# Patient Record
Sex: FEMALE | Race: WHITE | NOT HISPANIC OR LATINO | Employment: FULL TIME | URBAN - METROPOLITAN AREA
[De-identification: names, ages, dates, MRNs, and addresses within clinical notes are randomized per-mention and may not be internally consistent; named-entity substitution may affect disease eponyms.]

---

## 2017-01-17 DIAGNOSIS — D68.61 ANTIPHOSPHOLIPID SYNDROME (HCC): ICD-10-CM

## 2017-01-17 DIAGNOSIS — Z12.31 ENCOUNTER FOR SCREENING MAMMOGRAM FOR MALIGNANT NEOPLASM OF BREAST: ICD-10-CM

## 2017-01-17 DIAGNOSIS — R92.2 INCONCLUSIVE MAMMOGRAM: ICD-10-CM

## 2017-01-17 DIAGNOSIS — M35.89 OTHER SPECIFIED SYSTEMIC INVOLVEMENT OF CONNECTIVE TISSUE (HCC): ICD-10-CM

## 2017-01-17 DIAGNOSIS — M94.0 CHONDROCOSTAL JUNCTION SYNDROME: ICD-10-CM

## 2017-01-17 DIAGNOSIS — Z13.6 ENCOUNTER FOR SCREENING FOR CARDIOVASCULAR DISORDERS: ICD-10-CM

## 2017-01-17 DIAGNOSIS — M54.50 LOW BACK PAIN: ICD-10-CM

## 2017-01-17 DIAGNOSIS — Z79.52 LONG TERM CURRENT USE OF SYSTEMIC STEROIDS: ICD-10-CM

## 2017-01-17 DIAGNOSIS — Z79.899 OTHER LONG TERM (CURRENT) DRUG THERAPY: ICD-10-CM

## 2017-02-06 ENCOUNTER — HOSPITAL ENCOUNTER (EMERGENCY)
Facility: HOSPITAL | Age: 47
Discharge: HOME | End: 2017-02-06
Attending: EMERGENCY MEDICINE
Payer: OTHER MISCELLANEOUS

## 2017-02-06 VITALS
BODY MASS INDEX: 23.54 KG/M2 | HEART RATE: 88 BPM | DIASTOLIC BLOOD PRESSURE: 72 MMHG | HEIGHT: 67 IN | OXYGEN SATURATION: 100 % | WEIGHT: 150 LBS | SYSTOLIC BLOOD PRESSURE: 136 MMHG | RESPIRATION RATE: 20 BRPM | TEMPERATURE: 97.6 F

## 2017-02-06 DIAGNOSIS — M54.16 LUMBAR RADICULOPATHY: Primary | ICD-10-CM
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
## 2017-02-06 PROCEDURE — 99283 EMERGENCY DEPT VISIT LOW MDM: CPT

## 2017-02-06 RX ORDER — PREDNISONE 1 MG/1
5 TABLET ORAL EVERY OTHER DAY
COMMUNITY
End: 2017-12-12 | Stop reason: HOSPADM

## 2017-02-06 RX ORDER — CYCLOBENZAPRINE HCL 10 MG
10 TABLET ORAL 3 TIMES DAILY PRN
Qty: 10 TABLET | Refills: 0 | Status: SHIPPED | OUTPATIENT
Start: 2017-02-06 | End: 2018-02-09

## 2017-02-06 RX ORDER — PREDNISONE 20 MG/1
60 TABLET ORAL ONCE
Status: COMPLETED | OUTPATIENT
Start: 2017-02-06 | End: 2017-02-06

## 2017-02-06 RX ORDER — PREDNISONE 1 MG/1
TABLET ORAL
Qty: 28 TABLET | Refills: 0 | Status: SHIPPED | OUTPATIENT
Start: 2017-02-06 | End: 2017-12-12 | Stop reason: HOSPADM

## 2017-02-06 RX ORDER — CYCLOBENZAPRINE HCL 10 MG
10 TABLET ORAL ONCE
Status: COMPLETED | OUTPATIENT
Start: 2017-02-06 | End: 2017-02-06

## 2017-02-06 RX ORDER — OXYCODONE HYDROCHLORIDE AND ACETAMINOPHEN 5; 325 MG/1; MG/1
1 TABLET ORAL EVERY 4 HOURS PRN
Qty: 15 TABLET | Refills: 0 | Status: SHIPPED | OUTPATIENT
Start: 2017-02-06 | End: 2017-12-12 | Stop reason: HOSPADM

## 2017-02-06 RX ORDER — OXYCODONE HYDROCHLORIDE AND ACETAMINOPHEN 5; 325 MG/1; MG/1
1 TABLET ORAL ONCE
Status: COMPLETED | OUTPATIENT
Start: 2017-02-06 | End: 2017-02-06

## 2017-02-06 RX ADMIN — OXYCODONE HYDROCHLORIDE AND ACETAMINOPHEN 1 TABLET: 5; 325 TABLET ORAL at 02:47

## 2017-02-06 RX ADMIN — PREDNISONE 60 MG: 20 TABLET ORAL at 02:47

## 2017-02-06 RX ADMIN — CYCLOBENZAPRINE HYDROCHLORIDE 10 MG: 10 TABLET, FILM COATED ORAL at 02:47

## 2017-02-10 ENCOUNTER — HOSPITAL ENCOUNTER (OUTPATIENT)
Dept: RADIOLOGY | Age: 47
Discharge: HOME | End: 2017-02-10
Payer: OTHER MISCELLANEOUS

## 2017-02-10 ENCOUNTER — GENERIC CONVERSION - ENCOUNTER (OUTPATIENT)
Dept: OTHER | Facility: OTHER | Age: 47
End: 2017-02-10

## 2017-02-10 ENCOUNTER — ALLSCRIPTS OFFICE VISIT (OUTPATIENT)
Dept: OTHER | Facility: OTHER | Age: 47
End: 2017-02-10

## 2017-02-10 ENCOUNTER — APPOINTMENT (OUTPATIENT)
Dept: URGENT CARE | Age: 47
End: 2017-02-10
Payer: OTHER MISCELLANEOUS

## 2017-02-10 ENCOUNTER — TRANSCRIBE ORDERS (OUTPATIENT)
Dept: ADMINISTRATIVE | Facility: HOSPITAL | Age: 47
End: 2017-02-10

## 2017-02-10 DIAGNOSIS — M54.50 LUMBAR PAIN: ICD-10-CM

## 2017-02-10 DIAGNOSIS — Z12.31 OTHER SCREENING MAMMOGRAM: Primary | ICD-10-CM

## 2017-02-10 DIAGNOSIS — Z12.31 OTHER SCREENING MAMMOGRAM: ICD-10-CM

## 2017-02-10 PROCEDURE — 72100 X-RAY EXAM L-S SPINE 2/3 VWS: CPT

## 2017-02-10 PROCEDURE — 99213 OFFICE O/P EST LOW 20 MIN: CPT

## 2017-02-13 ENCOUNTER — APPOINTMENT (OUTPATIENT)
Dept: LAB | Facility: HOSPITAL | Age: 47
End: 2017-02-13
Payer: COMMERCIAL

## 2017-02-13 ENCOUNTER — GENERIC CONVERSION - ENCOUNTER (OUTPATIENT)
Dept: OTHER | Facility: OTHER | Age: 47
End: 2017-02-13

## 2017-02-13 DIAGNOSIS — D68.61 ANTIPHOSPHOLIPID SYNDROME (HCC): ICD-10-CM

## 2017-02-13 DIAGNOSIS — Z79.899 OTHER LONG TERM (CURRENT) DRUG THERAPY: ICD-10-CM

## 2017-02-13 DIAGNOSIS — M54.50 LOW BACK PAIN: ICD-10-CM

## 2017-02-13 DIAGNOSIS — Z79.52 LONG TERM CURRENT USE OF SYSTEMIC STEROIDS: ICD-10-CM

## 2017-02-13 DIAGNOSIS — M35.89 OTHER SPECIFIED SYSTEMIC INVOLVEMENT OF CONNECTIVE TISSUE (HCC): ICD-10-CM

## 2017-02-13 DIAGNOSIS — Z13.6 ENCOUNTER FOR SCREENING FOR CARDIOVASCULAR DISORDERS: ICD-10-CM

## 2017-02-13 DIAGNOSIS — M94.0 CHONDROCOSTAL JUNCTION SYNDROME: ICD-10-CM

## 2017-02-13 LAB
ALBUMIN SERPL BCP-MCNC: 3.6 G/DL (ref 3.5–5)
ALP SERPL-CCNC: 57 U/L (ref 46–116)
ALT SERPL W P-5'-P-CCNC: 19 U/L (ref 12–78)
ANION GAP SERPL CALCULATED.3IONS-SCNC: 7 MMOL/L (ref 4–13)
AST SERPL W P-5'-P-CCNC: 14 U/L (ref 5–45)
BASOPHILS # BLD AUTO: 0 THOUSANDS/ΜL (ref 0–0.1)
BASOPHILS NFR BLD AUTO: 1 % (ref 0–1)
BILIRUB SERPL-MCNC: 0.2 MG/DL (ref 0.2–1)
BILIRUB UR QL STRIP: NEGATIVE
BUN SERPL-MCNC: 13 MG/DL (ref 5–25)
CALCIUM SERPL-MCNC: 8.5 MG/DL (ref 8.3–10.1)
CHLORIDE SERPL-SCNC: 102 MMOL/L (ref 100–108)
CHOLEST SERPL-MCNC: 141 MG/DL (ref 50–200)
CLARITY UR: CLEAR
CO2 SERPL-SCNC: 29 MMOL/L (ref 21–32)
COLOR UR: YELLOW
CREAT SERPL-MCNC: 0.75 MG/DL (ref 0.6–1.3)
CREAT UR-MCNC: 234 MG/DL
CRP SERPL QL: <3 MG/L
EOSINOPHIL # BLD AUTO: 0.2 THOUSAND/ΜL (ref 0–0.61)
EOSINOPHIL NFR BLD AUTO: 4 % (ref 0–6)
ERYTHROCYTE [DISTWIDTH] IN BLOOD BY AUTOMATED COUNT: 12.9 % (ref 11.6–15.1)
ERYTHROCYTE [SEDIMENTATION RATE] IN BLOOD: 11 MM/HOUR (ref 2–25)
GFR SERPL CREATININE-BSD FRML MDRD: >60 ML/MIN/1.73SQ M
GLUCOSE SERPL-MCNC: 86 MG/DL (ref 65–140)
GLUCOSE UR STRIP-MCNC: NEGATIVE MG/DL
HCT VFR BLD AUTO: 37.2 % (ref 37–47)
HDLC SERPL-MCNC: 58 MG/DL (ref 40–60)
HGB BLD-MCNC: 12.4 G/DL (ref 12–16)
HGB UR QL STRIP.AUTO: NEGATIVE
KETONES UR STRIP-MCNC: ABNORMAL MG/DL
LDLC SERPL CALC-MCNC: 74 MG/DL (ref 0–100)
LEUKOCYTE ESTERASE UR QL STRIP: NEGATIVE
LYMPHOCYTES # BLD AUTO: 1.4 THOUSANDS/ΜL (ref 0.6–4.47)
LYMPHOCYTES NFR BLD AUTO: 33 % (ref 14–44)
MCH RBC QN AUTO: 30.3 PG (ref 27–31)
MCHC RBC AUTO-ENTMCNC: 33.5 G/DL (ref 31.4–37.4)
MCV RBC AUTO: 91 FL (ref 82–98)
MONOCYTES # BLD AUTO: 0.6 THOUSAND/ΜL (ref 0.17–1.22)
MONOCYTES NFR BLD AUTO: 15 % (ref 4–12)
NEUTROPHILS # BLD AUTO: 2 THOUSANDS/ΜL (ref 1.85–7.62)
NEUTS SEG NFR BLD AUTO: 47 % (ref 43–75)
NITRITE UR QL STRIP: NEGATIVE
NRBC BLD AUTO-RTO: 0 /100 WBCS
PH UR STRIP.AUTO: 5.5 [PH] (ref 5–9)
PLATELET # BLD AUTO: 246 THOUSANDS/UL (ref 130–400)
PMV BLD AUTO: 6.7 FL (ref 8.9–12.7)
POTASSIUM SERPL-SCNC: 3.3 MMOL/L (ref 3.5–5.3)
PROT SERPL-MCNC: 7.4 G/DL (ref 6.4–8.2)
PROT UR STRIP-MCNC: NEGATIVE MG/DL
PROT UR-MCNC: 21 MG/DL
PROT/CREAT UR: 0.09 MG/G{CREAT} (ref 0–0.1)
RBC # BLD AUTO: 4.11 MILLION/UL (ref 4.2–5.4)
SODIUM SERPL-SCNC: 138 MMOL/L (ref 136–145)
SP GR UR STRIP.AUTO: >=1.03 (ref 1–1.03)
TRIGL SERPL-MCNC: 47 MG/DL
TSH SERPL DL<=0.05 MIU/L-ACNC: 3.68 UIU/ML (ref 0.36–3.74)
UROBILINOGEN UR QL STRIP.AUTO: 0.2 E.U./DL
WBC # BLD AUTO: 4.2 THOUSAND/UL (ref 4.8–10.8)

## 2017-02-13 PROCEDURE — 80053 COMPREHEN METABOLIC PANEL: CPT

## 2017-02-13 PROCEDURE — 80061 LIPID PANEL: CPT

## 2017-02-13 PROCEDURE — 86147 CARDIOLIPIN ANTIBODY EA IG: CPT

## 2017-02-13 PROCEDURE — 82570 ASSAY OF URINE CREATININE: CPT

## 2017-02-13 PROCEDURE — 81003 URINALYSIS AUTO W/O SCOPE: CPT

## 2017-02-13 PROCEDURE — 84156 ASSAY OF PROTEIN URINE: CPT

## 2017-02-13 PROCEDURE — 86140 C-REACTIVE PROTEIN: CPT

## 2017-02-13 PROCEDURE — 84443 ASSAY THYROID STIM HORMONE: CPT

## 2017-02-13 PROCEDURE — 85652 RBC SED RATE AUTOMATED: CPT

## 2017-02-13 PROCEDURE — 85025 COMPLETE CBC W/AUTO DIFF WBC: CPT

## 2017-02-13 PROCEDURE — 36415 COLL VENOUS BLD VENIPUNCTURE: CPT

## 2017-02-14 LAB
CARDIOLIPIN IGA SER IA-ACNC: <9 APL U/ML (ref 0–11)
CARDIOLIPIN IGG SER IA-ACNC: <9 GPL U/ML (ref 0–14)
CARDIOLIPIN IGM SER IA-ACNC: 42 MPL U/ML (ref 0–12)

## 2017-02-22 ENCOUNTER — ALLSCRIPTS OFFICE VISIT (OUTPATIENT)
Dept: OTHER | Facility: OTHER | Age: 47
End: 2017-02-22

## 2017-02-28 ENCOUNTER — GENERIC CONVERSION - ENCOUNTER (OUTPATIENT)
Dept: OTHER | Facility: OTHER | Age: 47
End: 2017-02-28

## 2017-02-28 ENCOUNTER — HOSPITAL ENCOUNTER (OUTPATIENT)
Dept: MAMMOGRAPHY | Facility: HOSPITAL | Age: 47
Discharge: HOME | End: 2017-02-28
Payer: COMMERCIAL

## 2017-02-28 DIAGNOSIS — Z12.31 ENCOUNTER FOR SCREENING MAMMOGRAM FOR MALIGNANT NEOPLASM OF BREAST: ICD-10-CM

## 2017-02-28 DIAGNOSIS — R92.2 INCONCLUSIVE MAMMOGRAM: ICD-10-CM

## 2017-02-28 PROCEDURE — G0202 SCR MAMMO BI INCL CAD: HCPCS

## 2017-05-11 ENCOUNTER — ALLSCRIPTS OFFICE VISIT (OUTPATIENT)
Dept: OTHER | Facility: OTHER | Age: 47
End: 2017-05-11

## 2017-05-11 DIAGNOSIS — Z79.52 LONG TERM CURRENT USE OF SYSTEMIC STEROIDS: ICD-10-CM

## 2017-05-11 DIAGNOSIS — Z79.899 OTHER LONG TERM (CURRENT) DRUG THERAPY: ICD-10-CM

## 2017-05-11 DIAGNOSIS — D68.61 ANTIPHOSPHOLIPID SYNDROME (HCC): ICD-10-CM

## 2017-05-11 DIAGNOSIS — M35.89 OTHER SPECIFIED SYSTEMIC INVOLVEMENT OF CONNECTIVE TISSUE (HCC): ICD-10-CM

## 2017-06-05 ENCOUNTER — TRANSCRIBE ORDERS (OUTPATIENT)
Dept: ADMINISTRATIVE | Facility: HOSPITAL | Age: 47
End: 2017-06-05

## 2017-06-05 ENCOUNTER — LAB (OUTPATIENT)
Dept: LAB | Facility: HOSPITAL | Age: 47
End: 2017-06-05
Payer: COMMERCIAL

## 2017-06-05 DIAGNOSIS — Z79.899 OTHER LONG TERM (CURRENT) DRUG THERAPY: ICD-10-CM

## 2017-06-05 DIAGNOSIS — M35.89 OTHER SPECIFIED SYSTEMIC INVOLVEMENT OF CONNECTIVE TISSUE (HCC): ICD-10-CM

## 2017-06-05 DIAGNOSIS — D68.61 ANTIPHOSPHOLIPID SYNDROME (HCC): ICD-10-CM

## 2017-06-05 DIAGNOSIS — Z79.52 LONG TERM CURRENT USE OF SYSTEMIC STEROIDS: ICD-10-CM

## 2017-06-05 LAB
ALBUMIN SERPL BCP-MCNC: 3.6 G/DL (ref 3.5–5)
ALP SERPL-CCNC: 45 U/L (ref 46–116)
ALT SERPL W P-5'-P-CCNC: 21 U/L (ref 12–78)
ANION GAP SERPL CALCULATED.3IONS-SCNC: 8 MMOL/L (ref 4–13)
AST SERPL W P-5'-P-CCNC: 17 U/L (ref 5–45)
BACTERIA UR QL AUTO: ABNORMAL /HPF
BASOPHILS # BLD AUTO: 0 THOUSANDS/ΜL (ref 0–0.1)
BASOPHILS NFR BLD AUTO: 0 % (ref 0–1)
BILIRUB SERPL-MCNC: 0.5 MG/DL (ref 0.2–1)
BILIRUB UR QL STRIP: NEGATIVE
BUN SERPL-MCNC: 9 MG/DL (ref 5–25)
CALCIUM SERPL-MCNC: 8.5 MG/DL (ref 8.3–10.1)
CHLORIDE SERPL-SCNC: 104 MMOL/L (ref 100–108)
CLARITY UR: CLEAR
CO2 SERPL-SCNC: 27 MMOL/L (ref 21–32)
COLOR UR: YELLOW
CREAT SERPL-MCNC: 0.94 MG/DL (ref 0.6–1.3)
CREAT UR-MCNC: 305 MG/DL
CRP SERPL QL: <3 MG/L
EOSINOPHIL # BLD AUTO: 0.2 THOUSAND/ΜL (ref 0–0.61)
EOSINOPHIL NFR BLD AUTO: 4 % (ref 0–6)
ERYTHROCYTE [DISTWIDTH] IN BLOOD BY AUTOMATED COUNT: 13.5 % (ref 11.6–15.1)
ERYTHROCYTE [SEDIMENTATION RATE] IN BLOOD: 14 MM/HOUR (ref 2–25)
GFR SERPL CREATININE-BSD FRML MDRD: >60 ML/MIN/1.73SQ M
GLUCOSE P FAST SERPL-MCNC: 74 MG/DL (ref 65–99)
GLUCOSE UR STRIP-MCNC: NEGATIVE MG/DL
HCT VFR BLD AUTO: 37.6 % (ref 37–47)
HGB BLD-MCNC: 12.8 G/DL (ref 12–16)
HGB UR QL STRIP.AUTO: NEGATIVE
KETONES UR STRIP-MCNC: NEGATIVE MG/DL
LEUKOCYTE ESTERASE UR QL STRIP: NEGATIVE
LYMPHOCYTES # BLD AUTO: 1.5 THOUSANDS/ΜL (ref 0.6–4.47)
LYMPHOCYTES NFR BLD AUTO: 41 % (ref 14–44)
MCH RBC QN AUTO: 31.4 PG (ref 27–31)
MCHC RBC AUTO-ENTMCNC: 34 G/DL (ref 31.4–37.4)
MCV RBC AUTO: 93 FL (ref 82–98)
MONOCYTES # BLD AUTO: 0.5 THOUSAND/ΜL (ref 0.17–1.22)
MONOCYTES NFR BLD AUTO: 13 % (ref 4–12)
MUCOUS THREADS UR QL AUTO: ABNORMAL
NEUTROPHILS # BLD AUTO: 1.5 THOUSANDS/ΜL (ref 1.85–7.62)
NEUTS SEG NFR BLD AUTO: 42 % (ref 43–75)
NITRITE UR QL STRIP: NEGATIVE
NON-SQ EPI CELLS URNS QL MICRO: ABNORMAL /HPF
NRBC BLD AUTO-RTO: 0 /100 WBCS
PH UR STRIP.AUTO: 5.5 [PH] (ref 5–9)
PLATELET # BLD AUTO: 258 THOUSANDS/UL (ref 130–400)
PMV BLD AUTO: 6.9 FL (ref 8.9–12.7)
POTASSIUM SERPL-SCNC: 3.4 MMOL/L (ref 3.5–5.3)
PROT SERPL-MCNC: 7.3 G/DL (ref 6.4–8.2)
PROT UR STRIP-MCNC: ABNORMAL MG/DL
PROT UR-MCNC: 48 MG/DL
PROT/CREAT UR: 0.16 MG/G{CREAT} (ref 0–0.1)
RBC # BLD AUTO: 4.06 MILLION/UL (ref 4.2–5.4)
RBC #/AREA URNS AUTO: ABNORMAL /HPF
SODIUM SERPL-SCNC: 139 MMOL/L (ref 136–145)
SP GR UR STRIP.AUTO: >=1.03 (ref 1–1.03)
UROBILINOGEN UR QL STRIP.AUTO: 0.2 E.U./DL
WBC # BLD AUTO: 3.7 THOUSAND/UL (ref 4.8–10.8)
WBC #/AREA URNS AUTO: ABNORMAL /HPF

## 2017-06-05 PROCEDURE — 85652 RBC SED RATE AUTOMATED: CPT

## 2017-06-05 PROCEDURE — 36415 COLL VENOUS BLD VENIPUNCTURE: CPT

## 2017-06-05 PROCEDURE — 80053 COMPREHEN METABOLIC PANEL: CPT

## 2017-06-05 PROCEDURE — 85025 COMPLETE CBC W/AUTO DIFF WBC: CPT

## 2017-06-05 PROCEDURE — 82570 ASSAY OF URINE CREATININE: CPT

## 2017-06-05 PROCEDURE — 86140 C-REACTIVE PROTEIN: CPT

## 2017-06-05 PROCEDURE — 81001 URINALYSIS AUTO W/SCOPE: CPT

## 2017-06-05 PROCEDURE — 84156 ASSAY OF PROTEIN URINE: CPT

## 2017-08-11 ENCOUNTER — APPOINTMENT (OUTPATIENT)
Dept: LAB | Facility: CLINIC | Age: 47
End: 2017-08-11
Payer: COMMERCIAL

## 2017-08-11 ENCOUNTER — ALLSCRIPTS OFFICE VISIT (OUTPATIENT)
Dept: OTHER | Facility: OTHER | Age: 47
End: 2017-08-11

## 2017-08-11 ENCOUNTER — TRANSCRIBE ORDERS (OUTPATIENT)
Dept: LAB | Facility: CLINIC | Age: 47
End: 2017-08-11

## 2017-08-11 DIAGNOSIS — M35.89 OTHER SPECIFIED SYSTEMIC INVOLVEMENT OF CONNECTIVE TISSUE (HCC): ICD-10-CM

## 2017-08-11 DIAGNOSIS — Z79.899 OTHER LONG TERM (CURRENT) DRUG THERAPY: ICD-10-CM

## 2017-08-11 DIAGNOSIS — D68.61 ANTIPHOSPHOLIPID SYNDROME (HCC): ICD-10-CM

## 2017-08-11 DIAGNOSIS — Z00.8 HEALTH EXAMINATION IN POPULATION SURVEY: Primary | ICD-10-CM

## 2017-08-11 DIAGNOSIS — Z00.8 HEALTH EXAMINATION IN POPULATION SURVEY: ICD-10-CM

## 2017-08-11 LAB
ALBUMIN SERPL BCP-MCNC: 3.7 G/DL (ref 3.5–5)
ALP SERPL-CCNC: 50 U/L (ref 46–116)
ALT SERPL W P-5'-P-CCNC: 23 U/L (ref 12–78)
ANION GAP SERPL CALCULATED.3IONS-SCNC: 7 MMOL/L (ref 4–13)
AST SERPL W P-5'-P-CCNC: 18 U/L (ref 5–45)
BASOPHILS # BLD AUTO: 0.03 THOUSANDS/ΜL (ref 0–0.1)
BASOPHILS NFR BLD AUTO: 1 % (ref 0–1)
BILIRUB SERPL-MCNC: 0.7 MG/DL (ref 0.2–1)
BUN SERPL-MCNC: 7 MG/DL (ref 5–25)
CALCIUM SERPL-MCNC: 9.1 MG/DL (ref 8.3–10.1)
CHLORIDE SERPL-SCNC: 103 MMOL/L (ref 100–108)
CHOLEST SERPL-MCNC: 169 MG/DL (ref 50–200)
CO2 SERPL-SCNC: 29 MMOL/L (ref 21–32)
CREAT SERPL-MCNC: 0.88 MG/DL (ref 0.6–1.3)
CRP SERPL QL: <3 MG/L
EOSINOPHIL # BLD AUTO: 0.21 THOUSAND/ΜL (ref 0–0.61)
EOSINOPHIL NFR BLD AUTO: 6 % (ref 0–6)
ERYTHROCYTE [DISTWIDTH] IN BLOOD BY AUTOMATED COUNT: 12.4 % (ref 11.6–15.1)
ERYTHROCYTE [SEDIMENTATION RATE] IN BLOOD: 10 MM/HOUR (ref 0–20)
EST. AVERAGE GLUCOSE BLD GHB EST-MCNC: 111 MG/DL
GFR SERPL CREATININE-BSD FRML MDRD: 80 ML/MIN/1.73SQ M
GLUCOSE P FAST SERPL-MCNC: 88 MG/DL (ref 65–99)
HBA1C MFR BLD: 5.5 % (ref 4.2–6.3)
HCT VFR BLD AUTO: 41.9 % (ref 34.8–46.1)
HDLC SERPL-MCNC: 62 MG/DL (ref 40–60)
HGB BLD-MCNC: 14.1 G/DL (ref 11.5–15.4)
LDLC SERPL CALC-MCNC: 96 MG/DL (ref 0–100)
LYMPHOCYTES # BLD AUTO: 1.5 THOUSANDS/ΜL (ref 0.6–4.47)
LYMPHOCYTES NFR BLD AUTO: 43 % (ref 14–44)
MCH RBC QN AUTO: 30.3 PG (ref 26.8–34.3)
MCHC RBC AUTO-ENTMCNC: 33.7 G/DL (ref 31.4–37.4)
MCV RBC AUTO: 90 FL (ref 82–98)
MONOCYTES # BLD AUTO: 0.41 THOUSAND/ΜL (ref 0.17–1.22)
MONOCYTES NFR BLD AUTO: 12 % (ref 4–12)
NEUTROPHILS # BLD AUTO: 1.34 THOUSANDS/ΜL (ref 1.85–7.62)
NEUTS SEG NFR BLD AUTO: 38 % (ref 43–75)
PLATELET # BLD AUTO: 212 THOUSANDS/UL (ref 149–390)
PMV BLD AUTO: 9 FL (ref 8.9–12.7)
POTASSIUM SERPL-SCNC: 4.3 MMOL/L (ref 3.5–5.3)
PROT SERPL-MCNC: 7.9 G/DL (ref 6.4–8.2)
RBC # BLD AUTO: 4.66 MILLION/UL (ref 3.81–5.12)
SODIUM SERPL-SCNC: 139 MMOL/L (ref 136–145)
TRIGL SERPL-MCNC: 57 MG/DL
WBC # BLD AUTO: 3.49 THOUSAND/UL (ref 4.31–10.16)

## 2017-08-11 PROCEDURE — 85025 COMPLETE CBC W/AUTO DIFF WBC: CPT

## 2017-08-11 PROCEDURE — 85652 RBC SED RATE AUTOMATED: CPT

## 2017-08-11 PROCEDURE — 80061 LIPID PANEL: CPT

## 2017-08-11 PROCEDURE — 86140 C-REACTIVE PROTEIN: CPT

## 2017-08-11 PROCEDURE — 80053 COMPREHEN METABOLIC PANEL: CPT

## 2017-08-11 PROCEDURE — 83036 HEMOGLOBIN GLYCOSYLATED A1C: CPT

## 2017-08-11 PROCEDURE — 36415 COLL VENOUS BLD VENIPUNCTURE: CPT

## 2017-10-09 ENCOUNTER — ALLSCRIPTS OFFICE VISIT (OUTPATIENT)
Dept: OTHER | Facility: OTHER | Age: 47
End: 2017-10-09

## 2017-10-09 DIAGNOSIS — D68.61 ANTIPHOSPHOLIPID SYNDROME (HCC): ICD-10-CM

## 2017-10-09 DIAGNOSIS — M35.89 OTHER SPECIFIED SYSTEMIC INVOLVEMENT OF CONNECTIVE TISSUE (HCC): ICD-10-CM

## 2017-10-09 DIAGNOSIS — N92.6 IRREGULAR MENSTRUATION: ICD-10-CM

## 2017-10-10 NOTE — PROGRESS NOTES
Assessment  1  Abnormal menstrual periods (626 2) (N92 6)    Plan  Abnormal menstrual periods    · (1) FSH; Status:Active; Requested XPI:[id number];    · (1) LH (LEUTINIZING HORMONE); Status:Active; Requested VDA:[id number];    · * US PELVIS COMPLETE (TRANSABDOMINAL AND TRANSVAGINAL); Status:Active; Requested XMA:[id number];    · 1 - Disha Matthews DO  (Obstetrics/Gynecology) Co-Management  *  Status: Hold For -  Scheduling  Requested for: [id number]  Care Summary provided  : Yes    Discussion/Summary    New menstrual problems - referred to gynecologist       Chief Complaint  personal issues rmklpn      History of Present Illness  HPI: she has been have menstrual cycle issues for the past couple of months  She would getting bleeding for a day and it would be light  Her normal cycles are 3-5 days  then sometimes it is just brown  then she went 90 days without getting it  over the past 2 months she started with an odor between cycles  She has not had a change in discharge  has noticed that her bladder is not emptying all the way right away  Active Problems  1  Antiphospholipid antibody syndrome (289 81) (D68 61)   2  Body mass index (BMI) of 24 0 to 24 9 in adult (V85 1) (Z68 24)   3  Chronic low back pain (724 2,338 29) (M54 5,G89 29)   4  Connective tissue disease overlap syndrome (710 8) (M35 8)   5  Costochondritis (733 6) (M94 0)   6  Dense breasts (793 82) (R92 2)   7  Long term (current) use of systemic steroids (V58 65) (Z79 52)   8  Long-term use of hydroxychloroquine (V58 69) (C30 000)    Past Medical History  1  History of Abnormal serum level of lipase (790 5) (R74 8)   2  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)   3  History of Costochondritis (733 6) (M94 0)   4  History of Elevated liver enzymes (790 5) (R74 8)   5  History of Elevated sed rate (790 1) (R70 0)   6  History of Encounter for routine pelvic examination (V72 31) (Z01 419)   7   History of Encounter for screening mammogram for malignant neoplasm of breast   (V76 12) (Z12 31)   8  History of Fever chills (780 60) (R50 9)   9  History of dermatitis (V13 3) (Z87 2)   10  History of erythema nodosum (V13 3) (Z87 2)   11  History of infectious mononucleosis (V12 09) (Z86 19)   12  History of Limb pain (729 5) (M79 609)   13  History of Myalgia (729 1) (M79 1)   14  History of Other bursitis of elbow, right elbow (726 39) (M70 31)   15  History of Other muscle spasm (728 85) (M62 838)   16  History of Polyarthralgia (719 49) (M25 50)   17  History of Positive cardiolipin antibodies (795 79) (R76 8)   18  History of Screening for cardiovascular condition (V81 2) (Z13 6)   19  History of Screening for diabetes mellitus (DM) (V77 1) (Z13 1)   20  History of Skin lesion (709 9) (L98 9)   21  History of Well adult on routine health check (V70 0) (Z00 00)    Family History  Father    1  Family history of gout (V18 19) (Z82 69)  Brother    2  Family history of Diabetes Mellitus (V18 0)  Maternal Grandmother    3  Family history of rheumatoid arthritis (V17 7) (Z82 61)  Cousin    4  Family history of systemic lupus erythematosus (V19 4) (Z82 69)  Maternal Relatives    5  Family history of Sjogren's disease (V17 89) (Z82 69)   6  Family history of systemic lupus erythematosus (V19 4) (Z82 69)   7  Family history of thyroid disease (V18 19) (Z83 49)  Family History    8  Denied: Family history of Crohn's disease   9  Denied: Family history of psoriasis   10  Denied: Family history of ulcerative colitis   11  Family history of Heart Disease (V17 49)    Social History   · 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   · Never a smoker   · History of Never a smoker   · No alcohol use   · No drug use    Surgical History  1  Denied: History Of Prior Surgery    Current Meds   1  Advil 200 MG Oral Tablet; TAKE 3 TABLET Daily PRN pain; Therapy: (Recorded:11Aug2017) to Recorded   2  Cyclobenzaprine HCl - 10 MG Oral Tablet;  Take 1/2 to 1 tablet PO TID prn spasms    Requested for: 72AGW9464; Last Rx:11May2017 Ordered   3  Hydroxychloroquine Sulfate 200 MG Oral Tablet; TAKE 2 TABLET Daily With Food; Therapy: 64NHF9820 to (Evaluate:07Nov2017)  Requested for: 10IHA1171; Last   Rx:11May2017 Ordered   4  Omega 3 CAPS; take 1 capsule daily; Therapy: (Recorded:11Aug2017) to Recorded   5  Percocet 5-325 MG Oral Tablet; Take 1 tablet daily; Therapy: (Recorded:10Nov2016) to Recorded   6  PredniSONE 5 MG Oral Tablet; Take 1 tablet daily; Therapy: 11Aug2017 to (Evaluate:07Feb2018); Last Rx:11Aug2017 Ordered   7  Turmeric 500 MG Oral Capsule; take 1 capsule daily; Therapy: (Recorded:10Feb2017) to Recorded    Allergies  1  AMOXICILLIN   2  Levaquin TABS    Vitals   Recorded: 43PCM2902 05:30PM Recorded: 92XDA5427 01:44PM   Temperature  98 2 F   Heart Rate  72   Respiration  18   Systolic  605   Diastolic  70   Height  5 ft 7 in   Weight  147 lb    BMI Calculated  23 02   BSA Calculated  1 77   LMP 08Oct2017      Physical Exam    Constitutional   General appearance: No acute distress, well appearing and well nourished  Ears, Nose, Mouth, and Throat   External inspection of ears and nose: Normal     Otoscopic examination: Tympanic membranes translucent with normal light reflex  Canals patent without erythema  Oropharynx: Normal with no erythema, edema, exudate or lesions  Pulmonary   Auscultation of lungs: Clear to auscultation  no rales or crackles were heard bilaterally  no rhonchi  no friction rub  no wheezing  Cardiovascular   Auscultation of heart: Normal rate and rhythm, normal S1 and S2, without murmurs  Abdomen   Abdomen: Non-tender, no masses      Musculoskeletal   Gait and station: Normal          Future Appointments    Date/Time Provider Specialty Site   10/13/2017 10:00 AM Kylah Salgado [facility] Rheumatology [address] [facility]     Signatures   Electronically signed by : Kamaljit New DO; Oct  9 2017  5:33PM EST                       (Author)

## 2017-10-11 ENCOUNTER — TRANSCRIBE ORDERS (OUTPATIENT)
Dept: ADMINISTRATIVE | Facility: HOSPITAL | Age: 47
End: 2017-10-11

## 2017-10-11 ENCOUNTER — HOSPITAL ENCOUNTER (OUTPATIENT)
Dept: ULTRASOUND IMAGING | Facility: HOSPITAL | Age: 47
Discharge: HOME | End: 2017-10-11
Payer: COMMERCIAL

## 2017-10-11 DIAGNOSIS — N92.6 IRREGULAR MENSTRUATION: ICD-10-CM

## 2017-10-11 PROCEDURE — 76830 TRANSVAGINAL US NON-OB: CPT

## 2017-10-11 PROCEDURE — 76856 US EXAM PELVIC COMPLETE: CPT

## 2017-10-13 ENCOUNTER — ALLSCRIPTS OFFICE VISIT (OUTPATIENT)
Dept: OTHER | Facility: OTHER | Age: 47
End: 2017-10-13

## 2017-10-16 ENCOUNTER — GENERIC CONVERSION - ENCOUNTER (OUTPATIENT)
Dept: OTHER | Facility: OTHER | Age: 47
End: 2017-10-16

## 2017-10-20 DIAGNOSIS — M35.89 OTHER SPECIFIED SYSTEMIC INVOLVEMENT OF CONNECTIVE TISSUE (HCC): ICD-10-CM

## 2017-10-20 DIAGNOSIS — D68.61 ANTIPHOSPHOLIPID SYNDROME (HCC): ICD-10-CM

## 2017-10-20 DIAGNOSIS — Z79.899 OTHER LONG TERM (CURRENT) DRUG THERAPY: ICD-10-CM

## 2017-10-26 ENCOUNTER — GENERIC CONVERSION - ENCOUNTER (OUTPATIENT)
Dept: OTHER | Facility: OTHER | Age: 47
End: 2017-10-26

## 2017-10-26 PROCEDURE — 88305 TISSUE EXAM BY PATHOLOGIST: CPT | Performed by: OBSTETRICS & GYNECOLOGY

## 2017-10-27 ENCOUNTER — LAB REQUISITION (OUTPATIENT)
Dept: LAB | Facility: HOSPITAL | Age: 47
End: 2017-10-27
Payer: COMMERCIAL

## 2017-10-27 DIAGNOSIS — N92.6 IRREGULAR MENSTRUATION: ICD-10-CM

## 2017-10-28 ENCOUNTER — APPOINTMENT (OUTPATIENT)
Dept: LAB | Facility: HOSPITAL | Age: 47
End: 2017-10-28
Payer: COMMERCIAL

## 2017-10-28 ENCOUNTER — TRANSCRIBE ORDERS (OUTPATIENT)
Dept: ADMINISTRATIVE | Facility: HOSPITAL | Age: 47
End: 2017-10-28

## 2017-10-28 DIAGNOSIS — D68.61 ANTIPHOSPHOLIPID SYNDROME (HCC): Primary | ICD-10-CM

## 2017-10-28 DIAGNOSIS — N92.6 IRREGULAR MENSTRUAL CYCLE: Primary | ICD-10-CM

## 2017-10-28 DIAGNOSIS — M35.89 EOSINOPHILIA MYALGIA SYNDROME (HCC): ICD-10-CM

## 2017-10-28 DIAGNOSIS — M35.89 OTHER SPECIFIED SYSTEMIC INVOLVEMENT OF CONNECTIVE TISSUE (HCC): ICD-10-CM

## 2017-10-28 DIAGNOSIS — D68.61 ANTIPHOSPHOLIPID SYNDROME (HCC): ICD-10-CM

## 2017-10-28 DIAGNOSIS — N92.6 IRREGULAR MENSTRUAL CYCLE: ICD-10-CM

## 2017-10-28 DIAGNOSIS — N92.6 IRREGULAR MENSTRUATION: ICD-10-CM

## 2017-10-28 LAB
ALBUMIN SERPL BCP-MCNC: 3.6 G/DL (ref 3.5–5)
ALP SERPL-CCNC: 50 U/L (ref 46–116)
ALT SERPL W P-5'-P-CCNC: 26 U/L (ref 12–78)
ANION GAP SERPL CALCULATED.3IONS-SCNC: 8 MMOL/L (ref 4–13)
AST SERPL W P-5'-P-CCNC: 16 U/L (ref 5–45)
BASOPHILS # BLD AUTO: 0 THOUSANDS/ΜL (ref 0–0.1)
BASOPHILS NFR BLD AUTO: 1 % (ref 0–1)
BILIRUB SERPL-MCNC: 0.3 MG/DL (ref 0.2–1)
BUN SERPL-MCNC: 15 MG/DL (ref 5–25)
CALCIUM SERPL-MCNC: 8.9 MG/DL (ref 8.3–10.1)
CHLORIDE SERPL-SCNC: 105 MMOL/L (ref 100–108)
CO2 SERPL-SCNC: 26 MMOL/L (ref 21–32)
CREAT SERPL-MCNC: 0.87 MG/DL (ref 0.6–1.3)
CRP SERPL QL: <3 MG/L
EOSINOPHIL # BLD AUTO: 0.2 THOUSAND/ΜL (ref 0–0.61)
EOSINOPHIL NFR BLD AUTO: 3 % (ref 0–6)
ERYTHROCYTE [DISTWIDTH] IN BLOOD BY AUTOMATED COUNT: 12.8 % (ref 11.6–15.1)
ERYTHROCYTE [SEDIMENTATION RATE] IN BLOOD: 14 MM/HOUR (ref 2–25)
FSH SERPL-ACNC: 4.4 MIU/ML
GFR SERPL CREATININE-BSD FRML MDRD: 81 ML/MIN/1.73SQ M
GLUCOSE P FAST SERPL-MCNC: 82 MG/DL (ref 65–99)
HCT VFR BLD AUTO: 39.1 % (ref 37–47)
HGB BLD-MCNC: 13 G/DL (ref 12–16)
LH SERPL-ACNC: 5.9 MIU/ML
LYMPHOCYTES # BLD AUTO: 1.7 THOUSANDS/ΜL (ref 0.6–4.47)
LYMPHOCYTES NFR BLD AUTO: 39 % (ref 14–44)
MCH RBC QN AUTO: 30.9 PG (ref 27–31)
MCHC RBC AUTO-ENTMCNC: 33.3 G/DL (ref 31.4–37.4)
MCV RBC AUTO: 93 FL (ref 82–98)
MONOCYTES # BLD AUTO: 0.4 THOUSAND/ΜL (ref 0.17–1.22)
MONOCYTES NFR BLD AUTO: 10 % (ref 4–12)
NEUTROPHILS # BLD AUTO: 2.1 THOUSANDS/ΜL (ref 1.85–7.62)
NEUTS SEG NFR BLD AUTO: 47 % (ref 43–75)
NRBC BLD AUTO-RTO: 0 /100 WBCS
PLATELET # BLD AUTO: 225 THOUSANDS/UL (ref 130–400)
PMV BLD AUTO: 6.7 FL (ref 8.9–12.7)
POTASSIUM SERPL-SCNC: 3.6 MMOL/L (ref 3.5–5.3)
PROT SERPL-MCNC: 7.4 G/DL (ref 6.4–8.2)
RBC # BLD AUTO: 4.22 MILLION/UL (ref 4.2–5.4)
SODIUM SERPL-SCNC: 139 MMOL/L (ref 136–145)
WBC # BLD AUTO: 4.4 THOUSAND/UL (ref 4.8–10.8)

## 2017-10-28 PROCEDURE — 86140 C-REACTIVE PROTEIN: CPT

## 2017-10-28 PROCEDURE — 86147 CARDIOLIPIN ANTIBODY EA IG: CPT

## 2017-10-28 PROCEDURE — 85025 COMPLETE CBC W/AUTO DIFF WBC: CPT

## 2017-10-28 PROCEDURE — 80053 COMPREHEN METABOLIC PANEL: CPT

## 2017-10-28 PROCEDURE — 83001 ASSAY OF GONADOTROPIN (FSH): CPT

## 2017-10-28 PROCEDURE — 83002 ASSAY OF GONADOTROPIN (LH): CPT

## 2017-10-28 PROCEDURE — 85652 RBC SED RATE AUTOMATED: CPT

## 2017-10-29 ENCOUNTER — GENERIC CONVERSION - ENCOUNTER (OUTPATIENT)
Dept: OTHER | Facility: OTHER | Age: 47
End: 2017-10-29

## 2017-10-30 LAB
CARDIOLIPIN IGA SER IA-ACNC: <9 APL U/ML (ref 0–11)
CARDIOLIPIN IGG SER IA-ACNC: 10 GPL U/ML (ref 0–14)
CARDIOLIPIN IGM SER IA-ACNC: 62 MPL U/ML (ref 0–12)

## 2017-11-08 ENCOUNTER — ALLSCRIPTS OFFICE VISIT (OUTPATIENT)
Dept: OTHER | Facility: OTHER | Age: 47
End: 2017-11-08

## 2017-11-29 ENCOUNTER — ALLSCRIPTS OFFICE VISIT (OUTPATIENT)
Dept: OTHER | Facility: OTHER | Age: 47
End: 2017-11-29

## 2017-11-29 LAB — HCG, QUALITATIVE (HISTORICAL): NEGATIVE

## 2017-11-29 PROCEDURE — 87624 HPV HI-RISK TYP POOLED RSLT: CPT | Performed by: OBSTETRICS & GYNECOLOGY

## 2017-11-29 PROCEDURE — 88305 TISSUE EXAM BY PATHOLOGIST: CPT | Performed by: OBSTETRICS & GYNECOLOGY

## 2017-11-29 PROCEDURE — G0145 SCR C/V CYTO,THINLAYER,RESCR: HCPCS | Performed by: OBSTETRICS & GYNECOLOGY

## 2017-11-30 ENCOUNTER — ALLSCRIPTS OFFICE VISIT (OUTPATIENT)
Dept: OTHER | Facility: OTHER | Age: 47
End: 2017-11-30

## 2017-12-01 ENCOUNTER — LAB REQUISITION (OUTPATIENT)
Dept: LAB | Facility: HOSPITAL | Age: 47
End: 2017-12-01
Payer: COMMERCIAL

## 2017-12-01 DIAGNOSIS — Z12.4 ENCOUNTER FOR SCREENING FOR MALIGNANT NEOPLASM OF CERVIX: ICD-10-CM

## 2017-12-01 DIAGNOSIS — N93.9 ABNORMAL UTERINE AND VAGINAL BLEEDING, UNSPECIFIED (CODE): ICD-10-CM

## 2017-12-05 ENCOUNTER — GENERIC CONVERSION - ENCOUNTER (OUTPATIENT)
Dept: OTHER | Facility: OTHER | Age: 47
End: 2017-12-05

## 2017-12-05 LAB — HPV RRNA GENITAL QL NAA+PROBE: NORMAL

## 2017-12-05 RX ORDER — OMEGA-3-ACID ETHYL ESTERS 1 G/1
2 CAPSULE, LIQUID FILLED ORAL 2 TIMES DAILY
COMMUNITY
End: 2018-02-09

## 2017-12-05 NOTE — PRE-PROCEDURE INSTRUCTIONS
Pre-Surgery Instructions:   Medication Instructions    cyclobenzaprine (FLEXERIL) 10 mg tablet Instructed patient per Anesthesia Guidelines   hydroxychloroquine (PLAQUENIL) 200 mg tablet Patient was instructed by Physician and understands   NON FORMULARY Instructed patient per Anesthesia Guidelines   omega-3-acid ethyl esters (LOVAZA) 1 g capsule Instructed patient per Anesthesia Guidelines

## 2017-12-06 ENCOUNTER — TRANSCRIBE ORDERS (OUTPATIENT)
Dept: LAB | Facility: CLINIC | Age: 47
End: 2017-12-06

## 2017-12-06 ENCOUNTER — APPOINTMENT (OUTPATIENT)
Dept: LAB | Facility: CLINIC | Age: 47
End: 2017-12-06
Payer: COMMERCIAL

## 2017-12-06 ENCOUNTER — GENERIC CONVERSION - ENCOUNTER (OUTPATIENT)
Dept: OTHER | Facility: OTHER | Age: 47
End: 2017-12-06

## 2017-12-06 DIAGNOSIS — N93.9 VAGINAL HEMORRHAGE: Primary | ICD-10-CM

## 2017-12-06 DIAGNOSIS — N93.9 VAGINAL HEMORRHAGE: ICD-10-CM

## 2017-12-06 LAB
ABO GROUP BLD: NORMAL
BASOPHILS # BLD AUTO: 0.01 THOUSANDS/ΜL (ref 0–0.1)
BASOPHILS NFR BLD AUTO: 0 % (ref 0–1)
BLD GP AB SCN SERPL QL: NEGATIVE
EOSINOPHIL # BLD AUTO: 0.16 THOUSAND/ΜL (ref 0–0.61)
EOSINOPHIL NFR BLD AUTO: 4 % (ref 0–6)
ERYTHROCYTE [DISTWIDTH] IN BLOOD BY AUTOMATED COUNT: 12.1 % (ref 11.6–15.1)
EST. AVERAGE GLUCOSE BLD GHB EST-MCNC: 91 MG/DL
HBA1C MFR BLD: 4.8 % (ref 4.2–6.3)
HCT VFR BLD AUTO: 41.3 % (ref 34.8–46.1)
HGB BLD-MCNC: 13.8 G/DL (ref 11.5–15.4)
LYMPHOCYTES # BLD AUTO: 1.56 THOUSANDS/ΜL (ref 0.6–4.47)
LYMPHOCYTES NFR BLD AUTO: 38 % (ref 14–44)
MCH RBC QN AUTO: 30.4 PG (ref 26.8–34.3)
MCHC RBC AUTO-ENTMCNC: 33.4 G/DL (ref 31.4–37.4)
MCV RBC AUTO: 91 FL (ref 82–98)
MONOCYTES # BLD AUTO: 0.48 THOUSAND/ΜL (ref 0.17–1.22)
MONOCYTES NFR BLD AUTO: 12 % (ref 4–12)
NEUTROPHILS # BLD AUTO: 1.88 THOUSANDS/ΜL (ref 1.85–7.62)
NEUTS SEG NFR BLD AUTO: 46 % (ref 43–75)
PLATELET # BLD AUTO: 279 THOUSANDS/UL (ref 149–390)
PMV BLD AUTO: 8.8 FL (ref 8.9–12.7)
RBC # BLD AUTO: 4.54 MILLION/UL (ref 3.81–5.12)
RH BLD: NEGATIVE
SPECIMEN EXPIRATION DATE: NORMAL
WBC # BLD AUTO: 4.09 THOUSAND/UL (ref 4.31–10.16)

## 2017-12-06 PROCEDURE — 83036 HEMOGLOBIN GLYCOSYLATED A1C: CPT

## 2017-12-06 PROCEDURE — 86901 BLOOD TYPING SEROLOGIC RH(D): CPT

## 2017-12-06 PROCEDURE — 36415 COLL VENOUS BLD VENIPUNCTURE: CPT

## 2017-12-06 PROCEDURE — 86850 RBC ANTIBODY SCREEN: CPT

## 2017-12-06 PROCEDURE — 86900 BLOOD TYPING SEROLOGIC ABO: CPT

## 2017-12-06 PROCEDURE — 85025 COMPLETE CBC W/AUTO DIFF WBC: CPT

## 2017-12-07 LAB
LAB AP GYN PRIMARY INTERPRETATION: NORMAL
Lab: NORMAL

## 2017-12-08 ENCOUNTER — GENERIC CONVERSION - ENCOUNTER (OUTPATIENT)
Dept: OTHER | Facility: OTHER | Age: 47
End: 2017-12-08

## 2017-12-10 ENCOUNTER — ANESTHESIA EVENT (OUTPATIENT)
Dept: PERIOP | Facility: HOSPITAL | Age: 47
End: 2017-12-10
Payer: COMMERCIAL

## 2017-12-11 ENCOUNTER — GENERIC CONVERSION - ENCOUNTER (OUTPATIENT)
Dept: OTHER | Facility: OTHER | Age: 47
End: 2017-12-11

## 2017-12-11 ENCOUNTER — ANESTHESIA (OUTPATIENT)
Dept: PERIOP | Facility: HOSPITAL | Age: 47
End: 2017-12-11
Payer: COMMERCIAL

## 2017-12-11 ENCOUNTER — HOSPITAL ENCOUNTER (OUTPATIENT)
Facility: HOSPITAL | Age: 47
Setting detail: OUTPATIENT SURGERY
Discharge: HOME | End: 2017-12-12
Attending: OBSTETRICS & GYNECOLOGY | Admitting: OBSTETRICS & GYNECOLOGY
Payer: COMMERCIAL

## 2017-12-11 DIAGNOSIS — N93.9 ABNORMAL UTERINE AND VAGINAL BLEEDING, UNSPECIFIED (CODE): ICD-10-CM

## 2017-12-11 LAB
EXT PREGNANCY TEST URINE: NEGATIVE
GLUCOSE SERPL-MCNC: 121 MG/DL (ref 65–140)
GLUCOSE SERPL-MCNC: 87 MG/DL (ref 65–140)

## 2017-12-11 PROCEDURE — 88307 TISSUE EXAM BY PATHOLOGIST: CPT | Performed by: OBSTETRICS & GYNECOLOGY

## 2017-12-11 PROCEDURE — 82948 REAGENT STRIP/BLOOD GLUCOSE: CPT

## 2017-12-11 PROCEDURE — 81025 URINE PREGNANCY TEST: CPT | Performed by: OBSTETRICS & GYNECOLOGY

## 2017-12-11 RX ORDER — ROCURONIUM BROMIDE 10 MG/ML
INJECTION, SOLUTION INTRAVENOUS AS NEEDED
Status: DISCONTINUED | OUTPATIENT
Start: 2017-12-11 | End: 2017-12-11 | Stop reason: SURG

## 2017-12-11 RX ORDER — HYDROXYCHLOROQUINE SULFATE 200 MG/1
400 TABLET, FILM COATED ORAL DAILY
Status: DISCONTINUED | OUTPATIENT
Start: 2017-12-12 | End: 2017-12-11

## 2017-12-11 RX ORDER — PHENAZOPYRIDINE HYDROCHLORIDE 100 MG/1
100 TABLET, FILM COATED ORAL ONCE
Status: COMPLETED | OUTPATIENT
Start: 2017-12-11 | End: 2017-12-11

## 2017-12-11 RX ORDER — HYDROXYCHLOROQUINE SULFATE 200 MG/1
400 TABLET, FILM COATED ORAL
Status: DISCONTINUED | OUTPATIENT
Start: 2017-12-12 | End: 2017-12-12 | Stop reason: HOSPADM

## 2017-12-11 RX ORDER — POLYETHYLENE GLYCOL 3350 17 G/17G
17 POWDER, FOR SOLUTION ORAL DAILY PRN
Status: DISCONTINUED | OUTPATIENT
Start: 2017-12-11 | End: 2017-12-12 | Stop reason: HOSPADM

## 2017-12-11 RX ORDER — OXYCODONE AND ACETAMINOPHEN 10; 325 MG/1; MG/1
1 TABLET ORAL EVERY 4 HOURS PRN
Qty: 15 TABLET | Refills: 0 | Status: SHIPPED | OUTPATIENT
Start: 2017-12-11 | End: 2017-12-21

## 2017-12-11 RX ORDER — OXYCODONE HYDROCHLORIDE AND ACETAMINOPHEN 5; 325 MG/1; MG/1
2 TABLET ORAL EVERY 4 HOURS PRN
Status: DISCONTINUED | OUTPATIENT
Start: 2017-12-11 | End: 2017-12-12 | Stop reason: HOSPADM

## 2017-12-11 RX ORDER — PROMETHAZINE HYDROCHLORIDE 25 MG/ML
25 INJECTION, SOLUTION INTRAMUSCULAR; INTRAVENOUS ONCE AS NEEDED
Status: DISCONTINUED | OUTPATIENT
Start: 2017-12-11 | End: 2017-12-11 | Stop reason: HOSPADM

## 2017-12-11 RX ORDER — MIDAZOLAM HYDROCHLORIDE 1 MG/ML
INJECTION INTRAMUSCULAR; INTRAVENOUS AS NEEDED
Status: DISCONTINUED | OUTPATIENT
Start: 2017-12-11 | End: 2017-12-11 | Stop reason: SURG

## 2017-12-11 RX ORDER — ONDANSETRON 2 MG/ML
INJECTION INTRAMUSCULAR; INTRAVENOUS AS NEEDED
Status: DISCONTINUED | OUTPATIENT
Start: 2017-12-11 | End: 2017-12-11 | Stop reason: SURG

## 2017-12-11 RX ORDER — CYCLOBENZAPRINE HCL 10 MG
10 TABLET ORAL 3 TIMES DAILY PRN
Status: DISCONTINUED | OUTPATIENT
Start: 2017-12-11 | End: 2017-12-12 | Stop reason: HOSPADM

## 2017-12-11 RX ORDER — ONDANSETRON 2 MG/ML
4 INJECTION INTRAMUSCULAR; INTRAVENOUS ONCE
Status: DISCONTINUED | OUTPATIENT
Start: 2017-12-11 | End: 2017-12-12 | Stop reason: HOSPADM

## 2017-12-11 RX ORDER — SODIUM CHLORIDE, SODIUM LACTATE, POTASSIUM CHLORIDE, CALCIUM CHLORIDE 600; 310; 30; 20 MG/100ML; MG/100ML; MG/100ML; MG/100ML
125 INJECTION, SOLUTION INTRAVENOUS CONTINUOUS
Status: DISCONTINUED | OUTPATIENT
Start: 2017-12-11 | End: 2017-12-11

## 2017-12-11 RX ORDER — LIDOCAINE HYDROCHLORIDE AND EPINEPHRINE 10; 10 MG/ML; UG/ML
INJECTION, SOLUTION INFILTRATION; PERINEURAL AS NEEDED
Status: DISCONTINUED | OUTPATIENT
Start: 2017-12-11 | End: 2017-12-11 | Stop reason: HOSPADM

## 2017-12-11 RX ORDER — IBUPROFEN 600 MG/1
600 TABLET ORAL EVERY 6 HOURS PRN
Qty: 30 TABLET | Refills: 0 | Status: SHIPPED | OUTPATIENT
Start: 2017-12-11 | End: 2018-02-09

## 2017-12-11 RX ORDER — SODIUM CHLORIDE, SODIUM LACTATE, POTASSIUM CHLORIDE, CALCIUM CHLORIDE 600; 310; 30; 20 MG/100ML; MG/100ML; MG/100ML; MG/100ML
125 INJECTION, SOLUTION INTRAVENOUS CONTINUOUS
Status: DISCONTINUED | OUTPATIENT
Start: 2017-12-11 | End: 2017-12-12

## 2017-12-11 RX ORDER — FENTANYL CITRATE 50 UG/ML
INJECTION, SOLUTION INTRAMUSCULAR; INTRAVENOUS AS NEEDED
Status: DISCONTINUED | OUTPATIENT
Start: 2017-12-11 | End: 2017-12-11 | Stop reason: SURG

## 2017-12-11 RX ORDER — GLYCOPYRROLATE 0.2 MG/ML
INJECTION INTRAMUSCULAR; INTRAVENOUS AS NEEDED
Status: DISCONTINUED | OUTPATIENT
Start: 2017-12-11 | End: 2017-12-11 | Stop reason: SURG

## 2017-12-11 RX ORDER — PROMETHAZINE HYDROCHLORIDE 25 MG/ML
12.5 INJECTION, SOLUTION INTRAMUSCULAR; INTRAVENOUS EVERY 6 HOURS PRN
Status: DISCONTINUED | OUTPATIENT
Start: 2017-12-11 | End: 2017-12-11

## 2017-12-11 RX ORDER — IBUPROFEN 600 MG/1
600 TABLET ORAL EVERY 6 HOURS SCHEDULED
Status: DISCONTINUED | OUTPATIENT
Start: 2017-12-11 | End: 2017-12-12 | Stop reason: HOSPADM

## 2017-12-11 RX ORDER — ACETAMINOPHEN 325 MG/1
650 TABLET ORAL EVERY 6 HOURS PRN
Status: DISCONTINUED | OUTPATIENT
Start: 2017-12-11 | End: 2017-12-12 | Stop reason: HOSPADM

## 2017-12-11 RX ORDER — ONDANSETRON 2 MG/ML
4 INJECTION INTRAMUSCULAR; INTRAVENOUS EVERY 6 HOURS PRN
Status: DISCONTINUED | OUTPATIENT
Start: 2017-12-11 | End: 2017-12-12 | Stop reason: HOSPADM

## 2017-12-11 RX ORDER — LIDOCAINE HYDROCHLORIDE 10 MG/ML
INJECTION, SOLUTION INFILTRATION; PERINEURAL AS NEEDED
Status: DISCONTINUED | OUTPATIENT
Start: 2017-12-11 | End: 2017-12-11 | Stop reason: SURG

## 2017-12-11 RX ORDER — SODIUM CHLORIDE 9 MG/ML
INJECTION, SOLUTION INTRAVENOUS CONTINUOUS PRN
Status: DISCONTINUED | OUTPATIENT
Start: 2017-12-11 | End: 2017-12-11 | Stop reason: SURG

## 2017-12-11 RX ORDER — SIMETHICONE 80 MG
80 TABLET,CHEWABLE ORAL EVERY 6 HOURS PRN
Status: DISCONTINUED | OUTPATIENT
Start: 2017-12-11 | End: 2017-12-12 | Stop reason: HOSPADM

## 2017-12-11 RX ORDER — ACETAMINOPHEN 325 MG/1
650 TABLET ORAL EVERY 6 HOURS PRN
Qty: 30 TABLET | Refills: 0 | Status: SHIPPED | OUTPATIENT
Start: 2017-12-11 | End: 2018-02-09

## 2017-12-11 RX ORDER — PROPOFOL 10 MG/ML
INJECTION, EMULSION INTRAVENOUS AS NEEDED
Status: DISCONTINUED | OUTPATIENT
Start: 2017-12-11 | End: 2017-12-11 | Stop reason: SURG

## 2017-12-11 RX ORDER — BUPIVACAINE HYDROCHLORIDE AND EPINEPHRINE 2.5; 5 MG/ML; UG/ML
INJECTION, SOLUTION INFILTRATION; PERINEURAL AS NEEDED
Status: DISCONTINUED | OUTPATIENT
Start: 2017-12-11 | End: 2017-12-11 | Stop reason: HOSPADM

## 2017-12-11 RX ORDER — SODIUM CHLORIDE 9 MG/ML
100 INJECTION, SOLUTION INTRAVENOUS CONTINUOUS
Status: DISCONTINUED | OUTPATIENT
Start: 2017-12-11 | End: 2017-12-11

## 2017-12-11 RX ORDER — IBUPROFEN 600 MG/1
600 TABLET ORAL EVERY 6 HOURS PRN
Status: DISCONTINUED | OUTPATIENT
Start: 2017-12-11 | End: 2017-12-11

## 2017-12-11 RX ORDER — PROMETHAZINE HYDROCHLORIDE 25 MG/ML
12.5 INJECTION, SOLUTION INTRAMUSCULAR; INTRAVENOUS EVERY 6 HOURS PRN
Status: DISCONTINUED | OUTPATIENT
Start: 2017-12-11 | End: 2017-12-12 | Stop reason: HOSPADM

## 2017-12-11 RX ADMIN — NEOSTIGMINE METHYLSULFATE 3 MG: 1 INJECTION, SOLUTION INTRAMUSCULAR; INTRAVENOUS; SUBCUTANEOUS at 10:11

## 2017-12-11 RX ADMIN — SODIUM CHLORIDE: 0.9 INJECTION, SOLUTION INTRAVENOUS at 08:05

## 2017-12-11 RX ADMIN — SODIUM CHLORIDE, SODIUM LACTATE, POTASSIUM CHLORIDE, AND CALCIUM CHLORIDE 125 ML/HR: .6; .31; .03; .02 INJECTION, SOLUTION INTRAVENOUS at 18:21

## 2017-12-11 RX ADMIN — LIDOCAINE HYDROCHLORIDE 50 MG: 10 INJECTION, SOLUTION INFILTRATION; PERINEURAL at 08:00

## 2017-12-11 RX ADMIN — MIDAZOLAM HYDROCHLORIDE 2 MG: 1 INJECTION, SOLUTION INTRAMUSCULAR; INTRAVENOUS at 07:52

## 2017-12-11 RX ADMIN — OXYCODONE HYDROCHLORIDE AND ACETAMINOPHEN 2 TABLET: 5; 325 TABLET ORAL at 22:37

## 2017-12-11 RX ADMIN — ONDANSETRON 4 MG: 2 INJECTION INTRAMUSCULAR; INTRAVENOUS at 10:05

## 2017-12-11 RX ADMIN — HYDROMORPHONE HYDROCHLORIDE 0.5 MG: 1 INJECTION, SOLUTION INTRAMUSCULAR; INTRAVENOUS; SUBCUTANEOUS at 08:16

## 2017-12-11 RX ADMIN — GLYCOPYRROLATE 0.6 MG: 0.2 INJECTION, SOLUTION INTRAMUSCULAR; INTRAVENOUS at 10:11

## 2017-12-11 RX ADMIN — PROPOFOL 200 MG: 10 INJECTION, EMULSION INTRAVENOUS at 08:00

## 2017-12-11 RX ADMIN — FENTANYL CITRATE 100 MCG: 50 INJECTION INTRAMUSCULAR; INTRAVENOUS at 08:00

## 2017-12-11 RX ADMIN — SIMETHICONE CHEW TAB 80 MG 80 MG: 80 TABLET ORAL at 22:24

## 2017-12-11 RX ADMIN — SODIUM CHLORIDE, SODIUM LACTATE, POTASSIUM CHLORIDE, AND CALCIUM CHLORIDE: .6; .31; .03; .02 INJECTION, SOLUTION INTRAVENOUS at 07:43

## 2017-12-11 RX ADMIN — IBUPROFEN 600 MG: 600 TABLET ORAL at 18:18

## 2017-12-11 RX ADMIN — PROMETHAZINE HYDROCHLORIDE 12.5 MG: 25 INJECTION INTRAMUSCULAR; INTRAVENOUS at 13:27

## 2017-12-11 RX ADMIN — ROCURONIUM BROMIDE 50 MG: 10 INJECTION INTRAVENOUS at 08:00

## 2017-12-11 RX ADMIN — HYDROMORPHONE HYDROCHLORIDE 0.5 MG: 1 INJECTION, SOLUTION INTRAMUSCULAR; INTRAVENOUS; SUBCUTANEOUS at 10:55

## 2017-12-11 RX ADMIN — ONDANSETRON 4 MG: 2 INJECTION INTRAMUSCULAR; INTRAVENOUS at 11:59

## 2017-12-11 RX ADMIN — DEXAMETHASONE SODIUM PHOSPHATE 10 MG: 10 INJECTION INTRAMUSCULAR; INTRAVENOUS at 08:16

## 2017-12-11 RX ADMIN — PHENAZOPYRIDINE HYDROCHLORIDE 100 MG: 100 TABLET ORAL at 07:43

## 2017-12-11 RX ADMIN — ONDANSETRON 4 MG: 2 INJECTION INTRAMUSCULAR; INTRAVENOUS at 18:18

## 2017-12-11 RX ADMIN — CEFAZOLIN SODIUM 1000 MG: 1 SOLUTION INTRAVENOUS at 07:54

## 2017-12-11 RX ADMIN — ENOXAPARIN SODIUM 40 MG: 40 INJECTION SUBCUTANEOUS at 07:48

## 2017-12-11 NOTE — ANESTHESIA POSTPROCEDURE EVALUATION
Post-Op Assessment Note      CV Status:  Stable    Mental Status:  Alert and awake    Hydration Status:  Euvolemic    PONV Controlled:  Controlled    Airway Patency:  Patent    Post Op Vitals Reviewed: Yes          Staff: Anesthesiologist           /54   Temp   98   Pulse  98   Resp 12   SpO2   99

## 2017-12-11 NOTE — OP NOTE
OPERATIVE REPORT  PATIENT NAME: Amy Diane    :  1970  MRN: [id number]  Pt Location: AN OR ROOM 03    SURGERY DATE: 2017    Surgeon(s) and Role:     * Adina Vázquez MD - Primary     * Ciarra Ya MD - Assisting     * Jake Burch - Assisting    Preop Diagnosis:  Abnormal uterine and vaginal bleeding, unspecified (CODE) [N93 9]    Post-Op Diagnosis Codes:     * Abnormal uterine and vaginal bleeding, unspecified (CODE) [N93 9]    Procedure(s) (LRB):  LAPAROSCOPIC ASSISTED VAGINAL HYSTERECTOMY; BILATERAL SALPINGECTOMY (N/A)  CYSTOSCOPY (N/A)    Specimen(s):  ID Type Source Tests Collected by Time Destination   1 : uterus with B/L fallopian tubes and cervix Tissue Uterus TISSUE EXAM Adina Vázquez MD 2017 2320        Estimated Blood Loss:   Minimal    Drains:       Anesthesia Type:   General    Operative Indications:  Abnormal uterine and vaginal bleeding, unspecified (CODE) [N93 9]    Operative Findings:  Normal appearing uterus, normal appearing tubes and ovaries bilaterally, bilateral ureteral jets noted on cysto  Normal appearing gallbladder and liver    Procedure Technique:  The patient was taken to the operating room where a time out was performed to confirm correct patient and correct procedure  Because of her positivive anticardiolipin antibodies, she was given Lovenox 40mg prior to her surgery  She was also given 100mg of pyridium  The patient was given preophylactic intravenous antibiotics  General anesthesia was established  The patient was then positioned on the operating table in the dorsal lithotomy position with the legs supported using stirrups  All pressure points were padded and a Tigist hugger was placed to maintain control of core body temperature  The patient was then prepped and draped in the usual sterile fashion  A covington catheter was inserted into the bladder and a weighted speculum was placed into the vagina    The WELSH retractor was placed into the vagina, and the anterior portion of the cervix was grasped with a single tooth tenaculum  The uterus sounded to 7cm  And a size 6 long manipulator was placed  Surgeons gloves were then changed, and attention was then turned to the abdomen, where a small  Incision was made infraumbilical   A 96TF trocar and sleeve were inserted through the incision into the peritoneum  Laparoscopic visualization confirmed the intraperitoneal insertion of the port  Pneumoperitoneum was then established using carbon dioxide  Subsequently, two additional small incisions were made and Two more ports ( 5mm, 5mm) were introduced under direct visualization  The patient was placed in trendelenburg, and attention was then turned to the uterine fundus, which was grasped at the level of the fallopian tube  The left ureter was identified to be well out of the surgical field  The enseal dissecting instrument was then used to coagulate and cut the round ligament with subsequent coagulation and cutting of the right utero-ovarian ligament  The mesosalpinx was then coagulated and cut using the enseal dissecting instrument  The broad ligament was then coagulated and cut to the level of the uterine arteries  The enseal was then used to coagulate the uterine artery along the left  A bladder flap was then created and dissected approximately half of the way across the uterus at the level of the lower uterine segment  The bladder was pushed down  The instruments were then switched in the ports and using the enseal, the right fallopian tube was coagulated and cut  We continued using the enseal to coagulate and cut the round ligament and then coagulated and cut down the right utero-ovarian ligament  The broad ligament was then coagulated and cut to the level of the uterine arteries  The enseal was used to coagulate the uterine artery on the right    A bladder flap was then completed using the enseal coming across the uterus to meet the incision from the left side  At this point in the procedure, the decision was made to proceed vaginally, and pneumoperitoneum was turned off  Attention was turned to the perineum  The weighted speculum was replaced in the vagina  The single tooth tenaculum and dilator was removed from the vagina  Two double tooth tenaculums were used to grasp the anterior and posterior cervix  The gustavo retractor was placed into the anterior portion of the vagina  A semilunar incision was made in the mucosa of the vaginal fornix just above the portio below the attachment of the bladder with a bovie  The remainder of the bladder was freed from the anterior surface of the uterus using sharp and blunt dissection with peanut dissectors  The posterior peritoneum was identified, and grasped using forceps, and the peritoneum was entered using metzenbaum scissors  The peritoneum was stitched to the vaginal wall  A goose neck speculum was then inserted into the peritoneal cavity  The uterosacrals were then identified and using the Jerson clamp, scissors, and 0-vicryl pop offs, there were clamped and cut  The bases of the cardinal ligaments were then clamped and cut in the same manner blaterally  The anterior peritoneum was entered with the metzenbaum scissors, and the gustavo was replaced into the peritoneal cavity  The broad ligament was cut cephalad using the Jerson clamp, scissors, and suture until the uterus was noted to be fully detached  There uterus, cervix, and bilateral tubes were then delivered through the vagina  The goose neck was removed from the vagina, and the weighted speculum was replaced  The Allis clamps were placed on the anterior, posterior, and lateral vaginal cuff  The vaginal cuff was closed using 0Vicryl  It was closed with interrupted figure 8 stitches  Good hemostasis was confirmed        Attention was then returned to the abdomen where on inspection good hemostasis was confirmed  Pneumoperitoneum was once again evacuated  The trochars were removed under direct visualization  The trochar incisions were closed using running absorbable suture histacryl  The covington was removed  Cystoscopy was then performed, and bilateral ureteral jets were noted the bladder was inspected and no obvious defects were noted  The urine was drained of cysto fluid, and cystoscope was removed  This ended the procedure  The patient was transferred to the recovery room in stable condition  All needle, sponge, and instrument counts were noted to be correct x 2 at the end of the procedure  I was present and participated in the procedure          Complications:   None    Patient Disposition:  PACU , hemodynamically stable and extubated and stable    SIGNATURE: Lolis Valencia MD  DATE: December 11, 2017  TIME: 10:29 AM

## 2017-12-11 NOTE — PROGRESS NOTES
Patient feeling lightheaded  Has not voided yet  Straight cath by myself for 600cc urine  Vitals stable  Abdomen soft, nondistended  Incisions c/d/i  Will attempt to void by 21:30  If she cannot void, will keep covington in overnight  CBC in AM, and will start lovenox after

## 2017-12-11 NOTE — DISCHARGE INSTRUCTIONS
Laparoscopically Assisted Vaginal Hysterectomy   WHAT YOU SHOULD KNOW:   Laparoscopically assisted vaginal hysterectomy (LAVH) is surgery to remove your uterus  Other organs, such as your ovaries and fallopian tubes, may also be removed  AFTER YOU LEAVE:   Medicines:   · NSAIDs  help decrease swelling, pain, and fever  This medicine is available with or without a doctor's order  NSAIDs can cause stomach bleeding or kidney problems in certain people  If you take blood thinner medicine, always ask your primary healthcare provider (PHP) if NSAIDs are safe for you  Always read the medicine label and follow directions  · Acetaminophen  decreases pain and fever  It is available without a doctor's order  Ask how much to take and how often to take it  Follow directions  Acetaminophen can cause liver damage if not taken correctly  · Prescription pain medicine  may be given  Ask your PHP how to take this medicine safely  · Take your medicine as directed  Contact your PHP if you think your medicine is not helping or if you have side effects  Tell him if you are allergic to any medicine  Keep a list of the medicines, vitamins, and herbs you take  Include the amounts, and when and why you take them  Bring the list or the pill bottles to follow-up visits  Carry your medicine list with you in case of an emergency  Follow up with your PHP or gynecologist as directed: You may need to return for more tests  Write down your questions so you remember to ask them during your visits  Rest as needed: You may feel like resting more after surgery  Slowly start to do more each day  Ask when you can return to your usual activities  Contact your PHP or gynecologist if:   · You have heavy vaginal bleeding that fills 1 or more sanitary pads in 1 hour  · You have a fever  · You have new or increasing bright red blood coming from your vagina or your incisions      · You have trouble urinating, burning when you urinate, or feel a need to urinate often  · You have trouble having a bowel movement  · You have yellow, green, or foul-smelling discharge coming from your vagina  · Your incision is red, swollen, or has pus or foul-smelling drainage coming from it  · You have pain that gets worse instead of better, or that is not controlled with your pain medicine  · Your skin is itchy, swollen, or has a rash  · You have questions or concerns about your condition or care  Seek care immediately or call 911 if:   · Your arm or leg feels warm, tender, and painful  It may look swollen and red  · You feel lightheaded, short of breath, and have chest pain  · You cough up blood  © 2014 [address] is for End User's use only and may not be sold, redistributed or otherwise used for commercial purposes  All illustrations and images included in CareNotes® are the copyrighted property of A D A M , Inc  or Darin Carreno  The above information is an  only  It is not intended as medical advice for individual conditions or treatments  Talk to your doctor, nurse or pharmacist before following any medical regimen to see if it is safe and effective for you

## 2017-12-11 NOTE — PROGRESS NOTES
After discussing, with the doctor, the possible option of staying overnight if the patient did not feel comfortable going home, pt  states wanting to wait in [facility] a bit longer and going home after voiding trial

## 2017-12-12 ENCOUNTER — GENERIC CONVERSION - ENCOUNTER (OUTPATIENT)
Dept: OTHER | Facility: OTHER | Age: 47
End: 2017-12-12

## 2017-12-12 VITALS
BODY MASS INDEX: 24.98 KG/M2 | HEART RATE: 105 BPM | HEIGHT: 67 IN | WEIGHT: 159.17 LBS | RESPIRATION RATE: 18 BRPM | SYSTOLIC BLOOD PRESSURE: 105 MMHG | DIASTOLIC BLOOD PRESSURE: 75 MMHG | TEMPERATURE: 98.6 F | OXYGEN SATURATION: 97 %

## 2017-12-12 DIAGNOSIS — G89.18 OTHER ACUTE POSTPROCEDURAL PAIN: ICD-10-CM

## 2017-12-12 DIAGNOSIS — R19.8 OTHER SPECIFIED SYMPTOMS AND SIGNS INVOLVING THE DIGESTIVE SYSTEM AND ABDOMEN: ICD-10-CM

## 2017-12-12 DIAGNOSIS — D62 ACUTE POSTHEMORRHAGIC ANEMIA: ICD-10-CM

## 2017-12-12 DIAGNOSIS — Z12.4 ENCOUNTER FOR SCREENING FOR MALIGNANT NEOPLASM OF CERVIX: ICD-10-CM

## 2017-12-12 DIAGNOSIS — R17 JAUNDICE: ICD-10-CM

## 2017-12-12 LAB
BASOPHILS # BLD AUTO: 0.01 THOUSANDS/ΜL (ref 0–0.1)
BASOPHILS NFR BLD AUTO: 0 % (ref 0–1)
EOSINOPHIL # BLD AUTO: 0.01 THOUSAND/ΜL (ref 0–0.61)
EOSINOPHIL NFR BLD AUTO: 0 % (ref 0–6)
ERYTHROCYTE [DISTWIDTH] IN BLOOD BY AUTOMATED COUNT: 12.3 % (ref 11.6–15.1)
ERYTHROCYTE [DISTWIDTH] IN BLOOD BY AUTOMATED COUNT: 12.3 % (ref 11.6–15.1)
HCT VFR BLD AUTO: 23.7 % (ref 34.8–46.1)
HCT VFR BLD AUTO: 23.9 % (ref 34.8–46.1)
HGB BLD-MCNC: 7.9 G/DL (ref 11.5–15.4)
HGB BLD-MCNC: 8.1 G/DL (ref 11.5–15.4)
LYMPHOCYTES # BLD AUTO: 2.26 THOUSANDS/ΜL (ref 0.6–4.47)
LYMPHOCYTES NFR BLD AUTO: 30 % (ref 14–44)
MCH RBC QN AUTO: 29.8 PG (ref 26.8–34.3)
MCH RBC QN AUTO: 30.2 PG (ref 26.8–34.3)
MCHC RBC AUTO-ENTMCNC: 33.3 G/DL (ref 31.4–37.4)
MCHC RBC AUTO-ENTMCNC: 33.9 G/DL (ref 31.4–37.4)
MCV RBC AUTO: 89 FL (ref 82–98)
MCV RBC AUTO: 89 FL (ref 82–98)
MONOCYTES # BLD AUTO: 0.8 THOUSAND/ΜL (ref 0.17–1.22)
MONOCYTES NFR BLD AUTO: 11 % (ref 4–12)
NEUTROPHILS # BLD AUTO: 4.36 THOUSANDS/ΜL (ref 1.85–7.62)
NEUTS SEG NFR BLD AUTO: 59 % (ref 43–75)
PLATELET # BLD AUTO: 204 THOUSANDS/UL (ref 149–390)
PLATELET # BLD AUTO: 209 THOUSANDS/UL (ref 149–390)
PMV BLD AUTO: 8.5 FL (ref 8.9–12.7)
PMV BLD AUTO: 8.8 FL (ref 8.9–12.7)
RBC # BLD AUTO: 2.65 MILLION/UL (ref 3.81–5.12)
RBC # BLD AUTO: 2.68 MILLION/UL (ref 3.81–5.12)
WBC # BLD AUTO: 7.26 THOUSAND/UL (ref 4.31–10.16)
WBC # BLD AUTO: 7.44 THOUSAND/UL (ref 4.31–10.16)

## 2017-12-12 PROCEDURE — 85025 COMPLETE CBC W/AUTO DIFF WBC: CPT | Performed by: OBSTETRICS & GYNECOLOGY

## 2017-12-12 PROCEDURE — 85027 COMPLETE CBC AUTOMATED: CPT | Performed by: OBSTETRICS & GYNECOLOGY

## 2017-12-12 RX ORDER — DOCUSATE SODIUM 100 MG/1
100 CAPSULE, LIQUID FILLED ORAL 2 TIMES DAILY
Status: DISCONTINUED | OUTPATIENT
Start: 2017-12-12 | End: 2017-12-12 | Stop reason: HOSPADM

## 2017-12-12 RX ADMIN — ACETAMINOPHEN 650 MG: 325 TABLET ORAL at 19:57

## 2017-12-12 RX ADMIN — DOCUSATE SODIUM 100 MG: 100 CAPSULE, LIQUID FILLED ORAL at 14:22

## 2017-12-12 RX ADMIN — IBUPROFEN 600 MG: 600 TABLET ORAL at 00:10

## 2017-12-12 RX ADMIN — IBUPROFEN 600 MG: 600 TABLET ORAL at 18:33

## 2017-12-12 RX ADMIN — IBUPROFEN 600 MG: 600 TABLET ORAL at 12:09

## 2017-12-12 RX ADMIN — DOCUSATE SODIUM 100 MG: 100 CAPSULE, LIQUID FILLED ORAL at 18:35

## 2017-12-12 RX ADMIN — SODIUM CHLORIDE, SODIUM LACTATE, POTASSIUM CHLORIDE, AND CALCIUM CHLORIDE 125 ML/HR: .6; .31; .03; .02 INJECTION, SOLUTION INTRAVENOUS at 00:59

## 2017-12-12 RX ADMIN — SIMETHICONE CHEW TAB 80 MG 80 MG: 80 TABLET ORAL at 04:45

## 2017-12-12 RX ADMIN — IBUPROFEN 600 MG: 600 TABLET ORAL at 05:03

## 2017-12-12 RX ADMIN — SODIUM CHLORIDE, SODIUM LACTATE, POTASSIUM CHLORIDE, AND CALCIUM CHLORIDE 125 ML/HR: .6; .31; .03; .02 INJECTION, SOLUTION INTRAVENOUS at 08:14

## 2017-12-12 RX ADMIN — HYDROXYCHLOROQUINE SULFATE 400 MG: 200 TABLET, FILM COATED ORAL at 08:08

## 2017-12-12 RX ADMIN — ONDANSETRON 4 MG: 2 INJECTION INTRAMUSCULAR; INTRAVENOUS at 12:23

## 2017-12-12 RX ADMIN — SIMETHICONE CHEW TAB 80 MG 80 MG: 80 TABLET ORAL at 12:09

## 2017-12-12 RX ADMIN — ENOXAPARIN SODIUM 40 MG: 40 INJECTION SUBCUTANEOUS at 14:23

## 2017-12-12 RX ADMIN — ACETAMINOPHEN 650 MG: 325 TABLET ORAL at 08:07

## 2017-12-12 NOTE — CASE MANAGEMENT
Initial Clinical Review    Age/Sex: 41 y o  female    Surgery Date: 12/11/2017      Procedure: LAPAROSCOPIC ASSISTED VAGINAL HYSTERECTOMY; BILATERAL SALPINGECTOMY (N/A)  CYSTOSCOPY (N/A)    Anesthesia: general     Admission Orders: Date/Time/Statement: 12/11/2017  1634 Outpatient No Charge Bed    Outpatient No Charge Bed (Order [id number])   ADT Patient Update   Date: 12/11/2017 Department: [address] [place] [address] Unit Released By: Yobani Quinn RN (auto-released) Authorizing: Noah Gee MD   Order History   Inpatient   Date/Time Action Taken User Additional Information   12/11/17 1633 Release Yobani Quinn RN (auto-released) VJDE LMVBK: [id number]   12/11/17 1633 Complete Yobain Quinn RN    Order Details     Frequency Duration Priority Order Class   Once 1  occurrence Routine Hospital Performed   Order Questions     Question Answer Comment   Admitting Physician Natasha COYLE          Vital Signs: /58   Pulse 100   Temp 98 3 °F (36 8 °C) (Oral)   Resp 18   Ht 5' 7" (1 702 m)   Wt 72 2 kg (159 lb 2 8 oz)   LMP 12/01/2017   SpO2 98%   BMI 24 93 kg/m²     Diet:        Diet Orders            Start     Ordered    12/11/17 1916  Room Service  Once     Question:  Type of Service  Answer:  Room Service - Appropriate with Assistance    12/11/17 1915 12/11/17 1731  Diet Regular; Regular House; Regular House  Diet effective now     Question Answer Comment   Diet Type Regular    Regular Regular House    Other Restriction(s): Regular House    RD to adjust diet per protocol? Yes        12/11/17 1733          Mobility: up as tolerated  DVT Prophylaxis: scds  Pain Control: motrin  Percocet prn - used x 1     Pain Medications             cyclobenzaprine (FLEXERIL) 10 mg tablet Take 1 tablet by mouth 3 (three) times a day as needed for muscle spasms for up to 10 doses    predniSONE 5 mg tablet Take 5 mg by mouth every other day    predniSONE 5 mg tablet Medrol Dosepak one take as directed    acetaminophen (TYLENOL) 325 mg tablet Take 2 tablets by mouth every 6 (six) hours as needed for mild pain    ibuprofen (MOTRIN) 600 mg tablet Take 1 tablet by mouth every 6 (six) hours as needed for mild pain    oxyCODONE-acetaminophen (PERCOCET)  mg per tablet Take 1 tablet by mouth every 4 (four) hours as needed for moderate pain for up to 10 days Max Daily Amount: 6 tablets    oxyCODONE-acetaminophen (PERCOCET) 5-325 mg per tablet Take 1 tablet by mouth every 4 (four) hours as needed for moderate pain for up to 15 doses Max Daily Amount: 6 tablets          OTHER ORDERS:  Po medications:  Plaquenil  ivf @ 125 cc/h  Voiding trial   zofran 4 iv prn - used x 2  Mylicon prn - used x 2  [address] [facility] in the [facility] by Darin Carreno for 2017  Network Utilization Review Department  Phone: [phone]; Fax [phone]  ATTENTION: The Network Utilization Review Department is now centralized for our 7 Facilities  Make a note that we have a new phone and fax numbers for our Department  Please call with any questions or concerns to [phone] and carefully follow the prompts so that you are directed to the right person  All voicemails are confidential  Fax any determinations, approvals, denials, and requests for initial or continue stay review clinical to [phone]  Due to HIGH CALL volume, it would be easier if you could please send faxed requests to expedite your requests and in part, help us provide discharge notifications faster

## 2017-12-12 NOTE — PROGRESS NOTES
Progress Note - OB/GYN  Breanna  Benedicto 41 y o  female MRN: [id number]  Unit/Bed#: -01 Encounter: [id number]      Subjective:  Patient has not yet voided and complains of gas pain  Pain is not incisional, but the feeling like gas is coming out  Patient is due to void at 12:30  She is ambulating  Patient is currently passing flatus and has had no bowel movement  She is tolerating PO, and denies nausea or vomitting  Patient denies fever, chills, chest pain, shortness of breath, or calf tenderness  Vitals: Blood pressure 106/58, pulse 100, temperature 98 3 °F (36 8 °C), temperature source Oral, resp  rate 18, height 5' 7" (1 702 m), weight 72 2 kg (159 lb 2 8 oz), last menstrual period 12/01/2017, SpO2 98 %, not currently breastfeeding  ,Body mass index is 24 93 kg/m²  Intake/Output Summary (Last 24 hours) at 12/12/17 1156  Last data filed at 12/12/17 0925   Gross per 24 hour   Intake          2171 25 ml   Output             2575 ml   Net          -403 75 ml       Invasive Devices     Peripheral Intravenous Line            Peripheral IV 12/11/17 Left Hand 1 day    Peripheral IV 12/11/17 Right;Ventral (anterior); Upper Forearm 1 day                Physical Exam:   GEN: Bryn Begun appears well, alert and oriented x 3, pleasant and cooperative   NECK: supple, no carotid bruits   HEART: regular rhythm, normal S1 and S2, no murmurs, clicks, gallops or rubs   LUNGS: clear to auscultation bilaterally; no wheezes, rales, or rhonchi   ABDOMEN: normal bowel sounds, soft, no tenderness, no distention, incisions c/d/i  EXTREMITIES: peripheral pulses normal; no clubbing, cyanosis, or edema  NEURO: no focal findings       Labs:   Admission on 12/11/2017   Component Date Value    EXT Preg Test, Ur 12/11/2017 Negative     POC Glucose 12/11/2017 87     POC Glucose 12/11/2017 121     WBC 12/12/2017 7 26     RBC 12/12/2017 2 65*    Hemoglobin 12/12/2017 7 9*    Hematocrit 12/12/2017 23 7*    MCV 12/12/2017 89     MCH 12/12/2017 29 8     MCHC 12/12/2017 33 3     RDW 12/12/2017 12 3     Platelets 03/66/8345 204     MPV 12/12/2017 8 8*    WBC 12/12/2017 7 44     RBC 12/12/2017 2 68*    Hemoglobin 12/12/2017 8 1*    Hematocrit 12/12/2017 23 9*    MCV 12/12/2017 89     MCH 12/12/2017 30 2     MCHC 12/12/2017 33 9     RDW 12/12/2017 12 3     MPV 12/12/2017 8 5*    Platelets 34/56/4770 209     Neutrophils Relative 12/12/2017 59     Lymphocytes Relative 12/12/2017 30     Monocytes Relative 12/12/2017 11     Eosinophils Relative 12/12/2017 0     Basophils Relative 12/12/2017 0     Neutrophils Absolute 12/12/2017 4 36     Lymphocytes Absolute 12/12/2017 2 26     Monocytes Absolute 12/12/2017 0 80     Eosinophils Absolute 12/12/2017 0 01     Basophils Absolute 12/12/2017 0 01             A/P: POD # 1 s/p MANJEET, SCARLETT, cysto for aub/pelvic pain   1) Pain- patient complains of gas pain  Simethicone given  Discussed ambulation  2) Acute blood loss anemia-hgb 13 8 pre-op to 7 9 --> 8 1 postop op  Platelets stable at 358  Vitals stable  Adequate UOP, however patiet is still due to void after covington has been removed  asymptomatic  3) Urinary retention- Patient straight cath yesterday @ 17:00 for 600cc of urine  She failed voiding trial, so covington was placed over night  Had 1liter uop overnight  [place] discontinued @ 9:00  Due to void by 13:00     4) Anitcardiolipin positive- Patient is to be on postop therapeutic dose of lovenox  Given her hemoglobin today, I have personally spoken with her hematology PA  They believe that patient is at high risk and still needs her therapeutic dose  We have discussed giving her a prophylactic dose of lovenox today, and a therapeutic dose tomorrow    They will check in with patient on Thursday  5) dispo- anticipate d/c home later today

## 2017-12-12 NOTE — PROGRESS NOTES
Per Dr Juan Ortega, pt DTV at 1300  Bladder scanned pt  At 1315 as pt  Had still not voided  Bladder scan volume-120cc  Paged Dr Juan Ortega to make her aware of bladder scan volume and she said to straight cath the pt  And assess again in 4 hours  Straight cath completed with the assistance of nurse [facility]  2 attempts required for correct placement  Output 125cc clear yellow urine  Pt  Tolerated well  Encouraged pt  To increase fluid intake  Will continue to monitor

## 2017-12-12 NOTE — PLAN OF CARE
Problem: INFECTION - ADULT  Goal: Absence of fever/infection during neutropenic period  INTERVENTIONS:  - Monitor WBC  - Implement neutropenic guidelines   Outcome: Completed Date Met: 12/12/17  n/a

## 2017-12-13 ENCOUNTER — APPOINTMENT (OUTPATIENT)
Dept: LAB | Facility: CLINIC | Age: 47
End: 2017-12-13
Payer: COMMERCIAL

## 2017-12-13 DIAGNOSIS — D62 ACUTE POSTHEMORRHAGIC ANEMIA: ICD-10-CM

## 2017-12-13 LAB
ERYTHROCYTE [DISTWIDTH] IN BLOOD BY AUTOMATED COUNT: 12.3 % (ref 11.6–15.1)
HCT VFR BLD AUTO: 23 % (ref 34.8–46.1)
HGB BLD-MCNC: 7.2 G/DL (ref 11.5–15.4)
MCH RBC QN AUTO: 29.8 PG (ref 26.8–34.3)
MCHC RBC AUTO-ENTMCNC: 31.3 G/DL (ref 31.4–37.4)
MCV RBC AUTO: 95 FL (ref 82–98)
PLATELET # BLD AUTO: 176 THOUSANDS/UL (ref 149–390)
PMV BLD AUTO: 9.1 FL (ref 8.9–12.7)
RBC # BLD AUTO: 2.42 MILLION/UL (ref 3.81–5.12)
WBC # BLD AUTO: 4.82 THOUSAND/UL (ref 4.31–10.16)

## 2017-12-13 PROCEDURE — 36415 COLL VENOUS BLD VENIPUNCTURE: CPT

## 2017-12-13 PROCEDURE — 85027 COMPLETE CBC AUTOMATED: CPT

## 2017-12-18 ENCOUNTER — GENERIC CONVERSION - ENCOUNTER (OUTPATIENT)
Dept: OTHER | Facility: OTHER | Age: 47
End: 2017-12-18

## 2017-12-18 ENCOUNTER — LAB CONVERSION - ENCOUNTER (OUTPATIENT)
Dept: OTHER | Facility: OTHER | Age: 47
End: 2017-12-18

## 2017-12-18 ENCOUNTER — APPOINTMENT (OUTPATIENT)
Dept: LAB | Facility: CLINIC | Age: 47
End: 2017-12-18
Payer: COMMERCIAL

## 2017-12-18 DIAGNOSIS — Z12.4 ENCOUNTER FOR SCREENING FOR MALIGNANT NEOPLASM OF CERVIX: ICD-10-CM

## 2017-12-18 DIAGNOSIS — R17 JAUNDICE: ICD-10-CM

## 2017-12-18 DIAGNOSIS — D62 ACUTE POSTHEMORRHAGIC ANEMIA: ICD-10-CM

## 2017-12-18 LAB
ABO GROUP BLD: NORMAL
ALBUMIN SERPL BCP-MCNC: 3.5 G/DL (ref 3.5–5)
ALP SERPL-CCNC: 59 U/L (ref 46–116)
ALT SERPL W P-5'-P-CCNC: 109 U/L (ref 12–78)
ANION GAP SERPL CALCULATED.3IONS-SCNC: 9 MMOL/L (ref 4–13)
AST SERPL W P-5'-P-CCNC: 106 U/L (ref 5–45)
BILIRUB SERPL-MCNC: 1.7 MG/DL (ref 0.2–1)
BLD GP AB SCN SERPL QL: NEGATIVE
BUN SERPL-MCNC: 16 MG/DL (ref 5–25)
CALCIUM SERPL-MCNC: 9 MG/DL (ref 8.3–10.1)
CHLORIDE SERPL-SCNC: 100 MMOL/L (ref 100–108)
CO2 SERPL-SCNC: 26 MMOL/L (ref 21–32)
CREAT SERPL-MCNC: 0.9 MG/DL (ref 0.6–1.3)
ERYTHROCYTE [DISTWIDTH] IN BLOOD BY AUTOMATED COUNT: 13.5 % (ref 11.6–15.1)
GFR SERPL CREATININE-BSD FRML MDRD: 77 ML/MIN/1.73SQ M
GLUCOSE SERPL-MCNC: 98 MG/DL (ref 65–140)
HCT VFR BLD AUTO: 29.5 % (ref 34.8–46.1)
HGB BLD-MCNC: 9.7 G/DL (ref 11.5–15.4)
MCH RBC QN AUTO: 30.7 PG (ref 26.8–34.3)
MCHC RBC AUTO-ENTMCNC: 32.9 G/DL (ref 31.4–37.4)
MCV RBC AUTO: 93 FL (ref 82–98)
PLATELET # BLD AUTO: 326 THOUSANDS/UL (ref 149–390)
PMV BLD AUTO: 8.2 FL (ref 8.9–12.7)
POTASSIUM SERPL-SCNC: 3.6 MMOL/L (ref 3.5–5.3)
PROT SERPL-MCNC: 8.1 G/DL (ref 6.4–8.2)
RBC # BLD AUTO: 3.16 MILLION/UL (ref 3.81–5.12)
RH BLD: NEGATIVE
SODIUM SERPL-SCNC: 135 MMOL/L (ref 136–145)
SPECIMEN EXPIRATION DATE: NORMAL
WBC # BLD AUTO: 5.95 THOUSAND/UL (ref 4.31–10.16)

## 2017-12-18 PROCEDURE — 86900 BLOOD TYPING SEROLOGIC ABO: CPT

## 2017-12-18 PROCEDURE — 86901 BLOOD TYPING SEROLOGIC RH(D): CPT

## 2017-12-18 PROCEDURE — 86850 RBC ANTIBODY SCREEN: CPT

## 2017-12-18 PROCEDURE — 36415 COLL VENOUS BLD VENIPUNCTURE: CPT

## 2017-12-18 PROCEDURE — 85027 COMPLETE CBC AUTOMATED: CPT

## 2017-12-18 PROCEDURE — 80053 COMPREHEN METABOLIC PANEL: CPT

## 2017-12-20 ENCOUNTER — HOSPITAL ENCOUNTER (OUTPATIENT)
Dept: ULTRASOUND IMAGING | Facility: HOSPITAL | Age: 47
Discharge: HOME | End: 2017-12-20
Attending: OBSTETRICS & GYNECOLOGY
Payer: COMMERCIAL

## 2017-12-20 DIAGNOSIS — R17 JAUNDICE: ICD-10-CM

## 2017-12-20 PROCEDURE — 76705 ECHO EXAM OF ABDOMEN: CPT

## 2017-12-26 ENCOUNTER — GENERIC CONVERSION - ENCOUNTER (OUTPATIENT)
Dept: OTHER | Facility: OTHER | Age: 47
End: 2017-12-26

## 2017-12-26 ENCOUNTER — HOSPITAL ENCOUNTER (OUTPATIENT)
Dept: CT IMAGING | Facility: HOSPITAL | Age: 47
Discharge: HOME | End: 2017-12-26
Attending: OBSTETRICS & GYNECOLOGY
Payer: COMMERCIAL

## 2017-12-26 ENCOUNTER — TRANSCRIBE ORDERS (OUTPATIENT)
Dept: ADMINISTRATIVE | Facility: HOSPITAL | Age: 47
End: 2017-12-26

## 2017-12-26 DIAGNOSIS — R07.89 CHEST WALL PAIN FOLLOWING SURGERY: ICD-10-CM

## 2017-12-26 DIAGNOSIS — G89.18 CHEST WALL PAIN FOLLOWING SURGERY: ICD-10-CM

## 2017-12-26 DIAGNOSIS — R10.2 ADNEXAL TENDERNESS, RIGHT: ICD-10-CM

## 2017-12-26 DIAGNOSIS — R10.2 ADNEXAL TENDERNESS, RIGHT: Primary | ICD-10-CM

## 2017-12-26 PROCEDURE — 74177 CT ABD & PELVIS W/CONTRAST: CPT

## 2017-12-26 RX ADMIN — IOHEXOL 100 ML: 350 INJECTION, SOLUTION INTRAVENOUS at 13:57

## 2017-12-26 RX ADMIN — IOHEXOL 50 ML: 240 INJECTION, SOLUTION INTRATHECAL; INTRAVASCULAR; INTRAVENOUS; ORAL at 13:57

## 2017-12-27 ENCOUNTER — APPOINTMENT (OUTPATIENT)
Dept: LAB | Facility: CLINIC | Age: 47
End: 2017-12-27
Payer: COMMERCIAL

## 2017-12-27 ENCOUNTER — GENERIC CONVERSION - ENCOUNTER (OUTPATIENT)
Dept: OTHER | Facility: OTHER | Age: 47
End: 2017-12-27

## 2017-12-27 DIAGNOSIS — G89.18 OTHER ACUTE POSTPROCEDURAL PAIN: ICD-10-CM

## 2017-12-27 DIAGNOSIS — R17 JAUNDICE: ICD-10-CM

## 2017-12-27 DIAGNOSIS — D62 ACUTE POSTHEMORRHAGIC ANEMIA: ICD-10-CM

## 2017-12-27 LAB
ALBUMIN SERPL BCP-MCNC: 3.2 G/DL (ref 3.5–5)
ALP SERPL-CCNC: 69 U/L (ref 46–116)
ALT SERPL W P-5'-P-CCNC: 22 U/L (ref 12–78)
ANION GAP SERPL CALCULATED.3IONS-SCNC: 11 MMOL/L (ref 4–13)
AST SERPL W P-5'-P-CCNC: 14 U/L (ref 5–45)
BILIRUB SERPL-MCNC: 0.7 MG/DL (ref 0.2–1)
BUN SERPL-MCNC: 11 MG/DL (ref 5–25)
CALCIUM SERPL-MCNC: 9 MG/DL (ref 8.3–10.1)
CHLORIDE SERPL-SCNC: 102 MMOL/L (ref 100–108)
CO2 SERPL-SCNC: 24 MMOL/L (ref 21–32)
CREAT SERPL-MCNC: 0.81 MG/DL (ref 0.6–1.3)
ERYTHROCYTE [DISTWIDTH] IN BLOOD BY AUTOMATED COUNT: 13.1 % (ref 11.6–15.1)
GFR SERPL CREATININE-BSD FRML MDRD: 88 ML/MIN/1.73SQ M
GLUCOSE P FAST SERPL-MCNC: 86 MG/DL (ref 65–99)
HCT VFR BLD AUTO: 34 % (ref 34.8–46.1)
HGB BLD-MCNC: 10.8 G/DL (ref 11.5–15.4)
MCH RBC QN AUTO: 29.3 PG (ref 26.8–34.3)
MCHC RBC AUTO-ENTMCNC: 31.8 G/DL (ref 31.4–37.4)
MCV RBC AUTO: 92 FL (ref 82–98)
PLATELET # BLD AUTO: 518 THOUSANDS/UL (ref 149–390)
PMV BLD AUTO: 8.3 FL (ref 8.9–12.7)
POTASSIUM SERPL-SCNC: 3.9 MMOL/L (ref 3.5–5.3)
PROT SERPL-MCNC: 7.7 G/DL (ref 6.4–8.2)
RBC # BLD AUTO: 3.68 MILLION/UL (ref 3.81–5.12)
SODIUM SERPL-SCNC: 137 MMOL/L (ref 136–145)
WBC # BLD AUTO: 6.28 THOUSAND/UL (ref 4.31–10.16)

## 2017-12-27 PROCEDURE — 85027 COMPLETE CBC AUTOMATED: CPT

## 2017-12-27 PROCEDURE — 80053 COMPREHEN METABOLIC PANEL: CPT

## 2017-12-27 PROCEDURE — 36415 COLL VENOUS BLD VENIPUNCTURE: CPT

## 2017-12-29 ENCOUNTER — GENERIC CONVERSION - ENCOUNTER (OUTPATIENT)
Dept: OTHER | Facility: OTHER | Age: 47
End: 2017-12-29

## 2018-01-09 NOTE — PROGRESS NOTES
Assessment    1  Encounter for preventive health examination (V70 0) (Z00 00)   2  Dense breasts (793 82) (R92 2)   3  Never a smoker   4  Body mass index (BMI) of 24 0 to 24 9 in adult (V85 1) (Z68 24)    Plan  Body mass index (BMI) of 24 0 to 24 9 in adult    · Begin a limited exercise program ; Status:Complete;   Done: [id number] 07:06PM  Health Maintenance    · Follow-up visit in 1 year Evaluation and Treatment  Follow-up  Status: Hold For -  Scheduling  Requested for: [id number]    Discussion/Summary  health maintenance visit healthy adult female Currently, she eats a healthy diet and has an inadequate exercise regimen  cervical cancer screening is current next cervical cancer screening is due 2019 Breast cancer screening: mammogram is scheduled  The Will need tetanus booster this year, but is on steroids currently  Advice and education were given regarding weight bearing exercise  Chief Complaint  pt present for CPE  ac/cma      History of Present Illness  HM, Adult Female: The patient is being seen for a health maintenance evaluation  General Health: The patient's health since the last visit is described as good  Screening:      Review of Systems    Constitutional: feeling tired  Cardiovascular: No complaints of slow heart rate, no fast heart rate, no chest pain, no palpitations, no leg claudication, no lower extremity edema  Respiratory: No complaints of shortness of breath, no wheezing, no cough, no SOB on exertion, no orthopnea, no PND  Musculoskeletal: arthralgias  Active Problems    1  Antiphospholipid antibody syndrome (289 81) (D68 61)   2  Chronic low back pain (724 2,338 29) (M54 5,G89 29)   3  Connective tissue disease overlap syndrome (710 8) (M35 8)   4  Costochondritis (733 6) (M94 0)   5  Dense breasts (793 82) (R92 2)   6  Encounter for screening mammogram for malignant neoplasm of breast (V76 12)   (Z12 31)   7  Erythema nodosum (695 2) (L52)   8   Long term (current) use of systemic steroids (V58 65) (Z79 52)   9  Long-term use of hydroxychloroquine (V58 69) (Z79 899)   10  Other bursitis of elbow, right elbow (726 39) (M70 31)   11  Positive cardiolipin antibodies (795 79) (R76 8)   12   Screening for cardiovascular condition (V81 2) (Z13 6)    Past Medical History    · History of Abnormal serum level of lipase (790 5) (R74 8)   · 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)   · History of Costochondritis (733 6) (M94 0)   · History of Elevated liver enzymes (790 5) (R74 8)   · History of Elevated sed rate (790 1) (R70 0)   · History of Encounter for routine pelvic examination (V72 31) (Z01 419)   · History of Fever chills (780 60) (R50 9)   · History of dermatitis (V13 3) (Z87 2)   · History of infectious mononucleosis (V12 09) (Z86 19)   · History of Limb pain (729 5) (M79 609)   · History of Myalgia (729 1) (M79 1)   · History of Other muscle spasm (728 85) (G29 799)   · History of Polyarthralgia (719 49) (M25 50)   · History of Screening for diabetes mellitus (DM) (V77 1) (Z13 1)   · History of Skin lesion (709 9) (L98 9)   · History of Well adult on routine health check (V70 0) (Z00 00)    Surgical History    · Denied: History Of Prior Surgery    Family History  Father    · Family history of gout (V18 19) (Z82 69)  Brother    · Family history of Diabetes Mellitus (V18 0)  Maternal Grandmother    · Family history of rheumatoid arthritis (V17 7) (Z82 61)  Cousin    · Family history of systemic lupus erythematosus (V19 4) (Z82 69)  Maternal Relatives    · Family history of Sjogren's disease (V17 89) (Z82 69)   · Family history of systemic lupus erythematosus (V19 4) (Z82 69)   · Family history of thyroid disease (V18 19) (Z83 49)  Family History    · Denied: Family history of Crohn's disease   · Denied: Family history of psoriasis   · Denied: Family history of ulcerative colitis   · Family history of Heart Disease (V17 49)    Social History    · 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   · Never a smoker   · No alcohol use   · No drug use    Current Meds   1  Flexeril 10 MG TABS; Take 1 tablet daily; Therapy: (Recorded:10Nov2016) to Recorded   2  Hydroxychloroquine Sulfate 200 MG Oral Tablet; Take 1 and 1/2 tablet daily as directed; Therapy: [id number] to (Evaluate:45Fru9116)  Requested for: 91LJE4946; Last   Rx:29Smu5237 Ordered   3  Magnesium CAPS; take 1 capsule daily; Therapy: (433 6144) to Recorded   4  Medrol 4 MG Oral Tablet Therapy Pack; USE AS DIRECTED ON PACKAGE Recorded   5  Percocet 5-325 MG Oral Tablet; Take 1 tablet daily; Therapy: (Recorded:10Nov2016) to Recorded   6  Turmeric 500 MG Oral Capsule; take 1 capsule daily; Therapy: (Recorded:94Uam9596) to Recorded   7  Vitamin B Complex CAPS; TAKE 1 CAPSULE DAILY; Therapy: (433 6144) to Recorded   8  Vitamin C TABS; TAKE 1 TABLET DAILY; Therapy: (433 6144) to Recorded   9  Vitamin D 2000 UNIT Oral Capsule; take 1 capsule daily; Therapy: (Recorded:07Tkz1442) to Recorded    Allergies    1  AMOXICILLIN   2  Levaquin TABS    Vitals   Recorded: 22Feb2017 06:38PM   Temperature 99 2 F   Heart Rate 88   Respiration 16   Systolic 069   Diastolic 72   Height 5 ft 6 5 in   Weight 156 lb    BMI Calculated 24 8   BSA Calculated 1 81     Physical Exam    Constitutional   General appearance: No acute distress, well appearing and well nourished  Ears, Nose, Mouth, and Throat   External inspection of ears and nose: Normal     Otoscopic examination: Tympanic membranes translucent with normal light reflex  Canals patent without erythema  Oropharynx: Normal with no erythema, edema, exudate or lesions  Pulmonary   Auscultation of lungs: Clear to auscultation  Cardiovascular   Auscultation of heart: Normal rate and rhythm, normal S1 and S2, no murmurs  Abdomen   Abdomen: Non-tender, no masses  Liver and spleen: No hepatomegaly or splenomegaly      Lymphatic   Palpation of lymph nodes in neck: No lymphadenopathy  Musculoskeletal   Gait and station: Normal        Results/Data  PHQ-2 Adult Depression Screening 90Fpx7193 06:41PM User, Kristofer     Test Name Result Flag Reference   PHQ-2 Adult Depression Score 0     Over the last two weeks, how often have you been bothered by any of the following problems? Little interest or pleasure in doing things: Not at all - 0  Feeling down, depressed, or hopeless: Not at all - 0   PHQ-2 Adult Depression Screening Negative       (1) C-REACTIVE PROTEIN [id number] 08:50AM Ramirez Miko     Test Name Result Flag Reference   C-REACT PROTEIN <3 0 mg/L  <3 0       Procedure    Procedure: Visual Acuity Test    Indication: routine screening  Inforrmation supplied by a Snellen chart  Results: 20/15 in both eyes without corrective device, 20/20 in the right eye without corrective device, 20/50 in the left eye without corrective device Pt forgot glasses  ac/cma      Health Management  Encounter for screening mammogram for malignant neoplasm of breast   Digital Bilateral Screening Mammogram With CAD; every 2 years; Last 23Oct2015; Next  Due: 87WVO4772; Active    Message  Return to work or school:   Bakari Ramos is under my professional care  She was seen in my office on 2/22/17  Zane Azul is currently being treated with methylprednisone and is medically unable to get an influenza vaccine while on this medication               Future Appointments    Date/Time Provider Specialty Site   05/11/2017 10:40 AM Gillian Ashford DO Rheumatology [facility] [address] [facility]     Signatures   Electronically signed by : Raj Hunter DO; Feb 22 2017  7:06PM EST                       (Author)

## 2018-01-10 NOTE — MISCELLANEOUS
Message  Spoke with patient at [phone] about laboratory tests  Explain that her CBC did show a mild leukopenia, but the remainder of the indices were within normal limits  A CMP, ESR, and CRP were within normal limits  Urinalysis did show some leukocyte esterase, 20-40 white blood cells per high-power field, moderate bacteria and epithelial cells, and 1-2 RBCs per high-power field  A urine protein to creatinine ratio was essentially within normal limits  Anticardiolipin IgM remains elevated at 60, but IgG and IgA were negative  She is asymptomatic from the urinary standpoint, but we will plan to recheck a urinalysis and urine protein to creatinine ratio in approximately one month  She does report some mild flares of her mixed connective tissue disease which do respond to Advil  I did explain that if her flares become more frequent, we could consider increasing her Plaquenil to 400 mg daily  She will notify us if this becomes an issue  She will follow-up as scheduled  She will call if she has any additional questions or concerns  Plan  Connective tissue disease overlap syndrome    · (1) URINALYSIS (will reflex a microscopy if leukocytes, occult blood, protein or nitrites  are not within normal limits); Status:Active; Requested for:11Oct2016;    · (1) URINE PROTEIN CREATININE RATIO; Status:Active;  Requested for:11Oct2016;     Signatures   Electronically signed by : Cheryle Ladd, DO; Oct 11 2016  4:19PM EST                       (Author)

## 2018-01-10 NOTE — MISCELLANEOUS
Message  I called patient in regards to her latest labs and reviewed the results with her  In addition patient states that she had called yesterday in regards to a "hamstring pull"  She was seen at the emergency department and was discharged with Valium as well as Percocet  She states that she has to function throughout the day to take care of her children as well as work and would like to proceed with steroid therapy  A Medrol Dosepak was sent to her pharmacy yesterday however she states that she does have prednisone 5 mg tablets at home  Patient was advised not to fill the Medrol Dosepak and proceed with 2 tablets of the 5 mg prednisone a day for a week and decrease to prednisone 5 mg daily for the next week and then discontinue the medication  Patient was told to call the office if she has any further questions or concerns or if her pain persists  She did voice understanding and thanked me        Plan  Chronic low back pain, Connective tissue disease overlap syndrome    · PredniSONE 5 MG Oral Tablet; TAKE 2 TABLET PO DAILY for one week and then to  1 tablet po daily for one week then stop medication  Connective tissue disease overlap syndrome    · MethylPREDNISolone 4 MG Oral Tablet Therapy Pack (Medrol)    Signatures   Electronically signed by : FEROZ Dempsey; Dec  8 2016 11:22AM EST                       (Author)

## 2018-01-11 NOTE — PROGRESS NOTES
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)   2  Antiphospholipid antibody syndrome (289 81) (D68 61)   3  Long term (current) use of systemic steroids (V58 65) (Z79 52)   4  Long-term use of hydroxychloroquine (V58 69) (Z79 899)   5  Chronic low back pain (724 2,338 29) (M54 5,G89 29)   6  Costochondritis (733 6) (M94 0)    Plan    1  (1) ANTICARDIOLIPIN ANTIBODY; Status:Active; Requested for:10Nov2016;    2  (1) CBC/PLT/DIFF; Status:Active; Requested for:10Nov2016;    3  (1) COMPREHENSIVE METABOLIC PANEL; Status:Active; Requested for:10Nov2016;    4  (1) C-REACTIVE PROTEIN; Status:Active; Requested for:10Nov2016;    5  (1) SED RATE; Status:Active; Requested for:10Nov2016;    6  (1) URINALYSIS (will reflex a microscopy if leukocytes, occult blood, protein or nitrites are   not within normal limits); Status:Active; Requested for:10Nov2016;    7  (1) URINE PROTEIN CREATININE RATIO; Status:Active; Requested for:10Nov2016;    8  Follow-up visit in 3 months Evaluation and Treatment  Follow-up  Status: Complete    Done: 17NML9094    9  Hydroxychloroquine Sulfate 200 MG Oral Tablet; Take 1 and 1/2 tablet daily as   directed   10  Call [phone] if: The pain seems worse ; Status:Complete;   Done: 19EGU4806   11  Call [phone] if: The symptoms seem worse ; Status:Complete;   Done:    66PAP5332   12  Call [phone] if: You have questions or concerns about your problem ;    Status:Complete;   Done: 60NLG2001    Discussion/Summary    This is a 27-year-old female presenting today with the next connective tissue disease  The patient states that she did have an episode of increased pain which she describes as a burning sensation in her joints widespread  She states that this occurred last Friday and she had difficulty getting out of bed  She states that this did resolve however she continues to have achiness in all of her joints at this time   She states that her worst pain is in her knees, elbows, and hands  The patient states that at this time she also has bilateral ankle and foot pain  She states that she has no obvious joint swelling but does describe morning stiffness lasting 2 hours  She does notice a locking sensation of the left elbow with certain movements  She has difficulty with sleep due to stress and does describe nonrestorative sleep as well as increased fatigue  On physical examination, patient has normal passive range of motion of both upper and lower extremities  There is crepitus of bilateral knees  Patient's most recent labs revealed CBC that showed slight leukopenia as well as a CMP, CRP, and ESR were all within normal range  Patient's last UA and urine creatinine ratio revealed slight leukocyte esterase  Patient's anticardiolipin antibodies did reveal an elevated IgM of 60 however patient's IgA and IgM were within normal range  At this time patient's history and physical examination is most consistent with a mixed connective tissue disease which appears to be mildly active at this time despite hydroxychloroquine 300 mg daily  Patient would not like to make any further changes with her medication regimen at this time and continue with Advil as needed  It was suggested however if the patient does continue to have flares we will consider escalating her dose of hydroxychloroquine to 400 mg daily  We will plan to repeat a CBC, CMP, CRP, ESR, UA, urine protein creatinine ratio as well as anticardiolipin antibodies  She will follow-up in 3 months time however will call in the interim if she has any further questions or concerns  Patient does have an up-to-date eye exam    The patient was counseled regarding diagnostic results, instructions for management, prognosis, patient and family education, risks and benefits of treatment options, importance of compliance with treatment        Chief Complaint  F/U MCTD   Patient is here today for follow up of chronic conditions described in HPI       History of Present Illness  Patient is in the office today for follow up for a MCTD  Rough day last Friday, difficulty getting out of bed due to widespread joint pain  Burning joint pain  Still with achy joints at this time, worse in the knees, elbows, and some in the hands  Also with ankle and foot pain  no obvious joint swelling  +Am stiffness x 2 hours  Having "locking" in the left elbow with certain movements  +Difficulty with sleep due to stress  +non restorative sleep  +Fatigue  RAPID3: 8 0 /30      Review of Systems    Constitutional: recent 5 lb weight gain and fatigue, but no fever, no chills, no recent weight loss and no anorexia  HEENT: erythema eye(s), but no blurred vision, no double vision, no amaurosis fugax, no dryness of the eyes, no eye pain, no dryness mouth, no mouth sores, not feeling congested and no sore throat  Cardiovascular: no chest pain or pressure, no dyspnea on exertion and no swelling in the arms or legs  Respiratory: no unusual or persistent cough, no shortness of breath and no pleurisy  Gastrointestinal: no abdominal pain, no nausea, no vomiting, no heartburn, no diarrhea, no constipation, no melena and no BRBPR  Genitourinary: No foamy urine, but no dysuria and no hematuria  Musculoskeletal: as noted in HPI  Integumentary alopecia and nail changes, but no rash, no Raynaud's and no photosensitivity  Endocrine no polyuria and no polydipsia  Hematologic/Lymphatic: no unusual bleeding and no tendency for easy bruising  Neurological: headache and weakness, but no vertigo or dizziness and no tingling  Psychiatric: insomnia and non-restorative sleep  Active Problems    1  Antiphospholipid antibody syndrome (289 81) (D68 61)   2  Chronic low back pain (724 2,338 29) (M54 5,G89 29)   3  Connective tissue disease overlap syndrome (710 8) (M35 8)   4  Costochondritis (733 6) (M94 0)   5  Erythema nodosum (695 2) (L52)   6   Long term (current) use of systemic steroids (V58 65) (Z79 52)   7  Long-term use of hydroxychloroquine (V58 69) (Z79 899)   8  Positive cardiolipin antibodies (795 79) (R76 8)    Past Medical History    1  History of Abnormal serum level of lipase (790 5) (R74 8)   2  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)   3  History of Costochondritis (733 6) (M94 0)   4  History of Elevated liver enzymes (790 5) (R74 8)   5  History of Elevated sed rate (790 1) (R70 0)   6  History of Encounter for routine pelvic examination (V72 31) (Z01 419)   7  History of Encounter for screening mammogram for malignant neoplasm of breast   (V76 12) (Z12 31)   8  History of Fever chills (780 60) (R50 9)   9  History of dermatitis (V13 3) (Z87 2)   10  History of infectious mononucleosis (V12 09) (Z86 19)   11  History of Limb pain (729 5) (M79 609)   12  History of Myalgia (729 1) (M79 1)   13  History of Other muscle spasm (728 85) (M62 838)   14  History of Polyarthralgia (719 49) (M25 50)   15  History of Screening for diabetes mellitus (DM) (V77 1) (Z13 1)   16  History of Skin lesion (709 9) (L98 9)   17  History of Well adult on routine health check (V70 0) (Z00 00)    The active problems and past medical history were reviewed and updated today  Surgical History    1  Denied: History Of Prior Surgery    The surgical history was reviewed and updated today  Family History  Father    1  Family history of gout (V18 19) (Z82 69)  Brother    2  Family history of Diabetes Mellitus (V18 0)  Maternal Grandmother    3  Family history of rheumatoid arthritis (V17 7) (Z82 61)  Cousin    4  Family history of systemic lupus erythematosus (V19 4) (Z82 69)  Maternal Relatives    5  Family history of Sjogren's disease (V17 89) (Z82 69)   6  Family history of systemic lupus erythematosus (V19 4) (Z82 69)   7  Family history of thyroid disease (V18 19) (Z83 49)  Family History    8  Denied: Family history of Crohn's disease   9   Denied: Family history of psoriasis   10  Denied: Family history of ulcerative colitis   11  Family history of Heart Disease (V17 49)    The family history was reviewed and updated today  Social History    · Employed   · Never a smoker   · No alcohol use   · No drug use  The social history was reviewed and updated today  The social history was reviewed and is unchanged  Current Meds   1  Advil 200 MG Oral Tablet; take 2 tablet daily; Therapy: (Recorded:02Nov2015) to Recorded   2  Flexeril 10 MG TABS; Take 1 tablet daily; Therapy: (Recorded:10Nov2016) to Recorded   3  Hydroxychloroquine Sulfate 200 MG Oral Tablet; Take 1 and 1/2 tablet daily as directed; Therapy: 19GJU3883 to (Evaluate:64Vyd0690)  Requested for: 61Bps4510; Last   Rx:16Jun2016 Ordered   4  Magnesium CAPS; take 1 capsule daily; Therapy: (0498 72 13 49) to Recorded   5  Percocet 5-325 MG Oral Tablet; Take 1 tablet daily; Therapy: (Recorded:10Nov2016) to Recorded   6  Vitamin B Complex CAPS; TAKE 1 CAPSULE DAILY; Therapy: (0498 72 13 49) to Recorded   7  Vitamin C TABS; TAKE 1 TABLET DAILY; Therapy: (0498 72 13 49) to Recorded   8  Vitamin D 2000 UNIT Oral Capsule; take 1 capsule daily; Therapy: (Recorded:14Ard6135) to Recorded    The medication list was reviewed and updated today  Immunizations  Tdap --- Nelida He: 21-Aug-2005     Allergies    1  AMOXICILLIN    Vitals  Signs   Recorded: 40GFT0225 84:93LW   Systolic: 252  Diastolic: 70  Heart Rate: 88  Weight: 151 lb   BMI Calculated: 24 01  BSA Calculated: 1 78    Physical Exam    Constitutional   General appearance: No acute distress, well appearing and well nourished  Eyes   Conjunctiva and lids: No swelling, erythema or discharge  Pupils and irises: Equal, round and reactive to light  Ears, Nose, Mouth, and Throat   External inspection of ears and nose: Normal     Oropharynx: Normal with no erythema, edema, exudate lesions, or ulcers      Pulmonary   Respiratory effort: No increased work of breathing or signs of respiratory distress  Auscultation of lungs: Clear to auscultation  Cardiovascular   Auscultation of heart: Normal rate and rhythm, normal S1 and S2, without murmurs  Examination of extremities for edema and/or varicosities: Normal     Carotid pulses: Normal     Lymphatic   Palpation of lymph nodes in neck: No lymphadenopathy  Psychiatric   Orientation to person, place, and time: Normal     Mood and affect: Normal         Right Upper Extremity: Normal examination  Normal ROM  Left Upper Extremity: Normal examination  Normal ROM  Right Lower Extremity: Normal examination  Normal ROM  Left Lower Extremity: Normal examination  Normal ROM  Musculoskeletal - Joints, bones, and muscles: Abnormal  Palpation - bilateral knee crepitus       Right hand: All MCP, PIP and DIP joints are normal  Left Hand: All MCP, PIP and DIP joints are normal    Right foot: All MTP, PIP and DIP joints are normal  Left foot: All MTP, PIP and DIP joints are normal       Results/Data  (1) CBC/PLT/DIFF 03Oct2016 08:30AM Annel Hand     Test Name Result Flag Reference   WBC COUNT 4 00 Thousand/uL L 4 80-10 80   WBC ADJUSTED 4 00 Thousand/uL L 4 80-10 80   RBC COUNT 4 35 Million/uL  4 20-5 40   HEMOGLOBIN 13 7 g/dL  12 0-16 0   HEMATOCRIT 39 9 %  37 0-47 0   MCV 92 fL  82-98   MCH 31 5 pg H 27 0-31 0   MCHC 34 3 g/dL  31 4-37 4   RDW 13 0 %  11 6-15 1   MPV 7 2 fL L 8 9-12 7   PLATELET COUNT 073 Thousands/uL  130-400   NEUTROPHILS RELATIVE PERCENT 44 %  43-75   LYMPHOCYTES RELATIVE PERCENT 42 %  14-44   MONOCYTES RELATIVE PERCENT 10 %  4-12   EOSINOPHILS RELATIVE PERCENT 5 %  0-6   BASOPHILS RELATIVE PERCENT 0 %  0-1   NEUTROPHILS ABSOLUTE COUNT 1 80 Thousands/?L L 1 85-7 62   LYMPHOCYTES ABSOLUTE COUNT 1 70 Thousands/?L  0 60-4 47   MONOCYTES ABSOLUTE COUNT 0 40 Thousand/?L  0 17-1 22   EOSINOPHILS ABSOLUTE COUNT 0 20 Thousand/?L  0 00-0 61   BASOPHILS ABSOLUTE COUNT 0 00 Thousands/?L  0 00-0 10     (1) COMPREHENSIVE METABOLIC PANEL 56RTY8994 92:54QR Rosalinda Armstrong     Test Name Result Flag Reference   GLUCOSE,RANDM 85 mg/dL     If the patient is fasting, the ADA then defines impaired fasting glucose as > 100 mg/dL and diabetes as > or equal to 123 mg/dL  SODIUM 138 mmol/L  136-145   POTASSIUM 4 0 mmol/L  3 5-5 3   CHLORIDE 102 mmol/L  100-108   CARBON DIOXIDE 27 mmol/L  21-32   ANION GAP (CALC) 9 mmol/L  4-13   BLOOD UREA NITROGEN 17 mg/dL  5-25   CREATININE 0 88 mg/dL  0 60-1 30   Standardized to IDMS reference method   CALCIUM 8 6 mg/dL  8 3-10 1   BILI, TOTAL 0 50 mg/dL  0 20-1 00   ALK PHOSPHATAS 53 U/L     ALT (SGPT) 25 U/L  12-78   AST(SGOT) 18 U/L  5-45   ALBUMIN 4 1 g/dL  3 5-5 0   TOTAL PROTEIN 7 9 g/dL  6 4-8 2   eGFR Non-African American      >60 0 ml/min/1 73sq [facility] Disease Education Program recommendations are as follows:  GFR calculation is accurate only with a steady state creatinine  Chronic Kidney disease less than 60 ml/min/1 73 sq  meters  Kidney failure less than 15 ml/min/1 73 sq  meters       (1) URINALYSIS (will reflex a microscopy if leukocytes, occult blood, protein or nitrites are not within normal limits) 03Oct2016 08:30AM Rosalinda Armstrong     Test Name Result Flag Reference   COLOR Yellow     CLARITY Clear     SPECIFIC GRAVITY UA 1 020  1 000-1 030   PH UA 5 5  5 0-9 0   LEUKOCYTE ESTERASE UA Moderate A Negative   NITRITE UA Negative  Negative   PROTEIN UA Negative mg/dl  Negative   GLUCOSE UA Negative mg/dl  Negative   KETONES UA Negative mg/dl  Negative   UROBILINOGEN UA 0 2 E U /dl  0 2, 1 0 E U /dl   BILIRUBIN UA Negative  Negative   BLOOD UA Trace-lysed A Negative     (1) SED RATE 03Oct2016 08:30AM Rosalinda Armstrong     Test Name Result Flag Reference   SED RATE 9 mm/hour  2-25     (1) URINE PROTEIN CREATININE RATIO 03Oct2016 08:30AM Rosalinda Armstrong     Test Name Result Flag Reference   CREATININE URINE 88 0 mg/dL     URINE PROTEIN:CREATININE RATIO 0 16 H 0 00-0 10   URINE PROTEIN 14 mg/dL       (1) C-REACTIVE PROTEIN 03Oct2016 08:30AM Hazle Gills     Test Name Result Flag Reference   C-REACT PROTEIN <3 0 mg/L  <3 0     (1) ANTICARDIOLIPIN ANTIBODY 03Oct2016 08:30AM Hazfadumo Gills     Test Name Result Flag Reference   CARDLIP IGA AB <9 APL U/mL  0 - 11   Negative:              <12                            Indeterminate:     12 - 20                            Low-Med Positive: >20 - 80                            High Positive:         >80   CARDLIP IGG AB <9 GPL U/mL  0 - 14   Negative:              <15                            Indeterminate:     15 - 20                            Low-Med Positive: >20 - 80                            High Positive:         >80   CARDLIP IGM AB 60 MPL U/mL H 0 - 12   Negative:              <13                            Indeterminate:     13 - 20                            Low-Med Positive: >20 - 80                            High Positive:         >80  Performed at:  [facility] [address]  [id number]  : Mary Rodriguez MD, Phone:  [phone]       Health Management  History of Encounter for screening mammogram for malignant neoplasm of breast   Digital Bilateral Screening Mammogram With CAD; every 2 years; Last 23Oct2015; Next Due:  20NIB5971; Active    Attending Note  Collaborating Physician: I did not interview and examine the patient, I discussed the case with the Advanced Practitioner and reviewed the note and I agree with the Advanced Practitioner note        Future Appointments    Date/Time Provider Specialty Site   02/10/2017 08:20 AM FEROZ Caba [facility]     Signatures   Electronically signed by : FEROZ Vicente; Nov 10 2016  9:05AM EST                       (Author)    Electronically signed by : Keshav Marin DO; Nov 10 2016  4:40PM EST                       (Author)

## 2018-01-12 ENCOUNTER — GENERIC CONVERSION - ENCOUNTER (OUTPATIENT)
Dept: OTHER | Facility: OTHER | Age: 48
End: 2018-01-12

## 2018-01-12 VITALS
DIASTOLIC BLOOD PRESSURE: 70 MMHG | BODY MASS INDEX: 23.07 KG/M2 | HEART RATE: 72 BPM | TEMPERATURE: 98.2 F | SYSTOLIC BLOOD PRESSURE: 104 MMHG | WEIGHT: 147 LBS | RESPIRATION RATE: 18 BRPM | HEIGHT: 67 IN

## 2018-01-12 NOTE — MISCELLANEOUS
Message  Spoke with pt  [phone] re: lab results  Explained that CBC, CMP, ESR, and CRP all WNL  B2GP1 Abs also negative, as was LAC  Still with + HIEU IgM which is unchanged  Will not make any adjustments to HCQ for now  No need for anticoagulation as she has no evidence of thrombosis  She inquired about decreasing Prednisone  I explained that she could decrease to 2 5mg daily for the next 3 weeks, then 2 5mg every other day for 3 weeks before attempting to stop  F/U as scheduled        Signatures   Electronically signed by : Edin Deluca DO; May 27 2016  9:06AM EST                       (Author)

## 2018-01-13 NOTE — PROGRESS NOTES
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)   2  Antiphospholipid antibody syndrome (289 81) (D68 61)   3  Long term (current) use of systemic steroids (V58 65) (Z79 52)   4  Long-term use of hydroxychloroquine (V58 69) (Z79 899)   5  Chronic low back pain (724 2,338 29) (M54 5,G89 29)   6  Costochondritis (733 6) (M94 0)    Plan    1  PredniSONE 5 MG Oral Tablet   2  (1) ANTICARDIOLIPIN ANTIBODY; Status:Active; Requested CZH:62MND8035;    3  (1) CBC/PLT/DIFF; Status:Active; Requested OOK:24DZX2752;    4  (1) COMPREHENSIVE METABOLIC PANEL; Status:Active; Requested for:26Oct2016;    5  (1) C-REACTIVE PROTEIN; Status:Active; Requested SYN:87GGO9390;    6  (1) SED RATE; Status:Active; Requested for:26Oct2016;    7  (1) URINALYSIS (will reflex a microscopy if leukocytes, occult blood, protein or nitrites are   not within normal limits); Status:Active; Requested for:26Oct2016;    8  (1) URINE PROTEIN CREATININE RATIO; Status:Active; Requested HRQ:30KXQ5308;    9  Follow-up visit in 3 months Evaluation and Treatment  Follow-up  Status: Complete    Done: 43SKR8032    10  Hydroxychloroquine Sulfate 200 MG Oral Tablet; Take 1 and 1/2 tablet daily as    directed   11  Call [phone] if: The pain seems worse ; Status:Complete;   Done: 33CVA1658   12  Call [phone] if: You have questions or concerns about your problem ;    Status:Complete;   Done: 18QSL5229    Discussion/Summary    Ms Griffith has been feeling generally well since her last evaluation  She denies any significant joint pain at this time  She also denies any recurrence of her erythema nodosum or any further episodes of fever since her last evaluation  She is currently on prednisone 2 5 mg every other day for the last month, and she is interested in discontinuing it at this time if possible  She does continue to have some mild swelling in her ankles usually on Mondays after working all weekend   She denies any significant morning stiffness  She does report some difficulty sleeping, but she does not attribute this to pain  She also reports occasional nonrestorative sleep as well as intermittent fatigue  She also reports that she recently underwent an eye exam for her Plaquenil use  On exam, there is no active synovitis  She has full range of motion of all of her joints  There is no objective muscular weakness  Review of laboratory studies from May 16, 2016 revealed a normal CBC, CMP, ESR, and CRP  Beta 2 glycoprotein 1 antibodies were negative, as well as a Lupus anticoagulant  Anticardiolipin IgM was positive, but IgG and IgA were negative  This was essentially unchanged from prior studies  At this time, her mixed connective tissue disease does appear well controlled with the use of Plaquenil 300 mg daily and prednisone 2 5 mg every other day  Since she has been on this dose of prednisone for the last month, she is now safe to discontinue it at this time  She will continue the Plaquenil at its current dose  She is up-to-date with her eye exam for the Plaquenil use  I would like to recheck a CBC, CMP, ESR, CRP, urinalysis, urine protein to creatinine ratio, as well as the anticardiolipin antibodies at her request before her follow-up  I will reevaluate her in 3 months  She will call in the interim if there are any questions or concerns  Counseling  Rheumatology Counseling Documentation: The patient was counseled regarding diagnostic results, instructions for management and impressions  Chief Complaint  F/U MCTD   Patient is here today for follow up of chronic conditions described in HPI  History of Present Illness  Pt  returns for F/U for MCTD  Feeling well since last visit  No significant joint pain at this time  No recurrence of erythema nodosum  No further episodes of fevers  Currently on Prednisone 2 5mg every other day x 1 month  Interested in stopping Prednisone at this time   + swelling in ankles on Mondays after working all weekend  No AM stiffness  + difficulty sleeping -> not due to pain  + occasional non-restorative sleep  + intermittent fatigue  Recently has [facility] eye exam      RAPID3: 1 0/30      Review of Systems    Constitutional: no fever, no recent weight gain, no chills and no anorexia    The patient presents with complaints of intermittent episodes of fatigue  The patient presents with complaints of recent 7 lb weight loss (intentional)   HEENT: no blurred vision, no double vision, no amaurosis fugax, no dryness of the eyes, no eye pain, no erythema eye(s), no dryness mouth, no mouth sores, not feeling congested and no sore throat  Cardiovascular: swelling in the arms or legs, but no dyspnea on exertion    The patient presents with complaints of 1 episode of chest pain or pressure, described as pleuritic, each episode lasting about 24 hours  Respiratory: pleurisy, but no unusual or persistent cough and no shortness of breath  Gastrointestinal: no abdominal pain, no nausea, no vomiting, no heartburn, no diarrhea, no constipation, no melena and no BRBPR  Genitourinary: no foamy urine, but no dysuria and no hematuria  Musculoskeletal: as noted in HPI  Integumentary Raynaud's and photosensitivity, but no rash, no alopecia and no nail changes  Endocrine no polyuria and no polydipsia  Hematologic/Lymphatic: no unusual bleeding and no tendency for easy bruising  Neurological: no headache, no tingling and no weakness    The patient presents with complaints of occasional episodes of vertigo or dizziness, described as lightheadedness  Symptoms are made worse by getting up quickly  Psychiatric: insomnia and non-restorative sleep  Active Problems    1  Antiphospholipid antibody syndrome (289 81) (D68 61)   2  Chronic low back pain (724 2,338 29) (M54 5,G89 29)   3  Connective tissue disease overlap syndrome (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) (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)   4  Costochondritis (733 6) (M94 0)   5   Erythema nodosum (695  2) (L52)   6  Long term (current) use of systemic steroids (V58 65) (Z79 52)   7  Long-term use of hydroxychloroquine (V58 69) (Z79 899)   8  Positive cardiolipin antibodies (795 79) (R76 8)    Past Medical History    1  History of Abnormal serum level of lipase (790 5) (R74 8)   2  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)   3  History of Costochondritis (733 6) (M94 0)   4  History of Elevated liver enzymes (790 5) (R74 8)   5  History of Elevated sed rate (790 1) (R70 0)   6  History of Encounter for routine pelvic examination (V72 31) (Z01 419)   7  History of Encounter for screening mammogram for malignant neoplasm of breast   (V76 12) (Z12 31)   8  History of Fever chills (780 60) (R50 9)   9  History of dermatitis (V13 3) (Z87 2)   10  History of infectious mononucleosis (V12 09) (Z86 19)   11  History of Limb pain (729 5) (M79 609)   12  History of Myalgia (729 1) (M79 1)   13  History of Other muscle spasm (728 85) (M62 838)   14  History of Polyarthralgia (719 49) (M25 50)   15  History of Screening for diabetes mellitus (DM) (V77 1) (Z13 1)   16  History of Skin lesion (709 9) (L98 9)   17  History of Well adult on routine health check (V70 0) (Z00 00)    The active problems and past medical history were reviewed and updated today  Surgical History    1  Denied: History Of Prior Surgery    The surgical history was reviewed and updated today  Family History  Father    1  Family history of gout (V18 19) (Z82 69)  Brother    2  Family history of Diabetes Mellitus (V18 0)  Maternal Grandmother    3  Family history of rheumatoid arthritis (V17 7) (Z82 61)  Cousin    4  Family history of systemic lupus erythematosus (V19 4) (Z82 69)  Maternal Relatives    5  Family history of Sjogren's disease (V17 89) (Z82 69)   6  Family history of systemic lupus erythematosus (V19 4) (Z82 69)   7  Family history of thyroid disease (V18 19) (Z83 49)  Family History    8   Denied: Family history of Crohn's disease   9  Denied: Family history of psoriasis   10  Denied: Family history of ulcerative colitis   11  Family history of Heart Disease (V17 49)    The family history was reviewed and updated today  Social History    · Employed   · Never a smoker   · No alcohol use   · No drug use  The social history was reviewed and updated today  The social history was reviewed and is unchanged  Current Meds   1  Advil 200 MG Oral Tablet; take 2 tablet daily; Therapy: (Recorded:02Nov2015) to Recorded   2  Hydroxychloroquine Sulfate 200 MG Oral Tablet; Take 1 and 1/2 tablet daily as directed; Therapy: 97XBC7799 to (Evaluate:57Pba8322)  Requested for: 35IMN4044; Last   Rx:16Jun2016 Ordered   3  Magnesium CAPS; take 1 capsule daily; Therapy: [phone]) to Recorded   4  PredniSONE 5 MG Oral Tablet; Take 1/2 tablet every other day; Therapy: (Ivet Colander) to Recorded   5  Vitamin B Complex CAPS; TAKE 1 CAPSULE DAILY; Therapy: [phone]) to Recorded   6  Vitamin C TABS; TAKE 1 TABLET DAILY; Therapy: (([phone] 950-962) to Recorded   7  Vitamin D 2000 UNIT Oral Capsule; take 1 capsule daily; Therapy: (Recorded:61Rof1373) to Recorded    The medication list was reviewed and updated today  Immunizations  Tdap --- Matti Palak: 21-Aug-2005     Allergies    1  AMOXICILLIN    Vitals  Signs   Recorded: 23YLP0992 24:74ZE   Systolic: 886  Diastolic: 66  Heart Rate: 84  Weight: 146 lb   BMI Calculated: 23 21  BSA Calculated: 1 76    Physical Exam    Constitutional   General appearance: No acute distress, well appearing and well nourished  Eyes   Conjunctiva and lids: No swelling, erythema or discharge  Pupils and irises: Equal, round and reactive to light  Ears, Nose, Mouth, and Throat   External inspection of ears and nose: Normal     Oropharynx: Normal with no erythema, edema, exudate lesions, or ulcers      Pulmonary   Respiratory effort: No increased work of breathing or signs of respiratory distress  Auscultation of lungs: Clear to auscultation  Cardiovascular   Auscultation of heart: Normal rate and rhythm, normal S1 and S2, without murmurs  Examination of extremities for edema and/or varicosities: Normal     Lymphatic   Palpation of lymph nodes in neck: No lymphadenopathy  Psychiatric   Orientation to person, place, and time: Normal     Mood and affect: Normal         Right Upper Extremity: All normal    Left Upper Extremity: All normal    Right Lower Extremity: All normal    Left Lower Extremity: All normal    Skin - Skin and subcutaneous tissue: Normal    Neurologic - Sensation: Normal       Results/Data  (1) CBC/PLT/DIFF [id number] 08:42AM [place]     Test Name Result Flag Reference   WBC COUNT 5 30 Thousand/uL  4 80-10 80   WBC ADJUSTED 5 30 Thousand/uL  4 80-10 80   RBC COUNT 4 44 Million/uL  4 20-5 40   HEMOGLOBIN 13 8 g/dL  12 0-16 0   HEMATOCRIT 41 4 %  37 0-47 0   MCV 93 fL  82-98   MCH 31 1 pg H 27 0-31 0   MCHC 33 4 g/dL  31 4-37 4   RDW 13 3 %  11 6-15 1   MPV 7 2 fL L 8 9-12 7   PLATELET COUNT 284 Thousands/uL  130-400   nRBC AUTOMATED 0 /100 WBCs     NEUTROPHILS RELATIVE PERCENT 52 %  43-75   LYMPHOCYTES RELATIVE PERCENT 35 %  14-44   MONOCYTES RELATIVE PERCENT 10 %  4-12   EOSINOPHILS RELATIVE PERCENT 3 %  0-6   BASOPHILS RELATIVE PERCENT 0 %  0-1   NEUTROPHILS ABSOLUTE COUNT 2 70 Thousands/?L  1 85-7 62   LYMPHOCYTES ABSOLUTE COUNT 1 80 Thousands/?L  0 60-4 47   MONOCYTES ABSOLUTE COUNT 0 50 Thousand/?L  0 17-1 22   EOSINOPHILS ABSOLUTE COUNT 0 20 Thousand/?L  0 00-0 61   BASOPHILS ABSOLUTE COUNT 0 00 Thousands/?L  0 00-0 10     (1) COMPREHENSIVE METABOLIC PANEL [id number] 13:38SV [place]     Test Name Result Flag Reference   GLUCOSE,RANDM 81 mg/dL     If the patient is fasting, the ADA then defines impaired fasting glucose as > 100 mg/dL and diabetes as > or equal to 123 mg/dL     SODIUM 140 mmol/L  136-145   POTASSIUM 3 7 mmol/L  3 5-5 3   CHLORIDE 104 mmol/L  100-108   CARBON DIOXIDE 27 mmol/L  21-32   ANION GAP (CALC) 9 mmol/L  4-13   BLOOD UREA NITROGEN 8 mg/dL  5-25   CREATININE 0 80 mg/dL  0 60-1 30   Standardized to IDMS reference method   CALCIUM 8 9 mg/dL  8 3-10 1   BILI, TOTAL 0 90 mg/dL  0 20-1 00   ALK PHOSPHATAS 49 U/L     ALT (SGPT) 24 U/L  12-78   AST(SGOT) 17 U/L  5-45   ALBUMIN 4 0 g/dL  3 5-5 0   TOTAL PROTEIN 7 8 g/dL  6 4-8 2   eGFR Non-African American      >60 0 ml/min/1 73sq m   [facility] Disease Education Program recommendations are as follows:  GFR calculation is accurate only with a steady state creatinine  Chronic Kidney disease less than 60 ml/min/1 73 sq  meters  Kidney failure less than 15 ml/min/1 73 sq  meters       (1) SED RATE [id number] 08:42AM Tena Ballesteros     Test Name Result Flag Reference   SED RATE 4 mm/hour  2-25     (1) C-REACTIVE PROTEIN [id number] 08:42AM Tena Ballesteros     Test Name Result Flag Reference   C-REACT PROTEIN <3 0 mg/L  <3 0     (1) ANTICARDIOLIPIN ANTIBODY [id number] 08:42AM Tena Ballesteros     Test Name Result Flag Reference   CARDLIP IGA AB <9 APL U/mL  0 - 11   Negative:              <12                          Indeterminate:     12 - 20                          Low-Med Positive: >20 - 80                          High Positive:         >80   CARDLIP IGG AB <9 GPL U/mL  0 - 14   Negative:              <15                          Indeterminate:     15 - 20                          Low-Med Positive: >20 - 80                          High Positive:         >80   CARDLIP IGM AB 49 MPL U/mL H 0 - 12   Negative:              <13                          Indeterminate:     13 - 20                          Low-Med Positive: >20 - 80                          High Positive:         >80  Performed at:  [address]  [id number]  : Dusty Allison MD, Phone:  [phone]     [phone] [facility] [id number] 08:42AM Preet Castillo     Test Name Result Flag Reference   BETA-2 GLYCOPROTEIN I AB,IGG <9 GPI IgG units  0 - 20   The reference interval reflects a 3SD or 99th percentile interval,which is thought to represent a potentially clinically significantresult in accordance with the International Consensus Statement onthe classification criteria for definitive antiphospholipid syndrome(APS)  J Thromb Haem 2006;4:295-306  BETA-2 GLYCOPROTEIN I AB,IGM <9 GPI IgM units  0 - 32   The reference interval reflects a 3SD or 99th percentile interval,which is thought to represent a potentially clinically significantresult in accordance with the International Consensus Statement onthe classification criteria for definitive antiphospholipid syndrome(APS)  J Thromb Haem 2006;4:295-306  Performed at:  [address]  [id number]  : Ervin Ann MD, Phone:  [phone]   [facility] <9 GPI IgA units  0 - 25   The reference interval reflects a 3SD or 99th percentile interval,which is thought to represent a potentially clinically significantresult in accordance with the International Consensus Statement onthe classification criteria for definitive antiphospholipid syndrome(APS)  J Thromb Haem 2006;4:295-306  (1) LUPUS ANTICOAGULANT PROFILE [id number] 08:42AM Preet Castillo     Test Name Result Flag Reference   PTT LUPUS ANTICOAGULANT 44 8 sec  0 0 - 50 0   DILUTE CRESENCIO VIPER VENOM TIME 40 6 sec  0 0 - 55 1   DILUTE PROTHROMBIN TIME(DPT) 45 1 sec  0 0 - 55 0   THROMBIN TIME (DRVW) 16 5 sec  0 0 - 20 0   DPT CONFIRM RATIO 0 96 Ratio  0 00 - 1 40   LUPUS REFLEX INTERPRETATION Comment     No Lupus Anticoagulant was detected    Performed at:  [address]  [id number]  : Ervin Ann MD, Phone:  [phone]       Health Management  History of Encounter for screening mammogram for malignant neoplasm of breast   Digital Bilateral Screening Mammogram With CAD; every 2 years; Last 23Oct2015; Next Due:  83WMW2537;  Active    Future Appointments    Date/Time Provider Specialty Site   11/10/2016 08:20 AM Hilda Bardales DO Rheumatology [facility] [address] [facility]     Signatures   Electronically signed by : Tia Rehman DO; Aug  9 2016  5:05PM EST                       (Author)

## 2018-01-13 NOTE — MISCELLANEOUS
Message  Spoke with pt  at [phone] re: lab results  Explained CBC, CMP, ESR, CRP, and B-2-GP1 Abs all WNL> Cardiolipin IgM still elevated suggesting APS  No need to treat APS now unless she develops a thrombosis  She does report some increased HA on Prednisone 5mg daily, but she has had some increased stress recently, so she believes this is the cause of the HA  Will not make any medication changes at this time  F/U as scheduled        Signatures   Electronically signed by : Geraline Goodell, DO; Feb 17 2016  3:32PM EST                       (Author)

## 2018-01-13 NOTE — PROGRESS NOTES
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)   2  Antiphospholipid antibody syndrome (289 81) (D68 61)   3  Long-term use of hydroxychloroquine (V58 69) (Z79 899)   4  Chronic low back pain (724 2,338 29) (M54 5,G89 29)   5  Costochondritis (733 6) (M94 0)    Plan    1  PredniSONE 5 MG Oral Tablet; Take 1 tablet daily   2  (1) CBC/PLT/DIFF; Status:Active; Requested [id number];    3  (1) COMPREHENSIVE METABOLIC PANEL; Status:Active; Requested UST:00YDL7518;    4  (1) C-REACTIVE PROTEIN; Status:Active; Requested PMA:[id number];    5  (1) SED RATE; Status:Active; Requested FYL:86THU6021;    6  Follow-up visit in 2 months Evaluation and Treatment  Follow-up  Status: Complete    Done: 54GXA7584    7  Call [phone] if: The pain seems worse ; Status:Complete;   Done: 35SGH5237   8  Call [phone] if: The symptoms seem worse ; Status:Complete;   Done:   81FZS2732   9  Call [phone] if: You have questions or concerns about your problem ;   Status:Complete;   Done: 22MTN5780    Discussion/Summary    Ms Griffith reports that her pain has increased since her last evaluation, and she is currently feeling "miserable  She denies a pain in her hands, wrists, elbows, knees, hips, ankles, and neck  She states that her pain is constant in the elbows, but intermittent in the other areas  She denies any clear exacerbating factors for her pain  She does utilize Flexeril at night for her low back pain, as well as leftover Percocet a half a tablet at night with only mild relief  She is interested in restarting prednisone at this time, as she is "against methotrexate " She does note swelling in her elbows and hands especially in the morning  She reports morning stiffness typically lasting several hours before improvement  She does report difficulty sleeping due to back pain, as well as nonrestorative sleep and fatigue   On exam, there is synovitis of the left elbow, as well as the bilateral ankles  There is no other active synovitis, but she does have tenderness to palpation of the bilateral wrists and shoulders  Review of laboratory studies from June 5, 2017 revealed an essentially normal CMP, ESR, and CRP  CBC did show a mild leukopenia  The remainder of the indices were within normal limits  A urinalysis did show trace protein, and a urine protein to creatinine ratio was mildly elevated  The urinalysis also revealed significant epithelial cells  At this time, her mixed connective tissue disease does appear active and uncontrolled despite Plaquenil 400 mg daily  I did have a long discussion with the patient regarding treatment options, and I did explain that prednisone is the most potent immunosuppressive available, and she would benefit from other nonsteroidal DMARD therapies  She would not agree to any of these agents at this time, therefore we will restart prednisone 5 mg daily  If she does have an inadequate response to this, she would benefit from a DMARD such as methotrexate or azathioprine  I would like to check an updated CBC, CMP, ESR, and CRP  I will reevaluate her in 2 months  She will call in the interim if there are any questions or concerns  Patient is able to Self-Care  Counseling  Rheumatology Counseling Documentation: The patient was counseled regarding diagnostic results, instructions for management, impressions and risks and benefits of treatment options  Chief Complaint  F/U MCTD and APS   Patient is here today for follow up of chronic conditions described in HPI  History of Present Illness  Pt  returns for F/U MCTD and APS  Pain has increased since last visit  c/o pain in hands, wrists, elbows, knees, hips, ankles, and neck  Pain i constant in elbows, but intermittent in other areas  No clear exacerbating factors for pain  Taking Flexeril at night and leftover Percocet 1/2 of 5/325mg  Wants to restart Prednisone at this time  + swelling in elbows   Also with swelling in hands in AM  + AM stiffness x several hours  + difficulty sleeping due to back pain  + non-restorative sleep  + fatigue  RAPID3: 8 0/30      Review of Systems    Constitutional: fatigue and recent 4 lb weight loss, but no fever, no recent weight gain, no chills and no anorexia  HEENT: dryness of the eyes, but no blurred vision, no double vision, no amaurosis fugax, no eye pain, no erythema eye(s), no dryness mouth, no mouth sores, not feeling congested and no sore throat  Cardiovascular: swelling in the arms or legs, but no chest pain or pressure and no dyspnea on exertion  Respiratory: no unusual or persistent cough, no shortness of breath and no pleurisy  Gastrointestinal: no abdominal pain, no nausea, no vomiting, no heartburn, no diarrhea, no constipation, no melena and no BRBPR  Genitourinary: no foamy urine, but no dysuria and no hematuria  Musculoskeletal: as noted in HPI  Integumentary Raynaud's, alopecia and photosensitivity, but no rash and no nail changes  Endocrine no polyuria and no polydipsia  Hematologic/Lymphatic: no unusual bleeding and no tendency for easy bruising  Neurological: headache and weakness, but no tingling    The patient presents with complaints of occasional episodes of vertigo or dizziness, described as lightheadedness  Symptoms are made worse by getting up quickly  Psychiatric: insomnia and non-restorative sleep  Active Problems    1  Antiphospholipid antibody syndrome (289 81) (D68 61)   2  Body mass index (BMI) of 24 0 to 24 9 in adult (V85 1) (Z68 24)   3  Chronic low back pain (724 2,338 29) (M54 5,G89 29)   4  Connective tissue disease overlap syndrome (710 8) (M35 8)   5  Costochondritis (733 6) (M94 0)   6  Dense breasts (793 82) (R92 2)   7  Long term (current) use of systemic steroids (V58 65) (Z79 52)   8  Long-term use of hydroxychloroquine (V58 69) (S09 754)    Past Medical History    1   History of Abnormal serum level of lipase (790 5) (R74 8)   2  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)   3  History of Costochondritis (733 6) (M94 0)   4  History of Elevated liver enzymes (790 5) (R74 8)   5  History of Elevated sed rate (790 1) (R70 0)   6  History of Encounter for routine pelvic examination (V72 31) (Z01 419)   7  History of Encounter for screening mammogram for malignant neoplasm of breast   (V76 12) (Z12 31)   8  History of Fever chills (780 60) (R50 9)   9  History of dermatitis (V13 3) (Z87 2)   10  History of erythema nodosum (V13 3) (Z87 2)   11  History of infectious mononucleosis (V12 09) (Z86 19)   12  History of Limb pain (729 5) (M79 609)   13  History of Myalgia (729 1) (M79 1)   14  History of Other bursitis of elbow, right elbow (726 39) (M70 31)   15  History of Other muscle spasm (728 85) (M62 838)   16  History of Polyarthralgia (719 49) (M25 50)   17  History of Positive cardiolipin antibodies (795 79) (R76 8)   18  History of Screening for cardiovascular condition (V81 2) (Z13 6)   19  History of Screening for diabetes mellitus (DM) (V77 1) (Z13 1)   20  History of Skin lesion (709 9) (L98 9)   21  History of Well adult on routine health check (V70 0) (Z00 00)    The active problems and past medical history were reviewed and updated today  Surgical History    1  Denied: History Of Prior Surgery    The surgical history was reviewed and updated today  Family History  Father    1  Family history of gout (V18 19) (Z82 69)  Brother    2  Family history of Diabetes Mellitus (V18 0)  Maternal Grandmother    3  Family history of rheumatoid arthritis (V17 7) (Z82 61)  Cousin    4  Family history of systemic lupus erythematosus (V19 4) (Z82 69)  Maternal Relatives    5  Family history of Sjogren's disease (V17 89) (Z82 69)   6  Family history of systemic lupus erythematosus (V19 4) (Z82 69)   7  Family history of thyroid disease (V18 19) (Z83 49)  Family History    8   Denied: Family history of Crohn's disease   9  Denied: Family history of psoriasis   10  Denied: Family history of ulcerative colitis   11  Family history of Heart Disease (V17 49)    The family history was reviewed and updated today  Social History    · Employed   · Never a smoker   · No alcohol use   · No drug use  The social history was reviewed and updated today  The social history was reviewed and is unchanged  Current Meds   1  Advil 200 MG Oral Tablet; TAKE 3 TABLET Daily PRN pain; Therapy: (Recorded:11Aug2017) to Recorded   2  Cyclobenzaprine HCl - 10 MG Oral Tablet; Take 1/2 to 1 tablet PO TID prn spasms    Requested for: 13WFF4459; Last Rx:11May2017 Ordered   3  Hydroxychloroquine Sulfate 200 MG Oral Tablet; TAKE 2 TABLET Daily With Food; Therapy: 43KYO5771 to (Evaluate:07Nov2017)  Requested for: 91DDU8751; Last   Rx:11May2017 Ordered   4  Omega 3 CAPS; take 1 capsule daily; Therapy: (Recorded:11Aug2017) to Recorded   5  Percocet 5-325 MG Oral Tablet; Take 1 tablet daily; Therapy: (Recorded:10Nov2016) to Recorded   6  Turmeric 500 MG Oral Capsule; take 1 capsule daily; Therapy: (Recorded:69Fwz1800) to Recorded   7  Vitamin B Complex CAPS; TAKE 1 CAPSULE DAILY; Therapy: ([phone]) to Recorded   8  Vitamin C TABS; TAKE 1 TABLET DAILY; Therapy: ([phone]) to Recorded   9  Vitamin D 2000 UNIT Oral Capsule; take 1 capsule daily; Therapy: (Recorded:63Gql6110) to Recorded    The medication list was reviewed and updated today  Immunizations  Tdap --- Sid Armida: 21-Aug-2005     Allergies    1  AMOXICILLIN   2  Levaquin TABS    Vitals  Signs   Recorded: 11Aug2017 10:16AM   Heart Rate: 82  Systolic: 503  Diastolic: 70  Height: 5 ft 7 in  Weight: 149 lb   BMI Calculated: 23 34  BSA Calculated: 1 78    Physical Exam    Constitutional   General appearance: No acute distress, well appearing and well nourished  Eyes   Conjunctiva and lids: No swelling, erythema or discharge      Pupils and irises: Equal, round and reactive to light  Ears, Nose, Mouth, and Throat   External inspection of ears and nose: Normal     Oropharynx: Normal with no erythema, edema, exudate lesions, or ulcers  Pulmonary   Respiratory effort: No increased work of breathing or signs of respiratory distress  Auscultation of lungs: Clear to auscultation  Cardiovascular   Auscultation of heart: Normal rate and rhythm, normal S1 and S2, without murmurs  Examination of extremities for edema and/or varicosities: Normal     Lymphatic   Palpation of lymph nodes in neck: No lymphadenopathy  Psychiatric   Orientation to person, place, and time: Normal     Mood and affect: Normal         Left Elbow tenderness and swelling  Right ankle swelling  Left ankle swelling  Musculoskeletal - Muscle strength/tone: Normal  Motor Strength Findings: normal upper extremity strength and normal lower extremity strength  Right upper extremity: shoulder flexion 5/5, shoulder extension 5/5, biceps 5/5, triceps 5/5, but normal hand   Left upper extremity shoulder flexion 5/5, shoulder extension 5/5, biceps 5/5, triceps 5/5, but normal hand   Right lower extremity strength: hip flexion 5/5, hip abduction 5/5, hip adduction 5/5, knee flexion 5/5, knee extension 5/5, ankle dorsiflexion 5/5, ankle plantar flexion 5/5  Left lower extremity strength: hip flexion 5/5, hip abduction 5/5, hip adduction 5/5, knee flexion 5/5, knee extension 5/5, ankle dorsiflexion 5/5, ankle plantar flexion 5/5     Skin - Skin and subcutaneous tissue: Normal    Neurologic - Sensation: Normal       Results/Data  (1) CBC/PLT/DIFF [id number] 07:22AM [facility] Order Number: [id number]     Test Name Result Flag Reference   WBC COUNT 3 70 Thousand/uL L 4 80-10 80   RBC COUNT 4 06 Million/uL L 4 20-5 40   HEMOGLOBIN 12 8 g/dL  12 0-16 0   HEMATOCRIT 37 6 %  37 0-47 0   MCV 93 fL  82-98   MCH 31 4 pg H 27 0-31 0   MCHC 34 0 g/dL 31 4-37 4   RDW 13 5 %  11 6-15 1   MPV 6 9 fL L 8 9-12 7   PLATELET COUNT 149 Thousands/uL  130-400   nRBC AUTOMATED 0 /100 WBCs     NEUTROPHILS RELATIVE PERCENT 42 % L 43-75   LYMPHOCYTES RELATIVE PERCENT 41 %  14-44   MONOCYTES RELATIVE PERCENT 13 % H 4-12   EOSINOPHILS RELATIVE PERCENT 4 %  0-6   BASOPHILS RELATIVE PERCENT 0 %  0-1   NEUTROPHILS ABSOLUTE COUNT 1 50 Thousands/? ??L L 1 85-7 62   LYMPHOCYTES ABSOLUTE COUNT 1 50 Thousands/? ??L  0 60-4 47   MONOCYTES ABSOLUTE COUNT 0 50 Thousand/? ??L  0 17-1 22   EOSINOPHILS ABSOLUTE COUNT 0 20 Thousand/? ??L  0 00-0 61   BASOPHILS ABSOLUTE COUNT 0 00 Thousands/? ??L  0 00-0 10     (1) COMPREHENSIVE METABOLIC PANEL 89ZFB9812 10:95EU Kellen Olive   TW Order Number: [id number]     Test Name Result Flag Reference   SODIUM 139 mmol/L  136-145   POTASSIUM 3 4 mmol/L L 3 5-5 3   CHLORIDE 104 mmol/L  100-108   CARBON DIOXIDE 27 mmol/L  21-32   ANION GAP (CALC) 8 mmol/L  4-13   BLOOD UREA NITROGEN 9 mg/dL  5-25   CREATININE 0 94 mg/dL  0 60-1 30   Standardized to IDMS reference method   CALCIUM 8 5 mg/dL  8 3-10 1   BILI, TOTAL 0 50 mg/dL  0 20-1 00   ALK PHOSPHATAS 45 U/L L    ALT (SGPT) 21 U/L  12-78   AST(SGOT) 17 U/L  5-45   ALBUMIN 3 6 g/dL  3 5-5 0   TOTAL PROTEIN 7 3 g/dL  6 4-8 2   eGFR Non-African American      >60 0 ml/min/1 73sq m   [facility] Disease Education Program recommendations are as follows:  GFR calculation is accurate only with a steady state creatinine  Chronic Kidney disease less than 60 ml/min/1 73 sq  meters  Kidney failure less than 15 ml/min/1 73 sq  meters     GLUCOSE FASTING 74 mg/dL  65-99     (1) C-REACTIVE PROTEIN 05Jun2017 07:22AM Kellen Olive   TW Order Number: [id number]     Test Name Result Flag Reference   C-REACT PROTEIN <3 0 mg/L  <3 0     (1) SED RATE [id number] 07:22AM Kellen Olive   TW Order Number: [id number]     Test Name Result Flag Reference   SED RATE 14 mm/hour  2-25 (1) URINALYSIS (will reflex a microscopy if leukocytes, occult blood, protein or nitrites are not within normal limits) [id number] 07:22AM Celia Jacobo Order Number: [id number]     Test Name Result Flag Reference   COLOR Yellow     CLARITY Clear     SPECIFIC GRAVITY UA >=1 030  1 000-1 030   PH UA 5 5  5 0-9 0   LEUKOCYTE ESTERASE UA Negative  Negative   NITRITE UA Negative  Negative   PROTEIN UA Trace mg/dl A Negative   GLUCOSE UA Negative mg/dl  Negative   KETONES UA Negative mg/dl  Negative   UROBILINOGEN UA 0 2 E U /dl  0 2, 1 0 E U /dl   BILIRUBIN UA Negative  Negative   BLOOD UA Negative  Negative   BACTERIA Occasional /hpf  None Seen, Occasional   EPITHELIAL CELLS Moderate /hpf A None Seen, Occasional   MUCOUS THREADS Innumerable  Occasional, Moderate, Innumerable   RBC UA 2-4 /hpf A None Seen   WBC UA 2-4 /hpf A None Seen     (1) URINE PROTEIN CREATININE RATIO 05Jun2017 07:22AM Yari Elizondo    Order Number: [id number]     Test Name Result Flag Reference   CREATININE URINE 305 0 mg/dL     URINE PROTEIN:CREATININE RATIO 0 16 H 0 00-0 10   URINE PROTEIN 48 mg/dL         Health Management  Dense breasts   MAMMO SCREENING BILATERAL W 3D & CAD; every 1 year; Last 45Mkm1047; Next Due: 94Mti0038; Active  History of Encounter for screening mammogram for malignant neoplasm of breast   MAMMO SCREENING BILATERAL W 3D & CAD; every 1 year; Last 84Mjg3914; Next Due: 94Mfq9555;   Active    Future Appointments    Date/Time Provider Specialty Site   10/13/2017 09:40 AM Ayanna Bach, [facility] Rheumatology [address] [facility]     Signatures   Electronically signed by : Freedom Waters DO; Aug 11 2017 12:12PM EST                       (Author)

## 2018-01-14 VITALS
DIASTOLIC BLOOD PRESSURE: 70 MMHG | BODY MASS INDEX: 23.39 KG/M2 | HEART RATE: 82 BPM | SYSTOLIC BLOOD PRESSURE: 102 MMHG | HEIGHT: 67 IN | WEIGHT: 149 LBS

## 2018-01-14 VITALS
WEIGHT: 156 LBS | HEART RATE: 88 BPM | RESPIRATION RATE: 16 BRPM | BODY MASS INDEX: 24.48 KG/M2 | HEIGHT: 67 IN | DIASTOLIC BLOOD PRESSURE: 72 MMHG | TEMPERATURE: 99.2 F | SYSTOLIC BLOOD PRESSURE: 120 MMHG

## 2018-01-14 NOTE — RESULT NOTES
Discussion/Summary   Sid Aguirre,   Here is a copy of the results  Dr Keira Epperson ([address] [place] [address]) [id number] 01:25PM Rita Masters Order Number: [id number]    - Patient Instructions: To schedule this appointment, please contact Central Scheduling at [phone]  Test Name Result Flag Reference   US PELVIS COMPLETE (TRANSABDOMINAL AND TRANSVAGINAL) (Report)     PELVIC ULTRASOUND, COMPLETE     INDICATION: Bleeding between menses and with intercourse LMP was 10/7/2017      COMPARISON: None  TECHNIQUE:  Transabdominal pelvic ultrasound was performed in sagittal and transverse planes with a curvilinear transducer  Additional transvaginal imaging was performed to better evaluate the endometrium and ovaries  Imaging included volumetric    sweeps as well as traditional still imaging technique  FINDINGS:     UTERUS:   The uterus is anteverted in position, measuring 4 3 x 5 3 x 8 0 cm  Myometrium is heterogeneous  The cervix shows no suspicious abnormality  ENDOMETRIUM:    Normal caliber of 5 mm  Homogenous and normal in appearance  OVARIES/ADNEXA:   Right ovary: 1 5 x 1 4 x 2 2 cm  No suspicious right ovarian abnormality  Doppler flow within normal limits  Left ovary: 1 7 x 2 3 x 2 0 cm  No suspicious left ovarian abnormality  Doppler flow within normal limits  No suspicious adnexal mass or loculated collections  There is no free fluid  IMPRESSION:      Heterogeneous myometrium  This is nonspecific though can be seen with adenomyosis            Workstation performed: [id number]     Signed by:   Terese Danielle MD   10/15/17

## 2018-01-14 NOTE — RESULT NOTES
Verified Results  MAMMO SCREENING BILATERAL W 3D & CAD 48Mmc3345 06:05AM Trinh Mendez Order Number: [id number]    - Patient Instructions: To schedule this appointment, please contact Central Scheduling at [phone]  Do not wear any perfume, powder, lotion or deodorant on breast or underarm area  Please bring your doctors order, referral (if needed) and insurance information with you on the day of the test  Failure to bring this information may result in this test being rescheduled  Arrive 15 minutes prior to your appointment time to register  On the day of your test, please bring any prior mammogram or breast studies with you that were not performed at a [facility]  Failure to bring prior exams may result in your test needing to be rescheduled  Test Name Result Flag Reference   MAMMO SCREENING BILATERAL W 3D & CAD (Report)     Patient History:   Patient has history of endometrial cancer  Family history of unknown cancer at age 76 in father  Patient has never smoked  Patient's BMI is 24 7  Reason for exam: screening, asymptomatic  Mammo Screening Bilateral W DBT and CAD: February 28, 2017 -    Check In #: [de-identified]   2D/3D Procedure   2D views: Bilateral MLO, CC, and XCCL view(s) were taken  Technologist: PAGE Weber (PAGE)(M)   Prior study comparison: October 23, 2015, [facility] screening    bilateral digital jorge, performed at [address]  April 11, 2014, bilateral screening mammogram, performed   at [address]  The breast tissue is heterogeneously dense, potentially limiting    the sensitivity of mammography  Patient risk, included in this    report, assists in determining the appropriate screening regimen    (such as 3-D mammography or the inclusion of automated breast    ultrasound or MRI)  3-D mammography may also remain indicated as    screening       No dominant soft tissue mass, architectural distortion or suspicious calcifications are noted in either breast   The skin    and nipple contours are within normal limits  There is    generalized bilateral increased density compared to the prior    study  No significant changes when compared with prior studies  ACR BI-RADSï¾® Assessments: BiRad:1 - Negative     Recommendation:   Routine screening mammogram of both breasts in 1 year  A    reminder letter will be scheduled  8-10% of cancers will be missed on mammography  Management of a    palpable abnormality must be based on clinical grounds  Patients    will be notified of their results via letter from our facility  Accredited by [employer] of Radiology and FDA       Transcription Location: [address] [place] [address]: [id number]     Risk Value(s):   Tyrer-Cuzick 10 Year: 1 900%, Tyrer-Cuzick Lifetime: 11 300%,    Myriad Table: 1 5%, ESTEFANY 5 Year: 0 8%, NCI Lifetime: 9 8%       Discussion/Summary   Clemencia Half,   Your mammogram is normal    Dr Justyna Choi

## 2018-01-14 NOTE — RESULT NOTES
Message   Appointment 2/22/17   Please print and put in my folder  Dr Enrique Arrieta      Verified Results  (1) COMPREHENSIVE METABOLIC PANEL [id number] 47:83QN Ajay Stiles Order Number: [id number]     Test Name Result Flag Reference   GLUCOSE,RANDM 86 mg/dL     If the patient is fasting, the ADA then defines impaired fasting glucose as > 100 mg/dL and diabetes as > or equal to 123 mg/dL  SODIUM 138 mmol/L  136-145   POTASSIUM 3 3 mmol/L L 3 5-5 3   CHLORIDE 102 mmol/L  100-108   CARBON DIOXIDE 29 mmol/L  21-32   ANION GAP (CALC) 7 mmol/L  4-13   BLOOD UREA NITROGEN 13 mg/dL  5-25   CREATININE 0 75 mg/dL  0 60-1 30   Standardized to IDMS reference method   CALCIUM 8 5 mg/dL  8 3-10 1   BILI, TOTAL 0 20 mg/dL  0 20-1 00   ALK PHOSPHATAS 57 U/L     ALT (SGPT) 19 U/L  12-78   AST(SGOT) 14 U/L  5-45   ALBUMIN 3 6 g/dL  3 5-5 0   TOTAL PROTEIN 7 4 g/dL  6 4-8 2   eGFR Non-African American      >60 0 ml/min/1 73sq m   - Patient Instructions: This is a fasting blood test  Water, black tea or black coffee only after 9:00pm the night before test Drink 2 glasses of water the morning of test - Patient Instructions: This bloodwork is non-fasting  Please drink two glasses of   water morning of bloodwork  National Kidney Disease Education Program recommendations are as follows:  GFR calculation is accurate only with a steady state creatinine  Chronic Kidney disease less than 60 ml/min/1 73 sq  meters  Kidney failure less than 15 ml/min/1 73 sq  meters  (1) LIPID PANEL FASTING W DIRECT LDL REFLEX [id number] 08:50AM Ajay Stiles Order Number: [id number]     Test Name Result Flag Reference   CHOLESTEROL 141 mg/dL     LDL CHOLESTEROL CALCULATED 74 mg/dL  0-100   - Patient Instructions: This is a fasting blood test  Water, black tea or black coffee only after 9:00pm the night before test   Drink 2 glasses of water the morning of test     - Patient Instructions:  This is a fasting blood test  Water, black tea or black coffee only after 9:00pm the night before test Drink 2 glasses of water the morning of test - Patient Instructions: This bloodwork is non-fasting  Please drink two glasses of   water morning of bloodwork  Triglyceride:         Normal              <150 mg/dl       Borderline High    150-199 mg/dl       High               200-499 mg/dl       Very High          >499 mg/dl  Cholesterol:         Desirable        <200 mg/dl      Borderline High  200-239 mg/dl      High             >239 mg/dl  HDL Cholesterol:        High    >59 mg/dL      Low     <41 mg/dL  LDL Cholesterol:        Optimal          <100 mg/dl        Near Optimal     100-129 mg/dl        Above Optimal          Borderline High   130-159 mg/dl          High              160-189 mg/dl          Very High        >189 mg/dl  LDL CALCULATED:    This screening LDL is a calculated result  It does not have the accuracy of the Direct Measured LDL in the monitoring of patients with hyperlipidemia and/or statin therapy  Direct Measure LDL (ZRW232) must be ordered separately in these patients  TRIGLYCERIDES 47 mg/dL  <=150   Specimen collection should occur prior to N-Acetylcysteine or Metamizole administration due to the potential for falsely depressed results  HDL,DIRECT 58 mg/dL  40-60   Specimen collection should occur prior to Metamizole administration due to the potential for falsely depressed results       (1) CBC/PLT/DIFF [id number] 08:50AM Ella Lowry Order Number: [id number]     Test Name Result Flag Reference   WBC COUNT 4 20 Thousand/uL L 4 80-10 80   RBC COUNT 4 11 Million/uL L 4 20-5 40   HEMOGLOBIN 12 4 g/dL  12 0-16 0   HEMATOCRIT 37 2 %  37 0-47 0   MCV 91 fL  82-98   MCH 30 3 pg  27 0-31 0   MCHC 33 5 g/dL  31 4-37 4   RDW 12 9 %  11 6-15 1   MPV 6 7 fL L 8 9-12 7   PLATELET COUNT 076 Thousands/uL  130-400   nRBC AUTOMATED 0 /100 WBCs     NEUTROPHILS RELATIVE PERCENT 47 %  43-75   LYMPHOCYTES RELATIVE PERCENT 33 %  14-44   MONOCYTES RELATIVE PERCENT 15 % H 4-12   EOSINOPHILS RELATIVE PERCENT 4 %  0-6   BASOPHILS RELATIVE PERCENT 1 %  0-1   NEUTROPHILS ABSOLUTE COUNT 2 00 Thousands/?L  1 85-7 62   LYMPHOCYTES ABSOLUTE COUNT 1 40 Thousands/?L  0 60-4 47   MONOCYTES ABSOLUTE COUNT 0 60 Thousand/?L  0 17-1 22   EOSINOPHILS ABSOLUTE COUNT 0 20 Thousand/?L  0 00-0 61   BASOPHILS ABSOLUTE COUNT 0 00 Thousands/?L  0 00-0 10   - Patient Instructions: This bloodwork is non-fasting  Please drink two glasses of water morning of bloodwork  (1) TSH [id number] 08:50AM Peggy Velasquez Order Number: [id number]     Test Name Result Flag Reference   TSH 3 683 uIU/mL  0 358-3 740   - Patient Instructions: This bloodwork is non-fasting  Please drink two glasses of water morning of bloodwork  - Patient Instructions: This is a fasting blood test  Water, black tea or black coffee only after 9:00pm the night before test Drink 2 glasses of water the morning of test - Patient Instructions: This bloodwork is non-fasting  Please drink two glasses of   water morning of bloodwork  Patients undergoing fluorescein dye angiography may retain small amounts of fluorescein in the body for 48-72 hours post procedure  Samples containing fluorescein can produce falsely depressed TSH values  If the patient had this procedure,a specimen should be resubmitted post fluorescein clearance            The recommended reference ranges for TSH during pregnancy are as follows:  First trimester 0 1 to 2 5 uIU/mL  Second trimester  0 2 to 3 0 uIU/mL  Third trimester 0 3 to 3 0 uIU/m

## 2018-01-15 NOTE — CONSULTS
I had the pleasure of evaluating your patient, Gavin Rudolph  My full evaluation follows:      Chief Complaint  preop clearance, anticardiolipin antibody positive      History of Present Illness  72-year-old female referred for the above  Mrs Diane has had recent issues with irregular menstrual bleeding and menstrual pain  Patient is being evaluated and treated by gynecology and is tentatively scheduled for hysterectomy in mid 2017  Patient's sister has had a lower extremity DVT in the past (tobacco use, history of long car rides)  Patient's brother had a major MI at a young age - no evidence of atherosclerotic disease  Patient had a another brother who  suddenly at a young age (thought to be a PE, no autopsy)  Mrs Diane was previously diagnosed with mixed connective tissue disorder and part of the workup demonstrated an elevated anticardiolipin antibody  Nae patient states feeling okay, baseline  Still with pelvic pain - same as before  No problems with excessive bruising or bleeding  No shortness of breath or dyspnea on exertion, no chest pain or pressure  No lower extremity swelling or pain  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  Routine health maintenance and medical care is up-to-date  No other GI or  issues  Review of Systems    Constitutional: as noted in HPI  Eyes: No complaints of eye pain, no red eyes, no eyesight problems, no discharge, no dry eyes, no itching of eyes  ENT: no complaints of earache, no loss of hearing, no nose bleeds, no nasal discharge, no sore throat, no hoarseness  Cardiovascular: No complaints of slow heart rate, no fast heart rate, no chest pain, no palpitations, no leg claudication, no lower extremity edema  Respiratory: No complaints of shortness of breath, no wheezing, no cough, no SOB on exertion, no orthopnea, no PND     Gastrointestinal: No complaints of abdominal pain, no constipation, no nausea or vomiting, no diarrhea, no bloody stools  Genitourinary: pelvic pain, but as noted in HPI  Musculoskeletal: arthralgias and joint stiffness, but as noted in HPI  Integumentary: No complaints of skin rash or lesions, no itching, no skin wounds, no breast pain or lump  Neurological: No complaints of headache, no confusion, no convulsions, no numbness, no dizziness or fainting, no tingling, no limb weakness, no difficulty walking  Psychiatric: Not suicidal, no sleep disturbance, no anxiety or depression, no change in personality, no emotional problems  Endocrine: No complaints of proptosis, no hot flashes, no muscle weakness, no deepening of the voice, no feelings of weakness  Hematologic/Lymphatic: No complaints of swollen glands, no swollen glands in the neck, does not bleed easily, does not bruise easily  Active Problems    1  Abnormal menstrual periods (626 2) (N92 6)   2  Antiphospholipid antibody syndrome (289 81) (D68 61)   3  Body mass index (BMI) of 24 0 to 24 9 in adult (V85 1) (Z68 24)   4  Chronic low back pain (724 2,338 29) (M54 5,G89 29)   5  Connective tissue disease overlap syndrome (710 8) (M35 8)   6  Costochondritis (733 6) (M94 0)   7  Dense breasts (793 82) (R92 2)   8  Long term (current) use of systemic steroids (V58 65) (Z79 52)   9   Long-term use of hydroxychloroquine (V58 69) (X74 862)    Past Medical History    · History of Abnormal serum level of lipase (790 5) (R74 8)   · 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)   · History of Costochondritis (733 6) (M94 0)   · History of Elevated liver enzymes (790 5) (R74 8)   · History of Elevated sed rate (790 1) (R70 0)   · History of Encounter for routine pelvic examination (V72 31) (Z01 419)   · History of Encounter for screening mammogram for malignant neoplasm of breast  (V76 12) (Z12 31)   · History of Fever chills (780 60) (R50 9)   · History of dermatitis (V13 3) (Z87 2)   · History of erythema nodosum (V13 3) (Z87 2)   · History of infectious mononucleosis (V12 09) (Z86 19)   · History of Limb pain (729 5) (M79 609)   · History of Myalgia (729 1) (M79 1)   · History of Other bursitis of elbow, right elbow (726 39) (M70 31)   · History of Other muscle spasm (728 85) (Q74 496)   · History of Polyarthralgia (719 49) (M25 50)   · History of Positive cardiolipin antibodies (795 79) (R76 8)   · History of Screening for cardiovascular condition (V81 2) (Z13 6)   · History of Screening for diabetes mellitus (DM) (V77 1) (Z13 1)   · History of Skin lesion (709 9) (L98 9)   · History of Well adult on routine health check (V70 0) (Z00 00)    The active problems and past medical history were reviewed and updated today  as above, mixed connective tissue disorder, costochondritis, positive antiphospholipid antibody laboratory tests in the past, arthritis, normal childhood illnesses and vaccinations  Ob/GYN: Pelvic pain and irregular bleeding as above, 4 children without obstetric complications, no breast pathology, mammogram demonstrated dense breasts only        Surgical History    · Denied: History Of Prior Surgery    The surgical history was reviewed and updated today  No past surgical history, no transfusions        Family History    · Family history of gout (V18 19) (Z82 69)    · Family history of Diabetes Mellitus (V18 0)    · Family history of rheumatoid arthritis (V17 7) (Z82 61)    · Family history of systemic lupus erythematosus (V19 4) (Z82 69)    · Family history of Sjogren's disease (V17 89) (Z82 69)   · Family history of systemic lupus erythematosus (V19 4) (Z82 69)   · Family history of thyroid disease (V18 19) (Z83 49)    · Denied: Family history of Crohn's disease   · Denied: Family history of psoriasis   · Denied: Family history of ulcerative colitis   · Family history of Heart Disease (V17 49)    The family history was reviewed and updated today        sister with DVT, brother with MI at an early age, second brother dead from believed to be PE, mother and father without bleeding or clotting abnormalities, no other family members with anticardiolipin antibodies        Social History    · 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   · History of Never a smoker   · Never a smoker   · No alcohol use   · No drug use  The social history was reviewed and updated today  , patient works as an RN, no toxic exposure, no tobacco, alcohol or drug abuse        Current Meds   1  Advil 200 MG Oral Tablet; TAKE 3 TABLET Daily PRN pain; Therapy: (Recorded:11Aug2017) to Recorded   2  Cyclobenzaprine HCl - 10 MG Oral Tablet; Take 1/2 to 1 tablet PO TID prn spasms    Requested for: 49KNV2183; Last Rx:11May2017 Ordered   3  Hydroxychloroquine Sulfate 200 MG Oral Tablet; TAKE 2 TABLET Daily With Food; Therapy: 96ACR4845 to ([facility])  Requested for: 52EWC3913; Last   Rx:11May2017 Ordered   4  Omega 3 CAPS; take 1 capsule daily; Therapy: (Recorded:11Aug2017) to Recorded   5  Percocet 5-325 MG Oral Tablet; Take 1 tablet daily; Therapy: (Recorded:10Nov2016) to Recorded   6  Turmeric 500 MG Oral Capsule; take 1 capsule daily; Therapy: (Recorded:10Feb2017) to Recorded    Allergies    1  AMOXICILLIN   2  Levaquin TABS    Vitals   Recorded: 11NHU5693 01:34PM   Temperature 99 6 F   Heart Rate 80   Respiration 18   Systolic 152   Diastolic 60   Height 5 ft 7 in   Weight 152 lb 8 oz   BMI Calculated 23 89   BSA Calculated 1 8   O2 Saturation 98     Physical Exam    Constitutional Well-nourished female, no respiratory distress no edema, no cords, pulses are 1+  Eyes   Conjunctiva and lids: No swelling, erythema or discharge  Pupils and irises: Equal, round and reactive to light  Ears, Nose, Mouth, and Throat   External inspection of ears and nose: Normal     Nasal mucosa, septum, and turbinates: Normal without edema or erythema  Oropharynx: Normal with no erythema, edema, exudate or lesions  Pulmonary   Respiratory effort: No increased work of breathing or signs of respiratory distress  Auscultation of lungs: Clear to auscultation  Cardiovascular   Palpation of heart: Normal PMI, no thrills  Auscultation of heart: Normal rate and rhythm, normal S1 and S2, without murmurs  Carotid pulses: Normal     Abdomen Nontender, + bowel sounds, no hepatosplenomegaly  Lymphatic No adenopathy in the neck, supra-clavicular region, axilla and groin bilaterally  Musculoskeletal   Gait and station: Normal     Digits and nails: Normal without clubbing or cyanosis  Inspection/palpation of joints, bones, and muscles: Normal     Skin Warm, moist, no petechiae or ecchymoses, good turgor  Neurologic   Cranial nerves: Cranial nerves 2-12 intact  Reflexes: 2+ and symmetric  Sensation: No sensory loss  Psychiatric   Orientation to person, place, and time: Normal     Mood and affect: Normal          Results/Data    Results   Pathology 10/26/17 Cervical/endometrial polyp demonstrated a benign endocervical polyp with moderate acute and chronic inflammation and surface ulceration but negative for malignancy  Radiology 10/11/17 complete pelvic ultrasound demonstrated a heterogeneous myometrium  Radiologist stated this was nonspecific this could be seen with adenomyosis  2/28/70 bilateral screening mammogram was category 1, negative    Lab Results 10/28/17 BUN = 15 creatinine = 0 87 calcium = 8 9 AST = 16 ALT = 26 total protein = 7 4 total bilirubin = 0 30 WBC = 4 40 hemoglobin = 13 hematocrit = 39 1 platelet = 377 neutrophil = 47% lymphocyte = 39% monocyte = 10% eosinophil = 3% sedimentation rate = 14 anticardiolipin antibody IgA <9 = WNL anticardiolipin antibody IgG = 10 = WNL anticardiolipin antibody IgM = 62 (high, 0-12)  Assessment    1   Antiphospholipid antibody syndrome (289 81) (D68 61)    Plan  Antiphospholipid antibody syndrome    · Follow-up PRN Evaluation and Treatment Follow-up  Status: Complete  Done:  98FTL9056   Ordered; For: Antiphospholipid antibody syndrome; Ordered By: Dai Mejia Performed:  Due: 85LEB4806    Discussion/Summary    53-year-old female with a mixed connective tissue disorder being followed by rheumatology previously found to have an elevated anticardiolipin IgM  Patient has never had any issues with bleeding or clotting  Mrs Diane has chronic pelvic pain - this is not new  Hysterectomy is scheduled for mid December 2017  Generalized body aches are about the same as before  That being said, patient is able to go about her normal daily activities  We discussed options  Patient is to go for a complete thrombophilia evaluation now (looking for other abnormalities that may increase her blaine or postoperative risk)  Hematology clearance for surgery will be given when these results are available  Likely patient will only require prophylactic Lovenox for a few days after surgery until she is completely ambulatory  Return to this office is on a prn basis but Mrs Diane knows to call if she has any other hematology questions or concerns  Carefully review your medication list and verify that the list is accurate and up-to-date  Please call the hematology/oncology office if there are medications missing from the list, medications on the list that you are not currently taking or if there is a dosage or instruction that is different from how you're taking a medication  Thank you very much for allowing me to participate in the care of this patient  If you have any questions, please do not hesitate to contact me        Signatures   Electronically signed by : Raul Sears MD; Nov 16 2017  8:30AM EST                       (Author)

## 2018-01-15 NOTE — MISCELLANEOUS
Message  Return to work or school:   [place] is under my professional care  She was seen in my office on 2/22/17  Luana Corona is currently being treated with methylprednisone and is medically unable to get an influenza vaccine while on this medication               Future Appointments    Signatures   Electronically signed by : Petra Mercedes DO; Feb 22 2017  7:06PM EST                       (Author)

## 2018-01-15 NOTE — PROGRESS NOTES
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)   2  Antiphospholipid antibody syndrome (289 81) (D68 61)   3  Long term (current) use of systemic steroids (V58 65) (Z79 52)   4  Chronic low back pain (724 2,338 29) (M54 5,G89 29)   5  Costochondritis (733 6) (M94 0)    Plan    1  (1) ANTICARDIOLIPIN ANTIBODY; Status:Active; Requested for:13Oct2017;    2  (1) CBC/PLT/DIFF; Status:Active; Requested AHV:[id number];    3  (1) COMPREHENSIVE METABOLIC PANEL; Status:Active; Requested for:13Oct2017;    4  (1) C-REACTIVE PROTEIN; Status:Active; Requested for:13Oct2017;    5  (1) SED RATE; Status:Active; Requested FVX:[id number];    6  Follow-up visit in 3 months Evaluation and Treatment  Follow-up  Status: Hold For -   Scheduling  Requested for: 02ZPJ6435   8  Call [phone] if: The pain seems worse ; Status:Complete;   Done: 09JPP5758   8  Call [phone] if: The symptoms seem worse ; Status:Complete;   Done:   42SFD9738   9  Call [phone] if: You have questions or concerns about your problem ;   Status:Complete;   Done: 31OGA3986    10  PredniSONE 2 5 MG Oral Tablet; Take 1 tablet daily   11  Hydroxychloroquine Sulfate 200 MG Oral Tablet; TAKE 2 TABLET Daily With    Food    Discussion/Summary    This is a 56-year-old female presenting today for follow-up for an undifferentiated connective tissue disease  She states that she had one bout pain 2 weeks ago which she described as widespread joint pain with swelling  She also describes a "lupus migraine "with dizziness and the feeling of "vertigo "  The patient states that she continues to have pain in both elbows at this time however the other joint pain has resolved  She denies any obvious joint swelling but does describe morning stiffness lasting about an hour  She does have difficulty with sleep due to pain as well as nonrestorative sleep and fatigue   The patient is utilizing hydroxychloroquine 400 mg daily and she states she is due for an eye exam  In addition she was started on prednisone 5 mg daily at the last appointment has weaned herself to 2 5 mg daily  On physical examination, there is mild synovitis of the right elbow with tenderness of both elbows  She has normal passive range of motion of both upper and lower extremities with some tenderness of the lumbar spine  There is crepitus of the knees  At this time patient's history and physical examination is most consistent with an undifferentiated connective tissue disease which appears to be mildly active but stable with the use of hydroxychloroquine and prednisone 2 5 mg daily  The patient was given further options for treatment at the last appointment including azathioprine or methotrexate however she declines at this time  In addition patient was told that she may further wean prednisone to 2 5 mg every other day if she continues to feel well  It was recommended the patient proceed with a flu shot at this time  Patient will contact the office in the interim if she has any further questions or concerns before plan to return to the office in 3 months time  We will however obtain a CBC, CMP, CRP, ESR, as well as anticardiolipin antibody per her request at this time  Counseling  Rheumatology Counseling Documentation: The patient was counseled regarding instructions for management, prognosis, patient and family education, risks and benefits of treatment options and importance of compliance with treatment  Chief Complaint  F/U UCTD   Patient is here today for follow up of chronic conditions described in HPI  History of Present Illness  Patient is in the office today for follow up for UCTD  One bout of pain two weeks ago  Was having "lupus migraines" with dizziness and "vertigo"  Pain in all the joints during that flare and swelling  Taking Turmeric at this time with relief  Pain in the elbows  No obvious joint swelling  +Am stiffness x 1 hour   +Difficulty with sleep due to pain  +non restorative sleep  +fatigue  Last eye exam: due to one  Taking Prednisone 5 mg and now on 2 5 mg daily  RAPID3: 1 3/30      Review of Systems    Constitutional: fatigue, but no fever, no recent weight gain, no chills, no recent weight loss and no anorexia  HEENT: blurred vision and dryness of the eyes, but no double vision, no amaurosis fugax, no eye pain, no erythema eye(s), no dryness mouth, no mouth sores and no sore throat  Cardiovascular: no dyspnea on exertion and no swelling in the arms or legs    The patient presents with complaints of no chest pain or pressure (+costochronditis)  Respiratory: no unusual or persistent cough, no shortness of breath and no pleurisy  Gastrointestinal: heartburn, but no nausea, no vomiting, no diarrhea, no constipation, no melena and no BRBPR    The patient presents with complaints of bilateral lower quadrant abdominal pain (+pelvic pain )  Genitourinary: No foamy urine, but no dysuria and no hematuria  Musculoskeletal: as noted in HPI  Integumentary Raynaud's, but no rash, no alopecia, no nail changes and no photosensitivity  Endocrine no polyuria and no polydipsia  Hematologic/Lymphatic: no tendency for easy bruising    The patient presents with complaints of unusual bleeding (vaginal )  Neurological: headache and weakness, but no tingling    The patient presents with complaints of vertigo or dizziness, described as lightheadedness  Symptoms are made worse by getting up quickly  Psychiatric: insomnia and non-restorative sleep  Active Problems    1  Abnormal menstrual periods (626 2) (N92 6)   2  Antiphospholipid antibody syndrome (289 81) (D68 61)   3  Body mass index (BMI) of 24 0 to 24 9 in adult (V85 1) (Z68 24)   4  Chronic low back pain (724 2,338 29) (M54 5,G89 29)   5  Connective tissue disease overlap syndrome (710 8) (M35 8)   6  Costochondritis (733 6) (M94 0)   7  Dense breasts (793 82) (R92 2)   8   Long term (current) use of systemic steroids (V58 65) (Z79 52)   9  Long-term use of hydroxychloroquine (V58 69) (D10 308)    Past Medical History    1  History of Abnormal serum level of lipase (790 5) (R74 8)   2  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)   3  History of Costochondritis (733 6) (M94 0)   4  History of Elevated liver enzymes (790 5) (R74 8)   5  History of Elevated sed rate (790 1) (R70 0)   6  History of Encounter for routine pelvic examination (V72 31) (Z01 419)   7  History of Encounter for screening mammogram for malignant neoplasm of breast   (V76 12) (Z12 31)   8  History of Fever chills (780 60) (R50 9)   9  History of dermatitis (V13 3) (Z87 2)   10  History of erythema nodosum (V13 3) (Z87 2)   11  History of infectious mononucleosis (V12 09) (Z86 19)   12  History of Limb pain (729 5) (M79 609)   13  History of Myalgia (729 1) (M79 1)   14  History of Other bursitis of elbow, right elbow (726 39) (M70 31)   15  History of Other muscle spasm (728 85) (M62 838)   16  History of Polyarthralgia (719 49) (M25 50)   17  History of Positive cardiolipin antibodies (795 79) (R76 8)   18  History of Screening for cardiovascular condition (V81 2) (Z13 6)   19  History of Screening for diabetes mellitus (DM) (V77 1) (Z13 1)   20  History of Skin lesion (709 9) (L98 9)   21  History of Well adult on routine health check (V70 0) (Z00 00)    The active problems and past medical history were reviewed and updated today  Surgical History    1  Denied: History Of Prior Surgery    The surgical history was reviewed and updated today  Family History  Father    1  Family history of gout (V18 19) (Z82 69)  Brother    2  Family history of Diabetes Mellitus (V18 0)  Maternal Grandmother    3  Family history of rheumatoid arthritis (V17 7) (Z82 61)  Cousin    4  Family history of systemic lupus erythematosus (V19 4) (Z82 69)  Maternal Relatives    5  Family history of Sjogren's disease (V17 89) (Z77 23)   6  Family history of systemic lupus erythematosus (V19 4) (Z82 69)   7  Family history of thyroid disease (V18 19) (Z83 49)  Family History    8  Denied: Family history of Crohn's disease   9  Denied: Family history of psoriasis   10  Denied: Family history of ulcerative colitis   11  Family history of Heart Disease (V17 49)    The family history was reviewed and updated today  Social History    · Employed   · Never a smoker   · History of Never a smoker   · No alcohol use   · No drug use  The social history was reviewed and updated today  The social history was reviewed and is unchanged  Current Meds   1  Advil 200 MG Oral Tablet; TAKE 3 TABLET Daily PRN pain; Therapy: (Recorded:11Aug2017) to Recorded   2  Cyclobenzaprine HCl - 10 MG Oral Tablet; Take 1/2 to 1 tablet PO TID prn spasms    Requested for: 76PJL2283; Last Rx:11May2017 Ordered   3  Hydroxychloroquine Sulfate 200 MG Oral Tablet; TAKE 2 TABLET Daily With Food; Therapy: 85QFK5546 to (Evaluate:07Nov2017)  Requested for: 82WRQ8188; Last   Rx:[id number] Ordered   4  Omega 3 CAPS; take 1 capsule daily; Therapy: (Recorded:11Aug2017) to Recorded   5  Percocet 5-325 MG Oral Tablet; Take 1 tablet daily; Therapy: (Recorded:10Nov2016) to Recorded   6  PredniSONE 2 5 MG Oral Tablet; Take 1 tablet daily; Therapy: (Recorded:13Oct2017) to Recorded   7  Turmeric 500 MG Oral Capsule; take 1 capsule daily; Therapy: (Recorded:54Ocm9822) to Recorded    The medication list was reviewed and updated today  Immunizations  Tdap --- [place]: [birth year]2007     Allergies    1  AMOXICILLIN   2  Levaquin TABS    Vitals  Signs   Recorded: 56YFG5354 09:50AM   Heart Rate: 68  Systolic: 776  Diastolic: 62  Height: 5 ft 7 in  Weight: 147 lb   BMI Calculated: 23 02  BSA Calculated: 1 77    Physical Exam    Constitutional   General appearance: No acute distress, well appearing and well nourished  Eyes   Conjunctiva and lids: No swelling, erythema or discharge  Pupils and irises: Equal, round and reactive to light  Ears, Nose, Mouth, and Throat   External inspection of ears and nose: Normal     Oropharynx: Normal with no erythema, edema, exudate lesions, or ulcers  Pulmonary   Respiratory effort: No increased work of breathing or signs of respiratory distress  Auscultation of lungs: Clear to auscultation  Cardiovascular   Auscultation of heart: Normal rate and rhythm, normal S1 and S2, without murmurs  Examination of extremities for edema and/or varicosities: Normal     Lymphatic   Palpation of lymph nodes in neck: No lymphadenopathy  Psychiatric   Orientation to person, place, and time: Normal     Mood and affect: Normal         Right elbow tenderness and swelling  Left Elbow tenderness  Musculoskeletal - Joints, bones, and muscles: Abnormal  Palpation - lower mid-lumbar, right lower lumbar and left lower lumbar tenderness and bilateral knee crepitus  Right hand: All MCP, PIP and DIP joints are normal  Left Hand: All MCP, PIP and DIP joints are normal    Right foot: All MTP, PIP and DIP joints are normal  Left foot: All MTP, PIP and DIP joints are normal       Health Management  Dense breasts   MAMMO SCREENING BILATERAL W 3D & CAD; every 1 year; Last 99Dzi2249; Next Due: 67Bls4949; Active  History of Encounter for screening mammogram for malignant neoplasm of breast   MAMMO SCREENING BILATERAL W 3D & CAD; every 1 year; Last 96Kmu8250; Next Due: 46Diz7710; Active    Future Appointments    Date/Time Provider Specialty Site   10/26/2017 02:30 PM ROSIBEL Wang  Obstetrics/Gynecology [facility]   01/18/2018 08:30 AM Manny Burch [facility] Rheumatology [address] [facility]     Signatures   Electronically signed by : Naveen Harrison [facility]; Oct 13 2017 10:20AM EST                       (Author)    Electronically signed by :  ROSIBEL Eason ; Oct 25 2017  8:03AM EST                       (Author)

## 2018-01-15 NOTE — PROGRESS NOTES
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)   2  Antiphospholipid antibody syndrome (289 81) (D68 61)   3  Chronic low back pain (724 2,338 29) (M54 5,G89 29)   4  Costochondritis (733 6) (M94 0)   5  Long term (current) use of systemic steroids (V58 65) (Z79 52)   6  Long-term use of hydroxychloroquine (V58 69) (Z79 899)    Plan    1  PredniSONE 5 MG Oral Tablet; Take 1 tablet daily   2  (1) CBC/PLT/DIFF; Status:Active; Requested for:67Kpy5370;    3  (1) COMPREHENSIVE METABOLIC PANEL; Status:Active; Requested for:74Frg4850;    4  (1) C-REACTIVE PROTEIN; Status:Active; Requested for:64Uqs1142;    5  (1) SED RATE; Status:Active; Requested for:69Wbz5292;    6  (1) URINALYSIS (will reflex a microscopy if leukocytes, occult blood, protein or nitrites are   not within normal limits); Status:Active; Requested for:85Tfd3268;    7  (1) URINE PROTEIN CREATININE RATIO; Status:Active; Requested for:39Dtr7490;    8  Follow-up visit in 3 months Evaluation and Treatment  Follow-up  Status: Complete    Done: 88PSR9097    9  Cyclobenzaprine HCl - 10 MG Oral Tablet; Take 1/2 to 1 tablet PO TID prn   spasms    10  Hydroxychloroquine Sulfate 200 MG Oral Tablet; TAKE 2 TABLET Daily With Food   11  Call [phone] if: The pain seems worse ; Status:Complete;   Done: 01MXA2808   12  Call [phone] if: The symptoms seem worse ; Status:Complete;   Done:    91JLG6156   13  Call [phone] if: You have questions or concerns about your problem ;    Status:Complete;   Done: 16JLC3361    Discussion/Summary    Ms Griffith has been having increased pain recently which is constant  She complains of pain in her hands, wrists, and elbows  She also reports that her elbows feel weak because of pain when holding objects  She often feels like they are going to "give out " She also continues to have knee pain   She has been utilizing Motrin on occasion for pain, as well as Flexeril and Percocet with only minimal relief  She reports difficulty sleeping due to back pain  She denies any obvious joint swelling  She reports morning stiffness typically lasting one hour, as well as nonrestorative sleep and fatigue  She does report that she restarted prednisone 5 mg daily, but she has not had any improvement in her symptoms since doing so  On exam, there is mild synovitis of the bilateral elbows  There is no other active synovitis, but she does have mild tenderness to palpation of the bilateral MCPs and PIPs of the hands  There is crepitus of the bilateral knees  There is paraspinal spasm noted in the lumbar spine area  Review of laboratory studies from February 13, 2017 revealed an essentially normal CBC, CMP, ESR, and CRP  Urinalysis and urine protein creatinine ratio were normal  Cardiolipin antibodies were again positive  At this time, her mixed connective tissue disease does appear active and uncontrolled despite prednisone and Plaquenil 300 mg daily  We did discuss several treatment options, and she is reluctant to try any further DMARD at this time, including methotrexate  For now, we will increase her Plaquenil to 400 mg daily  She will continue the prednisone at its current dose  I advised her to contact my office if she is interested in starting methotrexate before her follow-up, as she certainly may benefit from this medication  I would like to recheck a CBC, CMP, ESR, and CRP at this time  She did inquire about rechecking cardiolipin antibodies, and I explained to her that they have been positive multiple times, and there is no reason to check them any longer  I will reevaluate her in 3 months or sooner if she chooses to start methotrexate  She will call in the interim if there are any questions or concerns  Patient is able to Self-Care        Counseling  Rheumatology Counseling Documentation: The patient was counseled regarding instructions for management, impressions and risks and benefits of treatment options  The following educational handouts were provided: Methotrexate  Chief Complaint  F/U MCTD and APS   Patient is here today for follow up of chronic conditions described in HPI  History of Present Illness  Pt  returns for F/U for MCTD and APS  Feeling like she is having increased pain recently  Has constant pain  c/o pain in hands, wrists, and elbows  Feels weak due to pain  Elbows feel like they are going to give out  Also with knee pain  Taking Flexeril for pain and sometimes Percocet  Also uses Motrin on occasion  + difficulty sleeping due to pain  No obvious joint swelling  + AM stiffness x 1 hour  + non-restorative sleep  + fatigue  Restarted taking Prednisone 5mg daily without relief  RAPID3: 9 0/30      Review of Systems    Constitutional: fatigue, but no fever, no recent weight gain, no chills, no recent weight loss and no anorexia  HEENT: blurred vision, but no double vision, no amaurosis fugax, no dryness of the eyes, no eye pain, no erythema eye(s), no dryness mouth, no mouth sores, not feeling congested and no sore throat  Cardiovascular: dyspnea on exertion, but no swelling in the arms or legs    The patient presents with complaints of 1 episode of chest pain or pressure starting about 3 months ago, each episode lasting about 2 days Symptoms are resolved (2/2 costochondritis)  Respiratory: no shortness of breath and no pleurisy    The patient presents with complaints of unusual or persistent cough (a/w swallowing)  Gastrointestinal: heartburn, but no abdominal pain, no nausea, no vomiting, no diarrhea, no constipation, no melena and no BRBPR  Genitourinary: no foamy urine, but no dysuria and no hematuria  Musculoskeletal: as noted in HPI  Integumentary Raynaud's and alopecia, but no rash, no nail changes and no photosensitivity  Endocrine no polyuria and no polydipsia  Hematologic/Lymphatic: no unusual bleeding and no tendency for easy bruising     Neurological: headache, tingling and weakness    The patient presents with complaints of occasional episodes of vertigo or dizziness, described as lightheadedness and faintness  Symptoms are made worse by bending over and getting up quickly  Psychiatric: insomnia and non-restorative sleep  Active Problems    1  Antiphospholipid antibody syndrome (289 81) (D68 61)   2  Body mass index (BMI) of 24 0 to 24 9 in adult (V85 1) (Z68 24)   3  Chronic low back pain (724 2,338 29) (M54 5,G89 29)   4  Connective tissue disease overlap syndrome (710 8) (M35 8)   5  Costochondritis (733 6) (M94 0)   6  Dense breasts (793 82) (R92 2)   7  Long term (current) use of systemic steroids (V58 65) (Z79 52)   8  Long-term use of hydroxychloroquine (V58 69) (V12 250)    Past Medical History    1  History of Abnormal serum level of lipase (790 5) (R74 8)   2  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)   3  History of Costochondritis (733 6) (M94 0)   4  History of Elevated liver enzymes (790 5) (R74 8)   5  History of Elevated sed rate (790 1) (R70 0)   6  History of Encounter for routine pelvic examination (V72 31) (Z01 419)   7  History of Encounter for screening mammogram for malignant neoplasm of breast   (V76 12) (Z12 31)   8  History of Fever chills (780 60) (R50 9)   9  History of dermatitis (V13 3) (Z87 2)   10  History of erythema nodosum (V13 3) (Z87 2)   11  History of infectious mononucleosis (V12 09) (Z86 19)   12  History of Limb pain (729 5) (M79 609)   13  History of Myalgia (729 1) (M79 1)   14  History of Other bursitis of elbow, right elbow (726 39) (M70 31)   15  History of Other muscle spasm (728 85) (M62 838)   16  History of Polyarthralgia (719 49) (M25 50)   17  History of Positive cardiolipin antibodies (795 79) (R76 8)   18  History of Screening for cardiovascular condition (V81 2) (Z13 6)   19  History of Screening for diabetes mellitus (DM) (V77 1) (Z13 1)   20   History of Skin lesion (709 9) (L98 9)   21  History of Well adult on routine health check (V70 0) (Z00 00)    The active problems and past medical history were reviewed and updated today  Surgical History    1  Denied: History Of Prior Surgery    The surgical history was reviewed and updated today  Family History  Father    1  Family history of gout (V18 19) (Z82 69)  Brother    2  Family history of Diabetes Mellitus (V18 0)  Maternal Grandmother    3  Family history of rheumatoid arthritis (V17 7) (Z82 61)  Cousin    4  Family history of systemic lupus erythematosus (V19 4) (Z82 69)  Maternal Relatives    5  Family history of Sjogren's disease (V17 89) (Z82 69)   6  Family history of systemic lupus erythematosus (V19 4) (Z82 69)   7  Family history of thyroid disease (V18 19) (Z83 49)  Family History    8  Denied: Family history of Crohn's disease   9  Denied: Family history of psoriasis   10  Denied: Family history of ulcerative colitis   11  Family history of Heart Disease (V17 49)    The family history was reviewed and updated today  Social History    · Employed   · Never a smoker   · No alcohol use   · No drug use  The social history was reviewed and updated today  The social history was reviewed and is unchanged  Current Meds   1  Flexeril 10 MG TABS; Take 1 tablet daily; Therapy: (Recorded:10Nov2016) to Recorded   2  Hydroxychloroquine Sulfate 200 MG Oral Tablet; Take 1 and 1/2 tablet daily as directed; Therapy: 58GTK4163 to (Evaluate:27Wph7132)  Requested for: 07DXW3482; Last   Rx:84Pkr8824 Ordered   3  Magnesium CAPS; take 1 capsule daily; Therapy: (355 732 052) to Recorded   4  Percocet 5-325 MG Oral Tablet; Take 1 tablet daily; Therapy: (Recorded:10Nov2016) to Recorded   5  PredniSONE 5 MG Oral Tablet; Take 1 tablet daily; Therapy: (Recorded:92Dvn2543) to Recorded   6  Turmeric 500 MG Oral Capsule; take 1 capsule daily; Therapy: (Recorded:03Wag9680) to Recorded   7   Vitamin B Complex CAPS; TAKE 1 CAPSULE DAILY; Therapy: ([phone]) to Recorded   8  Vitamin C TABS; TAKE 1 TABLET DAILY; Therapy: ([phone]) to Recorded   9  Vitamin D 2000 UNIT Oral Capsule; take 1 capsule daily; Therapy: (Recorded:12Yrw7149) to Recorded    The medication list was reviewed and updated today  Immunizations  Tdap --- Raiza David: 21-Aug-2005     Allergies    1  AMOXICILLIN   2  Levaquin TABS    Vitals  Signs   Recorded: 81JST2436 10:58AM   Heart Rate: 80  Systolic: 90  Diastolic: 50  Weight: 709 lb   BMI Calculated: 24 33  BSA Calculated: 1 79    Physical Exam    Constitutional   General appearance: No acute distress, well appearing and well nourished  Eyes   Conjunctiva and lids: No swelling, erythema or discharge  Pupils and irises: Equal, round and reactive to light  Ears, Nose, Mouth, and Throat   External inspection of ears and nose: Normal     Oropharynx: Normal with no erythema, edema, exudate lesions, or ulcers  Pulmonary   Respiratory effort: No increased work of breathing or signs of respiratory distress  Auscultation of lungs: Clear to auscultation  Cardiovascular   Auscultation of heart: Normal rate and rhythm, normal S1 and S2, without murmurs  Examination of extremities for edema and/or varicosities: Normal     Lymphatic   Palpation of lymph nodes in neck: No lymphadenopathy  Psychiatric   Orientation to person, place, and time: Normal     Mood and affect: Normal         Right elbow swelling  Left Elbow tenderness and swelling  Right ankle swelling  Left ankle swelling  Musculoskeletal - Muscle strength/tone: Normal  Motor Strength Findings: normal upper extremity strength and normal lower extremity strength  Right upper extremity: shoulder flexion 5/5, shoulder extension 5/5, biceps 5/5, triceps 5/5, but normal hand   Left upper extremity shoulder flexion 5/5, shoulder extension 5/5, biceps 5/5, triceps 5/5, but normal hand      Right lower extremity strength: hip flexion 5/5, hip abduction 5/5, hip adduction 5/5, knee flexion 5/5, knee extension 5/5, ankle dorsiflexion 5/5, ankle plantar flexion 5/5  Left lower extremity strength: hip flexion 5/5, hip abduction 5/5, hip adduction 5/5, knee flexion 5/5, knee extension 5/5, ankle dorsiflexion 5/5, ankle plantar flexion 5/5  Skin - Skin and subcutaneous tissue: Normal    Neurologic - Sensation: Normal       Health Management  Dense breasts   MAMMO SCREENING BILATERAL W 3D & CAD; every 1 year; Last 28Feb2017; Next Due: 03Fmi8415; Active  History of Encounter for screening mammogram for malignant neoplasm of breast   MAMMO SCREENING BILATERAL W 3D & CAD; every 1 year; Last 28Feb2017; Next Due: 54Iff9414;   Active    Future Appointments    Date/Time Provider Specialty Site   08/11/2017 10:20 AM Guadalupe Harrison DO Rheumatology [facility] [address] [facility]     Signatures   Electronically signed by : Mariluz Walker DO; May 11 2017  3:53PM EST                       (Author)

## 2018-01-16 NOTE — RESULT NOTES
Verified Results  * XR SPINE LUMBAR 2 OR 3 VIEWS INJURY [id number] 01:35PM Tanja Butler     Test Name Result Flag Reference   XR SPINE LUMBAR 2 OR 3 VIEWS (Report)     LUMBAR SPINE     INDICATION: Back pain  Injury     COMPARISON: None     VIEWS: AP, lateral and coned-down projections; 3 images     FINDINGS:     Alignment is unremarkable  There is no radiographic evidence of acute fracture or destructive osseous lesion  Minimal degenerative change in the posterior facets, probably L5-S1  Visualized soft tissues appear unremarkable  IMPRESSION:     Minimal degenerative change  No acute fracture visualized  Workstation performed: [id number]     Signed by:   Delilah De Leon MD   2/10/17       Discussion/Summary   Dalane Jackson,   Your back x-ray shows minimal degenerative changes     Dr Rosi Canchola

## 2018-01-16 NOTE — RESULT NOTES
Discussion/Summary   Tita Melendrez,   Your hormone levels are not in the menopausal range  Dr Jordi Parker     Verified Results  (1) [facility] [id number] 08:52AM Trinh Mendez Order Number: [id number]     Test Name Result Flag Reference   FOLLICLE STIMULATING HORMONE 4 4 mIU/mL     FSH:  Menstruating Females: Follicular Phase  8 4-95 5 mIU/mL    Mid-Cycle Phase   5 2-17 5 mIU/mL    Luteal Phase      1 7-9 5  mIU/mL  Postmenopausal Females    On Menopausal Hormone Therapy(HRT) 5 9-72 8   mIU/mL    Untreated                          12 7-132 2 mIU/mL     (1) LH (LEUTINIZING HORMONE) 28Oct2017 08:52AM Trinh Mendez Order Number: [id number]     Test Name Result Flag Reference   LUTEINIZING HORMONE 5 9 mIU/mL     LH:   Menstruating Females: Follicular VHFXU6 8-19  8mIU/mL    Mid-Cycle Phase 22 8-76 1 mIU/mL    Luteal Phase0 6-13  5mIU/mL   Postmenopausal Females:     On Menopausal Hormone Therapy(MHT) 1 1-52 4 mIU/mL    Untreated8 6-61 8 mIU/mL

## 2018-01-17 NOTE — PROGRESS NOTES
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)   2  Antiphospholipid antibody syndrome (289 81) (D68 61)   3  Long term (current) use of systemic steroids (V58 65) (Z79 52)   4  Long-term use of hydroxychloroquine (V58 69) (Z79 899)   5  Chronic low back pain (724 2,338 29) (M54 5,G89 29)   6  Costochondritis (733 6) (M94 0)   7  Other bursitis of elbow, right elbow (726 39) (M70 31)    Plan    1  (1) ANTICARDIOLIPIN ANTIBODY; Status:Active; Requested for:82Lrm7870;    2  (1) CBC/PLT/DIFF; Status:Active; Requested for:26Vaf4756;    3  (1) COMPREHENSIVE METABOLIC PANEL; Status:Active; Requested for:77Qiq8280;    4  (1) C-REACTIVE PROTEIN; Status:Active; Requested for:77Ryd1011;    5  (1) SED RATE; Status:Active; Requested for:62Ejb7972;    6  (1) URINALYSIS (will reflex a microscopy if leukocytes, occult blood, protein or nitrites are   not within normal limits); Status:Active; Requested for:67Xco6881;    7  (1) URINE PROTEIN CREATININE RATIO; Status:Active; Requested for:72Jdw6165;    8  Call [phone] if: The pain seems worse ; Status:Complete;   Done: 88ZMV9592   9  Call [phone] if: The symptoms seem worse ; Status:Complete;   Done:   98IHJ2416   10  Call [phone] if: You have questions or concerns about your problem ;    Status:Complete;   Done: 82JPE3259   11  Follow-up visit in 3 months Evaluation and Treatment  Follow-up  Status: Complete     Done: 63YTI4711    12  Hydroxychloroquine Sulfate 200 MG Oral Tablet; Take 1 and 1/2 tablet daily as    directed    13  PredniSONE 5 MG Oral Tablet; take 1 tablet po every other day with food    Discussion/Summary    This is a 57-year-old female presenting today for mixed connective tissue disease  Patient states that she has noticed more right elbow pain since last appointment primarily with lifting  She does also note discomfort in the left elbow however it is not as severe  She also notes "locking up "of both elbows   She continues to have low back pain as well as bilateral knee pain and stiffness in the hands mostly in the morning  Her morning stiffness generally lasts a few hours  She denies any obvious joint swelling at this time  She continues to have difficulty with sleep due to pain as well as occasional nonrestorative sleep and fatigue  She is utilizing hydroxychloroquine 300 mg daily with Advil and prednisone 5 mg every other day  She states that she is due for her next eye exam  On physical examination, there is no active synovitis  Patient does have tenderness of the lateral epicondyles of the right elbow as well as the lumbar spine  There is crepitus of bilateral knees  Patient's most recent labs continue to reveal an elevated anticardiolipin antibody at 57 however all other labs were essentially within normal range  At this time patient's history and physical examination is most consistent with an mixed connective tissue disease which appears to be mildly active at this time  It was suggested that patient consider escalating her dose of hydroxychloroquine as she is having flares periodically  Patient did not want to increase Plaquenil at this time  In addition patient was offered an elbow injection for possible bursitis however she declined  In addition it was suggested that patient try to wean off of prednisone completely  She was recommended to schedule her next eye exam  We will plan to obtain a CBC, CMP, CRP, ESR, as well as a urinalysis and urine protein creatinine ratio  In addition patient did request that her anticardiolipin antibody be checked again  It was discussed with the patient that this antibody will remain elevated on blood work and there is not a need to continue to check this antibody going further  Patient will plan to follow-up in the office in 3 months time for evaluation however we will contact the office in the interim if she has any further questions or concerns        Counseling  Rheumatology Counseling Documentation: The patient was counseled regarding diagnostic results, instructions for management, prognosis, patient and family education, risks and benefits of treatment options and importance of compliance with treatment  Chief Complaint  F/U MCTD   Patient is here today for follow up of chronic conditions described in HPI  History of Present Illness  Patient is in the office today for follow up for MCTD  Right elbow pain since the last appt worse with lifting  Also noticing "locking up" in both elbows  Also with low back pain but had a past w/c claim  Knee pain as well with hand pain  +Am stiffness x few hours  No obvious joint swelling  +Difficulty with sleep due to pain  +Occasional non restorative sleep  +Fatigue about the same as last appt   mg daily  Taking Advil as needed  Taking Prednisone 5 mg every other day  Last eye exam: due for a eye exam        RAPID3: 7 0/30      Review of Systems    Constitutional: fatigue, but no fever, no recent weight gain, no chills, no recent weight loss and no anorexia  HEENT: no blurred vision, no double vision, no amaurosis fugax, no dryness of the eyes, no eye pain, no erythema eye(s), no dryness mouth, no mouth sores and no sore throat  Cardiovascular: no dyspnea on exertion and no swelling in the arms or legs    The patient presents with complaints of chest pain or pressure (+costochronditis)  Respiratory: pleurisy, but no unusual or persistent cough and no shortness of breath  Gastrointestinal: no abdominal pain, no nausea, no vomiting, no heartburn, no diarrhea, no constipation, no melena and no BRBPR  Genitourinary: No foamy urine, but no dysuria and no hematuria  Musculoskeletal: as noted in HPI  Integumentary Raynaud's and alopecia, but no rash, no nail changes and no photosensitivity  Endocrine no polyuria and no polydipsia  Hematologic/Lymphatic: no unusual bleeding and no tendency for easy bruising     Neurological: tingling and weakness, but no headache    The patient presents with complaints of vertigo or dizziness, described as lightheadedness  Symptoms are made worse by getting up quickly  Psychiatric: insomnia and non-restorative sleep  Active Problems    1  Antiphospholipid antibody syndrome (289 81) (D68 61)   2  Chronic low back pain (724 2,338 29) (M54 5,G89 29)   3  Connective tissue disease overlap syndrome (710 8) (M35 8)   4  Costochondritis (733 6) (M94 0)   5  Dense breasts (793 82) (R92 2)   6  Encounter for screening mammogram for malignant neoplasm of breast (V76 12)   (Z12 31)   7  Erythema nodosum (695 2) (L52)   8  Long term (current) use of systemic steroids (V58 65) (Z79 52)   9  Long-term use of hydroxychloroquine (V58 69) (Z79 899)   10  Positive cardiolipin antibodies (795 79) (R76 8)   11  Screening for cardiovascular condition (V81 2) (Z13 6)    Past Medical History    1  History of Abnormal serum level of lipase (790 5) (R74 8)   2  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)   3  History of Costochondritis (733 6) (M94 0)   4  History of Elevated liver enzymes (790 5) (R74 8)   5  History of Elevated sed rate (790 1) (R70 0)   6  History of Encounter for routine pelvic examination (V72 31) (Z01 419)   7  History of Fever chills (780 60) (R50 9)   8  History of dermatitis (V13 3) (Z87 2)   9  History of infectious mononucleosis (V12 09) (Z86 19)   10  History of Limb pain (729 5) (M79 609)   11  History of Myalgia (729 1) (M79 1)   12  History of Other muscle spasm (728 85) (M62 838)   13  History of Polyarthralgia (719 49) (M25 50)   14  History of Screening for diabetes mellitus (DM) (V77 1) (Z13 1)   15  History of Skin lesion (709 9) (L98 9)   16  History of Well adult on routine health check (V70 0) (Z00 00)    The active problems and past medical history were reviewed and updated today  Surgical History    1   Denied: History Of Prior Surgery    The surgical history was reviewed and updated today  Family History  Father    1  Family history of gout (V18 19) (Z82 69)  Brother    2  Family history of Diabetes Mellitus (V18 0)  Maternal Grandmother    3  Family history of rheumatoid arthritis (V17 7) (Z82 61)  Cousin    4  Family history of systemic lupus erythematosus (V19 4) (Z82 69)  Maternal Relatives    5  Family history of Sjogren's disease (V17 89) (Z82 69)   6  Family history of systemic lupus erythematosus (V19 4) (Z82 69)   7  Family history of thyroid disease (V18 19) (Z83 49)  Family History    8  Denied: Family history of Crohn's disease   9  Denied: Family history of psoriasis   10  Denied: Family history of ulcerative colitis   11  Family history of Heart Disease (V17 49)    The family history was reviewed and updated today  Social History    · Employed   · Never a smoker   · No alcohol use   · No drug use  The social history was reviewed and updated today  The social history was reviewed and is unchanged  Current Meds   1  Advil 200 MG Oral Tablet; take 2 tablet daily; Therapy: (Recorded:02Nov2015) to Recorded   2  Flexeril 10 MG TABS; Take 1 tablet daily; Therapy: (Recorded:10Nov2016) to Recorded   3  Hydroxychloroquine Sulfate 200 MG Oral Tablet; Take 1 and 1/2 tablet daily as directed; Therapy: 91CMR8194 to (Evaluate:18Jhh2476)  Requested for: 52GXD2749; Last   Rx:16Jun2016 Ordered   4  Magnesium CAPS; take 1 capsule daily; Therapy: (0914-6873026) to Recorded   5  Percocet 5-325 MG Oral Tablet; Take 1 tablet daily; Therapy: (Recorded:10Nov2016) to Recorded   6  PredniSONE 5 MG Oral Tablet; take 1 tablet po every other day with food; Therapy: (Recorded:80Yyp8754) to Recorded   7  Turmeric 500 MG Oral Capsule; take 1 capsule daily; Therapy: (Recorded:10Feb2017) to Recorded   8  Vitamin B Complex CAPS; TAKE 1 CAPSULE DAILY; Therapy: (0914-6873026) to Recorded   9  Vitamin C TABS; TAKE 1 TABLET DAILY;    Therapy: (70 444 021) to Recorded   10  Vitamin D 2000 UNIT Oral Capsule; take 1 capsule daily; Therapy: (Recorded:61Slz6132) to Recorded    The medication list was reviewed and updated today  Immunizations  Tdap --- Yashira Patrizia: 21-Aug-2005     Allergies    1  AMOXICILLIN   2  Levaquin TABS    Vitals  Signs   Recorded: 05WDW2743 08:39AM   Heart Rate: 84  Systolic: 411  Diastolic: 68  Weight: 795 lb   BMI Calculated: 24 48  BSA Calculated: 1 8    Physical Exam    Constitutional   General appearance: No acute distress, well appearing and well nourished  Eyes   Conjunctiva and lids: No swelling, erythema or discharge  Pupils and irises: Equal, round and reactive to light  Ears, Nose, Mouth, and Throat   External inspection of ears and nose: Normal     Oropharynx: Normal with no erythema, edema, exudate lesions, or ulcers  Pulmonary   Respiratory effort: No increased work of breathing or signs of respiratory distress  Auscultation of lungs: Clear to auscultation  Cardiovascular   Auscultation of heart: Normal rate and rhythm, normal S1 and S2, without murmurs  Examination of extremities for edema and/or varicosities: Normal     Lymphatic   Palpation of lymph nodes in neck: No lymphadenopathy  Psychiatric   Orientation to person, place, and time: Normal     Mood and affect: Normal         Right elbow tenderness  Left Elbow tenderness  Musculoskeletal - Joints, bones, and muscles: Abnormal  Palpation - lower mid-lumbar, right lower lumbar and left lower lumbar tenderness and bilateral knee crepitus       Right hand: All MCP, PIP and DIP joints are normal  Left Hand: All MCP, PIP and DIP joints are normal    Right foot: All MTP, PIP and DIP joints are normal  Left foot: All MTP, PIP and DIP joints are normal       Results/Data  (1) CBC/PLT/DIFF 93WHK4189 08:10AM [facility] Order Number: [id number]     Test Name Result Flag Reference   WBC COUNT 4 00 Thousand/uL L 4 80-10 80 RBC COUNT 4 04 Million/uL L 4 20-5 40   HEMOGLOBIN 12 5 g/dL  12 0-16 0   HEMATOCRIT 36 8 % L 37 0-47 0   MCV 91 fL  82-98   MCH 31 0 pg  27 0-31 0   MCHC 34 0 g/dL  31 4-37 4   RDW 13 0 %  11 6-15 1   MPV 6 7 fL L 8 9-12 7   PLATELET COUNT 800 Thousands/uL  130-400   NEUTROPHILS RELATIVE PERCENT 38 % L 43-75   LYMPHOCYTES RELATIVE PERCENT 44 %  14-44   MONOCYTES RELATIVE PERCENT 13 % H 4-12   EOSINOPHILS RELATIVE PERCENT 6 %  0-6   BASOPHILS RELATIVE PERCENT 1 %  0-1   NEUTROPHILS ABSOLUTE COUNT 1 50 Thousands/?L L 1 85-7 62   LYMPHOCYTES ABSOLUTE COUNT 1 70 Thousands/?L  0 60-4 47   MONOCYTES ABSOLUTE COUNT 0 50 Thousand/?L  0 17-1 22   EOSINOPHILS ABSOLUTE COUNT 0 20 Thousand/?L  0 00-0 61   BASOPHILS ABSOLUTE COUNT 0 00 Thousands/?L  0 00-0 10   - Patient Instructions: This bloodwork is non-fasting  Please drink two glasses of water morning of bloodwork  (1) COMPREHENSIVE METABOLIC PANEL 44FHM5447 16:47RL Sudie Pain   TW Order Number: [id number]     Test Name Result Flag Reference   GLUCOSE,RANDM 86 mg/dL     If the patient is fasting, the ADA then defines impaired fasting glucose as > 100 mg/dL and diabetes as > or equal to 123 mg/dL     SODIUM 139 mmol/L  136-145   POTASSIUM 3 9 mmol/L  3 5-5 3   CHLORIDE 105 mmol/L  100-108   CARBON DIOXIDE 28 mmol/L  21-32   ANION GAP (CALC) 6 mmol/L  4-13   BLOOD UREA NITROGEN 14 mg/dL  5-25   CREATININE 0 92 mg/dL  0 60-1 30   Standardized to IDMS reference method   CALCIUM 8 8 mg/dL  8 3-10 1   BILI, TOTAL 0 50 mg/dL  0 20-1 00   ALK PHOSPHATAS 57 U/L     ALT (SGPT) 23 U/L  12-78   AST(SGOT) 22 U/L  5-45   ALBUMIN 3 5 g/dL  3 5-5 0   TOTAL PROTEIN 7 3 g/dL  6 4-8 2   eGFR Non-African American      >60 0 ml/min/1 73sq m   [facility] Disease Education Program recommendations are as follows:  GFR calculation is accurate only with a steady state creatinine  Chronic Kidney disease less than 60 ml/min/1 73 sq  meters  Kidney failure less than 15 ml/min/1 73 sq  meters       (1) C-REACTIVE PROTEIN 88FMG6861 08:10AM [facility] Order Number: [id number]     Test Name Result Flag Reference   C-REACT PROTEIN <3 0 mg/L  <3 0     (1) SED RATE 17GXM5369 08:10AM [facility] Order Number: [id number]     Test Name Result Flag Reference   SED RATE 10 mm/hour  2-25     (1) URINALYSIS (will reflex a microscopy if leukocytes, occult blood, protein or nitrites are not within normal limits) 32CZF0166 08:10AM [facility] Order Number: [id number]     Test Name Result Flag Reference   COLOR Yellow     CLARITY Clear     SPECIFIC GRAVITY UA >=1 030  1 000-1 030   PH UA 6 0  5 0-9 0   LEUKOCYTE ESTERASE UA Negative  Negative   NITRITE UA Negative  Negative   PROTEIN UA Negative mg/dl  Negative   GLUCOSE UA Negative mg/dl  Negative   KETONES UA Negative mg/dl  Negative   UROBILINOGEN UA 0 2 E U /dl  0 2, 1 0 E U /dl   BILIRUBIN UA Negative  Negative   BLOOD UA Negative  Negative     (1) URINE PROTEIN CREATININE RATIO 28Nov2016 08:10AM [facility] Order Number: [id number]     Test Name Result Flag Reference   CREATININE URINE 260 0 mg/dL     URINE PROTEIN:CREATININE RATIO 0 12 H 0 00-0 10   URINE PROTEIN 31 mg/dL       (1) ANTICARDIOLIPIN ANTIBODY 28Nov2016 08:10AM [facility] Order Number: [id number]     Test Name Result Flag Reference   CARDLIP IGA AB <9 APL U/mL  0 - 11   Negative:              <12                            Indeterminate:     12 - 20                            Low-Med Positive: >20 - 80                            High Positive:         >80   CARDLIP IGG AB 11 GPL U/mL  0 - 14   Negative:              <15                            Indeterminate:     15 - 20                            Low-Med Positive: >20 - 80                            High Positive:         >80   CARDLIP IGM AB 57 MPL U/mL H 0 - 12   Negative:              <13 Indeterminate:     13 - 20                            Low-Med Positive: >20 - 80                            High Positive:         >80    Performed at:  [address]  [id number]  : Lavell Rocha MD, Phone:  [phone]       Health Management  Encounter for screening mammogram for malignant neoplasm of breast   Digital Bilateral Screening Mammogram With CAD; every 2 years; Last 23Oct2015; Next Due:  86KFR6986; Active    Attending Note  Collaborating Physician: I did not interview and examine the patient, I discussed the case with the Advanced Practitioner and reviewed the note and I agree with the Advanced Practitioner note        Future Appointments    Date/Time Provider Specialty Site   02/22/2017 06:45 PM Alexey Zee[address]   05/11/2017 10:40 AM Preet Castillo DO Rheumatology [facility] [address] [facility]     Signatures   Electronically signed by : FEROZ Ovalles; Feb 10 2017  9:08AM EST                       (Author)    Electronically signed by : Darrel Bertrand DO; Feb 10 2017  9:32AM EST                       (Author)

## 2018-01-17 NOTE — PROGRESS NOTES
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)   2  Lateral epicondylitis of right elbow (726 32) (M77 11)    Plan    1  Lidocaine HCl - 1 % Injection Solution   2  MethylPREDNISolone Acetate 40 MG/ML Injection Suspension   3  Call [phone] if: The pain seems worse ; Status:Complete;   Done: 10QNL3691   4  Call [phone] if: The symptoms seem worse ; Status:Complete;   Done:   16AMB2103   5  Call [phone] if: You have questions or concerns about your problem ;   Status:Complete;   Done: 45XYC9448    Discussion/Summary    Patient was seen today for a right lateral epicondylar cortisone injection for right lateral epicondylitis  The patient tolerated the procedure well left without complication  She will return to the office for her next scheduled appointment  The patient has the current Goals: Improve joint pain  The patent has the current Barriers: None at this time  Patient is able to Self-Care  Counseling  Rheumatology Counseling Documentation: The patient and patient's family was counseled regarding instructions for management, prognosis, patient and family education, risks and benefits of treatment options and importance of compliance with treatment  Chief Complaint  F/U UCTD   Patient is here today for follow up of chronic conditions described in HPI  History of Present Illness  Patient is in the office today for UCTD/ right elbow injection  RAPID3: 11 3/30      Active Problems    1  Abnormal menstrual periods (626 2) (N92 6)   2  Body mass index (BMI) of 24 0 to 24 9 in adult (V85 1) (Z68 24)   3  Chronic low back pain (724 2,338 29) (M54 5,G89 29)   4  Costochondritis (733 6) (M94 0)   5  Dense breasts (793 82) (R92 2)   6  Encounter for screening for malignant neoplasm of cervix (V76 2) (Z12 4)   7  Long term (current) use of systemic steroids (V58 65) (Z79 52)   8   Long-term use of hydroxychloroquine (V58 69) (G04 229)    Past Medical History    1  History of Abnormal serum level of lipase (790 5) (R74 8)   2  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)   3  History of Costochondritis (733 6) (M94 0)   4  History of Elevated liver enzymes (790 5) (R74 8)   5  History of Elevated sed rate (790 1) (R70 0)   6  History of Encounter for routine pelvic examination (V72 31) (Z01 419)   7  History of Encounter for screening mammogram for malignant neoplasm of breast   (V76 12) (Z12 31)   8  History of Fever chills (780 60) (R50 9)   9  History of dermatitis (V13 3) (Z87 2)   10  History of erythema nodosum (V13 3) (Z87 2)   11  History of infectious mononucleosis (V12 09) (Z86 19)   12  History of Limb pain (729 5) (M79 609)   13  History of Myalgia (729 1) (M79 1)   14  History of Other bursitis of elbow, right elbow (726 39) (M70 31)   15  History of Other muscle spasm (728 85) (M62 838)   16  History of Polyarthralgia (719 49) (M25 50)   17  History of Positive cardiolipin antibodies (795 79) (R76 8)   18  History of Screening for cardiovascular condition (V81 2) (Z13 6)   19  History of Screening for diabetes mellitus (DM) (V77 1) (Z13 1)   20  History of Skin lesion (709 9) (L98 9)   21  History of Well adult on routine health check (V70 0) (Z00 00)    Surgical History    1  Denied: History Of Prior Surgery    Family History  Father    1  Family history of gout (V18 19) (Z82 69)  Brother    2  Family history of Diabetes Mellitus (V18 0)  Maternal Grandmother    3  Family history of rheumatoid arthritis (V17 7) (Z82 61)  Cousin    4  Family history of systemic lupus erythematosus (V19 4) (Z82 69)  Maternal Relatives    5  Family history of Sjogren's disease (V17 89) (Z82 69)   6  Family history of systemic lupus erythematosus (V19 4) (Z82 69)   7  Family history of thyroid disease (V18 19) (Z83 49)  Family History    8  Denied: Family history of Crohn's disease   9  Denied: Family history of psoriasis   10   Denied: Family history of ulcerative colitis   11  Family history of Heart Disease (V17 49)    Social History    · Employed   · Never a smoker   · History of Never a smoker   · No alcohol use   · No drug use    Current Meds   1  Advil 200 MG Oral Tablet; TAKE 3 TABLET Daily PRN pain; Therapy: (Recorded:11Aug2017) to Recorded   2  Cyclobenzaprine HCl - 10 MG Oral Tablet; Take 1/2 to 1 tablet PO TID prn spasms    Requested for: 31USC7785; Last Rx:11May2017 Ordered   3  Hydroxychloroquine Sulfate 200 MG Oral Tablet; TAKE 2 TABLET Daily With Food; Therapy: 82THT0170 to (Luzma Newton)  Requested for: 93NBK2843; Last   Rx:11May2017 Ordered   4  Omega 3 CAPS; take 1 capsule daily; Therapy: (Recorded:11Aug2017) to Recorded   5  Turmeric 500 MG Oral Capsule; take 1 capsule daily; Therapy: (Recorded:10Feb2017) to Recorded    Immunizations  Tdap --- [place] Zack: 21-Aug[birth year]2007     Allergies    1  AMOXICILLIN   2  Levaquin TABS    Vitals  Signs   Recorded: 42LJR2518 03:06PM   Heart Rate: 80  Systolic: 226  Diastolic: 72  Height: 5 ft 7 in  Weight: 153 lb   BMI Calculated: 23 96  BSA Calculated: 1 8    Procedure    Procedure: Injection of the elbow bursa on the right   Indication: joint pain  Risk, benefits and alternatives were discussed with the patient  Verbal consent was obtained prior to the procedure  Preparation: alcohol was used to prep the area and betadine was used to prep the area  ethyl chloride spray was used as a topical anesthetic  Procedure Note: A 25-gauge needle was used to inject 1 mL of 1% Lidocaine and 1 mL 40mg/mL methylprednisolone  A bandage was applied  Post-Procedure: the patient tolerated the procedure well  Complications: None  Health Management  Dense breasts   MAMMO SCREENING BILATERAL W 3D & CAD; every 1 year; Last 28Feb2017; Next Due: 00Ytm9502; Active  History of Encounter for screening mammogram for malignant neoplasm of breast   MAMMO SCREENING BILATERAL W 3D & CAD; every 1 year;  Last 28Feb2017; Next Due: 76IBD8713; Active    Future Appointments    Date/Time Provider Specialty Site   12/11/2017 08:00 AM ROSIBEL Gonzales  Obstetrics/Gynecology [facility] OUTPATIENT   01/03/2018 11:30 AM ROSIBEL Gonzales  Obstetrics/Gynecology [facility]   01[facility]18/2018 08:30 [facility] [address] [facility]     Signatures   Electronically signed by : Jez Wong [facility]; Nov 30 2017  3:29PM EST                       (Author)    Electronically signed by :  ROSIBEL Cunningham ; Dec  6 2017 11:02AM EST                       (Author)

## 2018-01-18 ENCOUNTER — GENERIC CONVERSION - ENCOUNTER (OUTPATIENT)
Dept: OTHER | Facility: OTHER | Age: 48
End: 2018-01-18

## 2018-01-18 ENCOUNTER — ALLSCRIPTS OFFICE VISIT (OUTPATIENT)
Dept: OTHER | Facility: OTHER | Age: 48
End: 2018-01-18

## 2018-01-18 DIAGNOSIS — D68.61 ANTIPHOSPHOLIPID SYNDROME (HCC): ICD-10-CM

## 2018-01-18 DIAGNOSIS — M35.89 OTHER SPECIFIED SYSTEMIC INVOLVEMENT OF CONNECTIVE TISSUE (HCC): ICD-10-CM

## 2018-01-22 ENCOUNTER — HOSPITAL ENCOUNTER (EMERGENCY)
Facility: HOSPITAL | Age: 48
Discharge: HOME | End: 2018-01-22
Attending: EMERGENCY MEDICINE
Payer: COMMERCIAL

## 2018-01-22 ENCOUNTER — APPOINTMENT (EMERGENCY)
Dept: ULTRASOUND IMAGING | Facility: HOSPITAL | Age: 48
End: 2018-01-22
Payer: COMMERCIAL

## 2018-01-22 ENCOUNTER — APPOINTMENT (EMERGENCY)
Dept: CT IMAGING | Facility: HOSPITAL | Age: 48
End: 2018-01-22
Payer: COMMERCIAL

## 2018-01-22 VITALS
BODY MASS INDEX: 23.93 KG/M2 | WEIGHT: 152.5 LBS | RESPIRATION RATE: 18 BRPM | TEMPERATURE: 99.6 F | HEIGHT: 67 IN | HEART RATE: 80 BPM | OXYGEN SATURATION: 98 % | SYSTOLIC BLOOD PRESSURE: 108 MMHG | DIASTOLIC BLOOD PRESSURE: 60 MMHG

## 2018-01-22 VITALS
OXYGEN SATURATION: 99 % | BODY MASS INDEX: 23.49 KG/M2 | DIASTOLIC BLOOD PRESSURE: 66 MMHG | WEIGHT: 150 LBS | HEART RATE: 77 BPM | RESPIRATION RATE: 14 BRPM | SYSTOLIC BLOOD PRESSURE: 102 MMHG | TEMPERATURE: 98.8 F

## 2018-01-22 VITALS
WEIGHT: 153 LBS | BODY MASS INDEX: 24.32 KG/M2 | DIASTOLIC BLOOD PRESSURE: 50 MMHG | SYSTOLIC BLOOD PRESSURE: 90 MMHG | HEART RATE: 80 BPM

## 2018-01-22 VITALS
WEIGHT: 154 LBS | SYSTOLIC BLOOD PRESSURE: 112 MMHG | BODY MASS INDEX: 24.12 KG/M2 | DIASTOLIC BLOOD PRESSURE: 68 MMHG | HEART RATE: 84 BPM

## 2018-01-22 VITALS
HEART RATE: 72 BPM | HEIGHT: 67 IN | DIASTOLIC BLOOD PRESSURE: 66 MMHG | RESPIRATION RATE: 16 BRPM | SYSTOLIC BLOOD PRESSURE: 104 MMHG | BODY MASS INDEX: 24.03 KG/M2 | WEIGHT: 153.13 LBS

## 2018-01-22 VITALS
BODY MASS INDEX: 24.01 KG/M2 | SYSTOLIC BLOOD PRESSURE: 112 MMHG | DIASTOLIC BLOOD PRESSURE: 72 MMHG | HEART RATE: 80 BPM | HEIGHT: 67 IN | WEIGHT: 153 LBS

## 2018-01-22 VITALS
WEIGHT: 153.5 LBS | SYSTOLIC BLOOD PRESSURE: 108 MMHG | BODY MASS INDEX: 23.07 KG/M2 | HEART RATE: 68 BPM | WEIGHT: 147 LBS | SYSTOLIC BLOOD PRESSURE: 100 MMHG | HEART RATE: 85 BPM | DIASTOLIC BLOOD PRESSURE: 64 MMHG | DIASTOLIC BLOOD PRESSURE: 62 MMHG | BODY MASS INDEX: 24.09 KG/M2 | HEIGHT: 67 IN | HEIGHT: 67 IN

## 2018-01-22 DIAGNOSIS — R10.31 RIGHT LOWER QUADRANT ABDOMINAL PAIN: Primary | ICD-10-CM

## 2018-01-22 LAB
ALBUMIN SERPL BCP-MCNC: 3.5 G/DL (ref 3.5–5)
ALP SERPL-CCNC: 73 U/L (ref 46–116)
ALT SERPL W P-5'-P-CCNC: 31 U/L (ref 12–78)
ANION GAP SERPL CALCULATED.3IONS-SCNC: 7 MMOL/L (ref 4–13)
AST SERPL W P-5'-P-CCNC: 19 U/L (ref 5–45)
BASOPHILS # BLD AUTO: 0.01 THOUSANDS/ΜL (ref 0–0.1)
BASOPHILS NFR BLD AUTO: 0 % (ref 0–1)
BILIRUB SERPL-MCNC: 0.4 MG/DL (ref 0.2–1)
BUN SERPL-MCNC: 9 MG/DL (ref 5–25)
CALCIUM SERPL-MCNC: 8.9 MG/DL (ref 8.3–10.1)
CHLORIDE SERPL-SCNC: 104 MMOL/L (ref 100–108)
CO2 SERPL-SCNC: 28 MMOL/L (ref 21–32)
CREAT SERPL-MCNC: 0.91 MG/DL (ref 0.6–1.3)
EOSINOPHIL # BLD AUTO: 0.22 THOUSAND/ΜL (ref 0–0.61)
EOSINOPHIL NFR BLD AUTO: 5 % (ref 0–6)
ERYTHROCYTE [DISTWIDTH] IN BLOOD BY AUTOMATED COUNT: 13.7 % (ref 11.6–15.1)
GFR SERPL CREATININE-BSD FRML MDRD: 76 ML/MIN/1.73SQ M
GLUCOSE SERPL-MCNC: 86 MG/DL (ref 65–140)
HCT VFR BLD AUTO: 36 % (ref 34.8–46.1)
HGB BLD-MCNC: 11.9 G/DL (ref 11.5–15.4)
LIPASE SERPL-CCNC: 177 U/L (ref 73–393)
LYMPHOCYTES # BLD AUTO: 1.69 THOUSANDS/ΜL (ref 0.6–4.47)
LYMPHOCYTES NFR BLD AUTO: 41 % (ref 14–44)
MCH RBC QN AUTO: 29.2 PG (ref 26.8–34.3)
MCHC RBC AUTO-ENTMCNC: 33.1 G/DL (ref 31.4–37.4)
MCV RBC AUTO: 88 FL (ref 82–98)
MONOCYTES # BLD AUTO: 0.45 THOUSAND/ΜL (ref 0.17–1.22)
MONOCYTES NFR BLD AUTO: 11 % (ref 4–12)
NEUTROPHILS # BLD AUTO: 1.79 THOUSANDS/ΜL (ref 1.85–7.62)
NEUTS SEG NFR BLD AUTO: 43 % (ref 43–75)
PLATELET # BLD AUTO: 236 THOUSANDS/UL (ref 149–390)
PMV BLD AUTO: 8.9 FL (ref 8.9–12.7)
POTASSIUM SERPL-SCNC: 4 MMOL/L (ref 3.5–5.3)
PROT SERPL-MCNC: 7.8 G/DL (ref 6.4–8.2)
RBC # BLD AUTO: 4.08 MILLION/UL (ref 3.81–5.12)
SODIUM SERPL-SCNC: 139 MMOL/L (ref 136–145)
WBC # BLD AUTO: 4.16 THOUSAND/UL (ref 4.31–10.16)

## 2018-01-22 PROCEDURE — 74177 CT ABD & PELVIS W/CONTRAST: CPT

## 2018-01-22 PROCEDURE — 96374 THER/PROPH/DIAG INJ IV PUSH: CPT

## 2018-01-22 PROCEDURE — 76856 US EXAM PELVIC COMPLETE: CPT

## 2018-01-22 PROCEDURE — 76830 TRANSVAGINAL US NON-OB: CPT

## 2018-01-22 PROCEDURE — 36415 COLL VENOUS BLD VENIPUNCTURE: CPT | Performed by: EMERGENCY MEDICINE

## 2018-01-22 PROCEDURE — 85025 COMPLETE CBC W/AUTO DIFF WBC: CPT | Performed by: EMERGENCY MEDICINE

## 2018-01-22 PROCEDURE — 96361 HYDRATE IV INFUSION ADD-ON: CPT

## 2018-01-22 PROCEDURE — 83690 ASSAY OF LIPASE: CPT | Performed by: EMERGENCY MEDICINE

## 2018-01-22 PROCEDURE — 99284 EMERGENCY DEPT VISIT MOD MDM: CPT

## 2018-01-22 PROCEDURE — 80053 COMPREHEN METABOLIC PANEL: CPT | Performed by: EMERGENCY MEDICINE

## 2018-01-22 RX ORDER — KETOROLAC TROMETHAMINE 30 MG/ML
15 INJECTION, SOLUTION INTRAMUSCULAR; INTRAVENOUS ONCE
Status: COMPLETED | OUTPATIENT
Start: 2018-01-22 | End: 2018-01-22

## 2018-01-22 RX ADMIN — SODIUM CHLORIDE 1000 ML: 0.9 INJECTION, SOLUTION INTRAVENOUS at 19:10

## 2018-01-22 RX ADMIN — IOHEXOL 100 ML: 350 INJECTION, SOLUTION INTRAVENOUS at 19:54

## 2018-01-22 RX ADMIN — KETOROLAC TROMETHAMINE 15 MG: 30 INJECTION, SOLUTION INTRAMUSCULAR at 21:08

## 2018-01-22 NOTE — ED PROVIDER NOTES
History  Chief Complaint   Patient presents with    Abdominal Pain     right lower quadrant abdominal pain "with rebound tenderness" per pt  History provided by:  Patient   used: No      57-year-old female presents with right lower quadrant pain with some radiation to the back for 1 day  Pain is moderate, persistent  No other associated symptoms including nausea, vomiting, diarrhea, urinary complaints or vaginal discharge or bleeding  She had a hysterectomy about 1 month ago and had a hematoma at the cuff but that has since resolved  No fever, chills, injuries  No other abdominal surgeries  Normal vitals  Somewhat anxious on exam here  Has focal tenderness in the right lower quadrant with guarding and some other mild diffuse abdominal tenderness  Differential diagnosis includes ovarian cyst, acute appendicitis, surgical complication  Will check labs, CT  She declined analgesics at this time  Prior to Admission Medications   Prescriptions Last Dose Informant Patient Reported? Taking?    NON FORMULARY   Yes No   Si capsule daily   acetaminophen (TYLENOL) 325 mg tablet   No No   Sig: Take 2 tablets by mouth every 6 (six) hours as needed for mild pain   cyclobenzaprine (FLEXERIL) 10 mg tablet   No No   Sig: Take 1 tablet by mouth 3 (three) times a day as needed for muscle spasms for up to 10 doses   hydroxychloroquine (PLAQUENIL) 200 mg tablet   Yes No   Sig: Take 400 mg by mouth daily     ibuprofen (MOTRIN) 600 mg tablet   No No   Sig: Take 1 tablet by mouth every 6 (six) hours as needed for mild pain   omega-3-acid ethyl esters (LOVAZA) 1 g capsule   Yes No   Sig: Take 2 g by mouth 2 (two) times a day      Facility-Administered Medications: None       Past Medical History:   Diagnosis Date    Anticardiolipin syndrome (HCC)     Herniated lumbar intervertebral disc     [facility] connective [facility] [address] )     [facility]        Past Surgical History:   Procedure Laterality Date    NO PAST SURGERIES      MS CYSTOURETHROSCOPY N/A 12/11/2017    Procedure: CYSTOSCOPY;  Surgeon: Ash Ni MD;  [facility];  Service: Gynecology    MS LAP,VAG HYST,UTERUS 250GMS/<,SALP-OOPH N/A 12/11/2017    Procedure: LAPAROSCOPIC ASSISTED VAGINAL HYSTERECTOMY; BILATERAL SALPINGECTOMY;  Surgeon: Ash Ni MD;  [facility];  Service: Gynecology       History reviewed  No pertinent family history  I have reviewed and agree with the history as documented  Social History   Substance Use Topics    Smoking status: Never Smoker    Smokeless tobacco: Never Used    Alcohol use No        Review of Systems   Constitutional: Negative for activity change and appetite change  Respiratory: Negative for chest tightness and shortness of breath  Gastrointestinal: Positive for abdominal pain  Negative for nausea and vomiting  Genitourinary: Negative for difficulty urinating, dysuria, flank pain, pelvic pain, vaginal bleeding and vaginal discharge  Musculoskeletal: Positive for back pain  Neurological: Negative for dizziness, weakness and light-headedness  All other systems reviewed and are negative  Physical Exam  ED Triage Vitals [01/22/18 1809]   Temperature Pulse Respirations Blood Pressure SpO2   98 8 °F (37 1 °C) 78 18 117/56 98 %      Temp Source Heart Rate Source Patient Position - Orthostatic VS BP Location FiO2 (%)   Oral Monitor Sitting Left arm --      Pain Score       5           Orthostatic Vital Signs  Vitals:    01/22/18 1809 01/22/18 2107 01/22/18 2311   BP: 117/56 119/77 102/66   Pulse: 78 57 77   Patient Position - Orthostatic VS: Sitting Sitting Sitting       Physical Exam   Constitutional: She is oriented to person, place, and time  She appears well-developed and well-nourished  HENT:   Head: Normocephalic  Mouth/Throat: Oropharynx is clear and moist    Cardiovascular: Normal rate and regular rhythm  Pulmonary/Chest: Effort normal and breath sounds normal    Abdominal: Soft  Focal RLQ tenderness with guarding with some otherwise diffuse mild tenderness  Neurological: She is alert and oriented to person, place, and time  Psychiatric: She has a normal mood and affect  Her behavior is normal    Nursing note and vitals reviewed  ED Medications  Medications   sodium chloride 0 9 % bolus 1,000 mL (0 mL Intravenous Stopped 1/22/18 2311)   iohexol (OMNIPAQUE) 350 MG/ML injection (MULTI-DOSE) 100 mL (100 mL Intravenous Given 1/22/18 1954)   ketorolac (TORADOL) injection 15 mg (15 mg Intravenous Given 1/22/18 2108)       Diagnostic Studies  Results Reviewed     Procedure Component Value Units Date/Time    Lipase [[id number]]  (Normal) Collected:  01/22/18 1909    Lab Status:  Final result Specimen:  Blood from Arm, Left Updated:  01/22/18 1939     Lipase 177 u/L     Comprehensive metabolic panel [[id number]] Collected:  01/22/18 1909    Lab Status:  Final result Specimen:  Blood from Arm, Left Updated:  01/22/18 1939     Sodium 139 mmol/L      Potassium 4 0 mmol/L      Chloride 104 mmol/L      CO2 28 mmol/L      Anion Gap 7 mmol/L      BUN 9 mg/dL      Creatinine 0 91 mg/dL      Glucose 86 mg/dL      Calcium 8 9 mg/dL      AST 19 U/L      ALT 31 U/L      Alkaline Phosphatase 73 U/L      Total Protein 7 8 g/dL      Albumin 3 5 g/dL      Total Bilirubin 0 40 mg/dL      eGFR 76 ml/min/1 73sq m     Narrative:         National Kidney Disease Education Program recommendations are as follows:  GFR calculation is accurate only with a steady state creatinine  Chronic Kidney disease less than 60 ml/min/1 73 sq  meters  Kidney failure less than 15 ml/min/1 73 sq  meters      CBC and differential [[id number]]  (Abnormal) Collected:  01/22/18 1909    Lab Status:  Final result Specimen:  Blood from Arm, Left Updated:  01/22/18 1919     WBC 4 16 (L) Thousand/uL      RBC 4 08 Million/uL      Hemoglobin 11 9 g/dL      Hematocrit 36 0 %      MCV 88 fL      MCH 29 2 pg      MCHC 33 1 g/dL      RDW 13 7 %      MPV 8 9 fL      Platelets 931 Thousands/uL      Neutrophils Relative 43 %      Lymphocytes Relative 41 %      Monocytes Relative 11 %      Eosinophils Relative 5 %      Basophils Relative 0 %      Neutrophils Absolute 1 79 (L) Thousands/µL      Lymphocytes Absolute 1 69 Thousands/µL      Monocytes Absolute 0 45 Thousand/µL      Eosinophils Absolute 0 22 Thousand/µL      Basophils Absolute 0 01 Thousands/µL                  US pelvis complete w transvaginal   Final Result by Arpan Contreras MD (01/22 2210)          1  Status post hysterectomy, unremarkable vaginal cuff  2   Bilateral ovarian follicles with no evidence for acute torsion  Workstation performed: [id number]         CT abdomen pelvis with contrast   Final Result by Arpan Contreras MD (01/22 2013)         1  Constipation  No evidence for acute appendicitis or bowel obstruction,   2  Resolution of complex pelvic collection with trace pelvic fluid noted  Workstation performed: [id number]                    Procedures  Procedures       Phone Contacts  ED Phone Contact    ED Course  ED Course                                MDM  Number of Diagnoses or Management Options  Right lower quadrant abdominal pain:   Diagnosis management comments: 79-year-old female presented with right lower quadrant pain for 1 day with radiation to the back  No other symptoms  Recent hysterectomy with pelvic hematoma 1 month ago  Some tenderness on exam   CT unremarkable  Pelvic ultrasound negative for ovarian torsion  Patient's pain is tolerable  Differential diagnosis includes early viral illness, muscular strain  Will discharge and have her follow up if symptoms worsen         Amount and/or Complexity of Data Reviewed  Clinical lab tests: ordered and reviewed  Tests in the radiology section of CPT®: ordered and reviewed  Discuss the patient with other providers: yes    Patient Progress  Patient progress: stable    CritCare Time    Disposition  Final diagnoses:   Right lower quadrant abdominal pain     Time reflects when diagnosis was documented in both MDM as applicable and the Disposition within this note     Time User Action Codes Description Comment    1/25/2018  7:45 PM Jamaal HOWARD Add [R10 31] Right lower quadrant abdominal pain       ED Disposition     ED Disposition Condition Comment    Discharge  Kaushik Diane discharge to home/self care  Condition at discharge: Good        Follow-up Information     Follow up With Specialties Details Why Contact Info Additional Paty 18, DO Family Medicine   [address] Emergency Department Emergency Medicine  If symptoms worsen [address] [facility]  [facility], [address], [place], [place], [address]        Discharge Medication List as of 1/22/2018 10:32 PM      CONTINUE these medications which have NOT CHANGED    Details   acetaminophen (TYLENOL) 325 mg tablet Take 2 tablets by mouth every 6 (six) hours as needed for mild pain, Starting Mon 12/11/2017, Print      cyclobenzaprine (FLEXERIL) 10 mg tablet Take 1 tablet by mouth 3 (three) times a day as needed for muscle spasms for up to 10 doses, Starting 2/6/2017, Until Discontinued, Print      hydroxychloroquine (PLAQUENIL) 200 mg tablet Take 400 mg by mouth daily  , Historical Med      ibuprofen (MOTRIN) 600 mg tablet Take 1 tablet by mouth every 6 (six) hours as needed for mild pain, Starting Mon 12/11/2017, Print      NON FORMULARY 1 capsule daily, Until Discontinued, Historical Med      omega-3-acid ethyl esters (LOVAZA) 1 g capsule Take 2 g by mouth 2 (two) times a day, Historical Med           No discharge procedures on file      ED Provider  Electronically Signed by           Anabella Petersen MD  01/25/18 40 Rylee Jones

## 2018-01-23 NOTE — PROGRESS NOTES
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)   2  Antiphospholipid antibody syndrome (289 81) (D68 61)   3  Long term (current) use of systemic steroids (V58 65) (Z79 52)   4  Long-term use of hydroxychloroquine (V58 69) (Z79 899)   5  Chronic low back pain (724 2,338 29) (M54 5,G89 29)   6  Costochondritis (733 6) (M94 0)    Plan    1  Gabapentin 300 MG Oral Capsule; 1 tablet po daily x 1 week then increase to 2   tablets po daily x 1 week, then increased to 3 tablets po daily as tolerated   2  Hydroxychloroquine Sulfate 200 MG Oral Tablet; TAKE 2 TABLETS DAILY WITH   FOOD   3  (1) ANTICARDIOLIPIN ANTIBODY; Status:Active; Requested SIX:47VBY1203;    4  (1) CBC/PLT/DIFF; Status:Active; Requested FHE:[id number];    5  (1) COMPREHENSIVE METABOLIC PANEL; Status:Active; Requested IUN:[id number];    6  (1) C-REACTIVE PROTEIN; Status:Active; Requested VMS:[id number];    7  (1) SED RATE; Status:Active; Requested XUZ:[id number];    8  Call [phone] if: The pain seems worse ; Status:Complete;   Done: 35ICX5750   9  Call [phone] if: The symptoms seem worse ; Status:Complete;   Done:   90SXB0253   10  Call [phone] if: You have questions or concerns about your problem ;    Status:Complete;   Done: 10OZA3224    Discussion/Summary    This is a 51-year-old female presenting today for follow-up for an undifferentiated connective tissue disease  She states she continues to have pain in the right elbow as well as the lumbar spine  She did have a cortisone injection into the right elbow in November which provided some relief  She states that she is off of prednisone as she underwent surgery and developed a hematoma  The patient does continue to utilize hydroxychloroquine  She was also placed on a baby aspirin  In addition to elbow and back pain she does have bilateral hip pain and does have difficulty standing as she does notice discomfort and weakness in her knees   She notes swelling of the right elbow but denies any further obvious joint swelling  She has numbness of her toes which she has noticed more recently as well  She does have some difficulty with sleep however this is unchanged  She notes non restorative sleep as well as fatigue  Her last eye exam was more than a year ago  She does continue to utilize 400 mg of Plaquenil on a daily basis  On physical examination, there is no active synovitis  Patient does have some tenderness and swelling of the right elbow otherwise has normal passive range of motion of both upper and lower extremities however does have some discomfort with internal rotation of the hip  There is crepitus of the knees  At this time patient's history and physical examination is most consistent with an undifferentiated connective tissue disease which appears to be mildly active at this time despite hydroxychloroquine  I did make patient aware that since we performed a cortisone injection just in November, it would recommended that we wait sometime before performing a 2nd injection  In addition she will remain off of prednisone until she is given clearance by her gynecologist to restart this  She was however provided with a prescription of gabapentin 300 mg daily to increase to 3 times daily as tolerated to help with her chronic low back pain as well as the numbness she is experiencing in her feet and pain in her right elbow pain  Patient will contact the office if she would like to have further cortisone injections into the elbow after she sees her gynecologist  In addition she will obtain a CBC, CMP, CRP, ESR, and anti cardiolipin antibody per her request  She will plan to return to the office in 6 months time for further evaluation however contact the office in the interim if she has any further questions or concerns  In addition I did recommend that she have an updated eye exam with the use of hydroxychloroquine  The patient has the current Goals: Improve joint pain  The patent has the current Barriers: None at this time  Patient is able to Self-Care  Counseling  Rheumatology Counseling Documentation: The patient was counseled regarding instructions for management, prognosis, patient and family education, risks and benefits of treatment options and importance of compliance with treatment  Chief Complaint  F/U UCTD   Patient is here today for follow up of chronic conditions described in HPI  History of Present Illness  Patient is in the office today for follow up for UCTD  Some relief with right elbow pain and now pain back  Off Prednisone and taking HCQ  on ASA as well  Has hematoma from uterine surgery  Pain in the hips and difficulty standing with knee discomfort  +Swelling in the right elbow  No other obvious joint swelling  +Numbness in the toes at this time  Some low back pain  +Difficulty with sleep in general  +Non restorative sleep  +fatigue  Last eye exam: more than a year  RAPID3: 6 7/30      Review of Systems    Constitutional: fatigue, but no fever, no recent weight gain, no chills, no recent weight loss and no anorexia  HEENT: blurred vision and dryness of the eyes, but no double vision, no amaurosis fugax, no eye pain, no erythema eye(s), no dryness mouth, no mouth sores and no sore throat  Cardiovascular: no dyspnea on exertion and no swelling in the arms or legs    The patient presents with complaints of no chest pain or pressure (+costochronditis)  Respiratory: no unusual or persistent cough, no shortness of breath and no pleurisy  Gastrointestinal: heartburn, but no nausea, no vomiting, no diarrhea, no constipation, no melena and no BRBPR    The patient presents with complaints of bilateral lower quadrant abdominal pain (+pelvic pain )  Genitourinary: No foamy urine, but no dysuria and no hematuria  Musculoskeletal: as noted in HPI  Integumentary Raynaud's, but no rash, no alopecia, no nail changes and no photosensitivity  Endocrine no polyuria and no polydipsia  Hematologic/Lymphatic: no tendency for easy bruising    The patient presents with complaints of unusual bleeding (vaginal )  Neurological: headache and weakness, but no tingling    The patient presents with complaints of vertigo or dizziness, described as lightheadedness  Symptoms are made worse by getting up quickly  Psychiatric: insomnia and non-restorative sleep  Active Problems    1  Acute blood loss as cause of postoperative anemia (285 1) (D62)   2  Antiphospholipid antibody syndrome (289 81) (D68 61)   3  Body mass index (BMI) of 24 0 to 24 9 in adult (V85 1) (Z68 24)   4  Chronic low back pain (724 2,338 29) (M54 5,G89 29)   5  Connective tissue disease overlap syndrome (710 8) (M35 8)   6  Costochondritis (733 6) (M94 0)   7  Dense breasts (793 82) (R92 2)   8  Hematoma (924 9) (T14 8XXA)   9  Lateral epicondylitis of right elbow (726 32) (M77 11)   10  Long term (current) use of systemic steroids (V58 65) (Z79 52)   11  Long-term use of hydroxychloroquine (V58 69) (Z79 899)   12  Lupus anticoagulant positive (795 79) (R76 0)   13  Painful defecation (564 00) (R19 8)   14  Postoperative examination (V67 00) (Z09)   15  Postoperative pain (338 18) (G89 18)    Past Medical History    1  History of Abnormal menstrual periods (626 2) (N92 6)   2  History of Abnormal serum level of lipase (790 5) (R74 8)   3  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)   4  History of Costochondritis (733 6) (M94 0)   5  History of Elevated liver enzymes (790 5) (R74 8)   6  History of Elevated sed rate (790 1) (R70 0)   7  History of Encounter for routine pelvic examination (V72 31) (Z01 419)   8  History of Encounter for screening for malignant neoplasm of cervix (V76 2) (Z12 4)   9  History of Encounter for screening mammogram for malignant neoplasm of breast   (V76 12) (Z12 31)   10  History of Fever chills (780 60) (R50 9)   11   History of abnormal uterine bleeding (V13 29) (Z87 42)   12  History of dermatitis (V13 3) (Z87 2)   13  History of erythema nodosum (V13 3) (Z87 2)   14  History of infectious mononucleosis (V12 09) (Z86 19)   15  History of Limb pain (729 5) (M79 609)   16  History of Myalgia (729 1) (M79 1)   17  History of Other bursitis of elbow, right elbow (726 39) (M70 31)   18  History of Other muscle spasm (728 85) (M62 838)   19  History of Polyarthralgia (719 49) (M25 50)   20  History of Positive cardiolipin antibodies (795 79) (R76 8)   21  History of Screening for cardiovascular condition (V81 2) (Z13 6)   22  History of Screening for diabetes mellitus (DM) (V77 1) (Z13 1)   23  History of Skin lesion (709 9) (L98 9)   24  History of Well adult on routine health check (V70 0) (Z00 00)    The active problems and past medical history were reviewed and updated today  Surgical History    1  Denied: History Of Prior Surgery   2  History of Hysterectomy   3  History of Salpingectomy    The surgical history was reviewed and updated today  Family History  Father    1  Family history of gout (V18 19) (Z82 69)  Brother    2  Family history of Diabetes Mellitus (V18 0)  Maternal Grandmother    3  Family history of rheumatoid arthritis (V17 7) (Z82 61)  Cousin    4  Family history of systemic lupus erythematosus (V19 4) (Z82 69)  Maternal Relatives    5  Family history of Sjogren's disease (V17 89) (Z82 69)   6  Family history of systemic lupus erythematosus (V19 4) (Z82 69)   7  Family history of thyroid disease (V18 19) (Z83 49)  Family History    8  Denied: Family history of Crohn's disease   9  Denied: Family history of psoriasis   10  Denied: Family history of ulcerative colitis   11  Family history of Heart Disease (V17 49)    The family history was reviewed and updated today         Social History    · Employed   · Never a smoker   · History of Never a smoker   · No alcohol use   · No drug use  The social history was reviewed and updated today  The social history was reviewed and is unchanged  Current Meds   1  Advil 200 MG Oral Tablet; TAKE 3 TABLET Daily PRN pain; Therapy: (Recorded:11Aug2017) to Recorded   2  Aspirin Low Dose 81 MG TABS; Therapy: (Recorded:84Bsu2016) to Recorded   3  Oxycodone-Acetaminophen 5-325 MG Oral Tablet; TAKE 1 TABLET EVERY 4 TO 6   HOURS AS NEEDED FOR PAIN;   Therapy: 37Bzb9918 to (Evaluate:08Lwg4850); Last Rx:91Njt2960 Ordered    The medication list was reviewed and updated today  Immunizations  Tdap --- Brianna Peaches: [birth year]2007     Allergies    1  AMOXICILLIN   2  Levaquin TABS    Vitals  Signs   Recorded: 68WVT9109 08:32AM   Heart Rate: 94  Systolic: 794  Diastolic: 60  Height: 5 ft 7 in  Weight: 151 lb   BMI Calculated: 23 65  BSA Calculated: 1 79    Physical Exam    Constitutional   General appearance: No acute distress, well appearing and well nourished  Eyes   Conjunctiva and lids: No swelling, erythema or discharge  Pupils and irises: Equal, round and reactive to light  Ears, Nose, Mouth, and Throat   External inspection of ears and nose: Normal     Oropharynx: Normal with no erythema, edema, exudate lesions, or ulcers  Pulmonary   Respiratory effort: No increased work of breathing or signs of respiratory distress  Auscultation of lungs: Clear to auscultation  Cardiovascular   Auscultation of heart: Normal rate and rhythm, normal S1 and S2, without murmurs  Examination of extremities for edema and/or varicosities: Normal     Lymphatic   Palpation of lymph nodes in neck: No lymphadenopathy  Psychiatric   Orientation to person, place, and time: Normal     Mood and affect: Normal         Right elbow tenderness and swelling  Right hip restricted ROM  Left hip restricted ROM  Musculoskeletal - Joints, bones, and muscles: Abnormal  Palpation - bilateral knee crepitus       Right hand: All MCP, PIP and DIP joints are normal  Left Hand: All MCP, PIP and DIP joints are normal  Right foot: All MTP, PIP and DIP joints are normal  Left foot: All MTP, PIP and DIP joints are normal       Health Management  Dense breasts   MAMMO SCREENING BILATERAL W 3D & CAD; every 1 year; Last 45Rog8969; Next Due: 09Mqa0966;  Near Due  History of Encounter for screening mammogram for malignant neoplasm of breast   MAMMO SCREENING BILATERAL W 3D & CAD; every 1 year; Last 68YKZ8788; Next Due: 28Sxf8201;  Near Due    Future Appointments    Date/Time Provider Specialty Site   02/09/2018 10:45 AM ROSIBEL Weiss  [facility]     Signatures   Electronically signed by : Omayra Pisano, [facility]; Jan 18 2018  9:31AM EST                       (Author)    Electronically signed by :  ROSIBEL Irwin ; Jan 19 2018 11:07AM EST                       (Author)

## 2018-01-23 NOTE — MISCELLANEOUS
Message   Recorded as Task   Date: 12/18/2017 08:52 AM, Created By: Fabi Ni   Task Name: Follow Up   Assigned To: Jordi Rodrigues   Regarding Patient: Jeanne Bourne, Status: Active   Vitaly Jovel - 18 Dec 2017 8:52 AM     TASK CREATED  Caller: Self; General Medical Question; [phone] (Home); [phone] (Work)  Pt saturday left message on nurse line  c/o pounding headache, weak, dizzy, incision pain, and gasey, SOB  Questioning if she should go to ER  Returned call this morning  States sincethen Saturday evening had diarrhea  Stopped iron and colace  Stopped lovanox and now the migraine has stopped  States she is pale, feels weaks  Feels like she should be more progressed by now  States shes stopped taking advil around the clock on Friday  In the past 12 hours had taken 2 percocet  Has taken 400 mg of motrin  Pt state she feels like her hemaglogin should be checked  No vaginal bleeding  Sinces are clean dry an intact  No fever no chills  Sweating at times  Routing to provider for recommendations  I have called patient, and left message for her to go and get her HGB checked  order for CBC in allscripts  Patient did not  phone call      Plan  Acute blood loss as cause of postoperative anemia    · (1) CBC/ PLT (NO DIFF); Status:Active;  Requested for:57Lky5640;     Signatures   Electronically signed by : ROSIBEL Cook ; Dec 18 2017  9:22AM EST                       (Author)

## 2018-01-23 NOTE — RESULT NOTES
Verified Results  (1) THIN PREP PAP WITH IMAGING [id number] 02:39PM Nicolás Canseco     Test Name Result Flag Reference   LAB AP CASE REPORT (Report)     Gynecologic Cytology Report            Case: [id number]                  Authorizing Provider: Josafat Moyer MD  Collected:      11/29/2017           First Screen:     PRADEEP Hathaway Received:      12/04/2017 0954        Specimen:  LIQUID-BASED PAP, SCREENING, Endocervical   HPV HIGH RISK RESULT (Report)     HPV, High Risk: HPV NEG, HPV16 NEG, HPV18 NEG      Other High Risk HPV Negative, HPV 16 Negative, HPV 18 Negative  HPV types: 16,18,31,33,35,39,45,51,52,56,58,59,66 and 68 DNA are undetectable or below the pre-set threshold  [employer]'s FDA approved Adal 4800 is utilized with strict adherence to the 's instruction  manual to test for the presence of High-Risk HPV DNA, as well as HPV 16 and HPV 18  This instrument  has been validated by our laboratory and/or by the   A negative result does not preclude the presence of HPV infection because results depend on adequate  specimen collection, absence of inhibitors and sufficient DNA to be detected  Additionally, HPV negative  results are not intended to prevent women from proceeding to colposcopy if clinically warranted  Positive HPV test results indicate the presence of any one or more of the high risk types, but since patients  are often co-infected with low-risk types it does not rule out the presence of low-risk types in patients  with mixed infections  LAB AP GYN PRIMARY INTERPRETATION      Negative for intraepithelial lesion or malignancy  Electronically signed by PRADEEP Hathaway on 12/7/2017 at 2:39 PM   LAB AP GYN SPECIMEN ADEQUACY      Satisfactory for evaluation  Partially obscuring blood     LAB AP GYN ADDITIONAL INFORMATION (Report)     [facility]'s FDA approved ,  and ThinPrep Imaging System are   utilized with strict adherence to the 's instruction manual to   prepare gynecologic and non-gynecologic cytology specimens for the   production of ThinPrep slides as well as for gynecologic ThinPrep imaging  These processes have been validated by our laboratory and/or by the     The Pap test is not a diagnostic procedure and should not be used as the   sole means to detect cervical cancer  It is only a screening procedure to   aid in the detection of cervical cancer and its precursors  Both   false-negative and false-positive results have been experienced  Your   patient's test result should be interpreted in this context together with   the history and clinical findings

## 2018-01-23 NOTE — RESULT NOTES
Verified Results  * CT ABDOMEN PELVIS W CONTRAST [id number] 80:33TJ Francisco Molina Alan     Test Name Result Flag Reference   CT ABDOMEN PELVIS W CONTRAST (Report)     CT ABDOMEN AND PELVIS WITH IV CONTRAST     INDICATION: h/o hysterectomy 12/11/17 now having lower abd painAdnexal tenderness     COMPARISON: CT abdomen/pelvis 10/13/2015  Pelvic ultrasound 10/11/2017  Right upper quadrant ultrasound 12/10/2017  TECHNIQUE: CT examination of the abdomen and pelvis was performed  Reformatted images were created in axial, sagittal, and coronal planes  Radiation dose length product (DLP) for this visit: 421 mGy-cm   This examination, like all CT scans performed in the [facility], was performed utilizing techniques to minimize radiation dose exposure, including the use of iterative    reconstruction and automated exposure control  IV Contrast: 100 mL Omnipaque 350 multidose  Enteric Contrast: Enteric contrast was administered  FINDINGS:     ABDOMEN     LOWER CHEST: No significant abnormalities identified in the lower chest      LIVER/BILIARY TREE: Unremarkable  GALLBLADDER: No calcified gallstones  No pericholecystic inflammatory change  SPLEEN: Unremarkable  PANCREAS: Unremarkable  ADRENAL GLANDS: Unremarkable  KIDNEYS/URETERS: Nonobstructive 3 mm interpolar right renal calculus  STOMACH AND BOWEL: Thickening of the colon within the pelvis on a reactive basis to the below described collection  APPENDIX: No findings to suggest appendicitis  ABDOMINOPELVIC CAVITY: 10 0 x 10 1 x 11 2 cm irregular collection with a surrounding wall within the posterior pelvis in keeping suspicious for an abscess or hematoma/superinfected hematoma; some internal high density components are present  VESSELS: Unremarkable for patient's age  PELVIS     REPRODUCTIVE ORGANS: Unremarkable for patient's age  URINARY BLADDER: Unremarkable       ABDOMINAL WALL/INGUINAL REGIONS: Unremarkable  OSSEOUS STRUCTURES: No acute fracture or destructive osseous lesion  IMPRESSION:     Approximate 10 cm irregular pelvic collection with a surrounding wall/right in keeping with an abscess, hematoma or superinfected hematoma         Workstation performed: [id number]     Signed by:   Trey Gerard MD   12/26/17

## 2018-01-23 NOTE — DISCHARGE INSTRUCTIONS
Acute Abdominal Pain   WHAT YOU NEED TO KNOW:   The cause of your abdominal pain may not be found  If a cause is found, treatment will depend on what the cause is  DISCHARGE INSTRUCTIONS:   Return to the emergency department if:   · You vomit blood or cannot stop vomiting  · You have blood in your bowel movement or it looks like tar  · You have bleeding from your rectum  · Your abdomen is larger than usual, more painful, and hard  · You have severe pain in your abdomen  · You stop passing gas and having bowel movements  · You feel weak, dizzy, or faint  Contact your healthcare provider if:   · You have a fever  · You have new signs and symptoms  · Your symptoms do not get better with treatment  · You have questions or concerns about your condition or care  Medicines  may be given to decrease pain, treat an infection, and manage your symptoms  Take your medicine as directed  Call your healthcare provider if you think your medicine is not helping or if you have side effects  Tell him if you are allergic to any medicine  Keep a list of the medicines, vitamins, and herbs you take  Include the amounts, and when and why you take them  Bring the list or the pill bottles to follow-up visits  Carry your medicine list with you in case of an emergency  Manage your symptoms:   · Apply heat  on your abdomen for 20 to 30 minutes every 2 hours for as many days as directed  Heat helps decrease pain and muscle spasms  · Manage your stress  Stress may cause abdominal pain  Your healthcare provider may recommend relaxation techniques and deep breathing exercises to help decrease your stress  Your healthcare provider may recommend you talk to someone about your stress or anxiety, such as a counselor or a trusted friend  Get plenty of sleep and exercise regularly  · Limit or do not drink alcohol  Alcohol can make your abdominal pain worse   Ask your healthcare provider if it is safe for you to drink alcohol  Also ask how much is safe for you to drink  · Do not smoke  Nicotine and other chemicals in cigarettes can damage your esophagus and stomach  Ask your healthcare provider for information if you currently smoke and need help to quit  E-cigarettes or smokeless tobacco still contain nicotine  Talk to your healthcare provider before you use these products  Make changes to the food you eat as directed:  Do not eat foods that cause abdominal pain or other symptoms  Eat small meals more often  · Eat more high-fiber foods if you are constipated  High-fiber foods include fruits, vegetables, whole-grain foods, and legumes  · Do not eat foods that cause gas if you have bloating  Examples include broccoli, cabbage, and cauliflower  Do not drink soda or carbonated drinks, because these may also cause gas  · Do not eat foods or drinks that contain sorbitol or fructose if you have diarrhea and bloating  Some examples are fruit juices, candy, jelly, and sugar-free gum  · Do not eat high-fat foods, such as fried foods, cheeseburgers, hot dogs, and desserts  · Limit or do not drink caffeine  Caffeine may make symptoms, such as heart burn or nausea, worse  · Drink plenty of liquids to prevent dehydration from diarrhea or vomiting  Ask your healthcare provider how much liquid to drink each day and which liquids are best for you  Follow up with your healthcare provider as directed:  Write down your questions so you remember to ask them during your visits  © 2017 [employer] Information is for End User's use only and may not be sold, redistributed or otherwise used for commercial purposes  All illustrations and images included in CareNotes® are the copyrighted property of [employer] , [employer]  or Darin Carreno  The above information is an  only  It is not intended as medical advice for individual conditions or treatments   Talk to your doctor, nurse or pharmacist before following any medical regimen to see if it is safe and effective for you

## 2018-01-23 NOTE — MISCELLANEOUS
Message   Recorded as Task   Date: 12/26/2017 09:05 AM, Created By: Carlene Ramos   Task Name: Follow Up   Assigned To: Jarrod Salas   Regarding Patient: Tova Reich, Status: Active   Comment:    Delicia Live - 26 Dec 2017 9:05 AM     TASK CREATED  Caller: Self; General Medical Question; [phone] (Home); [phone] (Work)  pt called, had hysterectomy performed by dr Umaña Stamp 12/11  10 out of 10 for pain, stool soft, when she goes to bathroom , feels like she is in labor, when she passes gas its so painful she has to breathe through it, said her bleeding has picked up, when she wipes she sees blood, its pink in color   Delicia Live - 26 Dec 2017 9:07 AM     TASK EDITED  said she is taking miralax   Jarrod Salas - 26 Dec 2017 10:47 AM     TASK EDITED  taking miralax daily  was on iron and colace but stopped  had diarrhea  was seen last week  hgb was up  Liver enzymes were off  Not taking percocet anymore  watching what she is eating/fiber rich foods  having a lot of pain with bowel movements, in perineum, rectum, vaginal area  Takes her breath away  Stools are soft  Moving her bowels daily  Then feels normal after moving her bowels  No fevers/chills  Appetite is slightly decreased, can eat without being nauseous  Has nausea when on toilet  No distension  So much pressure on anus and rectum  Jarrod Salas - 26 Dec 2017 10:49 AM     TASK EDITED  will order CT of abd/pelvis with contrast         Plan  Painful defecation, Postoperative pain    · * CT ABDOMEN PELVIS W CONTRAST; Status:Need Information - Financial  Authorization;  Requested for:[id number];     Signatures   Electronically signed by : Moris Steward DO; Dec 26 5608 10:49AM EST                       (Author)

## 2018-01-23 NOTE — MISCELLANEOUS
Dear Lesly Schwarz,  I am happy to report that your Pap test collected during your recent  exam was normal  The HPV test was negative  Based on the most recent guidelines, Pap tests are repeated every 3-5 years and are no longer collected after your Hysterectomy  Therefore,  no further Pap tests are necessary for you  However, I will continue to see you annually for your [facility] visit  If you have any questions, please do not hesitate to contact my office      Sincerely,     Mikel Kitchen MD

## 2018-01-23 NOTE — MISCELLANEOUS
Message  I spoke with Dr Anh Sandoval this morning  Patient had surgery on 12/11/17  Patient's hemoglobin went from 14 g down to 8 g  This morning her hemoglobin is stable at 8 grams/deciliter  Patient will be given a prophylactic dose of Lovenox at 40mg today  Patient will take therapeutic dose tomorrow as long as CBC remained stable  Dr Xenia Carranza is ordering lovenox dose before discharge today  CBC will be ordered by Dr Xenia Carranza  I will be speaking to patient on THrusday Dec 14th as previously stated        Signatures   Electronically signed by : Marcos Snyder, [facility]; Dec 12 2017 10:37AM EST                       (Author)

## 2018-01-23 NOTE — MISCELLANEOUS
Message   Recorded as Task   Date: 12/05/2017 11:18 AM, Created By: Jess Acevedo   Task Name: Care Coordination   Assigned To: Christopher Lee   Regarding Patient: Saúl Ward, Status: In Progress   Comment:    Lay Morton - 05 Dec 2017 11:18 AM     TASK CREATED  Caller: Esperanza Choudhary; Care Coordination  Jenifer of [address]  is calling regarding the surgery clearance for patient  Esperanza Choudhary said Dr Beti Pozo note indicated pending bloodwork results  Patients surgery is Monday, 12/11  Please call  [phone]  Amy Castillo - 05 Dec 2017 11:29 AM     TASK EDITED  results in Dr Beti Pozo bin for review  Amy Castillo - 05 Dec 2017 11:29 AM     TASK IN PROGRESS   Amy Castillo - 05 Dec 2017 1:52 PM     TASK EDITED  lab work reviewed, pt will need appointment to discuss results  this week, with Elan Borja  please coordinate with patient  thanks  Lyudmila Russell - 05 Dec 2017 2:27 PM     TASK EDITED  a message was left on the pt's cell # to contact the office to shawna an appt with jeannie this week to review her lab work  Lyudmila Russell - 05 Dec 2017 2:46 PM     TASK EDITED  pt return my call, she will come in 12/6/17 to see Jorge Restrepo and review lab work        Active Problems    1  Abnormal menstrual periods (626 2) (N92 6)   2  Abnormal uterine bleeding (AUB) (626 9) (N93 9)   3  Antiphospholipid antibody syndrome (289 81) (D68 61)   4  Body mass index (BMI) of 24 0 to 24 9 in adult (V85 1) (Z68 24)   5  Chronic low back pain (724 2,338 29) (M54 5,G89 29)   6  Chronic pelvic pain in female (708 5,085 42) (R10 2,G89 29)   7  Connective tissue disease overlap syndrome (710 8) (M35 8)   8  Costochondritis (733 6) (M94 0)   9  Dense breasts (793 82) (R92 2)   10  Encounter for screening for malignant neoplasm of cervix (V76 2) (Z12 4)   11  Lateral epicondylitis of right elbow (726 32) (M77 11)   12  Long term (current) use of systemic steroids (V58 65) (Z79 52)   13   Long-term use of hydroxychloroquine (V58 69) (Z79 899)    Current Meds   1  Advil 200 MG Oral Tablet; TAKE 3 TABLET Daily PRN pain; Therapy: (Recorded:11Aug2017) to Recorded   2  Cyclobenzaprine HCl - 10 MG Oral Tablet; Take 1/2 to 1 tablet PO TID prn spasms    Requested for: 63SXG4506; Last Rx:11May2017 Ordered   3  Hydroxychloroquine Sulfate 200 MG Oral Tablet; TAKE 2 TABLET Daily With Food; Therapy: 02BXG2826 to (Loretta Saunders)  Requested for: 10YOP8300; Last   Rx:11May2017 Ordered   4  Omega 3 CAPS; take 1 capsule daily; Therapy: (Recorded:11Aug2017) to Recorded   5  Turmeric 500 MG Oral Capsule; take 1 capsule daily; Therapy: (Recorded:10Feb2017) to Recorded    Allergies    1  AMOXICILLIN   2   Levaquin TABS    Signatures   Electronically signed by : Kiley Kelly RN; Dec  5 2017  3:07PM EST                       (Author)

## 2018-01-23 NOTE — MISCELLANEOUS
Message   Recorded as Task   Date: 12/26/2017 09:05 AM, Created By: Edmar Guerin   Task Name: Follow Up   Assigned To: Monik Espinoza   Regarding Patient: Saúl Ward, Status: Active   Comment:    Delicia Live - 26 Dec 2017 9:05 AM     TASK CREATED  Caller: Self; General Medical Question; [phone] (Home); [phone] (Work)  pt called, had hysterectomy performed by dr Tony Barr 12/11  10 out of 10 for pain, stool soft, when she goes to bathroom , feels like she is in labor, when she passes gas its so painful she has to breathe through it, said her bleeding has picked up, when she wipes she sees blood, its pink in color   Delicia Live - 26 Dec 2017 9:07 AM     TASK EDITED  said she is taking miralax   Monik Espinoza - 26 Dec 2017 10:47 AM     TASK EDITED  taking miralax daily  was on iron and colace but stopped  had diarrhea  was seen last week  hgb was up  Liver enzymes were off  Not taking percocet anymore  watching what she is eating/fiber rich foods  having a lot of pain with bowel movements, in perineum, rectum, vaginal area  Takes her breath away  Stools are soft  Moving her bowels daily  Then feels normal after moving her bowels  No fevers/chills  Appetite is slightly decreased, can eat without being nauseous  Has nausea when on toilet  No distension  So much pressure on anus and rectum  Monik Espinoza - 26 Dec 2017 10:49 AM     TASK EDITED  will order CT of abd/pelvis with contrast    Monik Espinoza - 26 Dec 2017 10:50 AM     TASK REASSIGNED: Previously Assigned To Monik Espinoza  ordered CT of abd pelvis with contrast - can you please schedule for today or tomorrow? and needs f/u appt with Dr Angela Castellano tomorrow or later this week  Almaz Askew - 26 Dec 2017 11:10 AM     TASK EDITED  Today at 12:00 to start the contrast   1:30 for scan     Pt aware of time and to be NPO   Delicia Live - 26 Dec 2017 2:42 PM     TASK REPLIED TO: Previously Assigned To [facility]    please look at ct scan, signifigant findings   Jason Kawasaki - 26 Dec 2017 3:48 PM     TASK EDITED  results discussed with patient, scheduled for appointment with Dr Michael Bryant tomorrow morning at 8:30 a m    CMP, CBC prior to visit  Denies fevers/chills at home  likely hematoma, consider IR drainage        Signatures   Electronically signed by : Darell Clark DO; Dec 26 7971  3:48PM EST                       (Author)

## 2018-01-24 VITALS
OXYGEN SATURATION: 98 % | DIASTOLIC BLOOD PRESSURE: 70 MMHG | SYSTOLIC BLOOD PRESSURE: 106 MMHG | BODY MASS INDEX: 24.17 KG/M2 | TEMPERATURE: 99.6 F | RESPIRATION RATE: 16 BRPM | HEIGHT: 67 IN | WEIGHT: 154 LBS | HEART RATE: 96 BPM

## 2018-01-24 VITALS
WEIGHT: 149 LBS | WEIGHT: 151 LBS | HEART RATE: 96 BPM | HEIGHT: 67 IN | SYSTOLIC BLOOD PRESSURE: 92 MMHG | DIASTOLIC BLOOD PRESSURE: 52 MMHG | DIASTOLIC BLOOD PRESSURE: 58 MMHG | BODY MASS INDEX: 23.39 KG/M2 | HEIGHT: 67 IN | RESPIRATION RATE: 16 BRPM | SYSTOLIC BLOOD PRESSURE: 96 MMHG | TEMPERATURE: 98.5 F | BODY MASS INDEX: 23.7 KG/M2

## 2018-01-24 VITALS
DIASTOLIC BLOOD PRESSURE: 64 MMHG | HEIGHT: 67 IN | WEIGHT: 150.38 LBS | BODY MASS INDEX: 23.6 KG/M2 | HEART RATE: 92 BPM | SYSTOLIC BLOOD PRESSURE: 104 MMHG

## 2018-01-24 VITALS
BODY MASS INDEX: 23.7 KG/M2 | HEIGHT: 67 IN | SYSTOLIC BLOOD PRESSURE: 100 MMHG | WEIGHT: 151 LBS | HEART RATE: 94 BPM | DIASTOLIC BLOOD PRESSURE: 60 MMHG

## 2018-01-24 VITALS
SYSTOLIC BLOOD PRESSURE: 98 MMHG | HEIGHT: 67 IN | WEIGHT: 151 LBS | RESPIRATION RATE: 16 BRPM | DIASTOLIC BLOOD PRESSURE: 62 MMHG | BODY MASS INDEX: 23.7 KG/M2

## 2018-02-09 ENCOUNTER — OFFICE VISIT (OUTPATIENT)
Dept: OBGYN CLINIC | Facility: CLINIC | Age: 48
End: 2018-02-09

## 2018-02-09 VITALS — SYSTOLIC BLOOD PRESSURE: 100 MMHG | DIASTOLIC BLOOD PRESSURE: 70 MMHG | BODY MASS INDEX: 24.75 KG/M2 | WEIGHT: 158 LBS

## 2018-02-09 DIAGNOSIS — Z12.31 SCREENING MAMMOGRAM, ENCOUNTER FOR: ICD-10-CM

## 2018-02-09 DIAGNOSIS — Z09 POSTOP CHECK: Primary | ICD-10-CM

## 2018-02-09 PROBLEM — R92.30 DENSE BREASTS: Status: ACTIVE | Noted: 2017-01-17

## 2018-02-09 PROBLEM — R92.2 DENSE BREASTS: Status: ACTIVE | Noted: 2017-01-17

## 2018-02-09 PROBLEM — R76.0 LUPUS ANTICOAGULANT POSITIVE: Status: ACTIVE | Noted: 2017-12-06

## 2018-02-09 PROBLEM — M77.11 LATERAL EPICONDYLITIS OF RIGHT ELBOW: Status: ACTIVE | Noted: 2017-11-30

## 2018-02-09 PROCEDURE — 99024 POSTOP FOLLOW-UP VISIT: CPT | Performed by: OBSTETRICS & GYNECOLOGY

## 2018-02-09 NOTE — PROGRESS NOTES
Denita Diane 41 y o  female MRN: [id number]    Encounter: [id number]      Subjective:  Patient has no current complaints  Pain is present at right elbow  No pelvic pain  Patient is currently voiding  She is ambulating  Patient is currently passing flatus and has had bowel movement  She is tolerating PO, and denies nausea or vomitting  Patient denies fever, chills, chest pain, shortness of breath, or calf tenderness  Vitals: Blood pressure 100/70, weight 71 7 kg (158 lb), last menstrual period 12/01/2017, not currently breastfeeding  ,Body mass index is 24 75 kg/m²  Physical Exam:   GEN: Blenda Ards appears well, alert and oriented x 3, pleasant and cooperative   NECK: supple, no carotid bruits   HEART: regular rhythm, normal S1 and S2, no murmurs, clicks, gallops or rubs   LUNGS: clear to auscultation bilaterally; no wheezes, rales, or rhonchi   ABDOMEN: normal bowel sounds, soft, no tenderness, no distention, incision c/d/i  : vaginal cuff well healed  No tenderness  No abnormal masses  EXTREMITIES: peripheral pulses normal; no clubbing, cyanosis, or edema  NEURO: no focal findings       Patient Active Problem List   Diagnosis    Antiphospholipid antibody syndrome (HCC)    Dense breasts    Lateral epicondylitis of right elbow    Lupus anticoagulant positive      INDICATION:  61-year-old female with right lower quadrant pain and history of recent hysterectomy      COMPARISON: CT abdomen and pelvis 12/26/2017      TECHNIQUE:  CT examination of the abdomen and pelvis was performed  Reformatted images were created in axial, sagittal, and coronal planes        Radiation dose length product (DLP) for this visit:  405 mGy-cm     This examination, like all CT scans performed in the [facility], was performed utilizing techniques to minimize radiation dose exposure, including the use of iterative   reconstruction and automated exposure control      IV Contrast: 100 mL of iohexol (OMNIPAQUE)           Enteric Contrast:  Enteric contrast was not administered      FINDINGS:     ABDOMEN     LOWER CHEST:  No significant abnormalities identified in the lower chest      LIVER/BILIARY TREE:  Unremarkable      GALLBLADDER:  No calcified gallstones  No pericholecystic inflammatory change      SPLEEN:  Unremarkable      PANCREAS:  Unremarkable      ADRENAL GLANDS:  Unremarkable      KIDNEYS/URETERS:  Unremarkable  No hydronephrosis      STOMACH AND BOWEL:  Mild constipation with no bowel obstruction or wall thickening      APPENDIX:  No findings to suggest appendicitis      ABDOMINOPELVIC CAVITY:  No free intraperitoneal air  No lymphadenopathy  The previously described enhancing collection deep in the pelvis has resolved  Trace pelvic fluid is noted      VESSELS:  Unremarkable for patient's age      PELVIS     REPRODUCTIVE ORGANS:  Patient is status post hysterectomy      URINARY BLADDER:  Unremarkable      ABDOMINAL WALL/INGUINAL REGIONS:  Unremarkable      OSSEOUS STRUCTURES:  No acute fracture or destructive osseous lesion      IMPRESSION:        1  Constipation  No evidence for acute appendicitis or bowel obstruction,  2  Resolution of complex pelvic collection with trace pelvic fluid noted          A/P: 8 week's s/p LAVH, BS, cysto for painful periods doing well   1) Patient can go back to work  2) Can have intercourse in 2 weeks    Vaginal cuff well healed  3) can take steroids for arm  4) hematoma resolved as per CT performed in ER to rule out appendicitis

## 2018-02-26 ENCOUNTER — APPOINTMENT (OUTPATIENT)
Dept: LAB | Facility: HOSPITAL | Age: 48
End: 2018-02-26
Payer: COMMERCIAL

## 2018-02-26 ENCOUNTER — TRANSCRIBE ORDERS (OUTPATIENT)
Dept: ADMINISTRATIVE | Facility: HOSPITAL | Age: 48
End: 2018-02-26

## 2018-02-26 DIAGNOSIS — M35.89 OTHER SPECIFIED SYSTEMIC INVOLVEMENT OF CONNECTIVE TISSUE (HCC): ICD-10-CM

## 2018-02-26 DIAGNOSIS — D68.61 ANTIPHOSPHOLIPID SYNDROME (HCC): ICD-10-CM

## 2018-02-26 LAB
ALBUMIN SERPL BCP-MCNC: 4 G/DL (ref 3.5–5)
ALP SERPL-CCNC: 58 U/L (ref 46–116)
ALT SERPL W P-5'-P-CCNC: 25 U/L (ref 12–78)
ANION GAP SERPL CALCULATED.3IONS-SCNC: 7 MMOL/L (ref 4–13)
AST SERPL W P-5'-P-CCNC: 18 U/L (ref 5–45)
BASOPHILS # BLD AUTO: 0 THOUSANDS/ΜL (ref 0–0.1)
BASOPHILS NFR BLD AUTO: 0 % (ref 0–1)
BILIRUB SERPL-MCNC: 0.4 MG/DL (ref 0.2–1)
BUN SERPL-MCNC: 16 MG/DL (ref 5–25)
CALCIUM SERPL-MCNC: 9.1 MG/DL (ref 8.3–10.1)
CHLORIDE SERPL-SCNC: 104 MMOL/L (ref 100–108)
CO2 SERPL-SCNC: 30 MMOL/L (ref 21–32)
CREAT SERPL-MCNC: 0.82 MG/DL (ref 0.6–1.3)
CRP SERPL QL: <3 MG/L
EOSINOPHIL # BLD AUTO: 0.1 THOUSAND/ΜL (ref 0–0.61)
EOSINOPHIL NFR BLD AUTO: 2 % (ref 0–6)
ERYTHROCYTE [DISTWIDTH] IN BLOOD BY AUTOMATED COUNT: 14.4 % (ref 11.6–15.1)
ERYTHROCYTE [SEDIMENTATION RATE] IN BLOOD: 12 MM/HOUR (ref 2–25)
GFR SERPL CREATININE-BSD FRML MDRD: 87 ML/MIN/1.73SQ M
GLUCOSE P FAST SERPL-MCNC: 87 MG/DL (ref 65–99)
HCT VFR BLD AUTO: 38.8 % (ref 37–47)
HGB BLD-MCNC: 12.8 G/DL (ref 12–16)
LYMPHOCYTES # BLD AUTO: 2.4 THOUSANDS/ΜL (ref 0.6–4.47)
LYMPHOCYTES NFR BLD AUTO: 45 % (ref 14–44)
MCH RBC QN AUTO: 29.5 PG (ref 27–31)
MCHC RBC AUTO-ENTMCNC: 32.8 G/DL (ref 31.4–37.4)
MCV RBC AUTO: 90 FL (ref 82–98)
MONOCYTES # BLD AUTO: 0.5 THOUSAND/ΜL (ref 0.17–1.22)
MONOCYTES NFR BLD AUTO: 10 % (ref 4–12)
NEUTROPHILS # BLD AUTO: 2.2 THOUSANDS/ΜL (ref 1.85–7.62)
NEUTS SEG NFR BLD AUTO: 42 % (ref 43–75)
NRBC BLD AUTO-RTO: 0 /100 WBCS
PLATELET # BLD AUTO: 254 THOUSANDS/UL (ref 130–400)
PMV BLD AUTO: 6.9 FL (ref 8.9–12.7)
POTASSIUM SERPL-SCNC: 3.3 MMOL/L (ref 3.5–5.3)
PROT SERPL-MCNC: 7.8 G/DL (ref 6.4–8.2)
RBC # BLD AUTO: 4.32 MILLION/UL (ref 4.2–5.4)
SODIUM SERPL-SCNC: 141 MMOL/L (ref 136–145)
WBC # BLD AUTO: 5.3 THOUSAND/UL (ref 4.8–10.8)

## 2018-02-26 PROCEDURE — 80053 COMPREHEN METABOLIC PANEL: CPT

## 2018-02-26 PROCEDURE — 36415 COLL VENOUS BLD VENIPUNCTURE: CPT

## 2018-02-26 PROCEDURE — 86147 CARDIOLIPIN ANTIBODY EA IG: CPT

## 2018-02-26 PROCEDURE — 85652 RBC SED RATE AUTOMATED: CPT

## 2018-02-26 PROCEDURE — 86140 C-REACTIVE PROTEIN: CPT

## 2018-02-26 PROCEDURE — 85025 COMPLETE CBC W/AUTO DIFF WBC: CPT

## 2018-02-27 LAB
CARDIOLIPIN IGA SER IA-ACNC: <9 APL U/ML (ref 0–11)
CARDIOLIPIN IGG SER IA-ACNC: 11 GPL U/ML (ref 0–14)
CARDIOLIPIN IGM SER IA-ACNC: 47 MPL U/ML (ref 0–12)

## 2018-03-01 ENCOUNTER — TELEPHONE (OUTPATIENT)
Dept: PAIN MEDICINE | Facility: CLINIC | Age: 48
End: 2018-03-01

## 2018-03-26 DIAGNOSIS — M35.1 CONNECTIVE TISSUE DISEASE OVERLAP SYNDROME (HCC): Primary | ICD-10-CM

## 2018-03-28 RX ORDER — PREDNISONE 2.5 MG
TABLET ORAL
Qty: 42 TABLET | Refills: 0 | Status: SHIPPED | OUTPATIENT
Start: 2018-03-28 | End: 2018-07-12 | Stop reason: ALTCHOICE

## 2018-03-28 NOTE — TELEPHONE ENCOUNTER
Left message on machine explaining the SIG for Prednisone and asking to call back with any questions

## 2018-03-28 NOTE — TELEPHONE ENCOUNTER
Please call pt and let her know this modified prescription was sent to [facility] in [facility]  Please tell her that if his course of prednisone does not improve her symptoms to call us, and at that point we may need to see her back sooner

## 2018-07-11 PROBLEM — M35.9 UNDIFFERENTIATED CONNECTIVE TISSUE DISEASE (HCC): Status: ACTIVE | Noted: 2018-07-11

## 2018-07-11 NOTE — PROGRESS NOTES
No problem-specific Assessment & Plan notes found for this encounter  Plan:  Diagnoses and all orders for this visit:    Undifferentiated connective tissue disease ([facility] )    Antiphospholipid antibody syndrome ([facility] )    Lateral epicondylitis of right elbow      Assessment: This is a 28-year-old female presenting today for follow-up for an undifferentiated connective tissue disease  The patient states that she has been feeling Julienne Gowers since last appointment  She states that she has weaned off of prednisone however always feels much better well on prednisone  She states that she does have some morning stiffness and feels unstable on her feet for the 1st few minutes  She also notices the same symptoms with prolonged sitting  She does describe fatigue as well  She does note discomfort primarily in the hips, knees, and low back  She denies any obvious joint swelling  She does take ibuprofen and/or Tylenol as needed for joint discomfort  She also continues to utilize Plaquenil and she did have an eye exam within the last year  On physical examination, there is no active synovitis  She has mild discomfort with palpation of the right elbow  She does have normal passive range of motion of both upper and lower extremities  There is crepitus of the knees  Her most recent labs are from February  At this time patient's history and physical examination is most consistent with an undifferentiated connective tissue disease which appears to be mildly active but fairly stable with the use of Plaquenil 400 mg daily  Patient will continue with ibuprofen and Tylenol as needed and we will not make any further changes in her medication regimen  She was however asked to have an eye exam at least once a year  Patient will plan to return to the office in 6 months time however I will obtain a CBC, CMP, CRP, ESR, and cardiolipin antibody at this time    She will contact the office if she has any further questions or concerns  This patient will be discussed with Dr Gloria Gray on a biweekly basis  Subjective:      Patient ID: Bre Avelar is a 57 y o   female presenting today for follow up for UCTD  Feeling okay  Stopped Prednisone  Elbows are doing okay  Feels so much better on Prednisone  +Am stiffness and instable x few minutes and prolonged seating   +Fatigue  Pain in the hips, knees, and low back  No obvious joint swelling  Taking Ibuprofen with relief and Tylenol  Taking HCQ  Last eye exam: within the year  The following portions of the patient's history were reviewed and updated as appropriate: She  has a past medical history of Anticardiolipin syndrome ([facility] ); Herniated lumbar intervertebral disc; Mixed connective tissue disease ([facility] ); and Mixed connective tissue disease ([facility] )     Review of Systems   Constitutional: Positive for fatigue  Negative for appetite change, chills, fever and unexpected weight change  HENT: Negative for congestion, mouth sores and sore throat  Eyes: Negative for pain, redness and visual disturbance  Respiratory: Negative for cough, chest tightness and shortness of breath  Cardiovascular: Negative for chest pain and leg swelling  Gastrointestinal: Negative for abdominal pain, blood in stool, constipation, diarrhea, nausea and vomiting  Endocrine: Negative for polydipsia and polyuria  Genitourinary: Negative for frequency and hematuria  Skin: Negative for color change and rash  Neurological: Positive for headaches (Daily )  Negative for weakness, light-headedness and numbness  Hematological: Negative for adenopathy  Psychiatric/Behavioral: Negative for behavioral problems  The patient is not nervous/anxious  Objective:    Physical Exam   Constitutional: She is oriented to person, place, and time  She appears well-developed and well-nourished  HENT:   Head: Normocephalic     Mouth/Throat: Oropharynx is clear and moist    Eyes: Conjunctivae are normal  Pupils are equal, round, and reactive to light  Neck: Normal range of motion  Neck supple  Cardiovascular: Normal rate, regular rhythm and normal heart sounds  Pulmonary/Chest: Effort normal and breath sounds normal    Musculoskeletal: Normal range of motion  +Crepitus of the knees B/L    Neurological: She is alert and oriented to person, place, and time  Skin: Skin is warm and dry  Psychiatric: She has a normal mood and affect   Her behavior is normal        Physical Exam     Tenderness:   RUE: ulnohumeral and radiohumeral    CRAWFORD-28 tender joint count: 1  CRAWFORD-28 swollen joint count: 0      Results Reviewed     None

## 2018-07-12 ENCOUNTER — OFFICE VISIT (OUTPATIENT)
Dept: RHEUMATOLOGY | Facility: CLINIC | Age: 48
End: 2018-07-12
Payer: COMMERCIAL

## 2018-07-12 VITALS
HEIGHT: 67 IN | BODY MASS INDEX: 24.64 KG/M2 | WEIGHT: 157 LBS | SYSTOLIC BLOOD PRESSURE: 118 MMHG | DIASTOLIC BLOOD PRESSURE: 72 MMHG

## 2018-07-12 DIAGNOSIS — M35.9 UNDIFFERENTIATED CONNECTIVE TISSUE DISEASE (HCC): Primary | ICD-10-CM

## 2018-07-12 DIAGNOSIS — D68.61 ANTIPHOSPHOLIPID ANTIBODY SYNDROME (HCC): ICD-10-CM

## 2018-07-12 DIAGNOSIS — M77.11 LATERAL EPICONDYLITIS OF RIGHT ELBOW: ICD-10-CM

## 2018-07-12 PROCEDURE — 99214 OFFICE O/P EST MOD 30 MIN: CPT | Performed by: PHYSICIAN ASSISTANT

## 2018-07-12 RX ORDER — CHOLECALCIFEROL (VITAMIN D3) 125 MCG
5 CAPSULE ORAL DAILY
Qty: 30 TABLET | Refills: 0
Start: 2018-07-12

## 2018-08-20 ENCOUNTER — APPOINTMENT (OUTPATIENT)
Dept: LAB | Facility: HOSPITAL | Age: 48
End: 2018-08-20
Payer: COMMERCIAL

## 2018-08-20 ENCOUNTER — TRANSCRIBE ORDERS (OUTPATIENT)
Dept: ADMINISTRATIVE | Facility: HOSPITAL | Age: 48
End: 2018-08-20

## 2018-08-20 DIAGNOSIS — Z00.8 ENCOUNTER FOR OTHER GENERAL EXAMINATION: Primary | ICD-10-CM

## 2018-08-20 DIAGNOSIS — M35.9 DIFFUSE DISEASE OF CONNECTIVE TISSUE (HCC): ICD-10-CM

## 2018-08-20 DIAGNOSIS — M35.9 DIFFUSE DISEASE OF CONNECTIVE TISSUE (HCC): Primary | ICD-10-CM

## 2018-08-20 DIAGNOSIS — D68.61 ANTIPHOSPHOLIPID SYNDROME (HCC): ICD-10-CM

## 2018-08-20 LAB
ALBUMIN SERPL BCP-MCNC: 3.8 G/DL (ref 3.5–5)
ALP SERPL-CCNC: 51 U/L (ref 46–116)
ALT SERPL W P-5'-P-CCNC: 25 U/L (ref 12–78)
ANION GAP SERPL CALCULATED.3IONS-SCNC: 10 MMOL/L (ref 4–13)
AST SERPL W P-5'-P-CCNC: 23 U/L (ref 5–45)
BASOPHILS # BLD AUTO: 0.02 THOUSANDS/ΜL (ref 0–0.1)
BASOPHILS NFR BLD AUTO: 1 % (ref 0–1)
BILIRUB SERPL-MCNC: 0.6 MG/DL (ref 0.2–1)
BUN SERPL-MCNC: 9 MG/DL (ref 5–25)
CALCIUM SERPL-MCNC: 8.8 MG/DL (ref 8.3–10.1)
CHLORIDE SERPL-SCNC: 103 MMOL/L (ref 100–108)
CHOLEST SERPL-MCNC: 164 MG/DL (ref 50–200)
CO2 SERPL-SCNC: 27 MMOL/L (ref 21–32)
CREAT SERPL-MCNC: 0.82 MG/DL (ref 0.6–1.3)
CRP SERPL QL: <3 MG/L
EOSINOPHIL # BLD AUTO: 0.17 THOUSAND/ΜL (ref 0–0.61)
EOSINOPHIL NFR BLD AUTO: 5 % (ref 0–6)
ERYTHROCYTE [DISTWIDTH] IN BLOOD BY AUTOMATED COUNT: 12.4 % (ref 11.6–15.1)
ERYTHROCYTE [SEDIMENTATION RATE] IN BLOOD: 11 MM/HOUR (ref 2–25)
EST. AVERAGE GLUCOSE BLD GHB EST-MCNC: 91 MG/DL
GFR SERPL CREATININE-BSD FRML MDRD: 86 ML/MIN/1.73SQ M
GLUCOSE P FAST SERPL-MCNC: 83 MG/DL (ref 65–99)
HBA1C MFR BLD: 4.8 % (ref 4.2–6.3)
HCT VFR BLD AUTO: 38.6 % (ref 34.8–46.1)
HDLC SERPL-MCNC: 61 MG/DL (ref 40–60)
HGB BLD-MCNC: 12.5 G/DL (ref 11.5–15.4)
IMM GRANULOCYTES # BLD AUTO: 0.01 THOUSAND/UL (ref 0–0.2)
IMM GRANULOCYTES NFR BLD AUTO: 0 % (ref 0–2)
LDLC SERPL CALC-MCNC: 93 MG/DL (ref 0–100)
LYMPHOCYTES # BLD AUTO: 1.56 THOUSANDS/ΜL (ref 0.6–4.47)
LYMPHOCYTES NFR BLD AUTO: 46 % (ref 14–44)
MCH RBC QN AUTO: 30.2 PG (ref 26.8–34.3)
MCHC RBC AUTO-ENTMCNC: 32.4 G/DL (ref 31.4–37.4)
MCV RBC AUTO: 93 FL (ref 82–98)
MONOCYTES # BLD AUTO: 0.38 THOUSAND/ΜL (ref 0.17–1.22)
MONOCYTES NFR BLD AUTO: 11 % (ref 4–12)
NEUTROPHILS # BLD AUTO: 1.26 THOUSANDS/ΜL (ref 1.85–7.62)
NEUTS SEG NFR BLD AUTO: 37 % (ref 43–75)
NONHDLC SERPL-MCNC: 103 MG/DL
NRBC BLD AUTO-RTO: 0 /100 WBCS
PLATELET # BLD AUTO: 199 THOUSANDS/UL (ref 149–390)
PMV BLD AUTO: 9.1 FL (ref 8.9–12.7)
POTASSIUM SERPL-SCNC: 3.2 MMOL/L (ref 3.5–5.3)
PROT SERPL-MCNC: 7.8 G/DL (ref 6.4–8.2)
RBC # BLD AUTO: 4.14 MILLION/UL (ref 3.81–5.12)
SODIUM SERPL-SCNC: 140 MMOL/L (ref 136–145)
TRIGL SERPL-MCNC: 52 MG/DL
WBC # BLD AUTO: 3.4 THOUSAND/UL (ref 4.31–10.16)

## 2018-08-20 PROCEDURE — 36415 COLL VENOUS BLD VENIPUNCTURE: CPT | Performed by: PHYSICIAN ASSISTANT

## 2018-08-20 PROCEDURE — 80053 COMPREHEN METABOLIC PANEL: CPT | Performed by: PHYSICIAN ASSISTANT

## 2018-08-20 PROCEDURE — 86147 CARDIOLIPIN ANTIBODY EA IG: CPT

## 2018-08-20 PROCEDURE — 83036 HEMOGLOBIN GLYCOSYLATED A1C: CPT | Performed by: PREVENTIVE MEDICINE

## 2018-08-20 PROCEDURE — 80061 LIPID PANEL: CPT | Performed by: PREVENTIVE MEDICINE

## 2018-08-20 PROCEDURE — 86140 C-REACTIVE PROTEIN: CPT

## 2018-08-20 PROCEDURE — 85652 RBC SED RATE AUTOMATED: CPT

## 2018-08-20 PROCEDURE — 85025 COMPLETE CBC W/AUTO DIFF WBC: CPT | Performed by: PHYSICIAN ASSISTANT

## 2018-08-21 LAB
CARDIOLIPIN IGA SER IA-ACNC: <9 APL U/ML (ref 0–11)
CARDIOLIPIN IGG SER IA-ACNC: 9 GPL U/ML (ref 0–14)
CARDIOLIPIN IGM SER IA-ACNC: 51 MPL U/ML (ref 0–12)

## 2018-08-25 ENCOUNTER — HOSPITAL ENCOUNTER (OUTPATIENT)
Dept: MAMMOGRAPHY | Facility: HOSPITAL | Age: 48
Discharge: HOME | End: 2018-08-25
Attending: OBSTETRICS & GYNECOLOGY
Payer: COMMERCIAL

## 2018-08-25 DIAGNOSIS — Z12.31 SCREENING MAMMOGRAM, ENCOUNTER FOR: ICD-10-CM

## 2018-08-25 PROCEDURE — 77067 SCR MAMMO BI INCL CAD: CPT

## 2018-08-25 PROCEDURE — 77063 BREAST TOMOSYNTHESIS BI: CPT
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
## 2019-01-24 ENCOUNTER — OFFICE VISIT (OUTPATIENT)
Dept: RHEUMATOLOGY | Facility: CLINIC | Age: 49
End: 2019-01-24
Payer: COMMERCIAL

## 2019-01-24 ENCOUNTER — NURSE TRIAGE (OUTPATIENT)
Dept: PHYSICAL THERAPY | Facility: OTHER | Age: 49
End: 2019-01-24

## 2019-01-24 VITALS
HEART RATE: 80 BPM | SYSTOLIC BLOOD PRESSURE: 122 MMHG | WEIGHT: 155 LBS | DIASTOLIC BLOOD PRESSURE: 74 MMHG | HEIGHT: 67 IN | BODY MASS INDEX: 24.33 KG/M2

## 2019-01-24 DIAGNOSIS — D68.61 ANTIPHOSPHOLIPID ANTIBODY SYNDROME (HCC): ICD-10-CM

## 2019-01-24 DIAGNOSIS — M54.5 CHRONIC MIDLINE LOW BACK PAIN, WITH SCIATICA PRESENCE UNSPECIFIED: Primary | ICD-10-CM

## 2019-01-24 DIAGNOSIS — M77.11 LATERAL EPICONDYLITIS OF RIGHT ELBOW: ICD-10-CM

## 2019-01-24 DIAGNOSIS — M35.1 MIXED CONNECTIVE TISSUE DISEASE (HCC): ICD-10-CM

## 2019-01-24 DIAGNOSIS — G89.29 CHRONIC BILATERAL LOW BACK PAIN WITHOUT SCIATICA: ICD-10-CM

## 2019-01-24 DIAGNOSIS — M54.50 CHRONIC BILATERAL LOW BACK PAIN WITHOUT SCIATICA: ICD-10-CM

## 2019-01-24 DIAGNOSIS — G89.29 CHRONIC MIDLINE LOW BACK PAIN WITHOUT SCIATICA: ICD-10-CM

## 2019-01-24 DIAGNOSIS — M54.50 CHRONIC MIDLINE LOW BACK PAIN WITHOUT SCIATICA: ICD-10-CM

## 2019-01-24 DIAGNOSIS — G89.29 CHRONIC MIDLINE LOW BACK PAIN, WITH SCIATICA PRESENCE UNSPECIFIED: Primary | ICD-10-CM

## 2019-01-24 DIAGNOSIS — M35.9 UNDIFFERENTIATED CONNECTIVE TISSUE DISEASE (HCC): Primary | ICD-10-CM

## 2019-01-24 PROCEDURE — 99214 OFFICE O/P EST MOD 30 MIN: CPT | Performed by: PHYSICIAN ASSISTANT
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
## 2019-01-30 ENCOUNTER — OFFICE VISIT (OUTPATIENT)
Dept: PAIN MEDICINE | Facility: CLINIC | Age: 49
End: 2019-01-30
Payer: COMMERCIAL

## 2019-01-30 VITALS
BODY MASS INDEX: 24.33 KG/M2 | DIASTOLIC BLOOD PRESSURE: 70 MMHG | WEIGHT: 155 LBS | HEIGHT: 67 IN | SYSTOLIC BLOOD PRESSURE: 100 MMHG | HEART RATE: 72 BPM

## 2019-01-30 DIAGNOSIS — G89.29 CHRONIC MIDLINE LOW BACK PAIN WITHOUT SCIATICA: Primary | ICD-10-CM

## 2019-01-30 DIAGNOSIS — M54.50 CHRONIC MIDLINE LOW BACK PAIN WITHOUT SCIATICA: Primary | ICD-10-CM

## 2019-01-30 PROCEDURE — 99204 OFFICE O/P NEW MOD 45 MIN: CPT | Performed by: PHYSICIAN ASSISTANT

## 2019-01-30 RX ORDER — IBUPROFEN 400 MG/1
TABLET ORAL EVERY 6 HOURS PRN
COMMUNITY

## 2019-01-31 ENCOUNTER — EVALUATION (OUTPATIENT)
Dept: PHYSICAL THERAPY | Facility: CLINIC | Age: 49
End: 2019-01-31
Payer: COMMERCIAL

## 2019-01-31 DIAGNOSIS — G89.29 CHRONIC MIDLINE LOW BACK PAIN, WITH SCIATICA PRESENCE UNSPECIFIED: Primary | ICD-10-CM

## 2019-01-31 DIAGNOSIS — M54.5 CHRONIC MIDLINE LOW BACK PAIN, WITH SCIATICA PRESENCE UNSPECIFIED: Primary | ICD-10-CM

## 2019-01-31 PROCEDURE — 97110 THERAPEUTIC EXERCISES: CPT | Performed by: PHYSICAL THERAPIST

## 2019-01-31 PROCEDURE — 97161 PT EVAL LOW COMPLEX 20 MIN: CPT | Performed by: PHYSICAL THERAPIST
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
## 2019-02-06 ENCOUNTER — APPOINTMENT (OUTPATIENT)
Dept: PHYSICAL THERAPY | Facility: CLINIC | Age: 49
End: 2019-02-06
Payer: COMMERCIAL

## 2019-02-07 ENCOUNTER — OFFICE VISIT (OUTPATIENT)
Dept: PHYSICAL THERAPY | Facility: CLINIC | Age: 49
End: 2019-02-07
Payer: COMMERCIAL

## 2019-02-07 DIAGNOSIS — G89.29 CHRONIC MIDLINE LOW BACK PAIN, WITH SCIATICA PRESENCE UNSPECIFIED: Primary | ICD-10-CM

## 2019-02-07 DIAGNOSIS — M54.5 CHRONIC MIDLINE LOW BACK PAIN, WITH SCIATICA PRESENCE UNSPECIFIED: Primary | ICD-10-CM

## 2019-02-07 PROCEDURE — 97112 NEUROMUSCULAR REEDUCATION: CPT

## 2019-02-07 PROCEDURE — 97140 MANUAL THERAPY 1/> REGIONS: CPT

## 2019-02-07 PROCEDURE — 97110 THERAPEUTIC EXERCISES: CPT

## 2019-02-07 NOTE — PROGRESS NOTES
Daily Note     Today's date: 2019  Patient name: Victoria Sanders  : 1970  MRN: [id number]  Referring provider: Brigette Lopez MD  Dx:   Encounter Diagnosis     ICD-10-CM    1  Chronic midline low back pain, with sciatica presence unspecified M54 5     G89 29         1:1 with PTA CR 10:05- 11:00  MH 11:00-11:10  Subjective: No changes since I E  Reports compliance with HEP offering no questions or concerns  Objective: See treatment diary below      Assessment: Tolerated treatment well  Patient demonstrated fatigue post treatment, exhibited good technique with therapeutic exercises and would benefit from continued PT  Progressed through  [address] as charted  Denied pain with all exercises however did have pain/difficulty with bed mobility and positional changes  Positive response to PA mobs  Limited tolerance to prone  Plan: Continue per plan of care         Precautions: Mixed Connective Tissue Disease     Daily Treatment Diary      Manual                     Lumbar PA mobs    RH,PT  8 mins                                                 Exercise Diary                     Bike    L1  6 mins                   Piriformis Str B    20"x6                   Glut Str B    20"x6                   Bridges    5"x20                   EIL    2"x10                   EIS    2"x10                   EIS Belt OP    2"x10                   Quad Stretch    20"x6                   Abd Mao    5"x10                   B LTR    5"x10 ea                                                                                                                                            Posture Ed                                                                                               HEP 10'                           Modalities                     MHP post    10 mins

## 2019-02-08 ENCOUNTER — OFFICE VISIT (OUTPATIENT)
Dept: PHYSICAL THERAPY | Facility: CLINIC | Age: 49
End: 2019-02-08
Payer: COMMERCIAL

## 2019-02-08 DIAGNOSIS — M54.5 CHRONIC MIDLINE LOW BACK PAIN, WITH SCIATICA PRESENCE UNSPECIFIED: Primary | ICD-10-CM

## 2019-02-08 DIAGNOSIS — G89.29 CHRONIC MIDLINE LOW BACK PAIN, WITH SCIATICA PRESENCE UNSPECIFIED: Primary | ICD-10-CM

## 2019-02-08 PROCEDURE — 97110 THERAPEUTIC EXERCISES: CPT

## 2019-02-08 PROCEDURE — 97112 NEUROMUSCULAR REEDUCATION: CPT

## 2019-02-08 PROCEDURE — 97140 MANUAL THERAPY 1/> REGIONS: CPT

## 2019-02-08 NOTE — PROGRESS NOTES
Daily Note     Today's date: 2019  Patient name: Dallas Choudhary  : 1970  MRN: [id number]  Referring provider: Rubi Hernandez MD  Dx:   Encounter Diagnosis     ICD-10-CM    1  Chronic midline low back pain, with sciatica presence unspecified M54 5     G89 29         1:1 with PTA CR 8:00- 8:30  Unbilled 8:30- 8:50  MH 8:50-9:00  Subjective: Denies pain or residual muscle soreness from yesterday's treatment  Objective: See treatment diary below      Assessment: Tolerated treatment well  Patient demonstrated fatigue post treatment, exhibited good technique with therapeutic exercises and would benefit from continued PT  Improved tolerance to quad stretch when performed in standing verus prone  Plan: Continue per plan of care         Precautions: Mixed Connective Tissue Disease     Daily Treatment Diary      Manual                   Lumbar PA mobs    RH,PT  8 mins  RH,PT  8 mins                                               Exercise Diary                   Bike    L1  6 mins  L3  6 mins                 Piriformis Str B    20"x6  20"x6                 Glut Str B    20"x6  20"x6                 Bridges    5"x20  5"x20                 EIL    2"x10  2"x10                 EIS    2"x10 2"x15                 EIS Belt OP    2"x10  2"x15                 2Quad Stretch    20"x6  standing  20"x6                   Abd Mao    5"x10  5"x20                 B LTR    5"x10 ea   5"x10 ea                                                                                                                                          Posture Ed                                                                                               HEP 10'                           Modalities                   MHP post    10 mins  10 mins

## 2019-02-13 ENCOUNTER — APPOINTMENT (OUTPATIENT)
Dept: PHYSICAL THERAPY | Facility: CLINIC | Age: 49
End: 2019-02-13
Payer: COMMERCIAL

## 2019-02-15 ENCOUNTER — OFFICE VISIT (OUTPATIENT)
Dept: PHYSICAL THERAPY | Facility: CLINIC | Age: 49
End: 2019-02-15
Payer: COMMERCIAL

## 2019-02-15 DIAGNOSIS — G89.29 CHRONIC MIDLINE LOW BACK PAIN, WITH SCIATICA PRESENCE UNSPECIFIED: Primary | ICD-10-CM

## 2019-02-15 DIAGNOSIS — M54.5 CHRONIC MIDLINE LOW BACK PAIN, WITH SCIATICA PRESENCE UNSPECIFIED: Primary | ICD-10-CM

## 2019-02-15 PROCEDURE — 97110 THERAPEUTIC EXERCISES: CPT

## 2019-02-15 PROCEDURE — 97140 MANUAL THERAPY 1/> REGIONS: CPT

## 2019-02-15 PROCEDURE — 97112 NEUROMUSCULAR REEDUCATION: CPT

## 2019-02-15 NOTE — PROGRESS NOTES
Daily Note     Today's date: 2/15/2019  Patient name: Torres Fermin  : 1970  MRN: [id number]  Referring provider: Aliyah Norman MD  Dx:   Encounter Diagnosis     ICD-10-CM    1  Chronic midline low back pain, with sciatica presence unspecified M54 5     G89 29         1:1 with PTA CR 9:00- 10:00  Unbilled 10:00- 10:10  MH 10:10-10:20  Subjective: Transfers in bed continue to be most challenging and painful  Objective: See treatment diary below      Assessment: Tolerated treatment well  Patient demonstrated fatigue post treatment, exhibited good technique with therapeutic exercises and would benefit from continued PT  Progressed core and multifidus strengthening as charted  Plan: Continue per plan of care         Precautions: Mixed Connective Tissue Disease     Daily Treatment Diary      Manual  1/31  2/7  2/8  2/15               Lumbar PA mobs    RH,PT  8 mins  RH,PT  8 mins RH,PT  8 mins                                             Exercise Diary  1/31  2/7  2/8  2/15               Bike    L1  6 mins  L3  6 mins  L3  6 mins               Piriformis Str B    20"x6  20"x6  20"x6               Glut Str B    20"x6  20"x6  20"x6               Bridges    5"x20  5"x20  pball x10                  EIL    2"x10  2"x10  2"x10               EIS    2"x10 2"x15  2"x15               EIS Belt OP    2"x10  2"x15  2"x15               Quad Stretch    20"x6  standing  20"x6  standing  20"x6                 Abd Mao    5"x10  5"x20  5"x20               B LTR    5"x10 ea   5"x10 ea   5"x10 ea                TB multifidus press        GTB  20 ea                 Pball knee extensions        10 ea                 pball marches        10 ea                 sidestepping        GTB  3 laps                clamshells        GTB  3"x10 ea               pball TB extensions        NV                pball TB rows        NV                                                               HEP 10'                         Modalities  1/31  2/7  2/8  2/15               MHP post    10 mins  10 mins  10 mins

## 2019-02-20 ENCOUNTER — APPOINTMENT (OUTPATIENT)
Dept: PHYSICAL THERAPY | Facility: CLINIC | Age: 49
End: 2019-02-20
Payer: COMMERCIAL

## 2019-02-22 ENCOUNTER — OFFICE VISIT (OUTPATIENT)
Dept: PHYSICAL THERAPY | Facility: CLINIC | Age: 49
End: 2019-02-22
Payer: COMMERCIAL

## 2019-02-22 DIAGNOSIS — M54.5 CHRONIC MIDLINE LOW BACK PAIN, WITH SCIATICA PRESENCE UNSPECIFIED: Primary | ICD-10-CM

## 2019-02-22 DIAGNOSIS — G89.29 CHRONIC MIDLINE LOW BACK PAIN, WITH SCIATICA PRESENCE UNSPECIFIED: Primary | ICD-10-CM

## 2019-02-22 PROCEDURE — 97140 MANUAL THERAPY 1/> REGIONS: CPT

## 2019-02-22 PROCEDURE — 97112 NEUROMUSCULAR REEDUCATION: CPT

## 2019-02-22 PROCEDURE — 97110 THERAPEUTIC EXERCISES: CPT

## 2019-02-22 NOTE — PROGRESS NOTES
Daily Note     Today's date: 2019  Patient name: Consuelo Sosa  : 1970  MRN: [id number]  Referring provider: Aidan Thompson MD  Dx:   Encounter Diagnosis     ICD-10-CM    1  Chronic midline low back pain, with sciatica presence unspecified M54 5     G89 29         1:1 with PTA CR 9:00- 10:05  Subjective: Increased pain right low thoracic spine after transferring obese patients over the weekend  Objective: See treatment diary below      Assessment: Tolerated treatment well  Patient demonstrated fatigue post treatment, exhibited good technique with therapeutic exercises and would benefit from continued PT  TB extensions and rows on pball added for additional core stab  Continue to progress as able  Positive response to manuals today- able to transfer off of table without pain or difficulty for the first time  " That was amazing  "       Plan: Continue per plan of care  Precautions: Mixed Connective Tissue Disease     Daily Treatment Diary      Manual  1/31  2/7  2/8  2/15  2/22             Lumbar PA mobs    RH,PT  8 mins  RH,PT  8 mins RH,PT  8 mins  RS,PT  5 mins (SP and TP)              Lower thoracic PA          RS, PT 5 min (SP and TP)                   Exercise Diary  1/31  2/7  2/8  2/15  2/22           Bike    L1  6 mins  L3  6 mins  L3  6 mins  L3  6 mins           Piriformis Str B    20"x6  20"x6  20"x6  20"x6           Glut Str B    20"x6  20"x6  20"x6  20"x6           Bridges    5"x20  5"x20  pball x10     pball  15           EIL    2"x10  2"x10  2"x10  2"x10           EIS    2"x10 2"x15  2"x15  2"x20           EIS Belt OP    2"x10  2"x15  2"x15  NP           Quad Stretch    20"x6  standing  20"x6  standing  20"x6    [id number]"x6           Abd Mao    5"x10  5"x20  5"x20  NP           B LTR    5"x10 ea   5"x10 ea   5"x10 ea  5"x10 ea             TB multifidus press        GTB  20 ea    GTB  2x10 ea             Pball knee extensions        10 ea   10 ea           pball marches        10 ea   10 ea             sidestepping        GTB  3 laps  GTB  3 laps            clamshells        GTB  3"x10 ea  GTB  3"x20           pball TB extensions        NV  GTB  5"x20            pball TB rows        NV  GTB  5"x20                                                       HEP 10'                         Modalities  1/31  2/7  2/8  2/15               MHP post    10 mins  10 mins  10 mins

## 2019-02-25 NOTE — PROGRESS NOTES
Assessment/Plan:      Diagnoses and all orders for this visit:    Chronic midline low back pain without sciatica      Discussion: 58 yo female presenting for follow up of chronic axial LBP  Notices significant improvement since starting PTx and would benefit from continued physical therapy  Order for XR of lumbar spine reprinted and given to patient today to evaluate for progression of facet and degenerative changes  Will call patient with results  Discussed if pain worsens or does not improve t/c referral to pain management for possible facet injections  Subjective:     Patient ID: Segundo Ward is a 57 y o  female  HPI Patient presents for follow up of axial LBP after starting physical therapy  Is scheduled for another 2-4 weeks of therapy  Did not complete XR of lumbar spine ordered at last visit  Still some AM stiffness  Denies any radiating pain or N/T since starting therapy  Still some perceived LE weakness, but feeling that legs will give out on her also no longer present  Denies B/B changes or saddle anesthesia  Currently treating with rheumatology for mixed connective tissue disorder and antiphospholipid antibody disorder for which she is prescribed Plaquenil  Not taking anything currently for back pain  ADLs not limited       PAST MEDICAL HISTORY  Past Medical History:   Diagnosis Date    [facility]ardiolipin [facility] [facility]     Herniated lumbar intervertebral disc     [facility] connective [facility] disease [facility] [facility]     Mixed connective tissue disease [facility] [address] [facility]      PAST SURGICAL HISTORY  Past Surgical History:   Procedure Laterality Date    NO PAST SURGERIES      HI CYSTOURETHROSCOPY N/A 12/11/2017    Procedure: Edil Baker;  Surgeon: Rosaura Arceo MD;  [facility]: [facility];  Service: Gynecology    HI LAP,VAG HYST,UTERUS 250GMS/<,SALP-OOPH N/A 12/11/2017    Procedure: LAPAROSCOPIC ASSISTED VAGINAL HYSTERECTOMY; BILATERAL SALPINGECTOMY;  Surgeon: Rosaura Arceo MD;  Location[facility];  Service: Gynecology       FAMILY HISTORY  History reviewed  No pertinent family history  HOME MEDICATIONS  Current Outpatient Medications   Medication Sig Dispense Refill    hydroxychloroquine (PLAQUENIL) 200 mg tablet Take 400 mg by mouth daily        ibuprofen (MOTRIN) 400 mg tablet Take by mouth every 6 (six) hours as needed for mild pain      MAGNESIUM CARBONATE PO Take by mouth      Melatonin 5 MG TABS Take 5 tablets (25 mg total) by mouth daily 30 tablet 0    VITAMIN D, CHOLECALCIFEROL, PO Take by mouth       No current facility-administered medications for this visit  ALLERGIES  Levaquin [levofloxacin] and Amoxil [amoxicillin]      SOCIAL HISTORY  Social History     Substance and Sexual Activity   Alcohol Use No     Social History     Substance and Sexual Activity   Drug Use No     Social History     Tobacco Use   Smoking Status Never Smoker   Smokeless Tobacco Never Used       Review of Systems   Constitutional: Negative for chills, diaphoresis, fatigue and fever  Respiratory: Negative for shortness of breath  Cardiovascular: Negative for chest pain and leg swelling  Gastrointestinal: Negative for constipation and diarrhea  Genitourinary: Negative for decreased urine volume and difficulty urinating  Musculoskeletal: Positive for arthralgias and back pain  Neurological: Positive for weakness  Negative for numbness  Psychiatric/Behavioral: Positive for sleep disturbance  Objective:  Vitals:    02/28/19 0809   BP: 110/70   Pulse: 84       Imaging:  No images are attached to the encounter  Physical Exam   Constitutional: She is oriented to person, place, and time  She appears well-developed and well-nourished  No distress  HENT:   Head: Normocephalic and atraumatic  Musculoskeletal:        Lumbar back: She exhibits decreased range of motion  She exhibits no tenderness and no bony tenderness          Back:    Neurological: She is alert and oriented to person, place, and time  She has normal strength  She displays no atrophy  No cranial nerve deficit or sensory deficit  Coordination and gait normal    Reflex Scores:       Tricep reflexes are 2+ on the right side and 2+ on the left side  Bicep reflexes are 2+ on the right side and 2+ on the left side  Brachioradialis reflexes are 2+ on the right side and 2+ on the left side  Patellar reflexes are 2+ on the right side and 2+ on the left side  Achilles reflexes are 2+ on the right side and 2+ on the left side   - SLR bilaterally     Able to heel stand, toe stand, squat and rise without difficulty   Skin: Skin is warm and dry  She is not diaphoretic  Psychiatric: She has a normal mood and affect   Her behavior is normal  Judgment and thought content normal

## 2019-02-27 ENCOUNTER — EVALUATION (OUTPATIENT)
Dept: PHYSICAL THERAPY | Facility: CLINIC | Age: 49
End: 2019-02-27
Payer: COMMERCIAL

## 2019-02-27 DIAGNOSIS — G89.29 CHRONIC MIDLINE LOW BACK PAIN, WITH SCIATICA PRESENCE UNSPECIFIED: Primary | ICD-10-CM

## 2019-02-27 DIAGNOSIS — M54.5 CHRONIC MIDLINE LOW BACK PAIN, WITH SCIATICA PRESENCE UNSPECIFIED: Primary | ICD-10-CM

## 2019-02-27 PROCEDURE — 97110 THERAPEUTIC EXERCISES: CPT

## 2019-02-27 PROCEDURE — 97140 MANUAL THERAPY 1/> REGIONS: CPT

## 2019-02-27 PROCEDURE — 97112 NEUROMUSCULAR REEDUCATION: CPT

## 2019-02-27 NOTE — PROGRESS NOTES
Daily Note     Today's date: 2019  Patient name: Quinn Frank  : 1970  MRN: [id number]  Referring provider: Leopold Mitts, MD  Dx:   No diagnosis found  1:1 with PTA CR 9:30- 10:20  Subjective: Significant pain relief following last session with improved sleep  Objective: See treatment diary below      Assessment: Tolerated treatment well  Patient demonstrated fatigue post treatment, exhibited good technique with therapeutic exercises and would benefit from continued PT  See RE for updated pain, strength and ROM measurements  Plan: Continue per plan of care  Precautions: Mixed Connective Tissue Disease     Daily Treatment Diary      Manual  1/31  2/7  2/8  2/15  2/22  2/27         Lumbar PA mobs    RH,PT  8 mins  RH,PT  8 mins RH,PT  8 mins  RS,PT  5 mins (SP and TP)  RS,PT  5 mins          Lower thoracic PA          RS, PT 5 min (SP and TP)  RS,PT  5 mins               Exercise Diary  1/31  2/7  2/8  2/15  2/22  2/27         Bike    L1  6 mins  L3  6 mins  L3  6 mins  L3  6 mins  L4  6 mins         Piriformis Str B    20"x6  20"x6  20"x6  20"x6  HEP         Glut Str B    20"x6  20"x6  20"x6  20"x6  HEP         Bridges    5"x20  5"x20  pball x10     pball  15  pball   2x10         EIL    2"x10  2"x10  2"x10  2"x10  NV         EIS    2"x10 2"x15  2"x15  2"x20  NV         EIS Belt OP    2"x10  2"x15  2"x15  NP  NV         Quad Stretch    20"x6  standing  20"x6  standing  20"x6    [id number]"x6  HEP         Abd Mao    5"x10  5"x20  5"x20  NP  NP         B LTR    5"x10 ea   5"x10 ea   5"x10 ea  5"x10 ea   HEP          TB multifidus press        GTB  20 ea   GTB  2x10 ea    GTB  2x10 ea           Pball knee extensions        10 ea   10 ea   15 ea           pball marches        10 ea   10 ea   15 ea           sidestepping        GTB  3 laps  GTB  3 laps  GTB  3 laps          clamshells        GTB  3"x10 ea  GTB  3"x20  GTB  3"x20         pball TB extensions        NV  GTB  5"x20  GTB  5"x20          pball TB rows        NV  GTB  5"x20  GTB  5"x20                                                     HEP 10'                         Modalities  1/31  2/7  2/8  2/15    2/27           MHP post    10 mins  10 mins  10 mins    declined

## 2019-02-27 NOTE — PROGRESS NOTES
PT Evaluation     Today's date: 2019  Patient name: Merlene Dalal  : 1970  MRN: [id number]  Referring provider: Jase Dahl MD  Dx:   Encounter Diagnosis     ICD-10-CM    1  Chronic midline low back pain, with sciatica presence unspecified M54 5     G89 29                   Assessment  Assessment details: Patient has been compliant with attending PT and home exercise program since initial eval   She  has made progress towards her goals and improvements in objective data since initial eval but is still limited compared to prior level of function  She has much less pain with sleeping and transfers  She continues with to have above listed impairments and would benefit from additional skilled PT to address these deficits to return to maximal level of function  She will be transitioned to HEP in 1-2 weeks if she continues to progress well  Thank you for this pleasant referral       Impairments: abnormal or restricted ROM, impaired physical strength, lacks appropriate home exercise program, pain with function and poor posture   Understanding of Dx/Px/POC: good   Prognosis: good    Goals  ST-6 weeks  1  Patient to be independent with HEP  -Met  2  Decrease pain at least 2 subjective levels  -Partially Met     LT-12 weeks  1    Patient to voice comfort with self management of condition -Partially Met  2   75% or > decreased pain -Partially Met  3   75% or > decreased functional deficits -Partially Met  4  Normalize AROM of all deficit planes -Partially Met  7  Patient to voice understanding of activities/positions to avoid  -Met      Plan  Patient would benefit from: skilled PT  Referral necessary: No  Planned modality interventions: cryotherapy  Planned therapy interventions: IADL retraining, joint mobilization, manual therapy, motor coordination training, neuromuscular re-education, patient education, postural training, self care, strengthening, stretching, therapeutic activities, therapeutic exercise, home exercise program, flexibility, ADL training, balance and body mechanics training  Frequency: 2x week  Duration in weeks: 6  Treatment plan discussed with: patient        Subjective Evaluation    History of Present Illness  Onset date: "for a long time"  Mechanism of injury: Patient feels significant improvement since beginning therapy  She notes her sleep is much less disturbed and now mostly in the morning and not only her back that limits her   She still notes 8/10 when experienced, but it is short lasting  Quality of life: good    Pain  At best pain ratin  At worst pain ratin  Location: LBP, R posterior thigh to knee          Objective     Concurrent Complaints  Positive for night pain and disturbed sleep       Postural Observations  Seated posture: fair  Standing posture: fair  Correction of posture: makes symptoms better        Active Range of Motion     Lumbar   Flexion:  Restriction level: minimal  Left lateral flexion:  WFL  Right lateral flexion:  WFL  Left rotation:  WF  Right rotation:  [facility]    Joint Play     Hypomobile: L3, L4, L5 and S1     Tests     Additional Tests Details  SLR WFL  (+) CARMINE- Min   Pain with R Hip IR PROM (15 degrees)- LBP- RESOLVED  (-) Slump for tightness, not pain B- RESOLVED  (-) Prone Instability Test  (-) Neuromotor Exam    Mechanical Assessment- Has responded to extension based treatment (extension mobs)

## 2019-02-28 ENCOUNTER — APPOINTMENT (OUTPATIENT)
Dept: LAB | Facility: CLINIC | Age: 49
End: 2019-02-28
Payer: COMMERCIAL

## 2019-02-28 ENCOUNTER — OFFICE VISIT (OUTPATIENT)
Dept: PAIN MEDICINE | Facility: CLINIC | Age: 49
End: 2019-02-28
Payer: COMMERCIAL

## 2019-02-28 ENCOUNTER — APPOINTMENT (OUTPATIENT)
Dept: RADIOLOGY | Facility: CLINIC | Age: 49
End: 2019-02-28
Payer: COMMERCIAL

## 2019-02-28 VITALS
HEART RATE: 84 BPM | SYSTOLIC BLOOD PRESSURE: 110 MMHG | WEIGHT: 153 LBS | DIASTOLIC BLOOD PRESSURE: 70 MMHG | BODY MASS INDEX: 24.01 KG/M2 | HEIGHT: 67 IN

## 2019-02-28 DIAGNOSIS — M54.50 CHRONIC MIDLINE LOW BACK PAIN WITHOUT SCIATICA: ICD-10-CM

## 2019-02-28 DIAGNOSIS — G89.29 CHRONIC MIDLINE LOW BACK PAIN WITHOUT SCIATICA: ICD-10-CM

## 2019-02-28 DIAGNOSIS — M54.50 CHRONIC MIDLINE LOW BACK PAIN WITHOUT SCIATICA: Primary | ICD-10-CM

## 2019-02-28 DIAGNOSIS — M35.9 UNDIFFERENTIATED CONNECTIVE TISSUE DISEASE (HCC): ICD-10-CM

## 2019-02-28 DIAGNOSIS — D68.61 ANTIPHOSPHOLIPID ANTIBODY SYNDROME (HCC): ICD-10-CM

## 2019-02-28 DIAGNOSIS — G89.29 CHRONIC MIDLINE LOW BACK PAIN WITHOUT SCIATICA: Primary | ICD-10-CM

## 2019-02-28 LAB
25(OH)D3 SERPL-MCNC: 37.6 NG/ML (ref 30–100)
ALBUMIN SERPL BCP-MCNC: 3.8 G/DL (ref 3.5–5)
ALP SERPL-CCNC: 62 U/L (ref 46–116)
ALT SERPL W P-5'-P-CCNC: 33 U/L (ref 12–78)
ANION GAP SERPL CALCULATED.3IONS-SCNC: 10 MMOL/L (ref 4–13)
AST SERPL W P-5'-P-CCNC: 19 U/L (ref 5–45)
BASOPHILS # BLD AUTO: 0.02 THOUSANDS/ΜL (ref 0–0.1)
BASOPHILS NFR BLD AUTO: 1 % (ref 0–1)
BILIRUB SERPL-MCNC: 0.6 MG/DL (ref 0.2–1)
BILIRUB UR QL STRIP: NEGATIVE
BUN SERPL-MCNC: 13 MG/DL (ref 5–25)
C3 SERPL-MCNC: 110 MG/DL (ref 90–180)
C4 SERPL-MCNC: 21 MG/DL (ref 10–40)
CALCIUM SERPL-MCNC: 9.1 MG/DL (ref 8.3–10.1)
CHLORIDE SERPL-SCNC: 103 MMOL/L (ref 100–108)
CLARITY UR: CLEAR
CO2 SERPL-SCNC: 26 MMOL/L (ref 21–32)
COLOR UR: YELLOW
CREAT SERPL-MCNC: 0.86 MG/DL (ref 0.6–1.3)
CREAT UR-MCNC: 173 MG/DL
CRP SERPL QL: <3 MG/L
CRYOGLOB RF SER-ACNC: ABNORMAL [IU]/ML
EOSINOPHIL # BLD AUTO: 0.18 THOUSAND/ΜL (ref 0–0.61)
EOSINOPHIL NFR BLD AUTO: 5 % (ref 0–6)
ERYTHROCYTE [DISTWIDTH] IN BLOOD BY AUTOMATED COUNT: 12.2 % (ref 11.6–15.1)
ERYTHROCYTE [SEDIMENTATION RATE] IN BLOOD: 10 MM/HOUR (ref 0–20)
GFR SERPL CREATININE-BSD FRML MDRD: 81 ML/MIN/1.73SQ M
GLUCOSE SERPL-MCNC: 73 MG/DL (ref 65–140)
GLUCOSE UR STRIP-MCNC: NEGATIVE MG/DL
HCT VFR BLD AUTO: 41 % (ref 34.8–46.1)
HGB BLD-MCNC: 13.7 G/DL (ref 11.5–15.4)
HGB UR QL STRIP.AUTO: NEGATIVE
IMM GRANULOCYTES # BLD AUTO: 0.01 THOUSAND/UL (ref 0–0.2)
IMM GRANULOCYTES NFR BLD AUTO: 0 % (ref 0–2)
KETONES UR STRIP-MCNC: NEGATIVE MG/DL
LEUKOCYTE ESTERASE UR QL STRIP: NEGATIVE
LYMPHOCYTES # BLD AUTO: 1.45 THOUSANDS/ΜL (ref 0.6–4.47)
LYMPHOCYTES NFR BLD AUTO: 41 % (ref 14–44)
MCH RBC QN AUTO: 30.3 PG (ref 26.8–34.3)
MCHC RBC AUTO-ENTMCNC: 33.4 G/DL (ref 31.4–37.4)
MCV RBC AUTO: 91 FL (ref 82–98)
MONOCYTES # BLD AUTO: 0.37 THOUSAND/ΜL (ref 0.17–1.22)
MONOCYTES NFR BLD AUTO: 11 % (ref 4–12)
NEUTROPHILS # BLD AUTO: 1.48 THOUSANDS/ΜL (ref 1.85–7.62)
NEUTS SEG NFR BLD AUTO: 42 % (ref 43–75)
NITRITE UR QL STRIP: NEGATIVE
NRBC BLD AUTO-RTO: 0 /100 WBCS
PH UR STRIP.AUTO: 5.5 [PH] (ref 4.5–8)
PLATELET # BLD AUTO: 212 THOUSANDS/UL (ref 149–390)
PMV BLD AUTO: 9 FL (ref 8.9–12.7)
POTASSIUM SERPL-SCNC: 4.2 MMOL/L (ref 3.5–5.3)
PROT SERPL-MCNC: 8 G/DL (ref 6.4–8.2)
PROT UR STRIP-MCNC: NEGATIVE MG/DL
PROT UR-MCNC: 17 MG/DL
PROT/CREAT UR: 0.1 MG/G{CREAT} (ref 0–0.1)
RBC # BLD AUTO: 4.52 MILLION/UL (ref 3.81–5.12)
RHEUMATOID FACT SER QL LA: POSITIVE
SODIUM SERPL-SCNC: 139 MMOL/L (ref 136–145)
SP GR UR STRIP.AUTO: >=1.03 (ref 1–1.03)
UROBILINOGEN UR QL STRIP.AUTO: 0.2 E.U./DL
WBC # BLD AUTO: 3.51 THOUSAND/UL (ref 4.31–10.16)

## 2019-02-28 PROCEDURE — 85025 COMPLETE CBC W/AUTO DIFF WBC: CPT | Performed by: PHYSICIAN ASSISTANT

## 2019-02-28 PROCEDURE — 86160 COMPLEMENT ANTIGEN: CPT | Performed by: PHYSICIAN ASSISTANT

## 2019-02-28 PROCEDURE — 81374 HLA I TYPING 1 ANTIGEN LR: CPT | Performed by: PHYSICIAN ASSISTANT

## 2019-02-28 PROCEDURE — 82570 ASSAY OF URINE CREATININE: CPT | Performed by: PHYSICIAN ASSISTANT

## 2019-02-28 PROCEDURE — 36415 COLL VENOUS BLD VENIPUNCTURE: CPT | Performed by: PHYSICIAN ASSISTANT

## 2019-02-28 PROCEDURE — 85705 THROMBOPLASTIN INHIBITION: CPT | Performed by: PHYSICIAN ASSISTANT

## 2019-02-28 PROCEDURE — 86431 RHEUMATOID FACTOR QUANT: CPT

## 2019-02-28 PROCEDURE — 99213 OFFICE O/P EST LOW 20 MIN: CPT | Performed by: PHYSICIAN ASSISTANT

## 2019-02-28 PROCEDURE — 86039 ANTINUCLEAR ANTIBODIES (ANA): CPT | Performed by: PHYSICIAN ASSISTANT

## 2019-02-28 PROCEDURE — 84156 ASSAY OF PROTEIN URINE: CPT | Performed by: PHYSICIAN ASSISTANT

## 2019-02-28 PROCEDURE — 86235 NUCLEAR ANTIGEN ANTIBODY: CPT | Performed by: PHYSICIAN ASSISTANT

## 2019-02-28 PROCEDURE — 86147 CARDIOLIPIN ANTIBODY EA IG: CPT

## 2019-02-28 PROCEDURE — 81003 URINALYSIS AUTO W/O SCOPE: CPT | Performed by: PHYSICIAN ASSISTANT

## 2019-02-28 PROCEDURE — 86140 C-REACTIVE PROTEIN: CPT | Performed by: PHYSICIAN ASSISTANT

## 2019-02-28 PROCEDURE — 86146 BETA-2 GLYCOPROTEIN ANTIBODY: CPT | Performed by: PHYSICIAN ASSISTANT

## 2019-02-28 PROCEDURE — 85613 RUSSELL VIPER VENOM DILUTED: CPT | Performed by: PHYSICIAN ASSISTANT

## 2019-02-28 PROCEDURE — 86200 CCP ANTIBODY: CPT | Performed by: PHYSICIAN ASSISTANT

## 2019-02-28 PROCEDURE — 85652 RBC SED RATE AUTOMATED: CPT | Performed by: PHYSICIAN ASSISTANT

## 2019-02-28 PROCEDURE — 85732 THROMBOPLASTIN TIME PARTIAL: CPT | Performed by: PHYSICIAN ASSISTANT

## 2019-02-28 PROCEDURE — 86235 NUCLEAR ANTIGEN ANTIBODY: CPT

## 2019-02-28 PROCEDURE — 82306 VITAMIN D 25 HYDROXY: CPT | Performed by: PHYSICIAN ASSISTANT

## 2019-02-28 PROCEDURE — 72110 X-RAY EXAM L-2 SPINE 4/>VWS: CPT

## 2019-02-28 PROCEDURE — 85670 THROMBIN TIME PLASMA: CPT | Performed by: PHYSICIAN ASSISTANT

## 2019-02-28 PROCEDURE — 80053 COMPREHEN METABOLIC PANEL: CPT | Performed by: PHYSICIAN ASSISTANT

## 2019-02-28 PROCEDURE — 86038 ANTINUCLEAR ANTIBODIES: CPT | Performed by: PHYSICIAN ASSISTANT

## 2019-02-28 PROCEDURE — 86225 DNA ANTIBODY NATIVE: CPT | Performed by: PHYSICIAN ASSISTANT

## 2019-02-28 PROCEDURE — 86430 RHEUMATOID FACTOR TEST QUAL: CPT

## 2019-02-28 NOTE — PATIENT INSTRUCTIONS
1  Continue physical therapy as scheduled  2  Complete XR of lumbar spine to evaluate for facet and degenerative changes   3   Follow up with this office if pain does not improve or worsens

## 2019-03-01 ENCOUNTER — OFFICE VISIT (OUTPATIENT)
Dept: PHYSICAL THERAPY | Facility: CLINIC | Age: 49
End: 2019-03-01
Payer: COMMERCIAL

## 2019-03-01 DIAGNOSIS — M54.5 CHRONIC MIDLINE LOW BACK PAIN, WITH SCIATICA PRESENCE UNSPECIFIED: Primary | ICD-10-CM

## 2019-03-01 DIAGNOSIS — G89.29 CHRONIC MIDLINE LOW BACK PAIN, WITH SCIATICA PRESENCE UNSPECIFIED: Primary | ICD-10-CM

## 2019-03-01 LAB
ANA HOMOGEN SER QL IF: NORMAL
ANA HOMOGEN TITR SER: NORMAL {TITER}
CARDIOLIPIN IGA SER IA-ACNC: <9 APL U/ML (ref 0–11)
CARDIOLIPIN IGG SER IA-ACNC: <9 GPL U/ML (ref 0–14)
CARDIOLIPIN IGM SER IA-ACNC: 44 MPL U/ML (ref 0–12)
DSDNA AB SER-ACNC: 1 IU/ML (ref 0–9)
ENA RNP AB SER-ACNC: <0.2 AI (ref 0–0.9)
ENA SM AB SER-ACNC: 0.2 AI (ref 0–0.9)
ENA SS-A AB SER-ACNC: >8 AI (ref 0–0.9)
ENA SS-B AB SER-ACNC: <0.2 AI (ref 0–0.9)
RYE IGE QN: POSITIVE

## 2019-03-01 PROCEDURE — 97110 THERAPEUTIC EXERCISES: CPT

## 2019-03-01 PROCEDURE — 97140 MANUAL THERAPY 1/> REGIONS: CPT

## 2019-03-01 PROCEDURE — 97112 NEUROMUSCULAR REEDUCATION: CPT

## 2019-03-01 NOTE — PROGRESS NOTES
Daily Note     Today's date: 3/1/2019  Patient name: Archana Rivas  : 1970  MRN: [id number]  Referring provider: Mahesh Carr MD  Dx:   Encounter Diagnosis     ICD-10-CM    1  Chronic midline low back pain, with sciatica presence unspecified M54 5     G89 29        Start Time: 930  Stop Time: 1015  Total time in clinic (min): 45 minutes    Subjective: Pt requesting to leave early, was accommodated  Pt denies any back pain or radicular symptoms  Pt notes that she has no difficulty with ADL's   "I was carting around a 3year old so I am tired but I am able to do it without pain " Pt notes that she has an x-ray scheduled  Objective: See treatment diary below  Issued BTB for home  Assessment: Tolerated treatment well  Able to progress with abdominal stabilization exercises  Reports relief post manuals  Patient demonstrated fatigue post treatment, exhibited good technique with therapeutic exercises and would benefit from continued PT  Plan: Continue per plan of care         Precautions: Mixed Connective Tissue Disease     Daily Treatment Diary      Manual  1/31  2/7  2/8  2/15  2/22  2/27  3       Lumbar PA mobs    RH,PT  8 mins  RH,PT  8 mins RH,PT  8 mins  RS,PT  5 mins (SP and TP)  RS,PT  5 mins  RS        Lower thoracic PA          RS, PT 5 min (SP and TP)  RS,PT  5 mins  RS             Exercise Diary  1/31  2/7  2/8  2/15  2/22  2/27  3/1       Bike    L1  6 mins  L3  6 mins  L3  6 mins  L3  6 mins  L4  6 mins  L4  6 mins       Piriformis Str B    20"x6  20"x6  20"x6  20"x6  HEP         Glut Str B    20"x6  20"x6  20"x6  20"x6  HEP         Bridges    5"x20  5"x20  pball x10     pball  15  pball   2x10  pball 2x10       EIL    2"x10  2"x10  2"x10  2"x10  NV         EIS    2"x10 2"x15  2"x15  2"x20  NV         EIS Belt OP    2"x10  2"x15  2"x15  NP  NV         Quad Stretch    20"x6  standing  20"x6  standing  20"x6    [id number]"x6  HEP         Abd Mao    5"x10  5"x20  5"x20  NP  NP         B LTR    5"x10 ea   5"x10 ea   5"x10 ea  5"x10 ea   HEP          TB multifidus press        GTB  20 ea   GTB  2x10 ea   GTB  2x10 ea    GTB  pball 2x10         Pball knee extensions        10 ea   10 ea   15 ea           pball marches        10 ea   10 ea   15 ea   np        sidestepping        GTB  3 laps  GTB  3 laps  GTB  3 laps  BTB  4 laps        clamshells        GTB  3"x10 ea  GTB  3"x20  GTB  3"x20  BTB  3" x20        pball TB extensions        NV  GTB  5"x20  GTB  5"x20  BTB  5"   20x        pball TB rows        NV  GTB  5"x20  GTB  5"x20  BTB  5" x20        pball opp UE/LE lift               2x10   5"        plball bridge with hamstring curl               2x10       HEP 10'                         Modalities  1/31  2/7  2/8  2/15    2/27           MHP post    10 mins  10 mins  10 mins    declined

## 2019-03-02 LAB
APTT SCREEN TO CONFIRM RATIO: 0.91 RATIO (ref 0–1.4)
B2 GLYCOPROT1 IGA SER-ACNC: <9 GPI IGA UNITS (ref 0–25)
B2 GLYCOPROT1 IGG SER-ACNC: <9 GPI IGG UNITS (ref 0–20)
B2 GLYCOPROT1 IGM SER-ACNC: <9 GPI IGM UNITS (ref 0–32)
CCP IGA+IGG SERPL IA-ACNC: 14 UNITS (ref 0–19)
CONFIRM APTT/NORMAL: 45.8 SEC (ref 0–55)
LA PPP-IMP: NORMAL
SCREEN APTT: 44.8 SEC (ref 0–51.9)
SCREEN DRVVT: 36.6 SEC (ref 0–47)
THROMBIN TIME: 17.7 SEC (ref 0–23)

## 2019-03-05 LAB
HISTONE IGG SER IA-ACNC: 6.3 UNITS (ref 0–0.9)
HLA-B27 QL NAA+PROBE: NEGATIVE

## 2019-03-06 ENCOUNTER — APPOINTMENT (OUTPATIENT)
Dept: PHYSICAL THERAPY | Facility: CLINIC | Age: 49
End: 2019-03-06
Payer: COMMERCIAL

## 2019-03-08 ENCOUNTER — APPOINTMENT (OUTPATIENT)
Dept: PHYSICAL THERAPY | Facility: CLINIC | Age: 49
End: 2019-03-08
Payer: COMMERCIAL

## 2019-03-13 ENCOUNTER — APPOINTMENT (OUTPATIENT)
Dept: PHYSICAL THERAPY | Facility: CLINIC | Age: 49
End: 2019-03-13
Payer: COMMERCIAL

## 2019-03-15 ENCOUNTER — APPOINTMENT (OUTPATIENT)
Dept: PHYSICAL THERAPY | Facility: CLINIC | Age: 49
End: 2019-03-15
Payer: COMMERCIAL

## 2019-03-15 ENCOUNTER — IMMUNIZATIONS (OUTPATIENT)
Dept: FAMILY MEDICINE CLINIC | Facility: CLINIC | Age: 49
End: 2019-03-15
Payer: COMMERCIAL

## 2019-03-15 DIAGNOSIS — Z23 NEED FOR VACCINATION: Primary | ICD-10-CM

## 2019-03-15 PROCEDURE — 90686 IIV4 VACC NO PRSV 0.5 ML IM: CPT

## 2019-03-15 PROCEDURE — 90471 IMMUNIZATION ADMIN: CPT

## 2019-03-20 ENCOUNTER — APPOINTMENT (OUTPATIENT)
Dept: PHYSICAL THERAPY | Facility: CLINIC | Age: 49
End: 2019-03-20
Payer: COMMERCIAL

## 2019-03-20 DIAGNOSIS — M35.9 UNDIFFERENTIATED CONNECTIVE TISSUE DISEASE (HCC): Primary | ICD-10-CM

## 2019-03-21 RX ORDER — HYDROXYCHLOROQUINE SULFATE 200 MG/1
TABLET, FILM COATED ORAL
Qty: 180 TABLET | Refills: 0 | Status: SHIPPED | OUTPATIENT
Start: 2019-03-21 | End: 2019-07-25 | Stop reason: SDUPTHER

## 2019-03-22 ENCOUNTER — APPOINTMENT (OUTPATIENT)
Dept: PHYSICAL THERAPY | Facility: CLINIC | Age: 49
End: 2019-03-22
Payer: COMMERCIAL

## 2019-03-27 ENCOUNTER — APPOINTMENT (OUTPATIENT)
Dept: PHYSICAL THERAPY | Facility: CLINIC | Age: 49
End: 2019-03-27
Payer: COMMERCIAL

## 2019-03-29 ENCOUNTER — APPOINTMENT (OUTPATIENT)
Dept: PHYSICAL THERAPY | Facility: CLINIC | Age: 49
End: 2019-03-29
Payer: COMMERCIAL

## 2019-04-01 ENCOUNTER — TELEPHONE (OUTPATIENT)
Dept: FAMILY MEDICINE CLINIC | Facility: CLINIC | Age: 49
End: 2019-04-01

## 2019-04-01 DIAGNOSIS — H10.33 ACUTE BACTERIAL CONJUNCTIVITIS OF BOTH EYES: Primary | ICD-10-CM

## 2019-04-01 RX ORDER — POLYMYXIN B SULFATE AND TRIMETHOPRIM 1; 10000 MG/ML; [USP'U]/ML
1 SOLUTION OPHTHALMIC EVERY 6 HOURS
Qty: 10 ML | Refills: 0 | Status: SHIPPED | OUTPATIENT
Start: 2019-04-01 | End: 2019-04-08

## 2019-06-09 ENCOUNTER — HOSPITAL ENCOUNTER (EMERGENCY)
Facility: HOSPITAL | Age: 49
Discharge: HOME | End: 2019-06-09
Attending: EMERGENCY MEDICINE
Payer: OTHER MISCELLANEOUS

## 2019-06-09 VITALS
BODY MASS INDEX: 24.01 KG/M2 | OXYGEN SATURATION: 98 % | SYSTOLIC BLOOD PRESSURE: 138 MMHG | HEART RATE: 86 BPM | DIASTOLIC BLOOD PRESSURE: 79 MMHG | WEIGHT: 153 LBS | TEMPERATURE: 97.7 F | HEIGHT: 67 IN | RESPIRATION RATE: 18 BRPM
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
## 2019-06-09 LAB — ALT SERPL W P-5'-P-CCNC: 39 U/L (ref 12–78)

## 2019-06-09 PROCEDURE — 36415 COLL VENOUS BLD VENIPUNCTURE: CPT | Performed by: EMERGENCY MEDICINE

## 2019-06-09 PROCEDURE — 99283 EMERGENCY DEPT VISIT LOW MDM: CPT

## 2019-06-09 PROCEDURE — 84460 ALANINE AMINO (ALT) (SGPT): CPT | Performed by: EMERGENCY MEDICINE

## 2019-06-09 PROCEDURE — 86706 HEP B SURFACE ANTIBODY: CPT | Performed by: EMERGENCY MEDICINE

## 2019-06-09 PROCEDURE — 87340 HEPATITIS B SURFACE AG IA: CPT | Performed by: EMERGENCY MEDICINE

## 2019-06-09 PROCEDURE — 86803 HEPATITIS C AB TEST: CPT | Performed by: EMERGENCY MEDICINE

## 2019-06-09 PROCEDURE — 87389 HIV-1 AG W/HIV-1&-2 AB AG IA: CPT | Performed by: EMERGENCY MEDICINE

## 2019-06-10 LAB
HBV SURFACE AB SER-ACNC: 3.29 MIU/ML
HBV SURFACE AG SER QL: NORMAL
HCV AB SER QL: NORMAL
HIV 1+2 AB+HIV1 P24 AG SERPL QL IA: NORMAL

## 2019-07-25 ENCOUNTER — OFFICE VISIT (OUTPATIENT)
Dept: RHEUMATOLOGY | Facility: CLINIC | Age: 49
End: 2019-07-25
Payer: COMMERCIAL

## 2019-07-25 VITALS
HEIGHT: 67 IN | DIASTOLIC BLOOD PRESSURE: 66 MMHG | HEART RATE: 80 BPM | SYSTOLIC BLOOD PRESSURE: 118 MMHG | BODY MASS INDEX: 23.96 KG/M2

## 2019-07-25 DIAGNOSIS — R53.1 GENERALIZED WEAKNESS: ICD-10-CM

## 2019-07-25 DIAGNOSIS — G89.29 CHRONIC PAIN OF BOTH KNEES: ICD-10-CM

## 2019-07-25 DIAGNOSIS — D72.819 CHRONIC LEUKOPENIA: ICD-10-CM

## 2019-07-25 DIAGNOSIS — M35.9 UNDIFFERENTIATED CONNECTIVE TISSUE DISEASE (HCC): ICD-10-CM

## 2019-07-25 DIAGNOSIS — M35.00 SJOGREN'S SYNDROME, WITH UNSPECIFIED ORGAN INVOLVEMENT (HCC): Primary | ICD-10-CM

## 2019-07-25 DIAGNOSIS — Z79.899 LONG-TERM USE OF PLAQUENIL: ICD-10-CM

## 2019-07-25 DIAGNOSIS — M25.562 CHRONIC PAIN OF BOTH KNEES: ICD-10-CM

## 2019-07-25 DIAGNOSIS — R76.0 ANTI-CARDIOLIPIN ANTIBODY POSITIVE: ICD-10-CM

## 2019-07-25 DIAGNOSIS — M25.561 CHRONIC PAIN OF BOTH KNEES: ICD-10-CM

## 2019-07-25 PROCEDURE — 99214 OFFICE O/P EST MOD 30 MIN: CPT | Performed by: INTERNAL MEDICINE

## 2019-07-25 RX ORDER — HYDROXYCHLOROQUINE SULFATE 200 MG/1
400 TABLET, FILM COATED ORAL
Qty: 180 TABLET | Refills: 1 | Status: SHIPPED | OUTPATIENT
Start: 2019-07-25 | End: 2020-03-17

## 2019-07-25 NOTE — PROGRESS NOTES
Assessment and Plan:   Patient is a 53-year-old female who presents for rheumatology follow-up regarding probable overlap autoimmune disease with features of Sjogren syndrome and systemic lupus  She has features of photosensitivity, nasal ulcers, possible Raynaud's, and lab features of positive MARKUS, positive rheumatoid factor, high titer SSA, high titer histone antibody, chronic leukopenia, and persistently positive cardiolipin antibody  We discussed that her labs do look most consistent which Sjogren syndrome but she does have some features that could suggest overlap with lupus, particularly with her reports of the nasal ulcers in photosensitivity along with the histone antibody  She has a positive cardiolipin antibody but has not had a clinical event to qualify her as anti phospholipid antibody syndrome  She is already been on Plaquenil over the past few years and we will continue this as we discussed this is the mainstay of therapy in lupus patients and we also used this sometimes in Sjogren syndrome  She is overdue for an eye exam as her last exam was close to 2 years ago and I advised her to schedule an exam   She also complains of the bilateral knee pain and there is no evidence of synovitis on exam and I suspect she likely has underlying osteoarthritis  We will get knee x-rays and we discussed that at next visit we can consider knee injections versus trying an NSAID  She did not want to do any particular treatment today and would rather get the x-rays 1st   We will follow-up in 3 months and she will get lab work for disease monitoring about 2 weeks before then      Plan:  Diagnoses and all orders for this visit:    Sjogren's syndrome, with unspecified organ involvement ([facility] )  -     CBC and differential  -     C4 complement  -     C3 complement  -     Comprehensive metabolic panel  -     C-reactive protein  -     Sedimentation rate, automated  -     Urinalysis with microscopic  -     Protein / creatinine ratio, urine  -     Anti-DNA antibody, double-stranded  -     CK  -     MISCELLANEOUS LAB TEST; Future    Anti-cardiolipin antibody positive  -     CBC and differential  -     C4 complement  -     C3 complement  -     Comprehensive metabolic panel  -     C-reactive protein  -     Sedimentation rate, automated  -     Urinalysis with microscopic  -     Protein / creatinine ratio, urine  -     Anti-DNA antibody, double-stranded  -     CK    Chronic leukopenia  -     CBC and differential  -     C4 complement  -     C3 complement  -     Comprehensive metabolic panel  -     C-reactive protein  -     Sedimentation rate, automated  -     Urinalysis with microscopic  -     Protein / creatinine ratio, urine  -     Anti-DNA antibody, double-stranded  -     CK    Long-term use of Plaquenil    Chronic pain of both knees  -     XR knee 3 vw left non injury; Future  -     XR knee 3 vw right non injury; Future    Undifferentiated connective tissue disease (HCC)  -     hydroxychloroquine (PLAQUENIL) 200 mg tablet; Take 2 tablets (400 mg total) by mouth daily with breakfast Take with food    Generalized weakness  -     MISCELLANEOUS LAB TEST; Future         Follow-up plan: 3 months        Rheumatic Disease Summary  1  Sjogrens syndrome, possible lupus overlap  -Established with Dr Robyn Albert- November, 2015- 2 month hx of polyarthralgia with low grade fevers and erythema nodosum  PCP said viral and found to have +MARKUS 1:80 with low titer SSA (1 4), +RNP(59), +Histone(5 5), +RF(14 2), +Lupus Anticoagulant/+Anticardiolipin antibody  -Dx with UCTD/ APS after repeat labs in 12 weeks reveals +Lupus anticoag/ +Anticardio   -Started on HCQ    -Intermittent use of Prednisone with improvement  -Repeat labs 2/2019: +MARKUS 640 speckled, +RF 20, +SSA>8, +cardiolipin IgM 44, +Histone 6 3, leukopenia; negative SSB, CCP, dsDNA, smith, RNP beta-2-glycoprotein, LAC, B27, normal C3/4, UA  2  +Anti-cardiolipin IgM/+LAC   -On multiple occasions, no h/o clot or pregnancy issues   -+LAC x1, then negative on repeat     HPI  Ghassan Berry is a 54 y o   female who presents for rheumatology follow-up for Sjogren syndrome with possible lupus overlap and positive anti phospholipid antibody  We reviewed her history and she reports in 2015 she developed recurrent migraines/headaches, was also having fevers, erythema nodosum, flu-like symptoms with myalgias and arthralgias  She does feel sensitive in the sunlight and has had increased erythema on her face  She has had several episodes of costochondritis  Joint pain in hands, knees, elbows, hips  Morning stiffness in the fingers mainly for 1 hour  She also gets 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  She reports recurrent nasal ulcers that come and go and currently she has 1 on each side of her nose on the septum  She notices they are there when she gets the blood out of her nose and they are tender  She reports possible Raynaud's with symptoms of white and blue color changes of her fingers in the cold which is longstanding for her  She has not noticed significant dry eyes or dry mouth  She is aware of the positive cardiolipin antibody but has never had a blood clot or pregnancy complications  Joint pain at this time is mainly in her knees which is her biggest complaint over the past few months  She feels they are briefly stiff in the morning but also that they worsen during 1 of her shift or with prolonged standing on her feet  She has not noted any swelling  She does feel that she has to take several seconds to orient herself when she stands up from sitting down as if her legs are weak and unstable  Denies, psoriasis, sicca symptoms, oral ulcers, alopecia, h/o pericarditis or pleurisy, h/o blood clots or miscarriages  She had 4 healthy pregnancies      The following portions of the patient's history were reviewed and updated as appropriate: allergies, current medications, past family history, past medical history, past social history, past surgical history and problem list     Review of Systems:   Review of Systems   Constitutional: Negative for chills, fatigue, fever and unexpected weight change  HENT: Negative for mouth sores and trouble swallowing  Eyes: Negative for pain and visual disturbance  Respiratory: Negative for cough and shortness of breath  Cardiovascular: Negative for chest pain and leg swelling  Gastrointestinal: Negative for abdominal pain, blood in stool, constipation, diarrhea and nausea  Genitourinary: Negative for hematuria  Musculoskeletal: Positive for arthralgias  Negative for back pain, joint swelling and myalgias  Skin: Positive for color change (occassional white/blue in cold )  Negative for rash  Neurological: Negative for weakness and numbness  Hematological: Negative for adenopathy  Psychiatric/Behavioral: Negative for sleep disturbance  Home Medications:    Current Outpatient Medications:     hydroxychloroquine (PLAQUENIL) 200 mg tablet, Take 2 tablets (400 mg total) by mouth daily with breakfast Take with food, Disp: 180 tablet, Rfl: 1    ibuprofen (MOTRIN) 400 mg tablet, Take by mouth every 6 (six) hours as needed for mild pain, Disp: , Rfl:     MAGNESIUM CARBONATE PO, Take by mouth, Disp: , Rfl:     Melatonin 5 MG TABS, Take 5 tablets (25 mg total) by mouth daily, Disp: 30 tablet, Rfl: 0    VITAMIN D, CHOLECALCIFEROL, PO, Take by mouth, Disp: , Rfl:     Objective:    Vitals:    07/25/19 0901   BP: 118/66   BP Location: Left arm   Patient Position: Sitting   Cuff Size: Standard   Pulse: 80   Height: 5' 7" (1 702 m)       Physical Exam   Constitutional: She appears well-developed and well-nourished  She is cooperative  No distress  HENT:   Head: Normocephalic and atraumatic     Mouth/Throat: Oropharynx is clear and moist and mucous membranes are normal    Bilateral nasal ulcers along the septum   Eyes: Conjunctivae and EOM are normal  No scleral icterus  Neck: Neck supple  No spinous process tenderness and no muscular tenderness present  No thyromegaly present  Cardiovascular: Normal rate, regular rhythm, S1 normal and S2 normal  Exam reveals no friction rub  No murmur heard  Pulmonary/Chest: Breath sounds normal  No respiratory distress  She has no wheezes  She has no rhonchi  She has no rales  Musculoskeletal:   No joint swelling or synovitis anywhere  Crepitus in bilateral knees with medial joint line tenderness  No significant reproducible soft tissue or joint tenderness  Lymphadenopathy:     She has no cervical adenopathy  Neurological: She is alert  No sensory deficit  5/5 motor strength in upper and lower extremities  Skin: Skin is warm and dry  No rash noted  Nails show no clubbing  Psychiatric: She has a normal mood and affect  Imaging:    Lumbar XR 2/28/19:   IMPRESSION:  Persistent mild degenerative facet arthrosis L5-S1    Labs:   Component      Latest Ref Rng & Units 2/28/2019   WBC      4 31 - 10 16 Thousand/uL 3 51 (L)   Red Blood Cell Count      3 81 - 5 12 Million/uL 4 52   Hemoglobin      11 5 - 15 4 g/dL 13 7   HCT      34 8 - 46 1 % 41 0   MCV      82 - 98 fL 91   MCH      26 8 - 34 3 pg 30 3   MCHC      31 4 - 37 4 g/dL 33 4   RDW      11 6 - 15 1 % 12 2   MPV      8 9 - 12 7 fL 9 0   Platelet Count      673 - 390 Thousands/uL 212   nRBC      /100 WBCs 0   Neutrophils %      43 - 75 % 42 (L)   Immat GRANS %      0 - 2 % 0   Lymphocytes Relative      14 - 44 % 41   Monocytes Relative      4 - 12 % 11   Eosinophils      0 - 6 % 5   Basophils Relative      0 - 1 % 1   Absolute Neutrophils      1 85 - 7 62 Thousands/µL 1 48 (L)   Immature Grans Absolute      0 00 - 0 20 Thousand/uL 0 01   Lymphocytes Absolute      0 60 - 4 47 Thousands/µL 1 45   Absolute Monocytes      0 17 - 1 22 Thousand/µL 0 37   Absolute Eosinophils      0 00 - 0 61 Thousand/µL 0 18   Basophils Absolute      0 00 - 0 10 Thousands/µL 0 02   Sodium      136 - 145 mmol/L 139   Potassium      3 5 - 5 3 mmol/L 4 2   Chloride      100 - 108 mmol/L 103   CO2      21 - 32 mmol/L 26   Anion Gap      4 - 13 mmol/L 10   BUN      5 - 25 mg/dL 13   Creatinine      0 60 - 1 30 mg/dL 0 86   Glucose, Random      65 - 140 mg/dL 73   Calcium      8 3 - 10 1 mg/dL 9 1   AST      5 - 45 U/L 19   ALT      12 - 78 U/L 33   Alkaline Phosphatase      46 - 116 U/L 62   Total Protein      6 4 - 8 2 g/dL 8 0   Albumin      3 5 - 5 0 g/dL 3 8   TOTAL BILIRUBIN      0 20 - 1 00 mg/dL 0 60   eGFR      ml/min/1 73sq m 81   Color, UA       Yellow   Clarity, UA       Clear   SL AMB SPECIFIC GRAVITY-URINE      1 003 - 1 030 >=1 030   pH, UA      4 5 - 8 0 5 5   Leukocytes, UA      Negative Negative   Nitrite, UA      Negative Negative   POCT URINE PROTEIN      Negative mg/dl Negative   Glucose, UA      Negative mg/dl Negative   Ketones, UA      Negative mg/dl Negative   SL AMB POCT UROBILINOGEN      0 2, 1 0 E U /dl E U /dl 0 2   Bilirubin, UA      Negative Negative   Blood, UA      Negative Negative   PTT LUPUS ANTICOAGULANT      0 0 - 51 9 sec 44 8   DILUTE CRESENCIO VIPER VENOM TIME      0 0 - 47 0 sec 36 6   DILUTE PROTHROMBIN TIME(DPT)      0 0 - 55 0 sec 45 8   THROMBIN TIME (DRVW)      0 0 - 23 0 sec 17 7   DPT CONFIRM RATIO      0 00 - 1 40 Ratio 0 91   LUPUS REFLEX INTERPRETATION       Negative    EXT Creatinine Urine      mg/dL 173 0   Protein Urine Random      mg/dL 17   Prot/Creat Ratio, Ur      0 00 - 0 10 0 10   BETA-2 GLYCOPROTEIN 1 IGG ANTIBODY      0 - 20 GPI IgG units <9   BETA-2 GLYCOPROTEIN 1 IGA ANTIBODY      0 - 25 GPI IgA units <9   BETA-2 GLYCOPROTEIN 1 IGM ANTIBODY      0 - 32 GPI IgM units <9   ANTICARDIOLIPIN IGA ANTIBODY      0 - 11 APL U/mL <9   ANTICARDIOLIPIN IGG ANTIBODY      0 - 14 GPL U/mL <9   ANTICARDIOLIPIN IGM ANTIBODY      0 - 12 MPL U/mL 44 (H)   JEFERSON TO SMITH (SM) ANTIBODY      0 0 - 0 9 AI 0 2   JEFERSON TO RNP ANTIBODY      0 0 - 0 9 AI <0 2   SSA (RO) ANTIBODY      0 0 - 0 9 AI >8 0 (H)   SSB (LA) ANTIBODY      0 0 - 0 9 AI <0 2   MARKUS Titer 1       Titer of 640   MARKUS Pattern 1       Speckled pattern   ANTI DNA DOUBLE STRANDED      0 - 9 IU/mL 1   C3 Complement      90 0 - 180 0 mg/dL 110 0   C4, COMPLEMENT      10 0 - 40 0 mg/dL 21 0   C-REACTIVE PROTEIN      <3 0 mg/L <3 0   Sed Rate      0 - 20 mm/hour 10   Vit D, 25-Hydroxy      30 0 - 100 0 ng/mL 37 6   HISTONE ANTIBODY      0 0 - 0 9 Units 6 3 (H)   CYCLIC CITRULLINATED PEPTIDE ANTIBODY      0 - 19 units 14   HLA B27       Negative   ANTI-NUCLEAR ANTIBODY (MARKUS)      Negative Positive (A)   RHEUMATOID FACTOR      Negative Positive (A)   RF Quantitation      (none) 20 IU/mL (A)

## 2019-08-26 ENCOUNTER — OFFICE VISIT (OUTPATIENT)
Dept: FAMILY MEDICINE CLINIC | Facility: CLINIC | Age: 49
End: 2019-08-26
Payer: COMMERCIAL

## 2019-08-26 VITALS
WEIGHT: 161 LBS | RESPIRATION RATE: 18 BRPM | HEART RATE: 72 BPM | TEMPERATURE: 98.2 F | HEIGHT: 67 IN | BODY MASS INDEX: 25.27 KG/M2 | DIASTOLIC BLOOD PRESSURE: 64 MMHG | OXYGEN SATURATION: 98 % | SYSTOLIC BLOOD PRESSURE: 100 MMHG

## 2019-08-26 DIAGNOSIS — R92.2 DENSE BREASTS: ICD-10-CM

## 2019-08-26 DIAGNOSIS — Z12.31 SCREENING MAMMOGRAM, ENCOUNTER FOR: ICD-10-CM

## 2019-08-26 DIAGNOSIS — L98.9 SKIN LESION: Primary | ICD-10-CM

## 2019-08-26 DIAGNOSIS — M25.50 ARTHRALGIA, UNSPECIFIED JOINT: ICD-10-CM

## 2019-08-26 PROCEDURE — 1036F TOBACCO NON-USER: CPT | Performed by: FAMILY MEDICINE

## 2019-08-26 PROCEDURE — 3008F BODY MASS INDEX DOCD: CPT | Performed by: FAMILY MEDICINE

## 2019-08-26 PROCEDURE — 99213 OFFICE O/P EST LOW 20 MIN: CPT | Performed by: FAMILY MEDICINE

## 2019-08-26 NOTE — PROGRESS NOTES
Assessment/Plan:    Problem List Items Addressed This Visit     Dense breasts    Relevant Orders    Mammo screening bilateral w 3d & cad      Other Visit Diagnoses     Skin lesion    -  Primary    Relevant Orders    Ambulatory referral to Dermatology    Screening mammogram, encounter for        Relevant Orders    Mammo screening bilateral w 3d & cad    Arthralgia, unspecified joint        Relevant Orders    Lyme Antibody Profile with reflex to WB          BMI Counseling: Body mass index is 25 22 kg/m²  Discussed the patient's BMI with her  The BMI is above average  BMI counseling and education was provided to the patient  Exercise recommendations include exercising 3-5 times per week  There are no Patient Instructions on file for this visit  Return in about 6 months (around 2/26/2020), or if symptoms worsen or fail to improve, for Annual physical     Subjective:      Patient ID: Robby Coley is a 54 y o  female  Chief Complaint   Patient presents with    changes in a mole on L thigh     [id number]       She has had a mole on her left hip for years and years  The lesion looks different that used to  She has not had any bleeding  The following portions of the patient's history were reviewed and updated as appropriate:  past social history    Review of Systems   Musculoskeletal: Positive for arthralgias  Current Outpatient Medications   Medication Sig Dispense Refill    hydroxychloroquine (PLAQUENIL) 200 mg tablet Take 2 tablets (400 mg total) by mouth daily with breakfast Take with food 180 tablet 1    ibuprofen (MOTRIN) 400 mg tablet Take by mouth every 6 (six) hours as needed for mild pain      MAGNESIUM CARBONATE PO Take by mouth      Melatonin 5 MG TABS Take 5 tablets (25 mg total) by mouth daily 30 tablet 0    VITAMIN D, CHOLECALCIFEROL, PO Take by mouth       No current facility-administered medications for this visit          Objective:    /64   Pulse 72   Temp 98 2 °F (36 8 °C)   Resp 18   Ht 5' 7" (1 702 m)   Wt 73 kg (161 lb)   LMP 12/01/2017   SpO2 98%   BMI 25 22 kg/m²        Physical Exam   Constitutional: She appears well-developed and well-nourished  HENT:   Head: Normocephalic and atraumatic  Right Ear: External ear normal    Left Ear: External ear normal    Mouth/Throat: Oropharynx is clear and moist    Cardiovascular: Normal rate, regular rhythm and normal heart sounds  Exam reveals no friction rub  No murmur heard  Pulmonary/Chest: Effort normal and breath sounds normal  No respiratory distress  She has no wheezes  She has no rales  Musculoskeletal: She exhibits no edema or deformity  Skin:   Dry raised lesion left lateral hip   Nursing note and vitals reviewed               Trinh Gill DO

## 2019-10-24 ENCOUNTER — APPOINTMENT (OUTPATIENT)
Dept: LAB | Facility: CLINIC | Age: 49
End: 2019-10-24
Payer: COMMERCIAL

## 2019-10-24 ENCOUNTER — HOSPITAL ENCOUNTER (OUTPATIENT)
Dept: RADIOLOGY | Facility: HOSPITAL | Age: 49
Discharge: HOME | End: 2019-10-24
Payer: COMMERCIAL

## 2019-10-24 DIAGNOSIS — M25.562 CHRONIC PAIN OF BOTH KNEES: ICD-10-CM

## 2019-10-24 DIAGNOSIS — R53.1 GENERALIZED WEAKNESS: ICD-10-CM

## 2019-10-24 DIAGNOSIS — M25.561 CHRONIC PAIN OF BOTH KNEES: ICD-10-CM

## 2019-10-24 DIAGNOSIS — G89.29 CHRONIC PAIN OF BOTH KNEES: ICD-10-CM

## 2019-10-24 DIAGNOSIS — M35.00 SJOGREN'S SYNDROME, WITH UNSPECIFIED ORGAN INVOLVEMENT (HCC): ICD-10-CM

## 2019-10-24 DIAGNOSIS — M25.50 ARTHRALGIA, UNSPECIFIED JOINT: ICD-10-CM

## 2019-10-24 LAB
ALBUMIN SERPL BCP-MCNC: 3.3 G/DL (ref 3.5–5)
ALP SERPL-CCNC: 64 U/L (ref 46–116)
ALT SERPL W P-5'-P-CCNC: 24 U/L (ref 12–78)
ANION GAP SERPL CALCULATED.3IONS-SCNC: 5 MMOL/L (ref 4–13)
AST SERPL W P-5'-P-CCNC: 21 U/L (ref 5–45)
BACTERIA UR QL AUTO: NORMAL /HPF
BASOPHILS # BLD AUTO: 0.02 THOUSANDS/ΜL (ref 0–0.1)
BASOPHILS NFR BLD AUTO: 1 % (ref 0–1)
BILIRUB SERPL-MCNC: 0.5 MG/DL (ref 0.2–1)
BILIRUB UR QL STRIP: NEGATIVE
BUN SERPL-MCNC: 10 MG/DL (ref 5–25)
C3 SERPL-MCNC: 102 MG/DL (ref 90–180)
C4 SERPL-MCNC: 17 MG/DL (ref 10–40)
CALCIUM SERPL-MCNC: 8.5 MG/DL (ref 8.3–10.1)
CHLORIDE SERPL-SCNC: 105 MMOL/L (ref 100–108)
CK SERPL-CCNC: 77 U/L (ref 26–192)
CLARITY UR: CLEAR
CO2 SERPL-SCNC: 27 MMOL/L (ref 21–32)
COLOR UR: YELLOW
CREAT SERPL-MCNC: 0.69 MG/DL (ref 0.6–1.3)
CREAT UR-MCNC: 165 MG/DL
CRP SERPL QL: <3 MG/L
EOSINOPHIL # BLD AUTO: 0.15 THOUSAND/ΜL (ref 0–0.61)
EOSINOPHIL NFR BLD AUTO: 4 % (ref 0–6)
ERYTHROCYTE [DISTWIDTH] IN BLOOD BY AUTOMATED COUNT: 12.6 % (ref 11.6–15.1)
ERYTHROCYTE [SEDIMENTATION RATE] IN BLOOD: 12 MM/HOUR (ref 0–20)
GFR SERPL CREATININE-BSD FRML MDRD: 104 ML/MIN/1.73SQ M
GLUCOSE P FAST SERPL-MCNC: 72 MG/DL (ref 65–99)
GLUCOSE UR STRIP-MCNC: NEGATIVE MG/DL
HCT VFR BLD AUTO: 39.8 % (ref 34.8–46.1)
HGB BLD-MCNC: 13.1 G/DL (ref 11.5–15.4)
HGB UR QL STRIP.AUTO: NEGATIVE
IMM GRANULOCYTES # BLD AUTO: 0.01 THOUSAND/UL (ref 0–0.2)
IMM GRANULOCYTES NFR BLD AUTO: 0 % (ref 0–2)
KETONES UR STRIP-MCNC: NEGATIVE MG/DL
LEUKOCYTE ESTERASE UR QL STRIP: NEGATIVE
LYMPHOCYTES # BLD AUTO: 1.46 THOUSANDS/ΜL (ref 0.6–4.47)
LYMPHOCYTES NFR BLD AUTO: 42 % (ref 14–44)
MCH RBC QN AUTO: 30.9 PG (ref 26.8–34.3)
MCHC RBC AUTO-ENTMCNC: 32.9 G/DL (ref 31.4–37.4)
MCV RBC AUTO: 94 FL (ref 82–98)
MONOCYTES # BLD AUTO: 0.45 THOUSAND/ΜL (ref 0.17–1.22)
MONOCYTES NFR BLD AUTO: 13 % (ref 4–12)
NEUTROPHILS # BLD AUTO: 1.37 THOUSANDS/ΜL (ref 1.85–7.62)
NEUTS SEG NFR BLD AUTO: 40 % (ref 43–75)
NITRITE UR QL STRIP: NEGATIVE
NON-SQ EPI CELLS URNS QL MICRO: NORMAL /HPF
NRBC BLD AUTO-RTO: 0 /100 WBCS
PH UR STRIP.AUTO: 6 [PH]
PLATELET # BLD AUTO: 199 THOUSANDS/UL (ref 149–390)
PMV BLD AUTO: 8.9 FL (ref 8.9–12.7)
POTASSIUM SERPL-SCNC: 4 MMOL/L (ref 3.5–5.3)
PROT SERPL-MCNC: 7.4 G/DL (ref 6.4–8.2)
PROT UR STRIP-MCNC: NEGATIVE MG/DL
PROT UR-MCNC: 10 MG/DL
PROT/CREAT UR: 0.06 MG/G{CREAT} (ref 0–0.1)
RBC # BLD AUTO: 4.24 MILLION/UL (ref 3.81–5.12)
RBC #/AREA URNS AUTO: NORMAL /HPF
SODIUM SERPL-SCNC: 137 MMOL/L (ref 136–145)
SP GR UR STRIP.AUTO: 1.02 (ref 1–1.03)
UROBILINOGEN UR QL STRIP.AUTO: 0.2 E.U./DL
WBC # BLD AUTO: 3.46 THOUSAND/UL (ref 4.31–10.16)
WBC #/AREA URNS AUTO: NORMAL /HPF

## 2019-10-24 PROCEDURE — 85025 COMPLETE CBC W/AUTO DIFF WBC: CPT | Performed by: INTERNAL MEDICINE

## 2019-10-24 PROCEDURE — 81001 URINALYSIS AUTO W/SCOPE: CPT | Performed by: INTERNAL MEDICINE

## 2019-10-24 PROCEDURE — 84156 ASSAY OF PROTEIN URINE: CPT | Performed by: INTERNAL MEDICINE

## 2019-10-24 PROCEDURE — 36415 COLL VENOUS BLD VENIPUNCTURE: CPT | Performed by: INTERNAL MEDICINE

## 2019-10-24 PROCEDURE — 80053 COMPREHEN METABOLIC PANEL: CPT | Performed by: INTERNAL MEDICINE

## 2019-10-24 PROCEDURE — 82550 ASSAY OF CK (CPK): CPT | Performed by: INTERNAL MEDICINE

## 2019-10-24 PROCEDURE — 86160 COMPLEMENT ANTIGEN: CPT | Performed by: INTERNAL MEDICINE

## 2019-10-24 PROCEDURE — 82570 ASSAY OF URINE CREATININE: CPT | Performed by: INTERNAL MEDICINE

## 2019-10-24 PROCEDURE — 73562 X-RAY EXAM OF KNEE 3: CPT

## 2019-10-24 PROCEDURE — 85652 RBC SED RATE AUTOMATED: CPT | Performed by: INTERNAL MEDICINE

## 2019-10-24 PROCEDURE — 84182 PROTEIN WESTERN BLOT TEST: CPT

## 2019-10-24 PROCEDURE — 86235 NUCLEAR ANTIGEN ANTIBODY: CPT

## 2019-10-24 PROCEDURE — 86225 DNA ANTIBODY NATIVE: CPT | Performed by: INTERNAL MEDICINE

## 2019-10-24 PROCEDURE — 86140 C-REACTIVE PROTEIN: CPT | Performed by: INTERNAL MEDICINE

## 2019-10-24 PROCEDURE — 86618 LYME DISEASE ANTIBODY: CPT

## 2019-10-25 ENCOUNTER — TELEPHONE (OUTPATIENT)
Dept: OBGYN CLINIC | Facility: HOSPITAL | Age: 49
End: 2019-10-25

## 2019-10-25 ENCOUNTER — OFFICE VISIT (OUTPATIENT)
Dept: RHEUMATOLOGY | Facility: CLINIC | Age: 49
End: 2019-10-25
Payer: COMMERCIAL

## 2019-10-25 VITALS — WEIGHT: 171 LBS | HEART RATE: 80 BPM | BODY MASS INDEX: 26.84 KG/M2 | HEIGHT: 67 IN

## 2019-10-25 DIAGNOSIS — M35.9 UNDIFFERENTIATED CONNECTIVE TISSUE DISEASE (HCC): ICD-10-CM

## 2019-10-25 DIAGNOSIS — M35.00 SJOGREN'S SYNDROME, WITH UNSPECIFIED ORGAN INVOLVEMENT (HCC): Primary | ICD-10-CM

## 2019-10-25 DIAGNOSIS — Z79.899 LONG-TERM USE OF PLAQUENIL: ICD-10-CM

## 2019-10-25 DIAGNOSIS — D68.61 ANTIPHOSPHOLIPID ANTIBODY SYNDROME (HCC): ICD-10-CM

## 2019-10-25 DIAGNOSIS — D72.819 CHRONIC LEUKOPENIA: ICD-10-CM

## 2019-10-25 LAB
B BURGDOR IGG+IGM SER-ACNC: <0.91 ISR (ref 0–0.9)
DSDNA AB SER-ACNC: 2 IU/ML (ref 0–9)

## 2019-10-25 PROCEDURE — 99214 OFFICE O/P EST MOD 30 MIN: CPT | Performed by: INTERNAL MEDICINE

## 2019-10-25 RX ORDER — PREDNISONE 1 MG/1
TABLET ORAL
Qty: 50 TABLET | Refills: 0 | Status: SHIPPED | OUTPATIENT
Start: 2019-10-25 | End: 2020-02-26 | Stop reason: ALTCHOICE

## 2019-10-25 NOTE — PROGRESS NOTES
Assessment and Plan:   Patient is a 82-year-old female who presents for rheumatology follow-up for Sjogren syndrome and possible overlap autoimmune disease with some features of lupus and also positive anti phospholipid antibodies  Since I last saw her she has had resolution in the nasal ulcer  However she still having multiple sites of joint pain including her bilateral knees and we reviewed that on her x-rays she has early signs of osteoarthritis in the medial joint space and tibial plateau spiking  I did again offer her knee injections but she would like to hold off for now  In terms of her other joint pain she has mild warmth at some of her joints but there is no obvious swelling today  I discussed with her to is not clear if she is having some joint inflammation but her inflammatory markers are normal and her labs that are available are otherwise stable and do not indicate any significant increase in her disease activity at this time  I did offer prescription NSAID like meloxicam but she would like to stick with ibuprofen for now and will let me know if she changes her mind  She is very hesitant to start on any new medications  I discussed with her if her joint pain progresses into an obvious inflammatory arthritis then we would have to talk about escalating DMARD therapy but I think we can hold off at this time given no other signs of increased disease activity  She will get lab work again before her next visit and we will follow-up in about 6 months      Plan:  Diagnoses and all orders for this visit:    Sjogren's syndrome, with unspecified organ involvement ([facility] [address] )  -     CBC and differential  -     C4 complement  -     C3 complement  -     Sedimentation rate, automated  -     C-reactive protein  -     Comprehensive metabolic panel    Chronic leukopenia  -     CBC and differential  -     C4 complement  -     C3 complement  -     Sedimentation rate, automated  -     C-reactive protein  - Comprehensive metabolic panel    Undifferentiated connective tissue disease (HCC)  -     CBC and differential  -     C4 complement  -     C3 complement  -     Sedimentation rate, automated  -     C-reactive protein  -     Comprehensive metabolic panel    Antiphospholipid antibody syndrome (HCC)    Long-term use of Plaquenil        Follow-up plan: 6 months        Rheumatic Disease Summary  1  Sjogrens syndrome, possible lupus overlap  -Established with Dr Rocco Roper- November, 2015- 2 month hx of polyarthralgia with low grade fevers and erythema nodosum  PCP said viral and found to have +MARKUS 1:80 with low titer SSA (1 4), +RNP(59), +Histone(5 5), +RF(14 2), +Lupus Anticoagulant/+Anticardiolipin antibody; other features of hand and knee arthralgias, recurrent nasal ulcers, possible raynauds  -Dx with UCTD/ APS after repeat labs in 12 weeks reveals +Lupus anticoag/ +Anticardio  -Started on HCQ  -Intermittent use of Prednisone with improvement  -Repeat labs 2/2019: +MARKUS 640 speckled, +RF 20, +SSA>8, +cardiolipin IgM 44, +Histone 6 3, leukopenia; negative SSB, CCP, dsDNA, smith, RNP beta-2-glycoprotein, LAC, B27, normal C3/4, UA  -Visit 7/25/19: no IA on exam, B/L nasal ulcers present, suspect sjogrens/lupus overlap; offered knee injections, XRs ordered but not done   -Visit 10/24/19: offered meloxicam, patient declined, no changes   2  +Anti-cardiolipin IgM/+LAC   -On multiple occasions, no h/o clot or pregnancy issues   -+LAC x1, then negative on repeat     HPI  Haley Rosenthal is a 54 y o   female who presents for rheumatology follow-up for Sjogren's syndrome with possible overlap autoimmune disease  Last visit she was complaining of knee pain and we discussed this was likely related to osteoarthritis and I ordered knee x-rays  She did have these done just yesterday in the reports are not available for review but I personally reviewed them with her in the office    She again reports ongoing knee pain which is worse with squatting and activity  She has morning stiffness in all of her joints that does not necessarily improve with physical activity  She still using ibuprofen 2-3 times per week  We also reviewed the lab work that is available as some of it is still pending  She denies any symptoms of Raynaud's  Last visit she also had a nasal ulcer but she states this has since resolved  No recent pleuritic type chest pain  No obvious joint swelling  She remains very hesitant to start on any new medications and states she just deals with it" in terms of her joint pain  The following portions of the patient's history were reviewed and updated as appropriate: allergies, current medications, past family history, past medical history, past social history, past surgical history and problem list     Review of Systems:   Review of Systems   Constitutional: Negative for chills, fatigue, fever and unexpected weight change  HENT: Negative for mouth sores and trouble swallowing  Eyes: Negative for pain and visual disturbance  Respiratory: Negative for cough and shortness of breath  Cardiovascular: Negative for chest pain and leg swelling  Gastrointestinal: Negative for abdominal pain, blood in stool, constipation, diarrhea and nausea  Genitourinary: Negative for hematuria  Musculoskeletal: Positive for arthralgias  Negative for back pain, joint swelling and myalgias  Skin: Negative for rash  Neurological: Negative for weakness and numbness  Hematological: Negative for adenopathy  Psychiatric/Behavioral: Negative for sleep disturbance         Home Medications:    Current Outpatient Medications:     hydroxychloroquine (PLAQUENIL) 200 mg tablet, Take 2 tablets (400 mg total) by mouth daily with breakfast Take with food, Disp: 180 tablet, Rfl: 1    ibuprofen (MOTRIN) 400 mg tablet, Take by mouth every 6 (six) hours as needed for mild pain, Disp: , Rfl:     MAGNESIUM CARBONATE PO, Take by mouth, Disp: , Rfl:     Melatonin 5 MG TABS, Take 5 tablets (25 mg total) by mouth daily, Disp: 30 tablet, Rfl: 0    VITAMIN D, CHOLECALCIFEROL, PO, Take by mouth, Disp: , Rfl:     Objective:    Vitals:    10/25/19 0907   Pulse: 80   Weight: 77 6 kg (171 lb)   Height: 5' 7" (1 702 m)       Physical Exam   Constitutional: She appears well-developed and well-nourished  She is cooperative  No distress  HENT:   Head: Normocephalic and atraumatic  Mouth/Throat: Oropharynx is clear and moist and mucous membranes are normal    Eyes: Conjunctivae and EOM are normal  No scleral icterus  Neck: Neck supple  No spinous process tenderness and no muscular tenderness present  No thyromegaly present  Cardiovascular: Normal rate, regular rhythm, S1 normal and S2 normal  Exam reveals no friction rub  No murmur heard  Pulmonary/Chest: Breath sounds normal  No respiratory distress  She has no wheezes  She has no rhonchi  She has no rales  Musculoskeletal:   Mild warmth at shoulders, elbows wrists without overt swelling  No warmth or swelling in the knees ankles or toes  No significant reproducible tenderness  Lymphadenopathy:     She has no cervical adenopathy  Neurological: She is alert  No sensory deficit  Skin: Skin is warm and dry  No rash noted  Nails show no clubbing  Psychiatric: She has a normal mood and affect         Imaging:   XR knees 10/24/19  Images personally reviewed in PACS and my impression is:  Mild medial joint line narrowing     Labs:   Myositis panel, dsDNA and lyme all pending from this morning   Component      Latest Ref Rng & Units 10/24/2019   WBC      4 31 - 10 16 Thousand/uL 3 46 (L)   Red Blood Cell Count      3 81 - 5 12 Million/uL 4 24   Hemoglobin      11 5 - 15 4 g/dL 13 1   HCT      34 8 - 46 1 % 39 8   MCV      82 - 98 fL 94   MCH      26 8 - 34 3 pg 30 9   MCHC      31 4 - 37 4 g/dL 32 9   RDW      11 6 - 15 1 % 12 6   MPV      8 9 - 12 7 fL 8 9   Platelet Count      075 - 390 Thousands/uL 199   nRBC      /100 WBCs 0   Neutrophils %      43 - 75 % 40 (L)   Immat GRANS %      0 - 2 % 0   Lymphocytes Relative      14 - 44 % 42   Monocytes Relative      4 - 12 % 13 (H)   Eosinophils      0 - 6 % 4   Basophils Relative      0 - 1 % 1   Absolute Neutrophils      1 85 - 7 62 Thousands/µL 1 37 (L)   Immature Grans Absolute      0 00 - 0 20 Thousand/uL 0 01   Lymphocytes Absolute      0 60 - 4 47 Thousands/µL 1 46   Absolute Monocytes      0 17 - 1 22 Thousand/µL 0 45   Absolute Eosinophils      0 00 - 0 61 Thousand/µL 0 15   Basophils Absolute      0 00 - 0 10 Thousands/µL 0 02   Sodium      136 - 145 mmol/L 137   Potassium      3 5 - 5 3 mmol/L 4 0   Chloride      100 - 108 mmol/L 105   CO2      21 - 32 mmol/L 27   Anion Gap      4 - 13 mmol/L 5   BUN      5 - 25 mg/dL 10   Creatinine      0 60 - 1 30 mg/dL 0 69   GLUCOSE FASTING      65 - 99 mg/dL 72   Calcium      8 3 - 10 1 mg/dL 8 5   AST      5 - 45 U/L 21   ALT      12 - 78 U/L 24   Alkaline Phosphatase      46 - 116 U/L 64   Total Protein      6 4 - 8 2 g/dL 7 4   Albumin      3 5 - 5 0 g/dL 3 3 (L)   TOTAL BILIRUBIN      0 20 - 1 00 mg/dL 0 50   eGFR      ml/min/1 73sq m 104   Clarity, UA       Clear   Color, UA       Yellow   SL AMB SPECIFIC GRAVITY-URINE      1 003 - 1 030 1 025   pH, UA      4 5, 5 0, 5 5, 6 0, 6 5, 7 0, 7 5, 8 0 6 0   Glucose, UA      Negative mg/dl Negative   Ketones, UA      Negative mg/dl Negative   Blood, UA      Negative Negative   POCT URINE PROTEIN      Negative mg/dl Negative   Nitrite, UA      Negative Negative   Bilirubin, UA      Negative Negative   SL AMB POCT UROBILINOGEN      0 2, 1 0 E U /dl E U /dl 0 2   Leukocytes, UA      Negative Negative   WBC, UA      None Seen, 0-5, 5-55, 5-65 /hpf None Seen   RBC, UA      None Seen, 0-5 /hpf None Seen   Bacteria, UA      None Seen, Occasional /hpf None Seen   Epithelial Cells      None Seen, Occasional /hpf Occasional   EXT Creatinine Urine      mg/dL 165 0   Protein Urine Random      mg/dL 10   Prot/Creat Ratio, Ur      0 00 - 0 10 0 06   C4, COMPLEMENT      10 0 - 40 0 mg/dL 17 0   C3 Complement      90 0 - 180 0 mg/dL 102 0   C-REACTIVE PROTEIN      <3 0 mg/L <3 0   Sed Rate      0 - 20 mm/hour 12   Total CK      26 - 192 U/L 77

## 2019-10-25 NOTE — TELEPHONE ENCOUNTER
At her visit today, she never indicated she wanted to go on prednisone and wanted to wait on starting any meds? I suggested prednisone taper, but she indicated she wanted to start no new meds    Can you please call her and find out whats going on?

## 2019-10-25 NOTE — TELEPHONE ENCOUNTER
Spoke with patient and she stated multiple medications were discussed but she would be willing to do the prednisone   Please send in to pharmacy

## 2019-10-25 NOTE — TELEPHONE ENCOUNTER
Patient is calling   [phone]    Patient is asking if Dr Robin Hutchins called in prednisone for the patient  Patient has not heard from her pharmacy  Please send to [facility]

## 2019-11-05 LAB — MISCELLANEOUS LAB TEST RESULT: NORMAL

## 2019-11-12 ENCOUNTER — HOSPITAL ENCOUNTER (OUTPATIENT)
Dept: MAMMOGRAPHY | Facility: HOSPITAL | Age: 49
Discharge: HOME | End: 2019-11-12
Payer: COMMERCIAL

## 2019-11-12 VITALS — BODY MASS INDEX: 26.84 KG/M2 | HEIGHT: 67 IN | WEIGHT: 171 LBS

## 2019-11-12 DIAGNOSIS — R92.2 DENSE BREASTS: ICD-10-CM

## 2019-11-12 DIAGNOSIS — Z12.31 SCREENING MAMMOGRAM, ENCOUNTER FOR: ICD-10-CM

## 2019-11-12 PROCEDURE — 77063 BREAST TOMOSYNTHESIS BI: CPT

## 2019-11-12 PROCEDURE — 77067 SCR MAMMO BI INCL CAD: CPT

## 2020-02-26 ENCOUNTER — OFFICE VISIT (OUTPATIENT)
Dept: FAMILY MEDICINE CLINIC | Facility: CLINIC | Age: 50
End: 2020-02-26
Payer: COMMERCIAL

## 2020-02-26 VITALS
DIASTOLIC BLOOD PRESSURE: 60 MMHG | HEIGHT: 67 IN | SYSTOLIC BLOOD PRESSURE: 100 MMHG | HEART RATE: 72 BPM | BODY MASS INDEX: 27.15 KG/M2 | TEMPERATURE: 98.5 F | RESPIRATION RATE: 16 BRPM | WEIGHT: 173 LBS

## 2020-02-26 DIAGNOSIS — Z00.00 ANNUAL PHYSICAL EXAM: Primary | ICD-10-CM

## 2020-02-26 DIAGNOSIS — M35.00 SJOGREN'S SYNDROME, WITH UNSPECIFIED ORGAN INVOLVEMENT (HCC): ICD-10-CM

## 2020-02-26 DIAGNOSIS — Z13.6 SCREENING FOR CARDIOVASCULAR CONDITION: ICD-10-CM

## 2020-02-26 DIAGNOSIS — M35.1 MIXED CONNECTIVE TISSUE DISEASE (HCC): ICD-10-CM

## 2020-02-26 PROCEDURE — 99396 PREV VISIT EST AGE 40-64: CPT | Performed by: FAMILY MEDICINE

## 2020-02-26 NOTE — PROGRESS NOTES
FAMILY PRACTICE HEALTH MAINTENANCE OFFICE VISIT  [facility]    NAME: Naresh Diane  AGE: 54 y o  SEX: female  : 1970     DATE: 2020    Assessment and Plan     1  Annual physical exam    2  Mixed connective tissue disease ([address] )  Assessment & Plan:  Stable  Following with rheumatologist      3  Sjogren's syndrome, with unspecified organ involvement [facility])  Assessment & Plan:  Stable   Follow with rheumatologist       4  Screening for cardiovascular condition  -     Lipid Panel with Direct LDL reflex; Future      · Patient Counseling:   · Nutrition: Stressed importance of a well balanced diet, moderation of sodium/saturated fat, caloric balance and sufficient intake of fiber  · Exercise: Stressed the importance of regular exercise with a goal of 150 minutes per week  · Dental Health: Discussed daily flossing and brushing and regular dental visits     · Immunizations reviewed: Declined recommended vaccinations   · She is going to think about getting an Adacel and will schedule a nursing appointment   · Discussed benefits of:  Mammogram , Cervical Cancer screening and Screening labs   BMI Counseling: Body mass index is 27 1 kg/m²  Discussed with patient's BMI with her  The BMI is above normal  Exercise recommendations include exercising 3-5 times per week  Return in about 1 year (around 2021) for Annual physical         Chief Complaint     Chief Complaint   Patient presents with    Physical Exam     ac/cma        History of Present Illness     She is still working the hospital every weekend         Well Adult Physical   Patient here for a comprehensive physical exam       Diet and Physical Activity  Diet: well balanced diet  Exercise: infrequently      Depression Screen  PHQ-9 Depression Screening    PHQ-9:    Frequency of the following problems over the past two weeks:       Little interest or pleasure in doing things:  0 - not at all  Feeling down, depressed, or hopeless:  0 - not at all  PHQ-2 Score:  0          General Health  Hearing: Slighty decreased: bilateral  Vision: no vision problems  Dental: regular dental visits    Reproductive Health  No issues    Follows with gynecologist       The following portions of the patient's history were reviewed and updated as appropriate: allergies, current medications, past family history, past medical history, past social history, past surgical history and problem list     Review of Systems     Review of Systems   Constitutional: Negative  Respiratory: Negative  Cardiovascular: Negative          Past Medical History     Past Medical History:   Diagnosis Date    Abnormal menstrual periods     Resolved 12/18/2017     Abnormal uterine bleeding     Resolved 12/18/2017     Anticardiolipin syndrome (HCC)     Arthropathy, multiple sites     Resolved 1/29/2016     Erythema nodosum     Resolved 5/1/2017     Herniated lumbar intervertebral disc     Mixed connective tissue disease ([address] )     Mixed connective tissue disease ([facility] [address] )     Skin lesion     Resolved 1/29/2016        Past Surgical History     Past Surgical History:   Procedure Laterality Date    HYSTERECTOMY  12/2017    NO PAST SURGERIES      SC CYSTOURETHROSCOPY N/A 12/11/2017    Procedure: CYSTOSCOPY;  Surgeon: Shubham Warren MD;  [facility]: [facility];  Service: Gynecology    SC LAP,VAG HYST,UTERUS 250GMS/<,SALP-OOPH N/A 12/11/2017    Procedure: LAPAROSCOPIC ASSISTED VAGINAL HYSTERECTOMY; BILATERAL SALPINGECTOMY;  Surgeon: Shubham Warren MD;  [facility];  Service: Gynecology       Social History     Social History     Socioeconomic History    Marital status: /Civil Union     Spouse name: None    Number of children: None    Years of education: None    Highest education level: None   Occupational History    None   Social Needs    Financial resource strain: None    Food insecurity:     Worry: None     Inability: None  Transportation needs:     Medical: None     Non-medical: None   Tobacco Use    Smoking status: Never Smoker    Smokeless tobacco: Never Used   Substance and Sexual Activity    Alcohol use: Yes     Frequency: 2-4 times a month     Drinks per session: 1 or 2     Binge frequency: Never    Drug use: No    Sexual activity: None   Lifestyle    Physical activity:     Days per week: None     Minutes per session: None    Stress: None   Relationships    Social connections:     Talks on phone: None     Gets together: None     Attends [employer] service: None     Active member of club or organization: None     Attends meetings of clubs or organizations: None     Relationship status: None    Intimate partner violence:     Fear of current or ex partner: None     Emotionally abused: None     Physically abused: None     Forced sexual activity: None   Other Topics Concern    None   Social History Narrative    None       Family History     Family History   Problem Relation Age of Onset    Gout Father     Diabetes Brother     Rheum arthritis Maternal Grandmother     Lupus Cousin     Sjogren's syndrome Family     Lupus Family     Thyroid disease Family     Heart disease Family        Current Medications       Current Outpatient Medications:     hydroxychloroquine (PLAQUENIL) 200 mg tablet, Take 2 tablets (400 mg total) by mouth daily with breakfast Take with food, Disp: 180 tablet, Rfl: 1    MAGNESIUM CARBONATE PO, Take by mouth, Disp: , Rfl:     Melatonin 5 MG TABS, Take 5 tablets (25 mg total) by mouth daily (Patient taking differently: Take 5 tablets by mouth daily as needed ), Disp: 30 tablet, Rfl: 0    VITAMIN D, CHOLECALCIFEROL, PO, Take by mouth as needed , Disp: , Rfl:     ibuprofen (MOTRIN) 400 mg tablet, Take by mouth every 6 (six) hours as needed for mild pain, Disp: , Rfl:      Allergies     Allergies   Allergen Reactions    Levaquin [Levofloxacin] Other (See Comments)     Tendon tears/ tendon pain    Amoxil [Amoxicillin] Rash       Objective     /60   Pulse 72   Temp 98 5 °F (36 9 °C)   Resp 16   Ht 5' 7" (1 702 m)   Wt 78 5 kg (173 lb)   LMP 12/01/2017   BMI 27 10 kg/m²      Physical Exam   Constitutional: She is oriented to person, place, and time  She appears well-developed and well-nourished  HENT:   Head: Normocephalic and atraumatic  Right Ear: External ear normal    Left Ear: External ear normal    Mouth/Throat: Oropharynx is clear and moist    Eyes: Pupils are equal, round, and reactive to light  Neck: Normal range of motion  Cardiovascular: Normal rate, regular rhythm and normal heart sounds  Exam reveals no friction rub  No murmur heard  Pulmonary/Chest: Effort normal and breath sounds normal  No respiratory distress  She has no wheezes  She has no rales  Abdominal: Soft  Bowel sounds are normal  She exhibits no distension and no mass  There is no tenderness  There is no rebound and no guarding  Musculoskeletal: Normal range of motion  She exhibits no edema or deformity  Neurological: She is alert and oriented to person, place, and time  She has normal reflexes  Skin: Skin is warm  Nursing note and vitals reviewed           Visual Acuity Screening    Right eye Left eye Both eyes   Without correction: 20/20 20/70 20/20   With correction:              DO RADHA Ford [facility]

## 2020-03-17 DIAGNOSIS — M35.9 UNDIFFERENTIATED CONNECTIVE TISSUE DISEASE (HCC): ICD-10-CM

## 2020-03-17 RX ORDER — HYDROXYCHLOROQUINE SULFATE 200 MG/1
TABLET, FILM COATED ORAL
Qty: 180 TABLET | Refills: 1 | Status: SHIPPED | OUTPATIENT
Start: 2020-03-17 | End: 2020-09-14 | Stop reason: SDUPTHER

## 2020-03-19 ENCOUNTER — TELEPHONE (OUTPATIENT)
Dept: RHEUMATOLOGY | Facility: CLINIC | Age: 50
End: 2020-03-19

## 2020-03-19 NOTE — TELEPHONE ENCOUNTER
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

## 2020-03-19 NOTE — TELEPHONE ENCOUNTER
Agreed she should clarify what to do about the mask issue with her department and if they recommend she does not work right now because of that  Otherwise from a rheum standpoint I have no reason to keep her out of work

## 2020-03-19 NOTE — TELEPHONE ENCOUNTER
Please let her know in rheumatology we are not recommending patients take medical leave at this time  She is on plaquenil which is not immunesuppressing and so not a concern for that  Her white count is only mildly reduce but her neutrophils (which are the main infection fighters) are still at a good level providing her a normal immune response  She should continue to take the normal precautions

## 2020-03-19 NOTE — TELEPHONE ENCOUNTER
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

## 2020-03-26 NOTE — TELEPHONE ENCOUNTER
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, she is a med/surg RN  She is concerned she is higher risk because of her underlying autoimmune disease, she currently takes plaquenil chronically and no immune-suppressing meds  Her WBC and ANC are also within range to continue fighting infections normally which I explained to her  I discussed with her at length we are not taking patients out of work with underlying AI disease especially since she is not on immune suppressing meds and she needs to continue working with her supervisor to determine the best plan for her  From a rheum standpoint I do not have a reason at this time to keep her from working in her normal environment and we discussed the plaquenil anecdotally might provide some protection although unproven at this point  Patient was still uneasy about working on the Covid floor by the end of our conversation but understood my reasoning

## 2020-03-26 NOTE — TELEPHONE ENCOUNTER
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

## 2020-09-14 DIAGNOSIS — M35.9 UNDIFFERENTIATED CONNECTIVE TISSUE DISEASE (HCC): ICD-10-CM

## 2020-09-14 RX ORDER — HYDROXYCHLOROQUINE SULFATE 200 MG/1
400 TABLET, FILM COATED ORAL
Qty: 180 TABLET | Refills: 1 | Status: SHIPPED | OUTPATIENT
Start: 2020-09-14 | End: 2020-12-29 | Stop reason: SDUPTHER

## 2020-09-14 NOTE — TELEPHONE ENCOUNTER
[phone] / Meri Cornea / [birth year] / requesting refills on Plaquenil 200 mg send over to [facility] texted Dr Rosemary Fuentes

## 2020-09-15 NOTE — TELEPHONE ENCOUNTER
William Morales,    I got a tiger text yesterday regarding refilling your patient's Plaquenil  Just wanted to forward the request to you      Thanks,  [employer]

## 2020-12-29 DIAGNOSIS — M35.9 UNDIFFERENTIATED CONNECTIVE TISSUE DISEASE (HCC): ICD-10-CM

## 2020-12-29 RX ORDER — HYDROXYCHLOROQUINE SULFATE 200 MG/1
400 TABLET, FILM COATED ORAL
Qty: 180 TABLET | Refills: 0 | Status: SHIPPED | OUTPATIENT
Start: 2020-12-29 | End: 2021-02-26 | Stop reason: SDUPTHER

## 2020-12-29 NOTE — TELEPHONE ENCOUNTER
Patient not seen in over 1 year, please let her know I refilled but this is last time as she needs a routine f/u

## 2021-02-10 ENCOUNTER — TELEPHONE (OUTPATIENT)
Dept: OBGYN CLINIC | Facility: HOSPITAL | Age: 51
End: 2021-02-10

## 2021-02-10 NOTE — TELEPHONE ENCOUNTER
Patient Sees Dr Mumtaz Portillo  Patient would like to know if she should get the Covid 19 Vaccination due to her blood count being low    Feura Bush silence - [phone]

## 2021-02-10 NOTE — TELEPHONE ENCOUNTER
Spoke with patient and relayed information  She wanted to know what resources Dr Ding was basing her recommendations off of  I advised her we are following the ACR recommendations

## 2021-02-26 ENCOUNTER — TRANSCRIBE ORDERS (OUTPATIENT)
Dept: ADMINISTRATIVE | Facility: HOSPITAL | Age: 51
End: 2021-02-26

## 2021-02-26 DIAGNOSIS — Z12.31 SCREENING MAMMOGRAM FOR HIGH-RISK PATIENT: Primary | ICD-10-CM

## 2021-02-26 DIAGNOSIS — M35.9 UNDIFFERENTIATED CONNECTIVE TISSUE DISEASE (HCC): ICD-10-CM

## 2021-02-26 RX ORDER — HYDROXYCHLOROQUINE SULFATE 200 MG/1
400 TABLET, FILM COATED ORAL
Qty: 180 TABLET | Refills: 0 | Status: SHIPPED | OUTPATIENT
Start: 2021-02-26 | End: 2021-10-17

## 2021-03-03 ENCOUNTER — HOSPITAL ENCOUNTER (OUTPATIENT)
Dept: MAMMOGRAPHY | Facility: HOSPITAL | Age: 51
Discharge: HOME | End: 2021-03-03
Payer: COMMERCIAL

## 2021-03-03 VITALS — WEIGHT: 173 LBS | HEIGHT: 67 IN | BODY MASS INDEX: 27.15 KG/M2

## 2021-03-03 DIAGNOSIS — Z12.31 SCREENING MAMMOGRAM FOR HIGH-RISK PATIENT: ICD-10-CM

## 2021-03-03 PROCEDURE — 77063 BREAST TOMOSYNTHESIS BI: CPT

## 2021-03-03 PROCEDURE — 77067 SCR MAMMO BI INCL CAD: CPT

## 2021-03-05 NOTE — PROGRESS NOTES
FAMILY PRACTICE HEALTH MAINTENANCE OFFICE VISIT  [facility] - [facility]    NAME: Jay Diane  AGE: 52 y o  SEX: female  : 1970     DATE: 3/10/2021    Assessment and Plan     1  Annual physical exam    2  Sjogren's syndrome, with unspecified organ involvement [facility])  Assessment & Plan:   Stable   Being managed by Rheumatology      3  Mixed connective tissue disease ([address] )  Assessment & Plan:    Stable on Plaquenil   Being managed by Rheumatology      4  Encounter for immunization    5  Screening for deficiency anemia  -     CBC; Future    6  Screening for cardiovascular condition  -     Comprehensive metabolic panel; Future  -     Lipid Panel with Direct LDL reflex; Future       · Patient Counseling:   · Nutrition: Stressed importance of a well balanced diet, moderation of sodium/saturated fat, caloric balance and sufficient intake of fiber  · Exercise: Stressed the importance of regular exercise with a goal of 150 minutes per week  · Dental Health: Discussed daily flossing and brushing and regular dental visits     · Immunizations reviewed: will hold off on Adacel, she is trying to get her COVID vaccine  · Discussed benefits of:  Mammogram , Cervical Cancer screening and Screening labs   BMI Counseling: Body mass index is 26 31 kg/m²  Discussed with patient's BMI with her  The BMI is above normal  Exercise recommendations include exercising 3-5 times per week  Return in about 1 year (around 3/10/2022) for Annual physical     COVID vaccine recommended    Chief Complaint     Chief Complaint   Patient presents with    Physical Exam     ac/cma        History of Present Illness      Patient is feeling well        Well Adult Physical   Patient here for a comprehensive physical exam       Diet and Physical Activity  Diet: well balanced diet  Exercise: frequently      Depression Screen  PHQ-9 Depression Screening    PHQ-9:   Frequency of the following problems over the past two weeks:      Little interest or pleasure in doing things: 0 - not at all  Feeling down, depressed, or hopeless: 0 - not at all  PHQ-2 Score: 0          General Health  Hearing: Slighty decreased: bilateral  Vision: no vision problems  Dental: regular dental visits    Reproductive Health  hysterectomy       The following portions of the patient's history were reviewed and updated as appropriate: allergies, current medications, past family history, past medical history, past social history, past surgical history and problem list     Review of Systems     Review of Systems   Constitutional: Negative  Respiratory: Negative  Cardiovascular: Negative          Past Medical History     Past Medical History:   Diagnosis Date    Abnormal menstrual periods     Resolved 12/18/2017     Abnormal uterine bleeding     Resolved 12/18/2017     Anticardiolipin syndrome (HCC)     Arthropathy, multiple sites     Resolved 1/29/2016     Erythema nodosum     Resolved 5/1/2017     Herniated lumbar intervertebral disc     Mixed connective tissue disease ([facility] [address] )     Mixed connective tissue disease ([facility] [address] )     Skin lesion     Resolved 1/29/2016        Past Surgical History     Past Surgical History:   Procedure Laterality Date    HYSTERECTOMY  12/2017    NO PAST SURGERIES      MN CYSTOURETHROSCOPY N/A 12/11/2017    Procedure: CYSTOSCOPY;  Surgeon: Samuel Laboy MD;  [facility];  Service: Gynecology    MN LAP,VAG HYST,UTERUS 250GMS/<,SALP-OOPH N/A 12/11/2017    Procedure: LAPAROSCOPIC ASSISTED VAGINAL HYSTERECTOMY; BILATERAL SALPINGECTOMY;  Surgeon: Samuel Laboy MD;  [facility];  Service: Gynecology       Social History     Social History     Socioeconomic History    Marital status: /Civil Union     Spouse name: None    Number of children: None    Years of education: None    Highest education level: None   Occupational History    None   Social Needs    Financial resource strain: None    Food insecurity     Worry: None     Inability: None    Transportation needs     Medical: None     Non-medical: None   Tobacco Use    Smoking status: Never Smoker    Smokeless tobacco: Never Used   Substance and Sexual Activity    Alcohol use: Yes     Frequency: 2-4 times a month     Drinks per session: 1 or 2     Binge frequency: Never    Drug use: No    Sexual activity: None   Lifestyle    Physical activity     Days per week: None     Minutes per session: None    Stress: None   Relationships    Social connections     Talks on phone: None     Gets together: None     Attends Mandaeism service: None     Active member of club or organization: None     Attends meetings of clubs or organizations: None     Relationship status: None    Intimate partner violence     Fear of current or ex partner: None     Emotionally abused: None     Physically abused: None     Forced sexual activity: None   Other Topics Concern    None   Social History Narrative    None       Family History     Family History   Problem Relation Age of Onset    Gout Father     Liver cancer Father 76    Diabetes Brother     Rheum arthritis Maternal Grandmother     Lupus Cousin     Sjogren's syndrome Family     Lupus Family     Thyroid disease Family     Heart disease Family     No Known Problems Sister     No Known Problems Daughter     No Known Problems Daughter     No Known Problems Daughter        Current Medications       Current Outpatient Medications:     hydroxychloroquine (PLAQUENIL) 200 mg tablet, Take 2 tablets (400 mg total) by mouth daily with breakfast, Disp: 180 tablet, Rfl: 0    ibuprofen (MOTRIN) 400 mg tablet, Take by mouth every 6 (six) hours as needed for mild pain, Disp: , Rfl:     Melatonin 5 MG TABS, Take 5 tablets (25 mg total) by mouth daily (Patient taking differently: Take 5 tablets by mouth daily as needed ), Disp: 30 tablet, Rfl: 0    MAGNESIUM CARBONATE PO, Take by mouth, Disp: , Rfl:    VITAMIN D, CHOLECALCIFEROL, PO, Take by mouth as needed , Disp: , Rfl:      Allergies     Allergies   Allergen Reactions    Levaquin [Levofloxacin] Other (See Comments)     Tendon tears/ tendon pain    Amoxil [Amoxicillin] Rash       Objective     /60   Pulse 72   Temp 98 6 °F (37 °C)   Resp 16   Ht 5' 7" (1 702 m)   Wt 76 2 kg (168 lb)   LMP 12/01/2017   BMI 26 31 kg/m²      Physical Exam  Vitals signs and nursing note reviewed  Constitutional:       Appearance: She is well-developed  HENT:      Head: Normocephalic and atraumatic  Right Ear: Tympanic membrane and external ear normal       Left Ear: Tympanic membrane and external ear normal    Eyes:      Pupils: Pupils are equal, round, and reactive to light  Neck:      Musculoskeletal: Normal range of motion  Cardiovascular:      Rate and Rhythm: Normal rate and regular rhythm  Heart sounds: Normal heart sounds  No murmur  No friction rub  Pulmonary:      Effort: Pulmonary effort is normal  No respiratory distress  Breath sounds: Normal breath sounds  No wheezing or rales  Abdominal:      General: Bowel sounds are normal  There is no distension  Palpations: Abdomen is soft  There is no mass  Tenderness: There is no abdominal tenderness  There is no guarding or rebound  Musculoskeletal:      Right lower leg: No edema  Left lower leg: No edema  Skin:     General: Skin is warm  Neurological:      Mental Status: She is alert and oriented to person, place, and time             Vision Screening Comments: Pt forgot geo [place], [address]

## 2021-03-10 ENCOUNTER — OFFICE VISIT (OUTPATIENT)
Dept: FAMILY MEDICINE CLINIC | Facility: CLINIC | Age: 51
End: 2021-03-10
Payer: COMMERCIAL

## 2021-03-10 VITALS
HEIGHT: 67 IN | DIASTOLIC BLOOD PRESSURE: 60 MMHG | TEMPERATURE: 98.6 F | SYSTOLIC BLOOD PRESSURE: 100 MMHG | BODY MASS INDEX: 26.37 KG/M2 | WEIGHT: 168 LBS | HEART RATE: 72 BPM | RESPIRATION RATE: 16 BRPM

## 2021-03-10 DIAGNOSIS — M35.1 MIXED CONNECTIVE TISSUE DISEASE (HCC): ICD-10-CM

## 2021-03-10 DIAGNOSIS — Z13.6 SCREENING FOR CARDIOVASCULAR CONDITION: ICD-10-CM

## 2021-03-10 DIAGNOSIS — M35.00 SJOGREN'S SYNDROME, WITH UNSPECIFIED ORGAN INVOLVEMENT (HCC): ICD-10-CM

## 2021-03-10 DIAGNOSIS — Z00.00 ANNUAL PHYSICAL EXAM: Primary | ICD-10-CM

## 2021-03-10 DIAGNOSIS — Z23 ENCOUNTER FOR IMMUNIZATION: ICD-10-CM

## 2021-03-10 DIAGNOSIS — Z13.0 SCREENING FOR DEFICIENCY ANEMIA: ICD-10-CM

## 2021-03-10 PROCEDURE — 99396 PREV VISIT EST AGE 40-64: CPT | Performed by: FAMILY MEDICINE

## 2021-03-15 ENCOUNTER — IMMUNIZATIONS (OUTPATIENT)
Dept: FAMILY MEDICINE CLINIC | Facility: HOSPITAL | Age: 51
End: 2021-03-15

## 2021-03-15 DIAGNOSIS — Z23 ENCOUNTER FOR IMMUNIZATION: Primary | ICD-10-CM

## 2021-03-15 PROCEDURE — 0001A SARS-COV-2 / COVID-19 MRNA VACCINE (PFIZER-BIONTECH) 30 MCG: CPT

## 2021-03-15 PROCEDURE — 91300 SARS-COV-2 / COVID-19 MRNA VACCINE (PFIZER-BIONTECH) 30 MCG: CPT

## 2021-04-05 ENCOUNTER — IMMUNIZATIONS (OUTPATIENT)
Dept: FAMILY MEDICINE CLINIC | Facility: HOSPITAL | Age: 51
End: 2021-04-05

## 2021-04-05 DIAGNOSIS — Z23 ENCOUNTER FOR IMMUNIZATION: Primary | ICD-10-CM

## 2021-04-05 PROCEDURE — 91300 SARS-COV-2 / COVID-19 MRNA VACCINE (PFIZER-BIONTECH) 30 MCG: CPT

## 2021-04-05 PROCEDURE — 0002A SARS-COV-2 / COVID-19 MRNA VACCINE (PFIZER-BIONTECH) 30 MCG: CPT

## 2021-04-30 NOTE — PROGRESS NOTES
Assessment and Plan:   Patient is a 82-year-old female who presents for rheumatology follow-up for Sjogren syndrome and possible overlap autoimmune disease with some features of lupus and also positive anti phospholipid antibodies  since our last visit about 1 5 years ago, she reports she is doing very well and has no major issues or complaints today  She has minimal knee pain and has started walking again for exercise and lost some weight  She is still on the plaquenil 400mg daily and we discussed with her weight loss, we should reduce her dose based off guidelines of a max dose of 5 mg/kg, which would place her dose around 350 mg, so I would defer to the 300 mg daily dosing  We discussed that weight based dosing has been shown to reduce the risk of long-term rare complication of retinal toxicity  We discussed that we will reduce her dose to 200 mg daily and see if there are any major changes or concerns  She is up-to-date with her eye exam and per her report there have been no concerns  She will let me know if there are any issues with the reduced dosing  We will get some updated blood work as well for her Sjogren's syndrome  We will follow up again in 1 year unless sooner issues arise  Plan:  Diagnoses and all orders for this visit:    Sjogren's syndrome, with unspecified organ involvement ([facility] [address] )  -     Basic metabolic panel  -     C-reactive protein  -     Sedimentation rate, automated  -     CBC and differential  -     C4 complement  -     C3 complement    Chronic leukopenia  -     Basic metabolic panel  -     C-reactive protein  -     Sedimentation rate, automated  -     CBC and differential  -     C4 complement  -     C3 complement    Long-term use of Plaquenil    Chronic pain of both knees        Follow-up plan: 1 year        Rheumatic Disease Summary  1   Sjogrens syndrome, possible lupus overlap  -Established with Dr James Brito- November, 2015- 2 month hx of polyarthralgia with low grade fevers and erythema nodosum  PCP said viral and found to have +MARKUS 1:80 with low titer SSA (1 4), +RNP(59), +Histone(5 5), +RF(14 2), +Lupus Anticoagulant/+Anticardiolipin antibody; other features of hand and knee arthralgias, recurrent nasal ulcers, possible raynauds  -Dx with UCTD/ APS after repeat labs in 12 weeks reveals +Lupus anticoag/ +Anticardio  -Started on HCQ  -Intermittent use of Prednisone with improvement  -Repeat labs 2/2019: +MARKUS 640 speckled, +RF 20, +SSA>8, +cardiolipin IgM 44, +Histone 6 3, leukopenia; negative SSB, CCP, dsDNA, smith, RNP beta-2-glycoprotein, LAC, B27, normal C3/4, UA  -Visit 7/25/19: no IA on exam, B/L nasal ulcers present, suspect sjogrens/lupus overlap; offered knee injections, XRs ordered but not done   -Visit 10/24/19: offered meloxicam and knee injections, patient declined, no changes, cont plaquenil  -Visit 5/4/21: stable, reduce plaquenil to 200mg daily given weight loss  2  +Anti-cardiolipin IgM/+LAC   -On multiple occasions, no h/o clot or pregnancy issues   -+LAC x1, then negative on repeat     HPI  Debora Petty is a 52 y o   female who presents for rheumatology follow-up for Sjogren's syndrome with possible overlap autoimmune disease  Last visit, she was continuing to have various sites of joint pain including her bilateral knees  I offered the injections and also to try meloxicam but she declined  Patient reports since our last visit she is doing very well  States she feels great lately and has no complaints today  She has noticed increased dry mouth lately but that is the only issue  Denies any joint swelling  Last visit she was having the knee pain but reports that this is doing fine and does not limit her in any way  She has started walking again for exercise and feels good being more active  She did lose some weight with this as well  She is still taking the Plaquenil 400 mg daily    We discussed based off her weight loss that we should reduce her dose to reduce her risk of retinal toxicity  No recent rashes  The following portions of the patient's history were reviewed and updated as appropriate: allergies, current medications, past family history, past medical history, past social history, past surgical history and problem list     Review of Systems:   Review of Systems   Constitutional: Negative for fatigue and unexpected weight change  HENT: Negative for mouth sores  +dry mouth    Respiratory: Negative for cough and shortness of breath  Gastrointestinal: Negative for constipation and diarrhea  Musculoskeletal: Negative for arthralgias, back pain, joint swelling and myalgias  Skin: Negative for color change and rash  Neurological: Negative for weakness  Psychiatric/Behavioral: Negative for sleep disturbance  Home Medications:    Current Outpatient Medications:     hydroxychloroquine (PLAQUENIL) 200 mg tablet, Take 2 tablets (400 mg total) by mouth daily with breakfast, Disp: 180 tablet, Rfl: 0    ibuprofen (MOTRIN) 400 mg tablet, Take by mouth every 6 (six) hours as needed for mild pain, Disp: , Rfl:     Melatonin 5 MG TABS, Take 5 tablets (25 mg total) by mouth daily (Patient taking differently: Take 5 tablets by mouth daily as needed ), Disp: 30 tablet, Rfl: 0    Objective:    Vitals:    05/04/21 0920   Pulse: 74   Weight: 75 3 kg (166 lb)   Height: 5' 7" (1 702 m)       Physical Exam  Constitutional:       General: She is not in acute distress  Appearance: She is well-developed  HENT:      Head: Normocephalic and atraumatic  Eyes:      General: Lids are normal  No scleral icterus  Conjunctiva/sclera: Conjunctivae normal    Neck:      Musculoskeletal: Neck supple  Pulmonary:      Effort: Pulmonary effort is normal  No tachypnea, accessory muscle usage or respiratory distress  Skin:     General: Skin is dry  Findings: No rash  Neurological:      Mental Status: She is alert     Psychiatric: Behavior: Behavior normal  Behavior is cooperative

## 2021-05-04 ENCOUNTER — OFFICE VISIT (OUTPATIENT)
Dept: RHEUMATOLOGY | Facility: CLINIC | Age: 51
End: 2021-05-04
Payer: COMMERCIAL

## 2021-05-04 VITALS — HEIGHT: 67 IN | HEART RATE: 74 BPM | BODY MASS INDEX: 26.06 KG/M2 | WEIGHT: 166 LBS

## 2021-05-04 DIAGNOSIS — G89.29 CHRONIC PAIN OF BOTH KNEES: ICD-10-CM

## 2021-05-04 DIAGNOSIS — M35.00 SJOGREN'S SYNDROME, WITH UNSPECIFIED ORGAN INVOLVEMENT (HCC): Primary | ICD-10-CM

## 2021-05-04 DIAGNOSIS — D72.819 CHRONIC LEUKOPENIA: ICD-10-CM

## 2021-05-04 DIAGNOSIS — M25.562 CHRONIC PAIN OF BOTH KNEES: ICD-10-CM

## 2021-05-04 DIAGNOSIS — Z79.899 LONG-TERM USE OF PLAQUENIL: ICD-10-CM

## 2021-05-04 DIAGNOSIS — M25.561 CHRONIC PAIN OF BOTH KNEES: ICD-10-CM

## 2021-05-04 PROCEDURE — 99214 OFFICE O/P EST MOD 30 MIN: CPT | Performed by: INTERNAL MEDICINE

## 2021-09-08 ENCOUNTER — APPOINTMENT (OUTPATIENT)
Dept: LAB | Facility: CLINIC | Age: 51
End: 2021-09-08
Payer: COMMERCIAL

## 2021-09-08 ENCOUNTER — APPOINTMENT (OUTPATIENT)
Dept: LAB | Facility: CLINIC | Age: 51
End: 2021-09-08

## 2021-09-08 DIAGNOSIS — Z13.0 SCREENING FOR DEFICIENCY ANEMIA: ICD-10-CM

## 2021-09-08 DIAGNOSIS — Z13.6 SCREENING FOR CARDIOVASCULAR CONDITION: ICD-10-CM

## 2021-09-08 DIAGNOSIS — Z00.8 HEALTH EXAMINATION IN POPULATION SURVEY: ICD-10-CM

## 2021-09-08 LAB
ALBUMIN SERPL BCP-MCNC: 3.8 G/DL (ref 3.5–5)
ALP SERPL-CCNC: 74 U/L (ref 46–116)
ALT SERPL W P-5'-P-CCNC: 23 U/L (ref 12–78)
ANION GAP SERPL CALCULATED.3IONS-SCNC: 5 MMOL/L (ref 4–13)
AST SERPL W P-5'-P-CCNC: 19 U/L (ref 5–45)
BASOPHILS # BLD AUTO: 0.02 THOUSANDS/ΜL (ref 0–0.1)
BASOPHILS NFR BLD AUTO: 1 % (ref 0–1)
BILIRUB SERPL-MCNC: 0.37 MG/DL (ref 0.2–1)
BUN SERPL-MCNC: 16 MG/DL (ref 5–25)
C3 SERPL-MCNC: 106 MG/DL (ref 90–180)
C4 SERPL-MCNC: 20 MG/DL (ref 10–40)
CALCIUM SERPL-MCNC: 9.4 MG/DL (ref 8.3–10.1)
CHLORIDE SERPL-SCNC: 106 MMOL/L (ref 100–108)
CHOLEST SERPL-MCNC: 182 MG/DL (ref 50–200)
CO2 SERPL-SCNC: 28 MMOL/L (ref 21–32)
CREAT SERPL-MCNC: 0.98 MG/DL (ref 0.6–1.3)
CRP SERPL QL: <3 MG/L
EOSINOPHIL # BLD AUTO: 0.2 THOUSAND/ΜL (ref 0–0.61)
EOSINOPHIL NFR BLD AUTO: 5 % (ref 0–6)
ERYTHROCYTE [DISTWIDTH] IN BLOOD BY AUTOMATED COUNT: 12.7 % (ref 11.6–15.1)
ERYTHROCYTE [SEDIMENTATION RATE] IN BLOOD: 14 MM/HOUR (ref 0–19)
EST. AVERAGE GLUCOSE BLD GHB EST-MCNC: 100 MG/DL
GFR SERPL CREATININE-BSD FRML MDRD: 68 ML/MIN/1.73SQ M
GLUCOSE P FAST SERPL-MCNC: 79 MG/DL (ref 65–99)
HBA1C MFR BLD: 5.1 %
HCT VFR BLD AUTO: 41.8 % (ref 34.8–46.1)
HDLC SERPL-MCNC: 56 MG/DL
HGB BLD-MCNC: 13.6 G/DL (ref 11.5–15.4)
IMM GRANULOCYTES # BLD AUTO: 0.02 THOUSAND/UL (ref 0–0.2)
IMM GRANULOCYTES NFR BLD AUTO: 1 % (ref 0–2)
LDLC SERPL CALC-MCNC: 113 MG/DL (ref 0–100)
LYMPHOCYTES # BLD AUTO: 1.89 THOUSANDS/ΜL (ref 0.6–4.47)
LYMPHOCYTES NFR BLD AUTO: 44 % (ref 14–44)
MCH RBC QN AUTO: 30.6 PG (ref 26.8–34.3)
MCHC RBC AUTO-ENTMCNC: 32.5 G/DL (ref 31.4–37.4)
MCV RBC AUTO: 94 FL (ref 82–98)
MONOCYTES # BLD AUTO: 0.42 THOUSAND/ΜL (ref 0.17–1.22)
MONOCYTES NFR BLD AUTO: 10 % (ref 4–12)
NEUTROPHILS # BLD AUTO: 1.66 THOUSANDS/ΜL (ref 1.85–7.62)
NEUTS SEG NFR BLD AUTO: 39 % (ref 43–75)
NRBC BLD AUTO-RTO: 0 /100 WBCS
PLATELET # BLD AUTO: 207 THOUSANDS/UL (ref 149–390)
PMV BLD AUTO: 8.6 FL (ref 8.9–12.7)
POTASSIUM SERPL-SCNC: 4 MMOL/L (ref 3.5–5.3)
PROT SERPL-MCNC: 7.7 G/DL (ref 6.4–8.2)
RBC # BLD AUTO: 4.45 MILLION/UL (ref 3.81–5.12)
SODIUM SERPL-SCNC: 139 MMOL/L (ref 136–145)
TRIGL SERPL-MCNC: 63 MG/DL
WBC # BLD AUTO: 4.21 THOUSAND/UL (ref 4.31–10.16)

## 2021-09-08 PROCEDURE — 85025 COMPLETE CBC W/AUTO DIFF WBC: CPT | Performed by: INTERNAL MEDICINE

## 2021-09-08 PROCEDURE — 80053 COMPREHEN METABOLIC PANEL: CPT

## 2021-09-08 PROCEDURE — 83036 HEMOGLOBIN GLYCOSYLATED A1C: CPT

## 2021-09-08 PROCEDURE — 80061 LIPID PANEL: CPT

## 2021-09-08 PROCEDURE — 86160 COMPLEMENT ANTIGEN: CPT | Performed by: INTERNAL MEDICINE

## 2021-09-08 PROCEDURE — 85652 RBC SED RATE AUTOMATED: CPT | Performed by: INTERNAL MEDICINE

## 2021-09-08 PROCEDURE — 86140 C-REACTIVE PROTEIN: CPT | Performed by: INTERNAL MEDICINE

## 2021-09-08 PROCEDURE — 36415 COLL VENOUS BLD VENIPUNCTURE: CPT | Performed by: INTERNAL MEDICINE

## 2021-12-29 ENCOUNTER — TELEPHONE (OUTPATIENT)
Dept: FAMILY MEDICINE CLINIC | Facility: CLINIC | Age: 51
End: 2021-12-29

## 2021-12-29 DIAGNOSIS — B34.9 VIRAL ILLNESS: Primary | ICD-10-CM

## 2021-12-29 PROCEDURE — 87636 SARSCOV2 & INF A&B AMP PRB: CPT | Performed by: INTERNAL MEDICINE

## 2022-01-04 ENCOUNTER — TELEPHONE (OUTPATIENT)
Dept: FAMILY MEDICINE CLINIC | Facility: CLINIC | Age: 52
End: 2022-01-04

## 2022-01-04 NOTE — TELEPHONE ENCOUNTER
1/4/2022 10:27 AM Called patient regarding her positive COVID results   She is feeling better and is back to work    Julieth Leon, DO

## 2022-02-27 ENCOUNTER — IMMUNIZATIONS (OUTPATIENT)
Dept: FAMILY MEDICINE CLINIC | Facility: HOSPITAL | Age: 52
End: 2022-02-27

## 2022-02-27 DIAGNOSIS — Z23 ENCOUNTER FOR IMMUNIZATION: Primary | ICD-10-CM

## 2022-02-27 PROCEDURE — 91306 COVID-19 MODERNA VACC 0.25 ML BOOSTER: CPT

## 2022-02-27 PROCEDURE — 0064A COVID-19 MODERNA VACC 0.25 ML BOOSTER: CPT

## 2022-03-02 ENCOUNTER — TELEPHONE (OUTPATIENT)
Dept: FAMILY MEDICINE CLINIC | Facility: CLINIC | Age: 52
End: 2022-03-02

## 2022-05-20 ENCOUNTER — TELEPHONE (OUTPATIENT)
Dept: FAMILY MEDICINE CLINIC | Facility: CLINIC | Age: 52
End: 2022-05-20

## 2022-05-20 NOTE — TELEPHONE ENCOUNTER
5/20/2022 11:23 AM returned call to Bassem Haynes   Her mother is 80years old and has not been to a doctor in years  She is worried her mother has COPD and a chronic leg wound  I recommended that she get a provider that can do home visits  [facility]      Message complete  Abrahan Edwards DO

## 2022-05-20 NOTE — TELEPHONE ENCOUNTER
Dr Jennifer Johnson patient has a personal question regarding her mother  its not an emergency    Mel Goltz, MA

## 2022-07-18 NOTE — PROGRESS NOTES
Assessment and Plan:   Patient is a 32-year-old female who presents for rheumatology follow-up for Sjogren syndrome and possible overlap autoimmune disease with some features of lupus and also positive anti phospholipid antibodies  Since our last visit, she has weaned herself off Plaquenil since November last year and has not felt any significant difference or changes  Feels very well at this time with the functional medicine she is doing and also has made some dietary changes  We discussed that we will recheck some of her autoimmune labs to look for any changes but as long as she is clinically doing well I do not think she needs to necessarily go back on Plaquenil  We will continue to monitor annually but she will let me know of any sooner changes in the meantime  Plan:  Diagnoses and all orders for this visit:    Sjogren's syndrome, with unspecified organ involvement ([facility] [address] )  -     C3 complement  -     Basic metabolic panel  -     C4 complement  -     CBC  -     Protein electrophoresis, serum  -     Sedimentation rate, automated  -     C-reactive protein  -     Nuclear antigen antibody  -     Sjogren's Antibodies  -     Anti-DNA antibody, double-stranded  -     Histone Antibody  -     Lipid Panel with Direct LDL reflex  -     RF Screen w/ Reflex to Titer  -     MARKUS Screen w/ Reflex to Titer/Pattern    Undifferentiated connective tissue disease (HCC)  -     C3 complement  -     Basic metabolic panel  -     C4 complement  -     CBC  -     Protein electrophoresis, serum  -     Sedimentation rate, automated  -     C-reactive protein  -     Nuclear antigen antibody  -     Sjogren's Antibodies  -     Anti-DNA antibody, double-stranded  -     Histone Antibody  -     Lipid Panel with Direct LDL reflex  -     RF Screen w/ Reflex to Titer  -     MARKUS Screen w/ Reflex to Titer/Pattern    Chronic leukopenia    Long-term use of Plaquenil        Follow-up plan: 1 year        Rheumatic Disease Summary  1   Sjogrens syndrome, possible lupus overlap  -Established with Dr Tasha Haynes- November, 2015- 2 month hx of polyarthralgia with low grade fevers and erythema nodosum  PCP said viral and found to have +MARKUS 1:80 with low titer SSA (1 4), +RNP(59), +Histone(5 5), +RF(14 2), +Lupus Anticoagulant/+Anticardiolipin antibody; other features of hand and knee arthralgias, recurrent nasal ulcers, possible raynauds  -Dx with UCTD/ APS after repeat labs in 12 weeks reveals +Lupus anticoag/ +Anticardio  -Started on HCQ  -Intermittent use of Prednisone with improvement  -Repeat labs 2/2019: +MARKUS 640 speckled, +RF 20, +SSA>8, +cardiolipin IgM 44, +Histone 6 3, leukopenia; negative SSB, CCP, dsDNA, smith, RNP beta-2-glycoprotein, LAC, B27, normal C3/4, UA  -Visit 7/25/19: no IA on exam, B/L nasal ulcers present, suspect sjogrens/lupus overlap; offered knee injections, XRs ordered but not done   -Visit 10/24/19: offered meloxicam and knee injections, patient declined, no changes, cont plaquenil  -Visit 5/4/21: stable, reduce plaquenil to 200mg daily given weight loss  -Visit 7/19/22: off HCQ since 11/2022 without changes, doing functional medicine and feeling well   2  +Anti-cardiolipin IgM/+LAC   -On multiple occasions, no h/o clot or pregnancy issues   -+LAC x1, then negative on repeat     HPI  Karen Puga is a 50 y o   female who presents for rheumatology follow-up for Sjogren's syndrome with possible overlap autoimmune disease  Last visit, she was doing very well  We reduced her HCQ to 200mg/day based on her weight  Patient reports she is doing "great"! Reports she has been doing a functional medicine type diet with a shake she drinks in the morning  She also cut out gluten and sugar which has made a big difference in the way she feels  She weaned herself off Plaquenil and has been off of it since November last year  She has not felt any worse or different since weaning off of it  Denies any significant joint pain    No rashes  She is active and walks for exercise  Has not felt this good in a long time  Not requiring any over-the-counter medications  The following portions of the patient's history were reviewed and updated as appropriate: allergies, current medications, past family history, past medical history, past social history, past surgical history and problem list     Review of Systems:   Review of Systems   Musculoskeletal: Negative for arthralgias and joint swelling  Skin: Negative for rash  Home Medications:    Current Outpatient Medications:     ibuprofen (MOTRIN) 400 mg tablet, Take by mouth every 6 (six) hours as needed for mild pain, Disp: , Rfl:     Melatonin 5 MG TABS, Take 5 tablets (25 mg total) by mouth daily (Patient taking differently: Take 5 tablets by mouth daily as needed ), Disp: 30 tablet, Rfl: 0    Objective:    Vitals:    07/19/22 0925   BP: 120/68   Pulse: 78   SpO2: 99%   Weight: 71 3 kg (157 lb 3 2 oz)   Height: 5' 7" (1 702 m)       Physical Exam  Constitutional:       General: She is not in acute distress  Appearance: She is well-developed  HENT:      Head: Normocephalic and atraumatic  Eyes:      General: Lids are normal  No scleral icterus  Conjunctiva/sclera: Conjunctivae normal    Pulmonary:      Effort: Pulmonary effort is normal  No tachypnea, accessory muscle usage or respiratory distress  Musculoskeletal:      Cervical back: Neck supple  Comments: No joint swelling or synovitis anywhere  Skin:     General: Skin is dry  Findings: No rash  Neurological:      Mental Status: She is alert  Psychiatric:         Behavior: Behavior normal  Behavior is cooperative         Labs:   Component      Latest Ref Rng & Units 9/8/2021   WBC      4 31 - 10 16 Thousand/uL 4 21 (L)   Red Blood Cell Count      3 81 - 5 12 Million/uL 4 45   Hemoglobin      11 5 - 15 4 g/dL 13 6   HCT      34 8 - 46 1 % 41 8   MCV      82 - 98 fL 94   MCH      26 8 - 34 3 pg 30 6 MCHC      31 4 - 37 4 g/dL 32 5   RDW      11 6 - 15 1 % 12 7   MPV      8 9 - 12 7 fL 8 6 (L)   Platelet Count      857 - 390 Thousands/uL 207   nRBC      /100 WBCs 0   Neutrophils %      43 - 75 % 39 (L)   Immat GRANS %      0 - 2 % 1   Lymphocytes Relative      14 - 44 % 44   Monocytes Relative      4 - 12 % 10   Eosinophils      0 - 6 % 5   Basophils Relative      0 - 1 % 1   Absolute Neutrophils      1 85 - 7 62 Thousands/µL 1 66 (L)   Immature Grans Absolute      0 00 - 0 20 Thousand/uL 0 02   Lymphocytes Absolute      0 60 - 4 47 Thousands/µL 1 89   Absolute Monocytes      0 17 - 1 22 Thousand/µL 0 42   Absolute Eosinophils      0 00 - 0 61 Thousand/µL 0 20   Basophils Absolute      0 00 - 0 10 Thousands/µL 0 02   Sodium      136 - 145 mmol/L 139   Potassium      3 5 - 5 3 mmol/L 4 0   Chloride      100 - 108 mmol/L 106   CO2      21 - 32 mmol/L 28   Anion Gap      4 - 13 mmol/L 5   BUN      5 - 25 mg/dL 16   Creatinine      0 60 - 1 30 mg/dL 0 98   GLUCOSE FASTING      65 - 99 mg/dL 79   Calcium      8 3 - 10 1 mg/dL 9 4   AST      5 - 45 U/L 19   ALT      12 - 78 U/L 23   Alkaline Phosphatase      46 - 116 U/L 74   Total Protein      6 4 - 8 2 g/dL 7 7   Albumin      3 5 - 5 0 g/dL 3 8   TOTAL BILIRUBIN      0 20 - 1 00 mg/dL 0 37   eGFR      ml/min/1 73sq m 68   C-REACTIVE PROTEIN      <3 0 mg/L <3 0   Sed Rate      0 - 19 mm/hour 14   C4, COMPLEMENT      10 0 - 40 0 mg/dL 20 0   C3 Complement      90 0 - 180 0 mg/dL 106 0

## 2022-07-19 ENCOUNTER — OFFICE VISIT (OUTPATIENT)
Dept: RHEUMATOLOGY | Facility: CLINIC | Age: 52
End: 2022-07-19
Payer: COMMERCIAL

## 2022-07-19 VITALS
OXYGEN SATURATION: 99 % | HEIGHT: 67 IN | WEIGHT: 157.2 LBS | BODY MASS INDEX: 24.67 KG/M2 | SYSTOLIC BLOOD PRESSURE: 120 MMHG | HEART RATE: 78 BPM | DIASTOLIC BLOOD PRESSURE: 68 MMHG

## 2022-07-19 DIAGNOSIS — D72.819 CHRONIC LEUKOPENIA: ICD-10-CM

## 2022-07-19 DIAGNOSIS — M35.9 UNDIFFERENTIATED CONNECTIVE TISSUE DISEASE (HCC): ICD-10-CM

## 2022-07-19 DIAGNOSIS — M35.00 SJOGREN'S SYNDROME, WITH UNSPECIFIED ORGAN INVOLVEMENT (HCC): Primary | ICD-10-CM

## 2022-07-19 DIAGNOSIS — Z79.899 LONG-TERM USE OF PLAQUENIL: ICD-10-CM

## 2022-07-19 PROCEDURE — 99214 OFFICE O/P EST MOD 30 MIN: CPT | Performed by: INTERNAL MEDICINE

## 2022-08-05 NOTE — TELEPHONE ENCOUNTER
3/2/2022 1:17 PM Called Bruce Presser     She took her [address] [facility] booster on 2/27/22  She is feeling better today, she still has pain in her arm  She also had heart palpitations and felt like she had a panic attack that lasted 15 minutes       Message complete  Julieth Leon, DO
DR PLATA    Patient would like you to call her tomorrow  She said she has some concerns and reactions to the covid booster 
Spoke with pt to get more information regarding her concerns and reactions  Pt states on Sunday AM she received the [employer] [place] booster  Her first two vaccines were the [facility] [facility]  Sunday afternoon she started with arm pain  She works evenings and around 1:00 pm she had to call out of work because her body aches were so bad she was barely able to walk  She also states that she had a swollen left axillary gland  She was also experiencing fever, chills, which she knows are normal  Monday she had chills, fever, body aches, dizziness, nausea  Yesterday she experienced what she believes was a panic attack  , she had palpitations, sweats, nauseated, and an overwhelming feeling  She was able to calm herself down  Today she feels better but still has the arm pain  She would like to speak with you regarding getting an exemption for any future boosters  She does not work on a covid floor, nor will she have to float to a covid floor  I advised pt that if she experiences the palpations again to go to the ER for an evaluation just to be certain this is not a heart related issue  Pt agreeable   Isha Benedict, COURTNEY
S/P BKA (below knee amputation), right

## 2022-08-30 ENCOUNTER — APPOINTMENT (OUTPATIENT)
Dept: RADIOLOGY | Facility: CLINIC | Age: 52
End: 2022-08-30
Payer: COMMERCIAL

## 2022-08-30 ENCOUNTER — OFFICE VISIT (OUTPATIENT)
Dept: URGENT CARE | Facility: CLINIC | Age: 52
End: 2022-08-30
Payer: COMMERCIAL

## 2022-08-30 VITALS
OXYGEN SATURATION: 99 % | WEIGHT: 154.8 LBS | BODY MASS INDEX: 24.3 KG/M2 | SYSTOLIC BLOOD PRESSURE: 118 MMHG | TEMPERATURE: 97.8 F | HEIGHT: 67 IN | RESPIRATION RATE: 18 BRPM | DIASTOLIC BLOOD PRESSURE: 80 MMHG | HEART RATE: 100 BPM

## 2022-08-30 DIAGNOSIS — S39.92XA INJURY OF COCCYX, INITIAL ENCOUNTER: ICD-10-CM

## 2022-08-30 DIAGNOSIS — S39.92XA INJURY OF COCCYX, INITIAL ENCOUNTER: Primary | ICD-10-CM

## 2022-08-30 PROCEDURE — 72220 X-RAY EXAM SACRUM TAILBONE: CPT

## 2022-08-30 PROCEDURE — 99213 OFFICE O/P EST LOW 20 MIN: CPT | Performed by: PHYSICIAN ASSISTANT

## 2022-08-30 RX ORDER — CYCLOBENZAPRINE HCL 10 MG
10 TABLET ORAL
Qty: 14 TABLET | Refills: 0 | Status: SHIPPED | OUTPATIENT
Start: 2022-08-30

## 2022-08-30 RX ORDER — CHOLECALCIFEROL (VITAMIN D3) 125 MCG
2000 CAPSULE ORAL DAILY
COMMUNITY

## 2022-08-30 NOTE — PATIENT INSTRUCTIONS
My interpretation of x-ray is no acute fracture or dislocation, official read is pending  Can use Flexeril as needed at bedtime  Continue over-the-counter ibuprofen or Tylenol and avoidance of aggravating factors while symptoms of pain are present  Provided referral to orthopedics if symptoms are not resolved in next 1-2 weeks

## 2022-08-30 NOTE — PROGRESS NOTES
[facility]        NAME: Armando Cantu is a 50 y o  female  : 1970    MRN: [id number]  DATE: 2022  TIME: 4:16 PM    Assessment and Plan   Injury of coccyx, initial encounter [S39 92XA]  1  Injury of coccyx, initial encounter  XR sacrum and coccyx         Patient Instructions   There are no Patient Instructions on file for this visit  Follow up with PCP in 3-5 days  Proceed to  ER if symptoms worsen  Chief Complaint     Chief Complaint   Patient presents with    Tailbone Pain     Fell 2 weeks ago - onto buttock  - tripped backward helping Mother  Continues with continuous throbbing and occ  Sharp pain at sacrum  Worse when sitting, squatting, laying  Used moist heat  No swelling/bruising            History of Present Illness       HPI    Review of Systems   Review of Systems      Current Medications       Current Outpatient Medications:     Cholecalciferol (Vitamin D3) 50 MCG ( UT) TABS, Take 2,000 Units by mouth daily, Disp: , Rfl:     COD LIVER OIL PO, Take by mouth daily after breakfast, Disp: , Rfl:     ibuprofen (MOTRIN) 400 mg tablet, Take by mouth every 6 (six) hours as needed for mild pain, Disp: , Rfl:     Melatonin 5 MG TABS, Take 5 tablets (25 mg total) by mouth daily (Patient taking differently: Take 5 tablets by mouth daily as needed), Disp: 30 tablet, Rfl: 0    Current Allergies     Allergies as of 2022 - Reviewed 2022   Allergen Reaction Noted    Covid-19 mrna vacc (moderna) Palpitations 2022    Levaquin [levofloxacin] Other (See Comments) 2017    Reglan [metoclopramide] Other (See Comments) 2022    Amoxil [amoxicillin] Rash 2016            The following portions of the patient's history were reviewed and updated as appropriate: allergies, current medications, past family history, past medical history, past social history, past surgical history and problem list      Past Medical History:   Diagnosis Date    Abnormal menstrual periods     Resolved 12/18/2017     Abnormal uterine bleeding     Resolved 12/18/2017     [facility]ardiolipin [facility] ([facility] [address] [facility]     Arthropathy, multiple sites     Resolved 1/29/2016     Erythema nodosum     Resolved 5/1/2017     Herniated lumbar intervertebral disc     [facility] connective tissue disease ([facility] [address] [facility]     Mixed connective tissue disease ([facility] [address] [facility]     Skin lesion     Resolved 1/29/2016        Past Surgical History:   Procedure Laterality Date    HYSTERECTOMY  12/2017    NH CYSTOURETHROSCOPY N/A 12/11/2017    Procedure: CYSTOSCOPY;  Surgeon: Amanda Cheek MD;  [facility];  Service: Gynecology    NH LAP,VAG HYST,UTERUS 250GMS/<,SALP-OOPH N/A 12/11/2017    Procedure: LAPAROSCOPIC ASSISTED VAGINAL HYSTERECTOMY; BILATERAL SALPINGECTOMY;  Surgeon: Amanda Cheek MD;  [facility];  Service: Gynecology       Family History   Problem Relation Age of Onset    Gout Father     Liver cancer Father 76    Diabetes Brother     Rheum arthritis Maternal Grandmother     Lupus Cousin     Sjogren's syndrome Family     Lupus Family     Thyroid disease Family     Heart disease Family     No Known Problems Sister     No Known Problems Daughter     No Known Problems Daughter     No Known Problems Daughter          Medications have been verified          Objective   /80   Pulse 100   Temp 97 8 °F (36 6 °C)   Resp 18   Ht 5' 7" (1 702 m)   Wt 70 2 kg (154 lb 12 8 oz)   LMP 12/01/2017   SpO2 99%   BMI 24 25 kg/m²        Physical Exam     Physical Exam

## 2022-08-30 NOTE — PROGRESS NOTES
[facility] [facility] [facility]        NAME: Bety Ervin is a 50 y o  female  : 1970    MRN: [id number]  DATE: 2022  TIME: 5:04 PM    Assessment and Plan   Injury of coccyx, initial encounter [S39 92XA]  1  Injury of coccyx, initial encounter  XR sacrum and coccyx    cyclobenzaprine (FLEXERIL) 10 mg tablet    Ambulatory referral to Orthopedic Surgery         Patient Instructions     Patient Instructions   My interpretation of x-ray is no acute fracture or dislocation, official read is pending  Can use Flexeril as needed at bedtime  Continue over-the-counter ibuprofen or Tylenol and avoidance of aggravating factors while symptoms of pain are present  Provided referral to orthopedics if symptoms are not resolved in next 1-2 weeks  Follow up with PCP in 3-5 days  Proceed to  ER if symptoms worsen  Chief Complaint     Chief Complaint   Patient presents with    Tailbone Pain     Fell 2 weeks ago - onto buttock  - tripped backward helping Mother  Continues with continuous throbbing and occ  Sharp pain at sacrum  Worse when sitting, squatting, laying  Used moist heat  No swelling/bruising  History of Present Illness       Patient is a 61-year-old female presenting today with tailbone pain times 2 weeks  Patient notes a couple weeks ago while helping her mother, states while assisting her mother with her clubbing that she lost her balance falling backwards on to carpeted floor from her feet landing on her tailbone and rolling on her back and hitting the back of her head against the carpet, notes that since the incident she has been experiencing pain and discomfort at the base of her tailbone, did ice the area and has been taking over-the-counter ibuprofen and Tylenol but notes she is still experiencing pain and discomfort at the area, notes the pain is made worse at the end of the day after long periods of standing and ambulating    Denies any bruising or swelling to the area, denies LOC, nausea, vomiting, saddle anesthesia, urinary or bowel incontinence  Review of Systems   Review of Systems   Constitutional: Negative for chills and fever  HENT: Negative for ear pain and sore throat  Eyes: Negative for pain and visual disturbance  Respiratory: Negative for cough and shortness of breath  Cardiovascular: Negative for chest pain and palpitations  Gastrointestinal: Negative for abdominal pain and vomiting  Genitourinary: Negative for dysuria and hematuria  Musculoskeletal: Positive for back pain  Skin: Negative for color change and rash  Neurological: Negative for seizures and syncope  All other systems reviewed and are negative          Current Medications       Current Outpatient Medications:     Cholecalciferol (Vitamin D3) 50 MCG (2000 UT) TABS, Take 2,000 Units by mouth daily, Disp: , Rfl:     COD LIVER OIL PO, Take by mouth daily after breakfast, Disp: , Rfl:     cyclobenzaprine (FLEXERIL) 10 mg tablet, Take 1 tablet (10 mg total) by mouth daily at bedtime, Disp: 14 tablet, Rfl: 0    ibuprofen (MOTRIN) 400 mg tablet, Take by mouth every 6 (six) hours as needed for mild pain, Disp: , Rfl:     Melatonin 5 MG TABS, Take 5 tablets (25 mg total) by mouth daily (Patient taking differently: Take 5 tablets by mouth daily as needed), Disp: 30 tablet, Rfl: 0    Current Allergies     Allergies as of 08/30/2022 - Reviewed 08/30/2022   Allergen Reaction Noted    Covid-19 mrna vacc (moderna) Palpitations 03/02/2022    Levaquin [levofloxacin] Other (See Comments) 02/06/2017    Reglan [metoclopramide] Other (See Comments) 08/30/2022    Amoxil [amoxicillin] Rash 04/24/2016            The following portions of the patient's history were reviewed and updated as appropriate: allergies, current medications, past family history, past medical history, past social history, past surgical history and problem list      Past Medical History:   Diagnosis Date    Abnormal menstrual periods     Resolved 12/18/2017     Abnormal uterine bleeding     Resolved 12/18/2017     [facility]iolipin syndrome ([facility] [address]     Arthropathy, multiple sites     Resolved 1/29/2016     Erythema nodosum     Resolved 5/1/2017     Herniated lumbar intervertebral disc     Mixed connective tissue disease ([facility] [address] [facility]     Mixed connective tissue disease ([facility] [address] [facility]     Skin lesion     Resolved 1/29/2016        Past Surgical History:   Procedure Laterality Date    HYSTERECTOMY  12/2017    AL CYSTOURETHROSCOPY N/A 12/11/2017    Procedure: Balwinder Shaw;  Surgeon: Angela Alexander MD;  [facility];  Service: Gynecology    AL LAP,VAG HYST,UTERUS 250GMS/<,SALP-OOPH N/A 12/11/2017    Procedure: LAPAROSCOPIC ASSISTED VAGINAL HYSTERECTOMY; BILATERAL SALPINGECTOMY;  Surgeon: Angela Alexander MD;  [facility];  Service: Gynecology       Family History   Problem Relation Age of Onset    Gout Father     Liver cancer Father 76    Diabetes Brother     Rheum arthritis Maternal Grandmother     Lupus Cousin     Sjogren's syndrome Family     Lupus Family     Thyroid disease Family     Heart disease Family     No Known Problems Sister     No Known Problems Daughter     No Known Problems Daughter     No Known Problems Daughter          Medications have been verified  Objective   /80   Pulse 100   Temp 97 8 °F (36 6 °C)   Resp 18   Ht 5' 7" (1 702 m)   Wt 70 2 kg (154 lb 12 8 oz)   LMP 12/01/2017   SpO2 99%   BMI 24 25 kg/m²        Physical Exam     Physical Exam  Vitals reviewed  Constitutional:       General: She is not in acute distress  Appearance: Normal appearance  She is not ill-appearing  HENT:      Head: Normocephalic and atraumatic        Right Ear: Tympanic membrane, ear canal and external ear normal       Left Ear: Tympanic membrane, ear canal and external ear normal       Nose: Nose normal       Mouth/Throat:      Mouth: Mucous membranes are moist       Pharynx: Oropharynx is clear  Eyes:      Extraocular Movements: Extraocular movements intact  Pupils: Pupils are equal, round, and reactive to light  Cardiovascular:      Rate and Rhythm: Normal rate  Pulses: Normal pulses  Pulmonary:      Effort: Pulmonary effort is normal    Musculoskeletal:      Comments: No obvious deformity of low back, no bruising, no swelling, mild TTP to base of tailbone, no lumbar spinous process tenderness, full ROM of lower extremities bilaterally, normal strength of lower extremities bilaterally, gross sensation intact, 2+ distal pulses  Skin:     General: Skin is warm  Capillary Refill: Capillary refill takes less than 2 seconds  Neurological:      Mental Status: She is alert

## 2022-09-10 ENCOUNTER — APPOINTMENT (OUTPATIENT)
Dept: LAB | Facility: CLINIC | Age: 52
End: 2022-09-10
Payer: COMMERCIAL

## 2022-09-10 DIAGNOSIS — Z00.8 HEALTH EXAMINATION IN POPULATION SURVEY: ICD-10-CM

## 2022-09-10 LAB
ANION GAP SERPL CALCULATED.3IONS-SCNC: 5 MMOL/L (ref 4–13)
BUN SERPL-MCNC: 11 MG/DL (ref 5–25)
C3 SERPL-MCNC: 103 MG/DL (ref 90–180)
C4 SERPL-MCNC: 24 MG/DL (ref 10–40)
CALCIUM SERPL-MCNC: 9.2 MG/DL (ref 8.4–10.2)
CHLORIDE SERPL-SCNC: 104 MMOL/L (ref 96–108)
CHOLEST SERPL-MCNC: 152 MG/DL
CO2 SERPL-SCNC: 27 MMOL/L (ref 21–32)
CREAT SERPL-MCNC: 0.8 MG/DL (ref 0.6–1.3)
CRP SERPL QL: 1.5 MG/L
ERYTHROCYTE [DISTWIDTH] IN BLOOD BY AUTOMATED COUNT: 12.7 % (ref 11.6–15.1)
ERYTHROCYTE [SEDIMENTATION RATE] IN BLOOD: 46 MM/HOUR (ref 0–29)
EST. AVERAGE GLUCOSE BLD GHB EST-MCNC: 103 MG/DL
GFR SERPL CREATININE-BSD FRML MDRD: 86 ML/MIN/1.73SQ M
GLUCOSE P FAST SERPL-MCNC: 84 MG/DL (ref 65–99)
HBA1C MFR BLD: 5.2 %
HCT VFR BLD AUTO: 40.8 % (ref 34.8–46.1)
HDLC SERPL-MCNC: 47 MG/DL
HGB BLD-MCNC: 13.8 G/DL (ref 11.5–15.4)
LDLC SERPL CALC-MCNC: 89 MG/DL (ref 0–100)
MCH RBC QN AUTO: 30.5 PG (ref 26.8–34.3)
MCHC RBC AUTO-ENTMCNC: 33.8 G/DL (ref 31.4–37.4)
MCV RBC AUTO: 90 FL (ref 82–98)
PLATELET # BLD AUTO: 191 THOUSANDS/UL (ref 149–390)
PMV BLD AUTO: 9.2 FL (ref 8.9–12.7)
POTASSIUM SERPL-SCNC: 4 MMOL/L (ref 3.5–5.3)
RBC # BLD AUTO: 4.53 MILLION/UL (ref 3.81–5.12)
SODIUM SERPL-SCNC: 136 MMOL/L (ref 135–147)
TRIGL SERPL-MCNC: 79 MG/DL
WBC # BLD AUTO: 4.77 THOUSAND/UL (ref 4.31–10.16)

## 2022-09-10 PROCEDURE — 84165 PROTEIN E-PHORESIS SERUM: CPT | Performed by: INTERNAL MEDICINE

## 2022-09-10 PROCEDURE — 86431 RHEUMATOID FACTOR QUANT: CPT | Performed by: INTERNAL MEDICINE

## 2022-09-10 PROCEDURE — 80048 BASIC METABOLIC PNL TOTAL CA: CPT | Performed by: INTERNAL MEDICINE

## 2022-09-10 PROCEDURE — 86430 RHEUMATOID FACTOR TEST QUAL: CPT | Performed by: INTERNAL MEDICINE

## 2022-09-10 PROCEDURE — 85652 RBC SED RATE AUTOMATED: CPT | Performed by: INTERNAL MEDICINE

## 2022-09-10 PROCEDURE — 85027 COMPLETE CBC AUTOMATED: CPT | Performed by: INTERNAL MEDICINE

## 2022-09-10 PROCEDURE — 86235 NUCLEAR ANTIGEN ANTIBODY: CPT | Performed by: INTERNAL MEDICINE

## 2022-09-10 PROCEDURE — 86140 C-REACTIVE PROTEIN: CPT | Performed by: INTERNAL MEDICINE

## 2022-09-10 PROCEDURE — 86160 COMPLEMENT ANTIGEN: CPT | Performed by: INTERNAL MEDICINE

## 2022-09-10 PROCEDURE — 80061 LIPID PANEL: CPT | Performed by: INTERNAL MEDICINE

## 2022-09-10 PROCEDURE — 86225 DNA ANTIBODY NATIVE: CPT | Performed by: INTERNAL MEDICINE

## 2022-09-10 PROCEDURE — 86039 ANTINUCLEAR ANTIBODIES (ANA): CPT | Performed by: INTERNAL MEDICINE

## 2022-09-10 PROCEDURE — 36415 COLL VENOUS BLD VENIPUNCTURE: CPT | Performed by: INTERNAL MEDICINE

## 2022-09-10 PROCEDURE — 86334 IMMUNOFIX E-PHORESIS SERUM: CPT | Performed by: INTERNAL MEDICINE

## 2022-09-10 PROCEDURE — 86038 ANTINUCLEAR ANTIBODIES: CPT | Performed by: INTERNAL MEDICINE

## 2022-09-10 PROCEDURE — 83036 HEMOGLOBIN GLYCOSYLATED A1C: CPT

## 2022-09-12 DIAGNOSIS — R76.8 RHEUMATOID FACTOR POSITIVE: ICD-10-CM

## 2022-09-12 DIAGNOSIS — M35.00 SJOGREN'S SYNDROME, WITH UNSPECIFIED ORGAN INVOLVEMENT (HCC): Primary | ICD-10-CM

## 2022-09-12 LAB
ANA HOMOGEN SER QL IF: NORMAL
ANA HOMOGEN TITR SER: NORMAL {TITER}
CRYOGLOB RF SER-ACNC: ABNORMAL [IU]/ML
DSDNA AB SER-ACNC: 1 IU/ML (ref 0–9)
ENA RNP AB SER-ACNC: <0.2 AI (ref 0–0.9)
ENA SM AB SER-ACNC: <0.2 AI (ref 0–0.9)
ENA SS-A AB SER-ACNC: >8 AI (ref 0–0.9)
ENA SS-B AB SER-ACNC: 0.4 AI (ref 0–0.9)
RHEUMATOID FACT SER QL LA: POSITIVE
RYE IGE QN: POSITIVE

## 2022-09-13 LAB
ALBUMIN SERPL ELPH-MCNC: 4.49 G/DL (ref 3.5–5)
ALBUMIN SERPL ELPH-MCNC: 53.4 % (ref 52–65)
ALPHA1 GLOB SERPL ELPH-MCNC: 0.29 G/DL (ref 0.1–0.4)
ALPHA1 GLOB SERPL ELPH-MCNC: 3.4 % (ref 2.5–5)
ALPHA2 GLOB SERPL ELPH-MCNC: 0.81 G/DL (ref 0.4–1.2)
ALPHA2 GLOB SERPL ELPH-MCNC: 9.7 % (ref 7–13)
BETA GLOB ABNORMAL SERPL ELPH-MCNC: 0.36 G/DL (ref 0.4–0.8)
BETA1 GLOB SERPL ELPH-MCNC: 4.3 % (ref 5–13)
BETA2 GLOB SERPL ELPH-MCNC: 5.7 % (ref 2–8)
BETA2+GAMMA GLOB SERPL ELPH-MCNC: 0.48 G/DL (ref 0.2–0.5)
GAMMA GLOB ABNORMAL SERPL ELPH-MCNC: 1.97 G/DL (ref 0.5–1.6)
GAMMA GLOB SERPL ELPH-MCNC: 23.5 % (ref 12–22)
HISTONE IGG SER IA-ACNC: 6.6 UNITS (ref 0–0.9)
IGG/ALB SER: 1.15 {RATIO} (ref 1.1–1.8)
INTERPRETATION UR IFE-IMP: NORMAL
PROT PATTERN SERPL ELPH-IMP: ABNORMAL
PROT SERPL-MCNC: 8.4 G/DL (ref 6.4–8.2)

## 2022-09-13 PROCEDURE — 86334 IMMUNOFIX E-PHORESIS SERUM: CPT | Performed by: PATHOLOGY

## 2022-09-13 PROCEDURE — 84165 PROTEIN E-PHORESIS SERUM: CPT | Performed by: PATHOLOGY

## 2022-11-04 ENCOUNTER — TELEPHONE (OUTPATIENT)
Dept: FAMILY MEDICINE CLINIC | Facility: CLINIC | Age: 52
End: 2022-11-04

## 2022-11-04 DIAGNOSIS — Z12.31 BREAST CANCER SCREENING BY MAMMOGRAM: Primary | ICD-10-CM

## 2022-11-05 ENCOUNTER — HOSPITAL ENCOUNTER (OUTPATIENT)
Dept: MAMMOGRAPHY | Facility: HOSPITAL | Age: 52
Discharge: HOME | End: 2022-11-05

## 2022-11-05 DIAGNOSIS — Z12.31 BREAST CANCER SCREENING BY MAMMOGRAM: ICD-10-CM

## 2022-11-05 DIAGNOSIS — Z12.31 ENCOUNTER FOR SCREENING MAMMOGRAM FOR MALIGNANT NEOPLASM OF BREAST: ICD-10-CM

## 2022-12-28 ENCOUNTER — OFFICE VISIT (OUTPATIENT)
Dept: FAMILY MEDICINE CLINIC | Facility: CLINIC | Age: 52
End: 2022-12-28

## 2022-12-28 VITALS
OXYGEN SATURATION: 99 % | HEIGHT: 67 IN | BODY MASS INDEX: 25.11 KG/M2 | SYSTOLIC BLOOD PRESSURE: 122 MMHG | RESPIRATION RATE: 18 BRPM | WEIGHT: 160 LBS | DIASTOLIC BLOOD PRESSURE: 70 MMHG | TEMPERATURE: 97.2 F | HEART RATE: 72 BPM

## 2022-12-28 DIAGNOSIS — Z13.6 SCREENING FOR CARDIOVASCULAR CONDITION: ICD-10-CM

## 2022-12-28 DIAGNOSIS — N91.2 ABSENT MENSES: ICD-10-CM

## 2022-12-28 DIAGNOSIS — R25.2 MUSCLE CRAMP: ICD-10-CM

## 2022-12-28 DIAGNOSIS — Z00.00 ANNUAL PHYSICAL EXAM: Primary | ICD-10-CM

## 2022-12-28 DIAGNOSIS — L98.9 SKIN LESION: ICD-10-CM

## 2022-12-28 DIAGNOSIS — Z12.11 COLON CANCER SCREENING: ICD-10-CM

## 2022-12-28 DIAGNOSIS — Z13.0 SCREENING FOR DEFICIENCY ANEMIA: ICD-10-CM

## 2022-12-28 DIAGNOSIS — Z13.1 DIABETES MELLITUS SCREENING: ICD-10-CM

## 2022-12-28 PROBLEM — Z79.899 LONG-TERM USE OF PLAQUENIL: Status: RESOLVED | Noted: 2019-07-25 | Resolved: 2022-12-28

## 2022-12-28 NOTE — PATIENT INSTRUCTIONS

## 2022-12-28 NOTE — PROGRESS NOTES
[id number]    NAME: Brooklynn Diane  AGE: 50 y o  SEX: female  : 1970     DATE: 2022     Assessment and Plan:     Problem List Items Addressed This Visit    None  Visit Diagnoses     Annual physical exam    -  Primary    Diabetes mellitus screening        Relevant Orders    Hemoglobin A1C    Screening for cardiovascular condition        Relevant Orders    Comprehensive metabolic panel    Lipid Panel with Direct LDL reflex    Screening for deficiency anemia        Relevant Orders    CBC    Absent menses        Relevant Orders    FSH and LH    Colon cancer screening        Relevant Orders    Ambulatory referral for colonoscopy    Skin lesion        Relevant Orders    Ambulatory referral to Dermatology    Muscle cramp        Relevant Orders    Magnesium          Immunizations and preventive care screenings were discussed with patient today  Appropriate education was printed on patient's after visit summary  Counseling:  Exercise: the importance of regular exercise/physical activity was discussed  Recommend exercise 3-5 times per week for at least 30 minutes  Shingrix recommended        Return in about 1 year (around 2023) for Annual physical      Chief Complaint:     Chief Complaint   Patient presents with   • Physical Exam     [id number]      History of Present Illness:     Adult Annual Physical   Patient here for a comprehensive physical exam  The patient reports That she has been following with rheumatology regularly  She has a lesion on her left thigh that she is concerned about would like to see a dermatologist   She is also interested in getting her hormone levels checked  She a hysterectomy but feels like she still may be ovulating  Diet and Physical Activity  Diet/Nutrition: well balanced diet  Exercise: walking        Depression Screening  PHQ-2/9 Depression Screening    Little interest or pleasure in doing things: 0 - not at all  Feeling down, depressed, or hopeless: 0 - not at all  PHQ-2 Score: 0  PHQ-2 Interpretation: Negative depression screen       General Health  Sleep: takes a melatonin  Hearing: decreased - bilateral   Vision: wears glasses  Dental: regular dental visits  /GYN Health  hysterectomy     Review of Systems:     Review of Systems   Constitutional: Negative  Respiratory: Negative  Cardiovascular: Negative         Past Medical History:     Past Medical History:   Diagnosis Date   • Abnormal menstrual periods     Resolved 12/18/2017    • Abnormal uterine bleeding     Resolved 12/18/2017    • Anticardiolipin syndrome ([facility] [address] )    • Arthropathy, multiple sites     Resolved 1/29/2016    • Erythema nodosum     Resolved 5/1/2017    • Herniated lumbar intervertebral disc    • Mixed connective tissue disease (HCC)    • Mixed connective tissue disease ([facility] [address] )    • Skin lesion     Resolved 1/29/2016       Past Surgical History:     Past Surgical History:   Procedure Laterality Date   • HYSTERECTOMY  12/2017   • OK CYSTOURETHROSCOPY N/A 12/11/2017    Procedure: CYSTOSCOPY;  Surgeon: Jacquelin Quigley MD;  [facility];  Service: Gynecology   • OK LAPS W/VAG HYSTERECT 250 GM/&RMVL TUBE&/OVARIES N/A 12/11/2017    Procedure: LAPAROSCOPIC ASSISTED VAGINAL HYSTERECTOMY; BILATERAL SALPINGECTOMY;  Surgeon: Jacquelin Quigley MD;  [facility];  Service: Gynecology      Social History:     Social History     Socioeconomic History   • Marital status: /Civil Union     Spouse name: None   • Number of children: None   • Years of education: None   • Highest education level: None   Occupational History   • None   Tobacco Use   • Smoking status: Never   • Smokeless tobacco: Never   Vaping Use   • Vaping Use: Never used   Substance and Sexual Activity   • Alcohol use: Not Currently   • Drug use: No   • Sexual activity: None   Other Topics Concern   • None   Social History Narrative • None     Social Determinants of Health     Financial Resource Strain: Not on file   Food Insecurity: Not on file   Transportation Needs: Not on file   Physical Activity: Not on file   Stress: Not on file   Social Connections: Not on file   Intimate Partner Violence: Not on file   Housing Stability: Not on file      Family History:     Family History   Problem Relation Age of Onset   • Gout Father    • Liver cancer Father 76   • No Known Problems Sister    • No Known Problems Daughter    • No Known Problems Daughter    • No Known Problems Daughter    • Rheum arthritis Maternal Grandmother    • Diabetes Brother    • No Known Problems Brother    • No Known Problems Brother    • Lupus Cousin    • Sjogren's syndrome Family    • Lupus Family    • Thyroid disease Family    • Heart disease Family       Current Medications:     Current Outpatient Medications   Medication Sig Dispense Refill   • Cholecalciferol (Vitamin D3) 50 MCG (2000 UT) TABS Take 2,000 Units by mouth daily     • COD LIVER OIL PO Take by mouth daily after breakfast     • ibuprofen (MOTRIN) 400 mg tablet Take by mouth every 6 (six) hours as needed for mild pain     • Melatonin 5 MG TABS Take 5 tablets (25 mg total) by mouth daily (Patient taking differently: Take 5 tablets by mouth daily as needed) 30 tablet 0     No current facility-administered medications for this visit  Allergies: Allergies   Allergen Reactions   • Covid-19 Mrna Vacc (Moderna) Palpitations     Felt sick and dizzy for days    • Levaquin [Levofloxacin] Other (See Comments)     Tendon tears/ tendon pain   • Reglan [Metoclopramide] Other (See Comments)     Severe agitation   • Amoxil [Amoxicillin] Rash      Physical Exam:     /70   Pulse 72   Temp (!) 97 2 °F (36 2 °C)   Resp 18   Ht 5' 6 5" (1 689 m)   Wt 72 6 kg (160 lb)   LMP 12/01/2017   SpO2 99%   BMI 25 44 kg/m²     Physical Exam  Vitals and nursing note reviewed     Constitutional:       Appearance: She is well-developed  HENT:      Head: Normocephalic and atraumatic  Right Ear: External ear normal       Left Ear: External ear normal       Nose: Nose normal    Cardiovascular:      Rate and Rhythm: Normal rate and regular rhythm  Heart sounds: Normal heart sounds  No murmur heard  No friction rub  Pulmonary:      Effort: No respiratory distress  Breath sounds: Normal breath sounds  No wheezing or rales  Abdominal:      Palpations: Abdomen is soft  Tenderness: There is no abdominal tenderness  Musculoskeletal:      Right lower leg: No edema  Left lower leg: No edema  Neurological:      Mental Status: She is oriented to person, place, and time  Cranial Nerves: No cranial nerve deficit          Vision Screening    Right eye Left eye Both eyes   Without correction 20/30 20/200 20/20   With correction        Komal Henderson, [address]

## 2023-07-08 ENCOUNTER — TELEPHONE (OUTPATIENT)
Dept: FAMILY MEDICINE CLINIC | Facility: CLINIC | Age: 53
End: 2023-07-08

## 2023-07-08 DIAGNOSIS — L98.9 SKIN LESION: Primary | ICD-10-CM

## 2023-07-08 NOTE — TELEPHONE ENCOUNTER
Pt left message asking for call back from Dr. Rosalia Delgadillo. She has a suspicious mole on leg and cannot get into dermatology until next year. Would like a surgeon referral and would like to discuss an issue with her daughter.

## 2023-07-10 NOTE — TELEPHONE ENCOUNTER
7/10/2023 8:22 AM returned call to [facility]     The skin lesion that she is concerned about is making her more worried. She does not want to wait until 2024 to have it removed.   Referred to local surgeon for further evaluation    Message complete  Isabella Castellon,

## 2023-07-27 ENCOUNTER — CONSULT (OUTPATIENT)
Dept: SURGERY | Facility: CLINIC | Age: 53
End: 2023-07-27
Payer: COMMERCIAL

## 2023-07-27 VITALS
TEMPERATURE: 97.9 F | DIASTOLIC BLOOD PRESSURE: 69 MMHG | BODY MASS INDEX: 24.42 KG/M2 | SYSTOLIC BLOOD PRESSURE: 103 MMHG | WEIGHT: 155.6 LBS | HEIGHT: 67 IN | OXYGEN SATURATION: 98 % | HEART RATE: 80 BPM

## 2023-07-27 DIAGNOSIS — L98.9 SKIN LESION: ICD-10-CM

## 2023-07-27 PROCEDURE — 99203 OFFICE O/P NEW LOW 30 MIN: CPT | Performed by: SURGERY

## 2023-07-27 NOTE — PROGRESS NOTES
[facility] History and Physical Note:    Assessment & Plan:    1. Left lateral thigh lesion with recent changes and intermittently causing some discomfort. We will excise in the minor procedure room. 2.  Left posterior shoulder macular lesion not worrisome.     Pertinent labs reviewed  Reviewed the CMP and CBC from April 2023  Pertinent images and available reads personally reviewed  Pertinent notes reviewed  Reviewed the family medicine note from December 2022    Chief Complaint:  "I am here for the lipoma"    HPI  Patient is a pleasant 61-year-old woman who was referred to my office for superficial subcutaneous lipoma    PMH:  Past Medical History:   Diagnosis Date   • Abnormal menstrual periods     Resolved 12/18/2017    • Abnormal uterine bleeding     Resolved 12/18/2017    • Anticardiolipin syndrome ([address])    • Arthropathy, multiple sites     Resolved 1/29/2016    • Erythema nodosum     Resolved 5/1/2017    • Herniated lumbar intervertebral disc    • Mixed connective tissue disease ([address])    • Mixed connective tissue disease ([address])    • Skin lesion     Resolved 1/29/2016        PSH:  Past Surgical History:   Procedure Laterality Date   • HYSTERECTOMY  12/2017   • OK CYSTOURETHROSCOPY N/A 12/11/2017    Procedure: CYSTOSCOPY;  Surgeon: Vilma Montero MD;  [facility];  Service: Gynecology   • OK LAPS W/VAG HYSTERECT 250 GM/&RMVL TUBE&/OVARIES N/A 12/11/2017    Procedure: LAPAROSCOPIC ASSISTED VAGINAL HYSTERECTOMY; BILATERAL SALPINGECTOMY;  Surgeon: Vilma Montero MD;  [facility];  Service: Gynecology       Home Meds:  Current Outpatient Medications on File Prior to Visit   Medication Sig Dispense Refill   • Cholecalciferol (Vitamin D3) 50 MCG (2000 UT) TABS Take 2,000 Units by mouth daily     • COD LIVER OIL PO Take by mouth daily after breakfast     • ibuprofen (MOTRIN) 400 mg tablet Take by mouth every 6 (six) hours as needed for mild pain     • Melatonin 5 MG TABS Take 5 tablets (25 mg total) by mouth daily (Patient taking differently: Take 5 tablets by mouth daily as needed) 30 tablet 0     No current facility-administered medications on file prior to visit. Allergies:   Allergies   Allergen Reactions   • Covid-19 Mrna Vacc (Moderna) Palpitations     Felt sick and dizzy for days    • Levaquin [Levofloxacin] Other (See Comments)     Tendon tears/ tendon pain   • Reglan [Metoclopramide] Other (See Comments)     Severe agitation   • Amoxil [Amoxicillin] Rash       Social Hx:  Social History     Socioeconomic History   • Marital status: /Civil Union     Spouse name: Not on file   • Number of children: Not on file   • Years of education: Not on file   • Highest education level: Not on file   Occupational History   • Not on file   Tobacco Use   • Smoking status: Never   • Smokeless tobacco: Never   Vaping Use   • Vaping Use: Never used   Substance and Sexual Activity   • Alcohol use: Not Currently   • Drug use: No   • Sexual activity: Not on file   Other Topics Concern   • Not on file   Social History Narrative   • Not on file     Social Determinants of Health     Financial Resource Strain: Not on file   Food Insecurity: Not on file   Transportation Needs: Not on file   Physical Activity: Not on file   Stress: Not on file   Social Connections: Not on file   Intimate Partner Violence: Not on file   Housing Stability: Not on file        Family Hx:    Family History   Problem Relation Age of Onset   • Gout Father    • Liver cancer Father 76   • No Known Problems Sister    • No Known Problems Daughter    • No Known Problems Daughter    • No Known Problems Daughter    • Rheum arthritis Maternal Grandmother    • Diabetes Brother    • No Known Problems Brother    • No Known Problems Brother    • Lupus Cousin    • Sjogren's syndrome Family    • Lupus Family    • Thyroid disease Family    • Heart disease Family          Review of Systems   Constitutional: Negative for chills and fever. Respiratory: Negative. Cardiovascular: Negative. Gastrointestinal: Negative. Genitourinary: Negative. Musculoskeletal: Negative. Allergic/Immunologic:        Anticardiolipin antibody positive   Neurological: Negative. All other systems reviewed and are negative. /69 (BP Location: Left arm, Patient Position: Sitting, Cuff Size: Standard)   Pulse 80   Temp 97.9 °F (36.6 °C) (Core)   Ht 5' 6.5" (1.689 m)   Wt 70.6 kg (155 lb 9.6 oz)   LMP 12/01/2017   SpO2 98%   BMI 24.74 kg/m²       Physical Exam  Vitals reviewed. Constitutional:       General: She is not in acute distress. Appearance: Normal appearance. Eyes:      Pupils: Pupils are equal, round, and reactive to light. Cardiovascular:      Rate and Rhythm: Normal rate. Pulmonary:      Effort: Pulmonary effort is normal. No respiratory distress. Abdominal:      General: Abdomen is flat. There is no distension. Musculoskeletal:         General: Normal range of motion. Skin:     General: Skin is warm and dry. Comments: 1.  6 mm diameter raised largely brown lesion left lateral thigh with recent changes and patient strongly desires removal.  Likely seborrheic keratosis. 2.  4 mm flat brown macule left posterior shoulder. Regular borders. Not blue or black. Not worrisome   Neurological:      Mental Status: She is alert.          Pertinent labs reviewed  Reviewed the CMP and CBC from April 2023  Pertinent images and available reads personally reviewed  Pertinent notes reviewed  Reviewed the family medicine note from December 2022     Uriel Carlin MD [address] [facility]  [phone]

## 2023-08-03 ENCOUNTER — PROCEDURE VISIT (OUTPATIENT)
Dept: SURGERY | Facility: CLINIC | Age: 53
End: 2023-08-03
Payer: COMMERCIAL

## 2023-08-03 VITALS
OXYGEN SATURATION: 98 % | WEIGHT: 160.4 LBS | HEART RATE: 99 BPM | TEMPERATURE: 97.4 F | BODY MASS INDEX: 25.18 KG/M2 | HEIGHT: 67 IN | SYSTOLIC BLOOD PRESSURE: 105 MMHG | DIASTOLIC BLOOD PRESSURE: 67 MMHG

## 2023-08-03 DIAGNOSIS — L98.9 SKIN LESION OF LEFT LEG: Primary | ICD-10-CM

## 2023-08-03 PROCEDURE — 99213 OFFICE O/P EST LOW 20 MIN: CPT | Performed by: SURGERY

## 2023-08-03 PROCEDURE — 88305 TISSUE EXAM BY PATHOLOGIST: CPT | Performed by: PATHOLOGY

## 2023-08-03 PROCEDURE — 88342 IMHCHEM/IMCYTCHM 1ST ANTB: CPT | Performed by: PATHOLOGY

## 2023-08-03 NOTE — PROGRESS NOTES
Patient is here for elective excision of an atypical nevus left thigh. Skin excision    Date/Time: 8/3/2023 8:00 AM    Performed by: Sajan Butterfield MD  Authorized by: Sajan Butterfield MD  Universal Protocol:  Procedure performed by:  Consent: Verbal consent obtained. Risks and benefits: risks, benefits and alternatives were discussed  Consent given by: patient  Timeout called at: 8/3/2023 8:29 AM.  Patient understanding: patient states understanding of the procedure being performed  Patient consent: the patient's understanding of the procedure matches consent given  Procedure consent: procedure consent matches procedure scheduled  Relevant documents: relevant documents present and verified  Test results: test results available and properly labeled  Site marked: the operative site was marked  Radiology Images displayed and confirmed. If images not available, report reviewed: imaging studies available  Patient identity confirmed: verbally with patient      Procedure Details - Skin Excision:     Number of Lesions:  1  Lesion 1:     Body area:  Lower extremity    Lower extremity location:  L upper leg    Malignancy: malignancy unknown            Final defect size (mm):  20    Closure complexity: intermediate       Repair Comments: Prepped and draped. Local infiltrated. Ellipse of skin encompassing lesion incised with #15 blade. Specimen removed and sent for pathology. Closed in 2 layers with 3-0 Vicryl and 4-0 running nylon suture. Bacitracin gauze and Tegaderm applied. Instructions given. Keep dressing on, clean and dry, for 2 days. After 2 days, it may be removed and clean incision daily in the shower with soap and water.     Follow-up 10 to 14 days for suture removal and pathology report review

## 2023-08-15 PROCEDURE — 88305 TISSUE EXAM BY PATHOLOGIST: CPT | Performed by: PATHOLOGY

## 2023-08-15 PROCEDURE — 88342 IMHCHEM/IMCYTCHM 1ST ANTB: CPT | Performed by: PATHOLOGY

## 2023-08-23 ENCOUNTER — APPOINTMENT (OUTPATIENT)
Dept: LAB | Facility: CLINIC | Age: 53
End: 2023-08-23
Payer: COMMERCIAL

## 2023-08-23 ENCOUNTER — OFFICE VISIT (OUTPATIENT)
Dept: SURGERY | Facility: CLINIC | Age: 53
End: 2023-08-23
Payer: COMMERCIAL

## 2023-08-23 VITALS — BODY MASS INDEX: 24.99 KG/M2 | WEIGHT: 159.2 LBS | TEMPERATURE: 98 F | HEIGHT: 67 IN

## 2023-08-23 DIAGNOSIS — C43.72: ICD-10-CM

## 2023-08-23 DIAGNOSIS — C43.9: ICD-10-CM

## 2023-08-23 DIAGNOSIS — Z09 SURGICAL FOLLOW-UP CARE: Primary | ICD-10-CM

## 2023-08-23 DIAGNOSIS — Z09 SURGICAL FOLLOW-UP CARE: ICD-10-CM

## 2023-08-23 LAB
ALBUMIN SERPL BCP-MCNC: 4.3 G/DL (ref 3.5–5)
ALP SERPL-CCNC: 68 U/L (ref 34–104)
ALT SERPL W P-5'-P-CCNC: 21 U/L (ref 7–52)
ANION GAP SERPL CALCULATED.3IONS-SCNC: 5 MMOL/L
AST SERPL W P-5'-P-CCNC: 20 U/L (ref 13–39)
BASOPHILS # BLD AUTO: 0.01 THOUSANDS/ÂΜL (ref 0–0.1)
BASOPHILS NFR BLD AUTO: 0 % (ref 0–1)
BILIRUB SERPL-MCNC: 0.42 MG/DL (ref 0.2–1)
BUN SERPL-MCNC: 16 MG/DL (ref 5–25)
CALCIUM SERPL-MCNC: 9.6 MG/DL (ref 8.4–10.2)
CHLORIDE SERPL-SCNC: 104 MMOL/L (ref 96–108)
CO2 SERPL-SCNC: 28 MMOL/L (ref 21–32)
CREAT SERPL-MCNC: 0.67 MG/DL (ref 0.6–1.3)
EOSINOPHIL # BLD AUTO: 0.11 THOUSAND/ÂΜL (ref 0–0.61)
EOSINOPHIL NFR BLD AUTO: 3 % (ref 0–6)
ERYTHROCYTE [DISTWIDTH] IN BLOOD BY AUTOMATED COUNT: 12.8 % (ref 11.6–15.1)
GFR SERPL CREATININE-BSD FRML MDRD: 102 ML/MIN/1.73SQ M
GLUCOSE SERPL-MCNC: 86 MG/DL (ref 65–140)
HCT VFR BLD AUTO: 41.1 % (ref 34.8–46.1)
HGB BLD-MCNC: 13.5 G/DL (ref 11.5–15.4)
IMM GRANULOCYTES # BLD AUTO: 0 THOUSAND/UL (ref 0–0.2)
IMM GRANULOCYTES NFR BLD AUTO: 0 % (ref 0–2)
LDH SERPL-CCNC: 153 U/L (ref 140–271)
LYMPHOCYTES # BLD AUTO: 1.29 THOUSANDS/ÂΜL (ref 0.6–4.47)
LYMPHOCYTES NFR BLD AUTO: 37 % (ref 14–44)
MCH RBC QN AUTO: 30.8 PG (ref 26.8–34.3)
MCHC RBC AUTO-ENTMCNC: 32.8 G/DL (ref 31.4–37.4)
MCV RBC AUTO: 94 FL (ref 82–98)
MONOCYTES # BLD AUTO: 0.45 THOUSAND/ÂΜL (ref 0.17–1.22)
MONOCYTES NFR BLD AUTO: 13 % (ref 4–12)
NEUTROPHILS # BLD AUTO: 1.67 THOUSANDS/ÂΜL (ref 1.85–7.62)
NEUTS SEG NFR BLD AUTO: 47 % (ref 43–75)
NRBC BLD AUTO-RTO: 0 /100 WBCS
PLATELET # BLD AUTO: 202 THOUSANDS/UL (ref 149–390)
PMV BLD AUTO: 9.4 FL (ref 8.9–12.7)
POTASSIUM SERPL-SCNC: 4.1 MMOL/L (ref 3.5–5.3)
PROT SERPL-MCNC: 8.6 G/DL (ref 6.4–8.4)
RBC # BLD AUTO: 4.39 MILLION/UL (ref 3.81–5.12)
SODIUM SERPL-SCNC: 137 MMOL/L (ref 135–147)
WBC # BLD AUTO: 3.53 THOUSAND/UL (ref 4.31–10.16)

## 2023-08-23 PROCEDURE — 83615 LACTATE (LD) (LDH) ENZYME: CPT

## 2023-08-23 PROCEDURE — 85025 COMPLETE CBC W/AUTO DIFF WBC: CPT

## 2023-08-23 PROCEDURE — 80053 COMPREHEN METABOLIC PANEL: CPT

## 2023-08-23 PROCEDURE — 99213 OFFICE O/P EST LOW 20 MIN: CPT | Performed by: SPECIALIST

## 2023-08-23 PROCEDURE — 36415 COLL VENOUS BLD VENIPUNCTURE: CPT

## 2023-08-23 NOTE — PROGRESS NOTES
General Surgery Office Visit Follow up   [facility]  Patient: Reji Morales   : 1970 Sex: female MRN: [id number]   CSN: [id number] PCP: Radha Esquivel DO    Assessment/ Plan:  Reji Morales is a 48 y.o. female  day(s) POD 2 weeks s/p skin lesion excision left LATERAL THIGH  Surgical follow-up care [Z09]    Plan  Stable postop   Removal of stitches  Staging work-up  Follow-up with Dr. Jesus Barakat in 1 week      SUBJECTIVE:   Doing very well lesion which was excised left lateral thigh    OBJECTIVE:  No complaints  Minimal incisional pain  No fever no chills no rigors  Tolerating p.o.  Diet well  Normal bowel movement no constipation or diarrhea  Ambulating well  Other pigmented lesion over my back    Vitals:   Temp 98 °F (36.7 °C) (Core)   Ht 5' 6.5" (1.689 m)   Wt 72.2 kg (159 lb 3.2 oz)   LMP 2017   BMI 25.31 kg/m²     Active medications:    Current Outpatient Medications:   •  Cholecalciferol (Vitamin D3) 50 MCG (2000 UT) TABS, Take 2,000 Units by mouth daily, Disp: , Rfl:   •  COD LIVER OIL PO, Take by mouth daily after breakfast, Disp: , Rfl:   •  ibuprofen (MOTRIN) 400 mg tablet, Take by mouth every 6 (six) hours as needed for mild pain (Patient not taking: Reported on 2023), Disp: , Rfl:   •  Melatonin 5 MG TABS, Take 5 tablets (25 mg total) by mouth daily (Patient taking differently: Take 5 tablets by mouth daily as needed), Disp: 30 tablet, Rfl: 0    Physical Exam:   General Alert awake   Normocephalic atraumatic PERRLA   Upper back  skin lesion about 1 cm pink raised  Pigmentation no itching patient no bleeding    Other pigmented lesions moles  Back    Skin: surgical dressing is C/D/I  Ext: No clubbing, cyanosis, edema  Surgical wound well healed    Visit Diagnosis: . Diagnoses and all orders for this visit:    Surgical follow-up care    Malignant melanoma of skin of left thigh ([address])    Leeroy level IV melanoma ([address])       Plan of care was discussed with patient in detail    Pertinent labs reviewed  Most Recent Labs:   No visits with results within 2 Week(s) from this visit. Latest known visit with results is:   Procedure visit on 08/03/2023   Component Date Value Ref Range Status   • Case Report 08/03/2023    Final                    Value:Surgical Pathology Report                         Case: [id number]                                   Authorizing Provider:  Terrance Bauman MD         Collected:           08/03/2023 0839              Ordering Location:     [facility]   Received:            08/03/2023 0840                                     [facility]                                                                       Pathologist:           Paxton Arreola MD                                                             Specimen:    Lesion, Left leg                                                                          • Addendum 08/03/2023    Final                    Value: This result contains rich text formatting which cannot be displayed here. • Final Diagnosis 08/03/2023    Final                    Value: This result contains rich text formatting which cannot be displayed here. • Additional Information 08/03/2023    Final                    Value: This result contains rich text formatting which cannot be displayed here. • Gross Description 08/03/2023    Final                    Value: This result contains rich text formatting which cannot be displayed here. • Clinical Information 08/03/2023    Final                    Value:Atypical nevus vs BCC   Left lateral thigh lesion with recent changes and intermittently causing some discomfort     Pertinent images and available reads personally reviewed  No results found. Pertinent notes reviewed  Final Diagnosis   A. Skin, left leg, excisional biopsy:  Melanoma, superficial spreading type, Breslow thickness: 0.8 mm. See synoptic report for further tumor characteristics.         Synoptic report for melanoma of the skin  Breslow thickness: 0.8 mm   Ulceration: Not seen. Anatomic Chase Even) level: IV  Type: Superficial spreading type  Mitoses: 1/mm2. Microsatellites: Not seen. Lymphovascular invasion: Not seen. Neurotropism: Not seen. Tumor regression: Not seen  Tumor-infiltrating lymphocytes (TIL): Present, focally brisk. Margin assessment: Melanoma in situ and invasive  melanoma do not extends to peripheral and deep margins. Evaluation of the tips is pending deeper levels and will be reported in an addendum. Pathologic stage: pT1b  Associated nevus: not identified. Comment: An immunohistochemical stain for SOX10 performed with adequate control supports the findings. Counseling / Coordination of Care  Total Office time /unit time spent today 15minutes. Greater than 50% of total time was spent with the patient and / or family counseling and / or coordination of care. A description of the counseling / coordination of care:  I performed an interim history, pertinent images and labs, performed a physical examination to arrive at the plan delineated above with associated thought processes. Latonia De León MD MS [facility] FACS  [facility]  08/23/23 10:12 AM        Portions of the record may have been created with voice recognition software. Occasional wrong word or "sound a like" substitutions may have occurred due to the inherent limitations of voice recognition software. Read the chart carefully and recognize, using context, where substitutions have occurred.

## 2023-08-24 ENCOUNTER — HOSPITAL ENCOUNTER (OUTPATIENT)
Dept: RADIOLOGY | Facility: HOSPITAL | Age: 53
Discharge: HOME | End: 2023-08-24
Attending: SPECIALIST
Payer: COMMERCIAL

## 2023-08-24 DIAGNOSIS — C43.9: ICD-10-CM

## 2023-08-24 DIAGNOSIS — C43.72: ICD-10-CM

## 2023-08-24 DIAGNOSIS — Z09 SURGICAL FOLLOW-UP CARE: ICD-10-CM

## 2023-08-24 PROCEDURE — 74177 CT ABD & PELVIS W/CONTRAST: CPT

## 2023-08-24 PROCEDURE — G1004 CDSM NDSC: HCPCS

## 2023-08-24 RX ADMIN — IOHEXOL 100 ML: 350 INJECTION, SOLUTION INTRAVENOUS at 16:40

## 2023-09-07 ENCOUNTER — OFFICE VISIT (OUTPATIENT)
Dept: SURGERY | Facility: CLINIC | Age: 53
End: 2023-09-07
Payer: COMMERCIAL

## 2023-09-07 VITALS
WEIGHT: 162.4 LBS | OXYGEN SATURATION: 98 % | DIASTOLIC BLOOD PRESSURE: 69 MMHG | TEMPERATURE: 97.5 F | BODY MASS INDEX: 25.49 KG/M2 | SYSTOLIC BLOOD PRESSURE: 93 MMHG | HEIGHT: 67 IN | HEART RATE: 79 BPM

## 2023-09-07 DIAGNOSIS — C43.72 MALIGNANT MELANOMA OF LEFT LOWER EXTREMITY INCLUDING HIP (HCC): Primary | ICD-10-CM

## 2023-09-07 PROCEDURE — 99213 OFFICE O/P EST LOW 20 MIN: CPT | Performed by: SURGERY

## 2023-09-07 RX ORDER — CEFAZOLIN SODIUM 1 G/50ML
1000 SOLUTION INTRAVENOUS ONCE
OUTPATIENT
Start: 2023-09-07 | End: 2023-09-07

## 2023-09-07 NOTE — PROGRESS NOTES
[facility] History and Physical Note:    Assessment:  Superficial spreading melanoma left lateral thigh. Breslow thickness 0.8 mm. All margins negative. Margins at time of surgery 2 mm. Ms. Arman Cushing level 4, but no other high risk characteristics noted on pathology. Plan:  Explained, at length, pathology and recommended surgical margins of 1 cm for breast low less than 1.0 mm lesions. Also sentinel lymph node biopsy not recommended for lesions less than 1.0 mm. Briefly discussed close interval surveillance and no reexcision. We will schedule for reexcision. Chief Complaint:  "I am here for follow-up"    HPI  Pt is a 53-year-old woman status post excision of an atypical nevus left lateral thigh 27 July 2023. Unremarkable postop course. Pathology report:  Final Diagnosis   A. Skin, left leg, excisional biopsy:  Melanoma, superficial spreading type, Breslow thickness: 0.8 mm. See synoptic report for further tumor characteristics.       Synoptic report for melanoma of the skin  Breslow thickness: 0.8 mm   Ulceration: Not seen. Anatomic Sunita Sifuentes) level: IV  Type: Superficial spreading type  Mitoses: 1/mm2. Microsatellites: Not seen. Lymphovascular invasion: Not seen. Neurotropism: Not seen. Tumor regression: Not seen  Tumor-infiltrating lymphocytes (TIL): Present, focally brisk. Margin assessment: Melanoma in situ and invasive  melanoma do not extends to peripheral and deep margins. Evaluation of the tips is pending deeper levels and will be reported in an addendum. Pathologic stage: pT1b  Associated nevus: not identified. Comment: An immunohistochemical stain for SOX10 performed with adequate control supports the findings. Addendum   This addendum is created to report the result of deeper levels. The tumor does not extend to the tips.  Deeper levels were examined     The final diagnosis remains unchanged.        CT abdomen pelvis 4 August 2023:  IMPRESSION:  Small bilateral inguinal lymph nodes identified, nonpathologic by size criteria. No concerning findings for metastatic disease throughout the lower chest, abdomen or pelvis.         PMH:  Past Medical History:   Diagnosis Date   • Abnormal menstrual periods     Resolved 12/18/2017    • Abnormal uterine bleeding     Resolved 12/18/2017    • Anticardiolipin syndrome (HCC)    • Arthropathy, multiple sites     Resolved 1/29/2016    • Erythema nodosum     Resolved 5/1/2017    • Herniated lumbar intervertebral disc    • Mixed connective tissue disease (HCC)    • Mixed connective tissue disease ([address])    • Skin lesion     Resolved 1/29/2016        PSH:  Past Surgical History:   Procedure Laterality Date   • HYSTERECTOMY  12/2017   • [place] CYSTOURETHROSCOPY N/A 12/11/2017    Procedure: CYSTOSCOPY;  Surgeon: Helio Cavazos MD;  [facility];  Service: Gynecology   • [place] LAPS W/VAG HYSTERECT 250 GM/&RMVL TUBE&/OVARIES N/A 12/11/2017    Procedure: LAPAROSCOPIC ASSISTED VAGINAL HYSTERECTOMY; BILATERAL SALPINGECTOMY;  Surgeon: Helio Cavazos MD;  [facility];  Service: Gynecology       Home Meds:  Current Outpatient Medications on File Prior to Visit   Medication Sig Dispense Refill   • Cholecalciferol (Vitamin D3) 50 MCG (2000 UT) TABS Take 2,000 Units by mouth daily     • COD LIVER OIL PO Take by mouth daily after breakfast     • ibuprofen (MOTRIN) 400 mg tablet Take by mouth every 6 (six) hours as needed for mild pain (Patient not taking: Reported on 7/27/2023)     • Melatonin 5 MG TABS Take 5 tablets (25 mg total) by mouth daily (Patient taking differently: Take 5 tablets by mouth daily as needed) 30 tablet 0     No current facility-administered medications on file prior to visit. Allergies:   Allergies   Allergen Reactions   • Covid-19 Mrna Vacc (Moderna) Palpitations     Felt sick and dizzy for days    • Levaquin [Levofloxacin] Other (See Comments)     Tendon tears/ tendon pain   • Reglan [Metoclopramide] Other (See Comments)     Severe agitation   • Amoxil [Amoxicillin] Rash       Social Hx:  Social History     Socioeconomic History   • Marital status: /Civil Union     Spouse name: Not on file   • Number of children: Not on file   • Years of education: Not on file   • Highest education level: Not on file   Occupational History   • Not on file   Tobacco Use   • Smoking status: Never   • Smokeless tobacco: Never   Vaping Use   • Vaping Use: Never used   Substance and Sexual Activity   • Alcohol use: Not Currently   • Drug use: No   • Sexual activity: Not on file   Other Topics Concern   • Not on file   Social History Narrative   • Not on file     Social Determinants of Health     Financial Resource Strain: Not on file   Food Insecurity: Not on file   Transportation Needs: Not on file   Physical Activity: Not on file   Stress: Not on file   Social Connections: Not on file   Intimate Partner Violence: Not on file   Housing Stability: Not on file        Family Hx:    Family History   Problem Relation Age of Onset   • Gout Father    • Liver cancer Father 76   • No Known Problems Sister    • No Known Problems Daughter    • No Known Problems Daughter    • No Known Problems Daughter    • Rheum arthritis Maternal Grandmother    • Diabetes Brother    • No Known Problems Brother    • No Known Problems Brother    • Lupus Cousin    • Sjogren's syndrome Family    • Lupus Family    • Thyroid disease Family    • Heart disease Family          Review of Systems   Constitutional: Negative for chills and fever. Respiratory: Negative. Cardiovascular: Negative. Gastrointestinal: Negative. Endocrine: Negative. Genitourinary: Negative. Musculoskeletal: Negative. Neurological: Negative. All other systems reviewed and are negative.       BP 93/69 (BP Location: Left arm, Patient Position: Sitting, Cuff Size: Standard)   Pulse 79   Temp 97.5 °F (36.4 °C) (Core)   Ht 5' 6.5" (1.689 m)   Wt 73.7 kg (162 lb 6.4 oz)   LMP 12/01/2017   SpO2 98%   BMI 25.82 kg/m²       Physical Exam  Vitals reviewed. Constitutional:       General: She is not in acute distress. Appearance: Normal appearance. HENT:      Head: Normocephalic and atraumatic. Cardiovascular:      Rate and Rhythm: Normal rate and regular rhythm. Pulmonary:      Effort: Pulmonary effort is normal. No respiratory distress. Breath sounds: Normal breath sounds. Abdominal:      General: There is no distension. Musculoskeletal:         General: Normal range of motion. Skin:     General: Skin is warm and dry. Comments: Left lateral thigh incision healing nicely. Neurological:      General: No focal deficit present. Mental Status: She is alert.          Pertinent labs reviewed    Pertinent images and available reads personally reviewed    Pertinent notes reviewed       Shantelle Armstrong MD [address] [facility]  [phone]

## 2023-09-11 RX ORDER — CEFAZOLIN SODIUM 1 G/50ML
1000 SOLUTION INTRAVENOUS ONCE
OUTPATIENT
Start: 2023-09-11 | End: 2023-09-11

## 2023-09-11 NOTE — H&P
[facility] History and Physical Note:    Assessment:  Superficial spreading melanoma left lateral thigh. Breslow thickness 0.8 mm. All margins negative. Margins at time of surgery 2 mm. Ms. Weiss Neely level 4, but no other high risk characteristics noted on pathology. Plan:  Explained, at length, pathology and recommended surgical margins of 1 cm for breast low less than 1.0 mm lesions. Also sentinel lymph node biopsy not recommended for lesions less than 1.0 mm. Briefly discussed close interval surveillance and no reexcision. We will schedule for reexcision. Chief Complaint:  "I am here for follow-up"    HPI  Pt is a 44-year-old woman status post excision of an atypical nevus left lateral thigh 27 July 2023. Unremarkable postop course. Pathology report:  Final Diagnosis   A. Skin, left leg, excisional biopsy:  Melanoma, superficial spreading type, Breslow thickness: 0.8 mm. See synoptic report for further tumor characteristics.       Synoptic report for melanoma of the skin  Breslow thickness: 0.8 mm   Ulceration: Not seen. Anatomic [facility]) level: IV  Type: Superficial spreading type  Mitoses: 1/mm2. Microsatellites: Not seen. Lymphovascular invasion: Not seen. Neurotropism: Not seen. Tumor regression: Not seen  Tumor-infiltrating lymphocytes (TIL): Present, focally brisk. Margin assessment: Melanoma in situ and invasive  melanoma do not extends to peripheral and deep margins. Evaluation of the tips is pending deeper levels and will be reported in an addendum. Pathologic stage: pT1b  Associated nevus: not identified. Comment: An immunohistochemical stain for SOX10 performed with adequate control supports the findings. Addendum   This addendum is created to report the result of deeper levels. The tumor does not extend to the tips.  Deeper levels were examined     The final diagnosis remains unchanged.        CT abdomen pelvis 4 August 2023:  IMPRESSION:  Small bilateral inguinal lymph nodes identified, nonpathologic by size criteria. No concerning findings for metastatic disease throughout the lower chest, abdomen or pelvis.         PMH:  Past Medical History:   Diagnosis Date   • Abnormal menstrual periods     Resolved 12/18/2017    • Abnormal uterine bleeding     Resolved 12/18/2017    • Anticardiolipin syndrome (HCC)    • Arthropathy, multiple sites     Resolved 1/29/2016    • Erythema nodosum     Resolved 5/1/2017    • Herniated lumbar intervertebral disc    • Mixed connective tissue disease (HCC)    • Mixed connective tissue disease ([address])    • Skin lesion     Resolved 1/29/2016        PSH:  Past Surgical History:   Procedure Laterality Date   • HYSTERECTOMY  12/2017   • [place] CYSTOURETHROSCOPY N/A 12/11/2017    Procedure: CYSTOSCOPY;  Surgeon: Blanche Rock MD;  [facility];  Service: Gynecology   • [place] LAPS W/VAG HYSTERECT 250 GM/&RMVL TUBE&/OVARIES N/A 12/11/2017    Procedure: LAPAROSCOPIC ASSISTED VAGINAL HYSTERECTOMY; BILATERAL SALPINGECTOMY;  Surgeon: Blanche Rock MD;  [facility];  Service: Gynecology       Home Meds:  Current Outpatient Medications on File Prior to Visit   Medication Sig Dispense Refill   • Cholecalciferol (Vitamin D3) 50 MCG (2000 UT) TABS Take 2,000 Units by mouth daily     • COD LIVER OIL PO Take by mouth daily after breakfast     • ibuprofen (MOTRIN) 400 mg tablet Take by mouth every 6 (six) hours as needed for mild pain (Patient not taking: Reported on 7/27/2023)     • Melatonin 5 MG TABS Take 5 tablets (25 mg total) by mouth daily (Patient taking differently: Take 5 tablets by mouth daily as needed) 30 tablet 0     No current facility-administered medications on file prior to visit. Allergies:   Allergies   Allergen Reactions   • Covid-19 Mrna Vacc (Moderna) Palpitations     Felt sick and dizzy for days    • Levaquin [Levofloxacin] Other (See Comments)     Tendon tears/ tendon pain   • Reglan [Metoclopramide] Other (See Comments)     Severe agitation   • Amoxil [Amoxicillin] Rash       Social Hx:  Social History     Socioeconomic History   • Marital status: /Civil Union     Spouse name: Not on file   • Number of children: Not on file   • Years of education: Not on file   • Highest education level: Not on file   Occupational History   • Not on file   Tobacco Use   • Smoking status: Never   • Smokeless tobacco: Never   Vaping Use   • Vaping Use: Never used   Substance and Sexual Activity   • Alcohol use: Not Currently   • Drug use: No   • Sexual activity: Not on file   Other Topics Concern   • Not on file   Social History Narrative   • Not on file     Social Determinants of Health     Financial Resource Strain: Not on file   Food Insecurity: Not on file   Transportation Needs: Not on file   Physical Activity: Not on file   Stress: Not on file   Social Connections: Not on file   Intimate Partner Violence: Not on file   Housing Stability: Not on file        Family Hx:    Family History   Problem Relation Age of Onset   • Gout Father    • Liver cancer Father 76   • No Known Problems Sister    • No Known Problems Daughter    • No Known Problems Daughter    • No Known Problems Daughter    • Rheum arthritis Maternal Grandmother    • Diabetes Brother    • No Known Problems Brother    • No Known Problems Brother    • Lupus Cousin    • Sjogren's syndrome Family    • Lupus Family    • Thyroid disease Family    • Heart disease Family          Review of Systems   Constitutional: Negative for chills and fever. Respiratory: Negative. Cardiovascular: Negative. Gastrointestinal: Negative. Endocrine: Negative. Genitourinary: Negative. Musculoskeletal: Negative. Neurological: Negative. All other systems reviewed and are negative.       BP 93/69 (BP Location: Left arm, Patient Position: Sitting, Cuff Size: Standard)   Pulse 79   Temp 97.5 °F (36.4 °C) (Core)   Ht 5' 6.5" (1.689 m)   Wt 73.7 kg (162 lb 6.4 oz)   LMP 12/01/2017   SpO2 98%   BMI 25.82 kg/m²       Physical Exam  Vitals reviewed. Constitutional:       General: She is not in acute distress. Appearance: Normal appearance. HENT:      Head: Normocephalic and atraumatic. Cardiovascular:      Rate and Rhythm: Normal rate and regular rhythm. Pulmonary:      Effort: Pulmonary effort is normal. No respiratory distress. Breath sounds: Normal breath sounds. Abdominal:      General: There is no distension. Musculoskeletal:         General: Normal range of motion. Skin:     General: Skin is warm and dry. Comments: Left lateral thigh incision healing nicely. Neurological:      General: No focal deficit present. Mental Status: She is alert.          Pertinent labs reviewed    Pertinent images and available reads personally reviewed    Pertinent notes reviewed       Braydon Couch MD [address] [facility]  [phone]

## 2023-09-19 DIAGNOSIS — C43.72 MALIGNANT MELANOMA OF LEFT LOWER EXTREMITY INCLUDING HIP (HCC): Primary | ICD-10-CM

## 2023-10-19 RX ORDER — MAGNESIUM 30 MG
30 TABLET ORAL 2 TIMES DAILY
COMMUNITY

## 2023-10-19 NOTE — PRE-PROCEDURE INSTRUCTIONS
Pre-Surgery Instructions:   Medication Instructions    Cholecalciferol (Vitamin D3) 50 MCG (2000 UT) TABS Hold day of surgery. COD LIVER OIL PO Hold day of surgery. magnesium 30 MG tablet Hold day of surgery. Medication instructions for day surgery reviewed. Please use only a sip of water to take your instructed medications. Avoid all over the counter vitamins, supplements and NSAIDS for one week prior to surgery per anesthesia guidelines. Tylenol is ok to take as needed. You will receive a call one business day prior to surgery with an arrival time and hospital directions. If your surgery is scheduled on a Monday, the hospital will be calling you on the Friday prior to your surgery. If you have not heard from anyone by 8pm, please call the hospital supervisor through the hospital  at [phone]. Beatriz Bridge [phone]). Do not eat or drink anything after midnight the night before your surgery, including candy, mints, lifesavers, or chewing gum. Do not drink alcohol 24hrs before your surgery. Try not to smoke at least 24hrs before your surgery. Follow the pre surgery showering instructions as listed in the [facility] Surgical Experience Booklet” or otherwise provided by your surgeon's office. Do not shave the surgical area 24 hours before surgery. Do not apply any lotions, creams, including makeup, cologne, deodorant, or perfumes after showering on the day of your surgery. No contact lenses, eye make-up, or artificial eyelashes. Remove nail polish, including gel polish, and any artificial, gel, or acrylic nails if possible. Remove all jewelry including rings and body piercing jewelry. Wear causal clothing that is easy to take on and off. Consider your type of surgery. Keep any valuables, jewelry, piercings at home. Please bring any specially ordered equipment (sling, braces) if indicated.     Arrange for a responsible person to drive you to and from the hospital on the day of your surgery. Visitor Guidelines discussed. Call the surgeon's office with any new illnesses, exposures, or additional questions prior to surgery. Please reference your [facility] Surgical Experience Booklet” for additional information to prepare for your upcoming surgery.

## 2023-10-23 ENCOUNTER — ANESTHESIA EVENT (OUTPATIENT)
Dept: PERIOP | Facility: HOSPITAL | Age: 53
End: 2023-10-23
Payer: COMMERCIAL

## 2023-10-23 NOTE — ANESTHESIA PREPROCEDURE EVALUATION
Procedure:  EXCISION BIOPSY TISSUE LESION/MASS LOWER EXTREMITY  (Left: Thigh)  9 AM NUCLEAR MED. BIOPSY LYMPH NODE SENTINEL (Left: Groin)    Relevant Problems   ANESTHESIA   (+) PONV (postoperative nausea and vomiting)      CARDIO  Patient runs low on BP      GYN   (+) History of hysterectomy      MUSCULOSKELETAL   (+) Chronic midline low back pain without sciatica   (+) Lateral epicondylitis of right elbow      NEURO/PSYCH   (+) Chronic midline low back pain without sciatica      Other   (+) Mixed connective tissue disease (HCC)   (+) Sjogren's syndrome (HCC)        Physical Exam    Airway    Mallampati score: II  TM Distance: >3 FB  Neck ROM: full     Dental       Cardiovascular  Rhythm: regular, Rate: normal    Pulmonary   Breath sounds clear to auscultation    Other Findings        Anesthesia Plan  ASA Score- 2     Anesthesia Type- general with ASA Monitors. Additional Monitors:     Airway Plan: LMA. Comment: Possible lateral positioning. Plan Factors-    Chart reviewed. Patient is not a current smoker. Induction- intravenous. Postoperative Plan- Plan for postoperative opioid use. Informed Consent- Anesthetic plan and risks discussed with patient. I personally reviewed this patient with the CRNA. Discussed and agreed on the Anesthesia Plan with the CRNA. Dilcia Jalloh

## 2023-10-25 ENCOUNTER — HOSPITAL ENCOUNTER (OUTPATIENT)
Dept: RADIOLOGY | Facility: HOSPITAL | Age: 53
Discharge: HOME | End: 2023-10-25
Payer: COMMERCIAL

## 2023-10-25 ENCOUNTER — HOSPITAL ENCOUNTER (OUTPATIENT)
Facility: HOSPITAL | Age: 53
Setting detail: OUTPATIENT SURGERY
Discharge: HOME | End: 2023-10-25
Attending: SURGERY | Admitting: SURGERY
Payer: COMMERCIAL

## 2023-10-25 ENCOUNTER — ANESTHESIA (OUTPATIENT)
Dept: PERIOP | Facility: HOSPITAL | Age: 53
End: 2023-10-25
Payer: COMMERCIAL

## 2023-10-25 VITALS
BODY MASS INDEX: 24.95 KG/M2 | TEMPERATURE: 98.4 F | SYSTOLIC BLOOD PRESSURE: 112 MMHG | HEART RATE: 68 BPM | HEIGHT: 67 IN | DIASTOLIC BLOOD PRESSURE: 71 MMHG | RESPIRATION RATE: 18 BRPM | WEIGHT: 158.95 LBS | OXYGEN SATURATION: 100 %

## 2023-10-25 DIAGNOSIS — C43.72 MALIGNANT MELANOMA OF LEFT LOWER EXTREMITY INCLUDING HIP (HCC): ICD-10-CM

## 2023-10-25 PROBLEM — R11.2 PONV (POSTOPERATIVE NAUSEA AND VOMITING): Status: ACTIVE | Noted: 2023-10-25

## 2023-10-25 PROBLEM — Z98.890 PONV (POSTOPERATIVE NAUSEA AND VOMITING): Status: ACTIVE | Noted: 2023-10-25

## 2023-10-25 PROBLEM — Z90.710 HISTORY OF HYSTERECTOMY: Status: ACTIVE | Noted: 2023-10-25

## 2023-10-25 PROCEDURE — NC001 PR NO CHARGE: Performed by: SURGERY

## 2023-10-25 PROCEDURE — 88305 TISSUE EXAM BY PATHOLOGIST: CPT | Performed by: STUDENT IN AN ORGANIZED HEALTH CARE EDUCATION/TRAINING PROGRAM

## 2023-10-25 PROCEDURE — 11606 EXC TR-EXT MAL+MARG >4 CM: CPT | Performed by: SURGERY

## 2023-10-25 PROCEDURE — 11606 EXC TR-EXT MAL+MARG >4 CM: CPT | Performed by: PHYSICIAN ASSISTANT

## 2023-10-25 RX ORDER — HYDROMORPHONE HCL/PF 1 MG/ML
0.25 SYRINGE (ML) INJECTION
Status: DISCONTINUED | OUTPATIENT
Start: 2023-10-25 | End: 2023-10-25 | Stop reason: HOSPADM

## 2023-10-25 RX ORDER — MIDAZOLAM HYDROCHLORIDE 2 MG/2ML
INJECTION, SOLUTION INTRAMUSCULAR; INTRAVENOUS AS NEEDED
Status: DISCONTINUED | OUTPATIENT
Start: 2023-10-25 | End: 2023-10-25

## 2023-10-25 RX ORDER — SODIUM CHLORIDE, SODIUM LACTATE, POTASSIUM CHLORIDE, CALCIUM CHLORIDE 600; 310; 30; 20 MG/100ML; MG/100ML; MG/100ML; MG/100ML
75 INJECTION, SOLUTION INTRAVENOUS CONTINUOUS
Status: DISCONTINUED | OUTPATIENT
Start: 2023-10-25 | End: 2023-10-25 | Stop reason: HOSPADM

## 2023-10-25 RX ORDER — OXYCODONE HYDROCHLORIDE AND ACETAMINOPHEN 5; 325 MG/1; MG/1
1 TABLET ORAL EVERY 8 HOURS PRN
Qty: 10 TABLET | Refills: 0
Start: 2023-10-25

## 2023-10-25 RX ORDER — PROPOFOL 10 MG/ML
INJECTION, EMULSION INTRAVENOUS AS NEEDED
Status: DISCONTINUED | OUTPATIENT
Start: 2023-10-25 | End: 2023-10-25

## 2023-10-25 RX ORDER — ONDANSETRON 2 MG/ML
4 INJECTION INTRAMUSCULAR; INTRAVENOUS ONCE AS NEEDED
Status: DISCONTINUED | OUTPATIENT
Start: 2023-10-25 | End: 2023-10-25 | Stop reason: HOSPADM

## 2023-10-25 RX ORDER — SCOLOPAMINE TRANSDERMAL SYSTEM 1 MG/1
1 PATCH, EXTENDED RELEASE TRANSDERMAL ONCE
Status: DISCONTINUED | OUTPATIENT
Start: 2023-10-25 | End: 2023-10-25 | Stop reason: HOSPADM

## 2023-10-25 RX ORDER — BUPIVACAINE HYDROCHLORIDE AND EPINEPHRINE 5; 5 MG/ML; UG/ML
INJECTION, SOLUTION EPIDURAL; INTRACAUDAL; PERINEURAL AS NEEDED
Status: DISCONTINUED | OUTPATIENT
Start: 2023-10-25 | End: 2023-10-25 | Stop reason: HOSPADM

## 2023-10-25 RX ORDER — GINSENG 100 MG
CAPSULE ORAL AS NEEDED
Status: DISCONTINUED | OUTPATIENT
Start: 2023-10-25 | End: 2023-10-25 | Stop reason: HOSPADM

## 2023-10-25 RX ORDER — DEXAMETHASONE SODIUM PHOSPHATE 10 MG/ML
INJECTION, SOLUTION INTRAMUSCULAR; INTRAVENOUS AS NEEDED
Status: DISCONTINUED | OUTPATIENT
Start: 2023-10-25 | End: 2023-10-25

## 2023-10-25 RX ORDER — ONDANSETRON 2 MG/ML
INJECTION INTRAMUSCULAR; INTRAVENOUS AS NEEDED
Status: DISCONTINUED | OUTPATIENT
Start: 2023-10-25 | End: 2023-10-25

## 2023-10-25 RX ORDER — LIDOCAINE HYDROCHLORIDE 10 MG/ML
INJECTION, SOLUTION EPIDURAL; INFILTRATION; INTRACAUDAL; PERINEURAL AS NEEDED
Status: DISCONTINUED | OUTPATIENT
Start: 2023-10-25 | End: 2023-10-25

## 2023-10-25 RX ORDER — MAGNESIUM HYDROXIDE 1200 MG/15ML
LIQUID ORAL AS NEEDED
Status: DISCONTINUED | OUTPATIENT
Start: 2023-10-25 | End: 2023-10-25 | Stop reason: HOSPADM

## 2023-10-25 RX ORDER — PROMETHAZINE HYDROCHLORIDE 25 MG/ML
12.5 INJECTION, SOLUTION INTRAMUSCULAR; INTRAVENOUS ONCE AS NEEDED
Status: DISCONTINUED | OUTPATIENT
Start: 2023-10-25 | End: 2023-10-25 | Stop reason: HOSPADM

## 2023-10-25 RX ORDER — EPHEDRINE SULFATE 50 MG/ML
INJECTION INTRAVENOUS AS NEEDED
Status: DISCONTINUED | OUTPATIENT
Start: 2023-10-25 | End: 2023-10-25

## 2023-10-25 RX ORDER — FENTANYL CITRATE/PF 50 MCG/ML
50 SYRINGE (ML) INJECTION
Status: DISCONTINUED | OUTPATIENT
Start: 2023-10-25 | End: 2023-10-25 | Stop reason: HOSPADM

## 2023-10-25 RX ORDER — CEFAZOLIN SODIUM 1 G/50ML
1000 SOLUTION INTRAVENOUS ONCE
Status: COMPLETED | OUTPATIENT
Start: 2023-10-25 | End: 2023-10-25

## 2023-10-25 RX ORDER — FENTANYL CITRATE 50 UG/ML
INJECTION, SOLUTION INTRAMUSCULAR; INTRAVENOUS AS NEEDED
Status: DISCONTINUED | OUTPATIENT
Start: 2023-10-25 | End: 2023-10-25

## 2023-10-25 RX ADMIN — PROPOFOL 120 MCG/KG/MIN: 10 INJECTION, EMULSION INTRAVENOUS at 10:49

## 2023-10-25 RX ADMIN — EPHEDRINE SULFATE 5 MG: 50 INJECTION, SOLUTION INTRAVENOUS at 11:40

## 2023-10-25 RX ADMIN — FENTANYL CITRATE 50 MCG: 50 INJECTION INTRAMUSCULAR; INTRAVENOUS at 11:49

## 2023-10-25 RX ADMIN — DEXAMETHASONE SODIUM PHOSPHATE 10 MG: 10 INJECTION, SOLUTION INTRAMUSCULAR; INTRAVENOUS at 10:55

## 2023-10-25 RX ADMIN — SCOPALAMINE 1 PATCH: 1 PATCH, EXTENDED RELEASE TRANSDERMAL at 08:33

## 2023-10-25 RX ADMIN — ONDANSETRON 4 MG: 2 INJECTION INTRAMUSCULAR; INTRAVENOUS at 10:55

## 2023-10-25 RX ADMIN — PROPOFOL 140 MG: 10 INJECTION, EMULSION INTRAVENOUS at 10:48

## 2023-10-25 RX ADMIN — MIDAZOLAM 2 MG: 1 INJECTION INTRAMUSCULAR; INTRAVENOUS at 10:44

## 2023-10-25 RX ADMIN — LIDOCAINE HYDROCHLORIDE 50 MG: 10 INJECTION, SOLUTION EPIDURAL; INFILTRATION; INTRACAUDAL at 10:48

## 2023-10-25 RX ADMIN — SODIUM CHLORIDE, SODIUM LACTATE, POTASSIUM CHLORIDE, AND CALCIUM CHLORIDE: .6; .31; .03; .02 INJECTION, SOLUTION INTRAVENOUS at 10:44

## 2023-10-25 RX ADMIN — SODIUM CHLORIDE, SODIUM LACTATE, POTASSIUM CHLORIDE, AND CALCIUM CHLORIDE: .6; .31; .03; .02 INJECTION, SOLUTION INTRAVENOUS at 11:33

## 2023-10-25 RX ADMIN — FENTANYL CITRATE 50 MCG: 50 INJECTION INTRAMUSCULAR; INTRAVENOUS at 10:48

## 2023-10-25 RX ADMIN — CEFAZOLIN SODIUM 1000 MG: 1 SOLUTION INTRAVENOUS at 10:55

## 2023-10-25 NOTE — OP NOTE
OPERATIVE REPORT  PATIENT NAME: Carol Diane    :  1970  MRN: [id number]  Pt Location: [facility] OR ROOM 02    SURGERY DATE: 10/25/2023    Surgeon(s) and Role:     * Aniceto Landaverde MD - Primary    Preop Diagnosis:  Malignant melanoma of left lower extremity including hip ([address]) [C43.72]    Post-Op Diagnosis Codes:     * Malignant melanoma of left lower extremity including hip ([address]) [C43.72]    Procedure(s):  Left - EXCISION BIOPSY TISSUE LESION/MASS LOWER EXTREMITY     Specimen(s):  ID Type Source Tests Collected by Time Destination   1 : left thigh melanoma Tissue Lesion TISSUE EXAM Aniceto Landaverde MD 10/25/2023 1111        Estimated Blood Loss:   Minimal    Drains:  * No LDAs found *    Anesthesia Type:   General    Operative Indications:  Malignant melanoma of left lower extremity including hip ([address]) [C43.72]      Operative Findings:  Scar consistent with previous incision left lateral thigh    Complications:   None    Procedure and Technique: Wide local excision left lateral thigh melanoma    Patient was taken back to main operating room, general anesthesia was induced and she was placed in the right lateral decubitus position utilizing a beanbag and padding. The left lateral thigh scar was prepped and draped in normal fashion. 1 cm margin was marked anterior and posterior to the area of scar. Total width was 3 cm. Total length of the ellipse was therefore at 9 cm. Scalpel was used to incise the skin and Bovie cautery was used to dissect the subcutaneous tissue. Skin and subcutaneous tissue were surgically excised. Total size of wound specimen: 9 cm long by 3 cm wide by 3 cm deep. The specimen was marked with a short stitch superior and long stitch lateral.  It was passed off the field. The area was copiously irrigated and strict hemostasis was obtained with Bovie cautery. Cutaneous flaps were raised utilizing Bovie cautery to allow closure.     3-0 Vicryl was used to approximate subcutaneous tissue as well as the dermis. 3-0 nylon interrupted sutures were used to approximate the skin edges. Bacitracin gauze and a Tegaderm was applied. The patient awoke from general anesthesia, was placed supine on the stretcher and sent to the PACU in stable condition. I was present for the entire procedure., A qualified resident physician was not available. , and A physician assistant was required during the procedure for retraction, tissue handling, dissection and suturing.     Patient Disposition:  PACU         SIGNATURE: Jeni Mclaughlin MD  DATE: October 25, 2023  TIME: 11:16 AM

## 2023-10-25 NOTE — ANESTHESIA POSTPROCEDURE EVALUATION
Post-Op Assessment Note    CV Status:  Stable    Pain management: adequate     Mental Status:  Alert and awake   Hydration Status:  Euvolemic   PONV Controlled:  Controlled   Airway Patency:  Patent      Post Op Vitals Reviewed: Yes      Staff: CRNA         No notable events documented.     BP   120/70   Temp  98.7   Pulse  78   Resp   18   SpO2   99 Breath sounds clear and equal bilaterally.

## 2023-10-25 NOTE — DISCHARGE INSTR - AVS FIRST PAGE
1.  Keep dressing on, clean and dry, for 2 days. After 2 days, the dressing may be removed and you should clean the incision daily in the shower with soap and water. No dressings are required, but you may cover with gauze to protect against rubbing on clothing if you desire. 2.  Take ibuprofen, 600 mg, 3 times a day regularly. 3.  Take Percocet, in addition to ibuprofen, if you need further pain control. 4.  If you do not already have an appointment, call my office at [phone] for an appointment to be seen in about 10 to 14 days.

## 2023-10-25 NOTE — H&P
/73   Pulse 76   Temp (!) 97.4 °F (36.3 °C) (Temporal)   Resp 16   Ht 5' 7" (1.702 m)   Wt 72.1 kg (158 lb 15.2 oz)   LMP 12/01/2017   SpO2 98%   BMI 24.90 kg/m²   GA: alert. Heart: Regular rate. Lungs: normal effort, no respiratory. Abdomen: soft, non-tender. Extremities: soft, non-tender      [facility] History and Physical Note:     Assessment:  Superficial spreading melanoma left lateral thigh. Breslow thickness 0.8 mm. All margins negative. Margins at time of surgery 2 mm. Ms. Vi Araujo level 4, but no other high risk characteristics noted on pathology. After extensive discussion with a surgical oncologist, Dr. Cristiane Espinal, patient's risk for regional lymph node metastasis is very low but Breslow's thickness is greater than 0.75 and thus risk could be as high as 5%. I discussed this further with the patient and we will proceed with WLE and SLNB. Plan:  WLE and SLNB     Chief Complaint:  "I am here for follow-up"     HPI  Pt is a 51-year-old woman status post excision of an atypical nevus left lateral thigh 27 July 2023. Unremarkable postop course. Pathology report:  Final Diagnosis   A. Skin, left leg, excisional biopsy:  Melanoma, superficial spreading type, Breslow thickness: 0.8 mm. See synoptic report for further tumor characteristics. Synoptic report for melanoma of the skin  Breslow thickness: 0.8 mm   Ulceration: Not seen. Anatomic [place] Burdett) level: IV  Type: Superficial spreading type  Mitoses: 1/mm2. Microsatellites: Not seen. Lymphovascular invasion: Not seen. Neurotropism: Not seen. Tumor regression: Not seen  Tumor-infiltrating lymphocytes (TIL): Present, focally brisk. Margin assessment: Melanoma in situ and invasive  melanoma do not extends to peripheral and deep margins. Evaluation of the tips is pending deeper levels and will be reported in an addendum. Pathologic stage: pT1b  Associated nevus: not identified. Comment:  An immunohistochemical stain for SOX10 performed with adequate control supports the findings. Addendum   This addendum is created to report the result of deeper levels. The tumor does not extend to the tips. Deeper levels were examined     The final diagnosis remains unchanged. CT abdomen pelvis 4 August 2023:  IMPRESSION:  Small bilateral inguinal lymph nodes identified, nonpathologic by size criteria. No concerning findings for metastatic disease throughout the lower chest, abdomen or pelvis.            PMH:       Past Medical History:   Diagnosis Date    Abnormal menstrual periods       Resolved 12/18/2017     Abnormal uterine bleeding       Resolved 12/18/2017     Anticardiolipin syndrome ([address])      Arthropathy, multiple sites       Resolved 1/29/2016     Erythema nodosum       Resolved 5/1/2017     Herniated lumbar intervertebral disc      Mixed connective tissue disease ([address])      Mixed connective tissue disease ([address])      Skin lesion       Resolved 1/29/2016          PSH:        Past Surgical History:   Procedure Laterality Date    HYSTERECTOMY   12/2017    WI CYSTOURETHROSCOPY N/A 12/11/2017     Procedure: CYSTOSCOPY;  Surgeon: Jayant Arevalo MD;  Location: [facility];  Service: Gynecology    WI LAPS W/VAG HYSTERECT 250 GM/&RMVL TUBE&/OVARIES N/A 12/11/2017     Procedure: LAPAROSCOPIC ASSISTED VAGINAL HYSTERECTOMY; BILATERAL SALPINGECTOMY;  Surgeon: Jayant Arevalo MD;  Location: [facility];  Service: Gynecology         Home Meds:         Current Outpatient Medications on File Prior to Visit   Medication Sig Dispense Refill    Cholecalciferol (Vitamin D3) 50 MCG (2000 UT) TABS Take 2,000 Units by mouth daily        COD LIVER OIL PO Take by mouth daily after breakfast        ibuprofen (MOTRIN) 400 mg tablet Take by mouth every 6 (six) hours as needed for mild pain (Patient not taking: Reported on 7/27/2023)        Melatonin 5 MG TABS Take 5 tablets (25 mg total) by mouth daily (Patient taking differently: Take 5 tablets by mouth daily as needed) 30 tablet 0      No current facility-administered medications on file prior to visit. Allergies: Allergies   Allergen Reactions    Covid-19 Mrna Vacc (Moderna) Palpitations       Felt sick and dizzy for days     Levaquin [Levofloxacin] Other (See Comments)       Tendon tears/ tendon pain    Reglan [Metoclopramide] Other (See Comments)       Severe agitation    Amoxil [Amoxicillin] Rash         Social Hx:  Social History            Socioeconomic History    Marital status: /Civil Union       Spouse name: Not on file    Number of children: Not on file    Years of education: Not on file    Highest education level: Not on file   Occupational History    Not on file   Tobacco Use    Smoking status: Never    Smokeless tobacco: Never   Vaping Use    Vaping Use: Never used   Substance and Sexual Activity    Alcohol use: Not Currently    Drug use: No    Sexual activity: Not on file   Other Topics Concern    Not on file   Social History Narrative    Not on file      Social Determinants of Health      Financial Resource Strain: Not on file   Food Insecurity: Not on file   Transportation Needs: Not on file   Physical Activity: Not on file   Stress: Not on file   Social Connections: Not on file   Intimate Partner Violence: Not on file   Housing Stability: Not on file         Family Hx:          Family History   Problem Relation Age of Onset    Gout Father      Liver cancer Father 76    No Known Problems Sister      No Known Problems Daughter      No Known Problems Daughter      No Known Problems Daughter      Rheum arthritis Maternal Grandmother      Diabetes Brother      No Known Problems Brother      No Known Problems Brother      Lupus Cousin      Sjogren's syndrome Family      Lupus Family      Thyroid disease Family      Heart disease Family              Review of Systems   Constitutional: Negative for chills and fever. Respiratory: Negative. Cardiovascular: Negative. Gastrointestinal: Negative. Endocrine: Negative. Genitourinary: Negative. Musculoskeletal: Negative. Neurological: Negative. All other systems reviewed and are negative. BP 93/69 (BP Location: Left arm, Patient Position: Sitting, Cuff Size: Standard)   Pulse 79   Temp 97.5 °F (36.4 °C) (Core)   Ht 5' 6.5" (1.689 m)   Wt 73.7 kg (162 lb 6.4 oz)   LMP 12/01/2017   SpO2 98%   BMI 25.82 kg/m²         Physical Exam  Vitals reviewed. Constitutional:       General: She is not in acute distress. Appearance: Normal appearance. HENT:      Head: Normocephalic and atraumatic. Cardiovascular:      Rate and Rhythm: Normal rate and regular rhythm. Pulmonary:      Effort: Pulmonary effort is normal. No respiratory distress. Breath sounds: Normal breath sounds. Abdominal:      General: There is no distension. Musculoskeletal:         General: Normal range of motion. Skin:     General: Skin is warm and dry. Comments: Left lateral thigh incision healing nicely. Neurological:      General: No focal deficit present. Mental Status: She is alert.             Pertinent labs reviewed     Pertinent images and available reads personally reviewed     Pertinent notes reviewed        Rona Ramos MD [address] [facility]  [phone]

## 2023-10-25 NOTE — PERIOPERATIVE NURSING NOTE
Patient discharged to home. Prescriptions and Discharge instructions were given and reviewed with patient. All questions answered. IV was removed per policy and procedure. Pt tolerated this well without complaint. Occlusive dressing was applied to the site. Patient left the unit with all belongings and a firm understanding of discharge instructions.

## 2023-10-25 NOTE — PERIOPERATIVE NURSING NOTE
Received patient from pacu aoo, vitals stable. Patient denies pain or discomfort at this time. Patient Left thigh surgical incision dressing is CDI, no drainage noted, Pulses palpable. Patient given water and is tolerating sips well. Patient ready for dc home.

## 2023-10-30 PROCEDURE — 88305 TISSUE EXAM BY PATHOLOGIST: CPT | Performed by: STUDENT IN AN ORGANIZED HEALTH CARE EDUCATION/TRAINING PROGRAM

## 2023-11-09 ENCOUNTER — OFFICE VISIT (OUTPATIENT)
Dept: SURGERY | Facility: CLINIC | Age: 53
End: 2023-11-09

## 2023-11-09 VITALS — WEIGHT: 160 LBS | TEMPERATURE: 97.4 F | HEIGHT: 67 IN | BODY MASS INDEX: 25.11 KG/M2

## 2023-11-09 DIAGNOSIS — Z09 SURGERY FOLLOW-UP EXAMINATION: Primary | ICD-10-CM

## 2023-11-09 PROCEDURE — 99024 POSTOP FOLLOW-UP VISIT: CPT | Performed by: SURGERY

## 2023-11-09 NOTE — PROGRESS NOTES
Patient is status post reexcision of a cutaneous melanoma left lateral thigh. Surgery performed on 25 October 2023. Breslow depth 0.8 mm and patient declined sentinel lymph node biopsy after further discussion. Here for routine follow-up and suture removal.    Pathology report:  Final Diagnosis   A. Skin, left thigh, excision:     -Prior procedure site changes present.     -Residual melanoma not seen. Original pathology report: Addendum   This addendum is created to report the result of deeper levels. The tumor does not extend to the tips. Deeper levels were examined     The final diagnosis remains unchanged. Addendum electronically signed by Tonya Gruber MD on 8/17/2023 at 10:42 AM   Final Diagnosis   A. Skin, left leg, excisional biopsy:  Melanoma, superficial spreading type, Breslow thickness: 0.8 mm. See synoptic report for further tumor characteristics. Synoptic report for melanoma of the skin  Breslow thickness: 0.8 mm   Ulceration: Not seen. Anatomic Dominga Loss) level: IV  Type: Superficial spreading type  Mitoses: 1/mm2. Microsatellites: Not seen. Lymphovascular invasion: Not seen. Neurotropism: Not seen. Tumor regression: Not seen  Tumor-infiltrating lymphocytes (TIL): Present, focally brisk. Margin assessment: Melanoma in situ and invasive  melanoma do not extends to peripheral and deep margins. Evaluation of the tips is pending deeper levels and will be reported in an addendum. Pathologic stage: pT1b  Associated nevus: not identified. Comment: An immunohistochemical stain for SOX10 performed with adequate control supports the findings. Healing nicely. Sutures removed and Steri-Strips placed.     Follow-up as needed

## 2024-01-02 ENCOUNTER — OFFICE VISIT (OUTPATIENT)
Dept: DERMATOLOGY | Facility: CLINIC | Age: 54
End: 2024-01-02
Payer: COMMERCIAL

## 2024-01-02 VITALS — HEIGHT: 67 IN | BODY MASS INDEX: 25.27 KG/M2 | WEIGHT: 161 LBS | TEMPERATURE: 98 F

## 2024-01-02 DIAGNOSIS — D18.01 CHERRY ANGIOMA: ICD-10-CM

## 2024-01-02 DIAGNOSIS — D22.9 MULTIPLE MELANOCYTIC NEVI: ICD-10-CM

## 2024-01-02 DIAGNOSIS — Z85.820 HISTORY OF MELANOMA: Primary | ICD-10-CM

## 2024-01-02 DIAGNOSIS — L81.4 LENTIGO: ICD-10-CM

## 2024-01-02 PROCEDURE — 99203 OFFICE O/P NEW LOW 30 MIN: CPT | Performed by: DERMATOLOGY

## 2024-01-02 NOTE — PATIENT INSTRUCTIONS
"HISTORY OF MELANOMA      Assessment and Plan:  Based on a thorough discussion of this condition and the management approach to it (including a comprehensive discussion of the known risks, side effects and potential benefits of treatment), the patient (family) agrees to implement the following specific plan:  When outside we recommend using a wide brim hat, sunglasses, long sleeve and pants, sunscreen with SPF 30+ with reapplication every 2 hours, or SPF specific clothing    We recommend that you have regular full body skin exams with a dermatologist every 3 months for 3 years and then every 6 months. Declined full body skin check today.   Recommend yearly appointments with your eye doctor and dentist. Please make sure they are aware of your history of Melanoma.   Discussed that with this stage of T 1 B, lymph node biopsy would have been recommended.  Discussed referral to Dr. Santoro. Patient agreeable.     What happens at follow-up?  The main purpose of follow-up is to detect recurrences early (metastatic melanoma), but it also offers an opportunity to diagnose a new primary melanoma at the first possible opportunity. A second invasive melanoma occurs in 5-10% of melanoma patients and a new melanoma in situ is diagnosed in more than 20% of melanoma patients.    Our practice makes the following recommendations for follow-up for patients with invasive melanoma.  At-least \"monthly\" self-skin examinations   Routine skin checks by a board certified dermatologist  Follow-up intervals are \"every 3 months\" within 2 years of a new melanoma diagnosis; \"every 6 months\" between 2-4 years of a new melanoma diagnosis; and \"annually\" after 4 years of a new melanoma diagnosis  Individual patient's needs should be considered before an appropriate follow-up is offered  Provide education and support to help the patient adjust to their illness    Follow-up appointments should include:  A check of the scar where the primary melanoma was " "removed  Checking the regional lymph nodes  A general skin examination  A full physical examination at least annually by your primary care physician    In those with more advanced primary disease, follow-up may include:  Blood tests  Imaging: ultrasound, X-ray, CT, MRI and PET scan.    Most tests are not worthwhile for patients with stage 1 or 2 melanoma unless there are signs or symptoms of disease recurrence or metastasis. No tests are necessary for healthy patients who have remained well for five years or longer after removal of their melanoma.    What is the outlook for patients with melanoma?  Melanoma in situ is cured by excision because it has no potential to spread around the body.  The risk of spread and ultimate death from invasive melanoma depends on several factors, but the main one is the Breslow thickness of the melanoma at the time it was surgically removed.  Metastases are rare for melanomas < 0.75 mm and the risk for tumours 0.75-1 mm thick is about 5%. The risk steadily increases with thickness so that melanomas > 4 mm have a risk of metastasis of about 40%.    Melanoma is a potentially serious type of skin cancer, in which there is uncontrolled growth of melanocytes (pigment cells). Melanoma is sometimes called malignant melanoma.  Normal melanocytes are found in the basal layer of the epidermis (the outer layer of skin). Melanocytes produce a protein called melanin, which protects skin cells by absorbing ultraviolet (UV) radiation. Melanocytes are found in equal numbers in black and white skin, but melanocytes in black skin produce much more melanin. People with dark brown or black skin are very much less likely to be damaged by UV radiation than those with white skin.     MELANOCYTIC NEVI (\"Moles\")    Assessment and Plan:  Based on a thorough discussion of this condition and the management approach to it (including a comprehensive discussion of the known risks, side effects and potential " "benefits of treatment), the patient (family) agrees to implement the following specific plan:  When outside we recommend using a wide brim hat, sunglasses, long sleeve and pants, sunscreen with SPF 30+ with reapplication every 2 hours, or SPF specific clothing   Benign, reassured  Annual skin check     Melanocytic Nevi  Melanocytic nevi (\"moles\") are tan or brown, raised or flat areas of the skin which have an increased number of melanocytes. Melanocytes are the cells in our body which make pigment and account for skin color.    Some moles are present at birth (I.e., \"congenital nevi\"), while others come up later in life (i.e., \"acquired nevi\").  The sun can stimulate the body to make more moles.  Sunburns are not the only thing that triggers more moles.  Chronic sun exposure can do it too.     Clinically distinguishing a healthy mole from melanoma may be difficult, even for experienced dermatologists. The \"ABCDE's\" of moles have been suggested as a means of helping to alert a person to a suspicious mole and the possible increased risk of melanoma.  The suggestions for raising alert are as follows:    Asymmetry: Healthy moles tend to be symmetric, while melanomas are often asymmetric.  Asymmetry means if you draw a line through the mole, the two halves do not match in color, size, shape, or surface texture. Asymmetry can be a result of rapid enlargement of a mole, the development of a raised area on a previously flat lesion, scaling, ulceration, bleeding or scabbing within the mole.  Any mole that starts to demonstrate \"asymmetry\" should be examined promptly by a board certified dermatologist.     Border: Healthy moles tend to have discrete, even borders.  The border of a melanoma often blends into the normal skin and does not sharply delineate the mole from normal skin.  Any mole that starts to demonstrate \"uneven borders\" should be examined promptly by a board certified dermatologist.     Color: Healthy moles tend " "to be one color throughout.  Melanomas tend to be made up of different colors ranging from dark black, blue, white, or red.  Any mole that demonstrates a color change should be examined promptly by a board certified dermatologist.     Diameter: Healthy moles tend to be smaller than 0.6 cm in size; an exception are \"congenital nevi\" that can be larger.  Melanomas tend to grow and can often be greater than 0.6 cm (1/4 of an inch, or the size of a pencil eraser). This is only a guideline, and many normal moles may be larger than 0.6 cm without being unhealthy.  Any mole that starts to change in size (small to bigger or bigger to smaller) should be examined promptly by a board certified dermatologist.     Evolving: Healthy moles tend to \"stay the same.\"  Melanomas may often show signs of change or evolution such as a change in size, shape, color, or elevation.  Any mole that starts to itch, bleed, crust, burn, hurt, or ulcerate or demonstrate a change or evolution should be examined promptly by a board certified dermatologist.        LENTIGO    Assessment and Plan:  Based on a thorough discussion of this condition and the management approach to it (including a comprehensive discussion of the known risks, side effects and potential benefits of treatment), the patient (family) agrees to implement the following specific plan:  When outside we recommend using a wide brim hat, sunglasses, long sleeve and pants, sunscreen with SPF 30+ with reapplication every 2 hours, or SPF specific clothing       What is a lentigo?  A lentigo is a pigmented flat or slightly raised lesion with a clearly defined edge. Unlike an ephelis (freckle), it does not fade in the winter months. There are several kinds of lentigo.  The name lentigo originally referred to its appearance resembling a small lentil. The plural of lentigo is lentigines, although “lentigos” is also in common use.    Who gets lentigines?  Lentigines can affect males and " "females of all ages and races. Solar lentigines are especially prevalent in fair skinned adults. Lentigines associated with syndromes are present at birth or arise during childhood.    What causes lentigines?  Common forms of lentigo are due to exposure to ultraviolet radiation:  Sun damage including sunburn   Indoor tanning   Phototherapy, especially photochemotherapy (PUVA)    Ionizing radiation, eg radiation therapy, can also cause lentigines.  Several familial syndromes associated with widespread lentigines originate from mutations in Attila-MAP kinase, mTOR signaling and PTEN pathways.    What is the treatment for lentigines?  Most lentigines are left alone. Attempts to lighten them may not be successful. The following approaches are used:  SPF 50+ broad-spectrum sunscreen   Hydroquinone bleaching cream   Alpha hydroxy acids   Vitamin C   Retinoids   Azelaic acid   Chemical peels  Individual lesions can be permanently removed using:  Cryotherapy   Intense pulsed light   Pigment lasers    How can lentigines be prevented?  Lentigines associated with exposure ultraviolet radiation can be prevented by very careful sun protection. Clothing is more successful at preventing new lentigines than are sunscreens.    What is the outlook for lentigines?  Lentigines usually persist. They may increase in number with age and sun exposure. Some in sun-protected sites may fade and disappear.    PRESLEY ANGIOMAS    Assessment and Plan:  Based on a thorough discussion of this condition and the management approach to it (including a comprehensive discussion of the known risks, side effects and potential benefits of treatment), the patient (family) agrees to implement the following specific plan:  Monitor for changes  Benign, reassured      Assessment and Plan:    Cherry angioma, also known as Aldana de Dorian spots, are benign vascular skin lesions. A \"cherry angioma\" is a firm red, blue or purple papule, 0.1-1 cm in diameter. When " thrombosed, they can appear black in colour until evaluated with a dermatoscope when the red or purple colour is more easily seen. Cherry angioma may develop on any part of the body but most often appear on the scalp, face, lips and trunk.  An angioma is due to proliferating endothelial cells; these are the cells that line the inside of a blood vessel.    Angiomas can arise in early life or later in life; the most common type of angioma is a cherry angioma.  Cherry angiomas are very common in males and females of any age or race. They are more noticeable in white skin than in skin of colour. They markedly increase in number from about the age of 40. There may be a family history of similar lesions. Eruptive cherry angiomas have been rarely reported to be associated with internal malignancy. The cause of angiomas is unknown. Genetic analysis of cherry angiomas has shown that they frequently carry specific somatic missense mutations in the GNAQ and GNA11 (Q209H) genes, which are involved in other vascular and melanocytic proliferations.

## 2024-01-02 NOTE — PROGRESS NOTES
"[facility] Dermatology Clinic Note     Patient Name: Felicia Diane  Encounter Date: 01/02/2024     Have you been cared for by a [facility] Dermatologist in the last 3 years and, if so, which description applies to you?    NO.   I am considered a \"new\" patient and must complete all patient intake questions. I am FEMALE/of child-bearing potential.    REVIEW OF SYSTEMS:  Have you recently had or currently have any of the following? Recent fever or chills? No  Any non-healing wound? No  Are you pregnant or planning to become pregnant? No  Are you currently or planning to be nursing or breast feeding? No   PAST MEDICAL HISTORY:  Have you personally ever had or currently have any of the following?  If \"YES,\" then please provide more detail. Skin cancer (such as Melanoma, Basal Cell Carcinoma, Squamous Cell Carcinoma?  YES, Melanoma  Tuberculosis, HIV/AIDS, Hepatitis B or C: No  Radiation Treatment No   HISTORY OF IMMUNOSUPPRESSION:   Do you have a history of any of the following:  Systemic Immunosuppression such as Diabetes, Biologic or Immunotherapy, Chemotherapy, Organ Transplantation, Bone Marrow Transplantation?  YES, Sjogren's     Answering \"YES\" requires the addition of the dotphrase \"IMMUNOSUPPRESSED\" as the first diagnosis of the patient's visit.   FAMILY HISTORY:  Any \"first degree relatives\" (parent, brother, sister, or child) with the following?    Skin Cancer, Pancreatic or Other Cancer? No   PATIENT EXPERIENCE:    Do you want the Dermatologist to perform a COMPLETE skin exam today including a clinical examination under the \"bra and underwear\" areas?  NO, only face and back examined today  If necessary, do we have your permission to call and leave a detailed message on your Preferred Phone number that includes your specific medical information?  Yes      Allergies   Allergen Reactions    Covid-19 Mrna Vacc (Moderna) Palpitations     Felt sick and dizzy for days     Levaquin [Levofloxacin] Other (See " Comments)     Tendon tears/ tendon pain    Reglan [Metoclopramide] Other (See Comments)     Severe agitation    Amoxil [Amoxicillin] Rash      Current Outpatient Medications:     Cholecalciferol (Vitamin D3) 50 MCG (2000 UT) TABS, Take 2,000 Units by mouth daily, Disp: , Rfl:     COD LIVER OIL PO, Take by mouth daily after breakfast, Disp: , Rfl:     magnesium 30 MG tablet, Take 30 mg by mouth 2 (two) times a day, Disp: , Rfl:     oxyCODONE-acetaminophen (PERCOCET) 5-325 mg per tablet, Take 1 tablet by mouth every 8 (eight) hours as needed for severe pain for up to 10 doses Max Daily Amount: 3 tablets (Patient not taking: Reported on 11/9/2023), Disp: 10 tablet, Rfl: 0          Whom besides the patient is providing clinical information about today's encounter?   NO ADDITIONAL HISTORIAN (patient alone provided history)    Physical Exam and Assessment/Plan by Diagnosis:    HISTORY OF MELANOMA    Physical Exam:  Anatomic Location Affected:  Left thigh  Morphological Description of Scar:  well healed scar  Year Treated: 10/2023  TNM Classification: pT1b   Suspected Recurrence: no  Regional adenopathy: no    Additional History of Present Condition:  Surgery by general surgery, pathology results: -Prior procedure site changes present.   Residual melanoma not seen. [id number].       Assessment and Plan:  Based on a thorough discussion of this condition and the management approach to it (including a comprehensive discussion of the known risks, side effects and potential benefits of treatment), the patient (family) agrees to implement the following specific plan:  When outside we recommend using a wide brim hat, sunglasses, long sleeve and pants, sunscreen with SPF 30+ with reapplication every 2 hours, or SPF specific clothing    We recommend that you have regular full body skin exams with a dermatologist every 3 months for 3 years and then every 6 months. Declined full body skin check today.   Recommend yearly appointments  "with your eye doctor and dentist. Please make sure they are aware of your history of Melanoma.   Discussed that with this stage of T 1 B, lymph node biopsy would have been recommended.  Discussed referral to Dr. Santoro. Patient agreeable.     What happens at follow-up?  The main purpose of follow-up is to detect recurrences early (metastatic melanoma), but it also offers an opportunity to diagnose a new primary melanoma at the first possible opportunity. A second invasive melanoma occurs in 5-10% of melanoma patients and a new melanoma in situ is diagnosed in more than 20% of melanoma patients.    Our practice makes the following recommendations for follow-up for patients with invasive melanoma.  At-least \"monthly\" self-skin examinations   Routine skin checks by a board certified dermatologist  Follow-up intervals are \"every 3 months\" within 2 years of a new melanoma diagnosis; \"every 6 months\" between 2-4 years of a new melanoma diagnosis; and \"annually\" after 4 years of a new melanoma diagnosis  Individual patient's needs should be considered before an appropriate follow-up is offered  Provide education and support to help the patient adjust to their illness    Follow-up appointments should include:  A check of the scar where the primary melanoma was removed  Checking the regional lymph nodes  A general skin examination  A full physical examination at least annually by your primary care physician    In those with more advanced primary disease, follow-up may include:  Blood tests  Imaging: ultrasound, X-ray, CT, MRI and PET scan.    Most tests are not worthwhile for patients with stage 1 or 2 melanoma unless there are signs or symptoms of disease recurrence or metastasis. No tests are necessary for healthy patients who have remained well for five years or longer after removal of their melanoma.    What is the outlook for patients with melanoma?  Melanoma in situ is cured by excision because it has no potential to " "spread around the body.  The risk of spread and ultimate death from invasive melanoma depends on several factors, but the main one is the Breslow thickness of the melanoma at the time it was surgically removed.  Metastases are rare for melanomas < 0.75 mm and the risk for tumours 0.75-1 mm thick is about 5%. The risk steadily increases with thickness so that melanomas > 4 mm have a risk of metastasis of about 40%.    Melanoma is a potentially serious type of skin cancer, in which there is uncontrolled growth of melanocytes (pigment cells). Melanoma is sometimes called malignant melanoma.  Normal melanocytes are found in the basal layer of the epidermis (the outer layer of skin). Melanocytes produce a protein called melanin, which protects skin cells by absorbing ultraviolet (UV) radiation. Melanocytes are found in equal numbers in black and white skin, but melanocytes in black skin produce much more melanin. People with dark brown or black skin are very much less likely to be damaged by UV radiation than those with white skin.     MELANOCYTIC NEVI (\"Moles\")    Physical Exam:  Anatomic Location Affected:   Mostly on sun-exposed areas of the trunk  A; Left upper back; 0.6 X 0.5 cm irregular brown macule; pictures taken today  Morphological Description:  Scattered, 1-4mm round to ovoid, symmetrical-appearing, even bordered, skin colored to dark brown macules/papules, mostly in sun-exposed areas  Pertinent Positives:  Pertinent Negatives:    Additional History of Present Condition:      Assessment and Plan:  Based on a thorough discussion of this condition and the management approach to it (including a comprehensive discussion of the known risks, side effects and potential benefits of treatment), the patient (family) agrees to implement the following specific plan:  When outside we recommend using a wide brim hat, sunglasses, long sleeve and pants, sunscreen with SPF 30+ with reapplication every 2 hours, or SPF specific " "clothing   Benign, reassured  Annual skin check     Melanocytic Nevi  Melanocytic nevi (\"moles\") are tan or brown, raised or flat areas of the skin which have an increased number of melanocytes. Melanocytes are the cells in our body which make pigment and account for skin color.    Some moles are present at birth (I.e., \"congenital nevi\"), while others come up later in life (i.e., \"acquired nevi\").  The sun can stimulate the body to make more moles.  Sunburns are not the only thing that triggers more moles.  Chronic sun exposure can do it too.     Clinically distinguishing a healthy mole from melanoma may be difficult, even for experienced dermatologists. The \"ABCDE's\" of moles have been suggested as a means of helping to alert a person to a suspicious mole and the possible increased risk of melanoma.  The suggestions for raising alert are as follows:    Asymmetry: Healthy moles tend to be symmetric, while melanomas are often asymmetric.  Asymmetry means if you draw a line through the mole, the two halves do not match in color, size, shape, or surface texture. Asymmetry can be a result of rapid enlargement of a mole, the development of a raised area on a previously flat lesion, scaling, ulceration, bleeding or scabbing within the mole.  Any mole that starts to demonstrate \"asymmetry\" should be examined promptly by a board certified dermatologist.     Border: Healthy moles tend to have discrete, even borders.  The border of a melanoma often blends into the normal skin and does not sharply delineate the mole from normal skin.  Any mole that starts to demonstrate \"uneven borders\" should be examined promptly by a board certified dermatologist.     Color: Healthy moles tend to be one color throughout.  Melanomas tend to be made up of different colors ranging from dark black, blue, white, or red.  Any mole that demonstrates a color change should be examined promptly by a board certified dermatologist.     Diameter: " "Healthy moles tend to be smaller than 0.6 cm in size; an exception are \"congenital nevi\" that can be larger.  Melanomas tend to grow and can often be greater than 0.6 cm (1/4 of an inch, or the size of a pencil eraser). This is only a guideline, and many normal moles may be larger than 0.6 cm without being unhealthy.  Any mole that starts to change in size (small to bigger or bigger to smaller) should be examined promptly by a board certified dermatologist.     Evolving: Healthy moles tend to \"stay the same.\"  Melanomas may often show signs of change or evolution such as a change in size, shape, color, or elevation.  Any mole that starts to itch, bleed, crust, burn, hurt, or ulcerate or demonstrate a change or evolution should be examined promptly by a board certified dermatologist.        LENTIGO    Physical Exam:  Anatomic Location Affected:  trunk, face  Morphological Description:  Light brown macules  Pertinent Positives:  Pertinent Negatives:    Additional History of Present Condition:      Assessment and Plan:  Based on a thorough discussion of this condition and the management approach to it (including a comprehensive discussion of the known risks, side effects and potential benefits of treatment), the patient (family) agrees to implement the following specific plan:  When outside we recommend using a wide brim hat, sunglasses, long sleeve and pants, sunscreen with SPF 30+ with reapplication every 2 hours, or SPF specific clothing       What is a lentigo?  A lentigo is a pigmented flat or slightly raised lesion with a clearly defined edge. Unlike an ephelis (freckle), it does not fade in the winter months. There are several kinds of lentigo.  The name lentigo originally referred to its appearance resembling a small lentil. The plural of lentigo is lentigines, although “lentigos” is also in common use.    Who gets lentigines?  Lentigines can affect males and females of all ages and races. Solar lentigines are " especially prevalent in fair skinned adults. Lentigines associated with syndromes are present at birth or arise during childhood.    What causes lentigines?  Common forms of lentigo are due to exposure to ultraviolet radiation:  Sun damage including sunburn   Indoor tanning   Phototherapy, especially photochemotherapy (PUVA)    Ionizing radiation, eg radiation therapy, can also cause lentigines.  Several familial syndromes associated with widespread lentigines originate from mutations in Attila-MAP kinase, mTOR signaling and PTEN pathways.    What is the treatment for lentigines?  Most lentigines are left alone. Attempts to lighten them may not be successful. The following approaches are used:  SPF 50+ broad-spectrum sunscreen   Hydroquinone bleaching cream   Alpha hydroxy acids   Vitamin C   Retinoids   Azelaic acid   Chemical peels  Individual lesions can be permanently removed using:  Cryotherapy   Intense pulsed light   Pigment lasers    How can lentigines be prevented?  Lentigines associated with exposure ultraviolet radiation can be prevented by very careful sun protection. Clothing is more successful at preventing new lentigines than are sunscreens.    What is the outlook for lentigines?  Lentigines usually persist. They may increase in number with age and sun exposure. Some in sun-protected sites may fade and disappear.    PERSLEY ANGIOMAS    Physical Exam:  Anatomic Location Affected:  trunk  Morphological Description:  Scattered cherry red, 1-4 mm papules.  Pertinent Positives:  Pertinent Negatives:    Additional History of Present Condition:      Assessment and Plan:  Based on a thorough discussion of this condition and the management approach to it (including a comprehensive discussion of the known risks, side effects and potential benefits of treatment), the patient (family) agrees to implement the following specific plan:  Monitor for changes  Benign, reassured      Assessment and Plan:    Cherry angioma,  "also known as Aldana de Dorian spots, are benign vascular skin lesions. A \"cherry angioma\" is a firm red, blue or purple papule, 0.1-1 cm in diameter. When thrombosed, they can appear black in colour until evaluated with a dermatoscope when the red or purple colour is more easily seen. Cherry angioma may develop on any part of the body but most often appear on the scalp, face, lips and trunk.  An angioma is due to proliferating endothelial cells; these are the cells that line the inside of a blood vessel.    Angiomas can arise in early life or later in life; the most common type of angioma is a cherry angioma.  Cherry angiomas are very common in males and females of any age or race. They are more noticeable in white skin than in skin of colour. They markedly increase in number from about the age of 40. There may be a family history of similar lesions. Eruptive cherry angiomas have been rarely reported to be associated with internal malignancy. The cause of angiomas is unknown. Genetic analysis of cherry angiomas has shown that they frequently carry specific somatic missense mutations in the GNAQ and GNA11 (Q209H) genes, which are involved in other vascular and melanocytic proliferations.          Scribe Attestation      I,:  Zulma Nunez am acting as a scribe while in the presence of the attending physician.:       I,:  Yajaira Escalona MD personally performed the services described in this documentation    as scribed in my presence.:            "

## 2024-01-03 ENCOUNTER — DOCUMENTATION (OUTPATIENT)
Dept: HEMATOLOGY ONCOLOGY | Facility: CLINIC | Age: 54
End: 2024-01-03

## 2024-01-03 NOTE — PROGRESS NOTES
Intake received/ Chart reviewed for services completed outside of [facility]    Pathology completed: 10/25/2023 [id number], 8/3/2023 [id number]    Imaging completed: n/a    All records needed are in patients chart. No records retrieval needed at this time.

## 2024-01-25 ENCOUNTER — OFFICE VISIT (OUTPATIENT)
Dept: HEMATOLOGY ONCOLOGY | Facility: CLINIC | Age: 54
End: 2024-01-25
Payer: COMMERCIAL

## 2024-01-25 VITALS
RESPIRATION RATE: 16 BRPM | TEMPERATURE: 97.9 F | BODY MASS INDEX: 24.64 KG/M2 | DIASTOLIC BLOOD PRESSURE: 62 MMHG | OXYGEN SATURATION: 99 % | HEART RATE: 90 BPM | HEIGHT: 67 IN | WEIGHT: 157 LBS | SYSTOLIC BLOOD PRESSURE: 102 MMHG

## 2024-01-25 DIAGNOSIS — C43.72 MALIGNANT MELANOMA OF LEFT LOWER EXTREMITY INCLUDING HIP (HCC): Primary | ICD-10-CM

## 2024-01-25 DIAGNOSIS — Z85.820 HISTORY OF MELANOMA: ICD-10-CM

## 2024-01-25 PROCEDURE — 99244 OFF/OP CNSLTJ NEW/EST MOD 40: CPT | Performed by: INTERNAL MEDICINE

## 2024-01-25 NOTE — LETTER
January 26, 2024     Yajaira Baca DO  [address] [facility]   [address]  [facility] [address]    Patient: Felicia Diane   YOB: 1970   Date of Visit: 1/25/2024       Dear Dr. Baca:    Thank you for referring Felicia Diane to me for evaluation. Below are my notes for this consultation.    If you have questions, please do not hesitate to call me. I look forward to following your patient along with you.         Sincerely,        Piper Santoro MD        CC: DO Klaus Brenner  1/26/2024  5:17 PM  Attested  [facility] [place]  [address] [address]  [phone]  [phone]     Date of Visit: 1/25/2024  Name: Felicia Diane   YOB: 1970     Subjective    Subjective    VISIT DIAGNOSIS:  Diagnoses and all orders for this visit:    Malignant melanoma of left lower extremity including hip (HCC)  -     CBC and differential; Future  -     Comprehensive metabolic panel; Future  -     LD,Blood; Future  -     US groin/inguinal area; Future  -     Comprehensive metabolic panel; Future  -     CBC and differential; Future  -     LD,Blood; Future    History of melanoma  -     Ambulatory Referral to Hematology / Oncology  -     CBC and differential; Future  -     Comprehensive metabolic panel; Future  -     LD,Blood; Future  -     US groin/inguinal area; Future  -     Comprehensive metabolic panel; Future  -     CBC and differential; Future  -     LD,Blood; Future        Oncology History   Malignant melanoma of left lower extremity including hip (HCC)   8/3/2023 -  Cancer Staged    Staging form: Melanoma of the Skin, AJCC 8th Edition  - Clinical stage from 8/3/2023: Stage IB (cT1b, cN0, cM0) - Signed by Piper Santoro MD on 1/25/2024       10/25/2023 Initial Diagnosis    Malignant melanoma of left lower extremity including hip (HCC)        Cancer Staging   Malignant melanoma of left lower extremity including hip  ([facility])  Staging form: Melanoma of the Skin, AJCC 8th Edition  - Clinical stage from 8/3/2023: Stage IB (cT1b, cN0, cM0) - Signed by Piper Santoro MD on 1/25/2024     [No matching plan found]     HISTORY OF PRESENT ILLNESS: Felicia Diane is a 53 y.o. female  with a history of mixed connective tissue disorder and Sjogren's who presents for initial consultation of her melanoma. Patient was diagnosed with a Stage T1B melanoma of her left lateral thigh on 8/2/23. She is s/p resection and WLE on 10/25/23 due to 2 mm margins. She was recommended a SLN biopsy which she declined due to how it could impact her work and concerns of lymphedema. She presents today on referral from her dermatologist for her Stage 1B melanoma.    Patient states that she had pale pink mole on her left thigh that was present for many years. Over the last few months the spot had become larger, dry, blotchy, and hyperpigmented. Her  noted these changes around summer of 2023 and recommended the patient be evaluated by dermatology. She had scheduled an appointment with Dermatology but the earliest appointment she could get was in January 2024. So in the meant time patient had her melanotic lesion evaluated by surgery. Surgery performed an elective excision of her atypical nevus of her left thigh. Biopsy results confirmed melanoma with a Breslow thickness of 0.8 mm and Leeroy level IV without any ulcerations and mitoses of 1/mm squared. There were no microsattelites or lymphovascular invasion but tumor infiltrating lymphocytes were present in the region. Based on her high risk features, patient was recommended re-excision with a SLN biopsy. Patient subsequently underwent a wide local excision but declined SLN biopsy.    Patient had significant risk factors for developing melanoma. She was outside a lot as a child and would develop blistering sun burns all over her body every summer. She states that since late 80s she started wearing sun  protection but last 10 years she has been wearing SPF  sunscreen. She has her  check her skin and she states that he noticed some lesions in the back and R. Gluteal region and the left scapular region. The lesions have not grown in size but apparently the shape and hue of the lesions bothers her and her . She states that her father has undiagnosed melanoma based on an obvious melanotic lesion in his face. He also had cholangiocarcinoma that led to his ultimate passing. Patient denies any other known family history of cancer including no know breast, ovarian, pancreatic, and renal cell. She is up to date on her cancer screening. She is a non-smoker, drinks alcohol occasionally, and does not use recreational drugs. She has a son who does have some melanotic lesion on the right trunk and she will tell him to go to the dermatologist.        Review of Systems   Constitutional:  Negative for appetite change, chills, diaphoresis, fatigue, fever and unexpected weight change.   HENT:   Negative for lump/mass and trouble swallowing.    Eyes:  Negative for icterus.   Respiratory:  Negative for chest tightness, cough, shortness of breath and wheezing.    Cardiovascular:  Negative for chest pain, leg swelling and palpitations.   Gastrointestinal:  Negative for abdominal pain, constipation, diarrhea, nausea and vomiting.   Musculoskeletal:  Negative for arthralgias and myalgias.   Skin:  Negative for itching and rash.   Neurological:  Negative for extremity weakness, headaches and numbness.   Hematological:  Negative for adenopathy.        MEDICATIONS:    Current Outpatient Medications:   •  Cholecalciferol (Vitamin D3) 50 MCG (2000 UT) TABS, Take 2,000 Units by mouth daily, Disp: , Rfl:   •  COD LIVER OIL PO, Take by mouth daily after breakfast, Disp: , Rfl:   •  magnesium 30 MG tablet, Take 30 mg by mouth 2 (two) times a day, Disp: , Rfl:   •  oxyCODONE-acetaminophen (PERCOCET) 5-325 mg per tablet, Take 1  tablet by mouth every 8 (eight) hours as needed for severe pain for up to 10 doses Max Daily Amount: 3 tablets (Patient not taking: Reported on 11/9/2023), Disp: 10 tablet, Rfl: 0     ALLERGIES:  Allergies   Allergen Reactions   • Covid-19 Mrna Vacc (Moderna) Palpitations     Felt sick and dizzy for days    • Levaquin [Levofloxacin] Other (See Comments)     Tendon tears/ tendon pain   • Reglan [Metoclopramide] Other (See Comments)     Severe agitation   • Amoxil [Amoxicillin] Rash        ACTIVE PROBLEMS:  Patient Active Problem List   Diagnosis   • Dense breasts   • Lateral epicondylitis of right elbow   • Chronic midline low back pain without sciatica   • Mixed connective tissue disease (HCC)   • Anti-cardiolipin antibody positive   • Sjogren's syndrome (HCC)   • Chronic leukopenia   • Skin lesion   • PONV (postoperative nausea and vomiting)   • History of hysterectomy   • Malignant melanoma of left lower extremity including hip (HCC)          PAST MEDICAL HISTORY:   Past Medical History:   Diagnosis Date   • Abnormal menstrual periods     Resolved 12/18/2017    • Abnormal uterine bleeding     Resolved 12/18/2017    • Anticardiolipin syndrome (HCC)    • Arthropathy, multiple sites     Resolved 1/29/2016    • Erythema nodosum     Resolved 5/1/2017    • Herniated lumbar intervertebral disc    • Malignant melanoma of left lower extremity including hip (HCC) 10/25/2023   • Mixed connective tissue disease (HCC)    • Mixed connective tissue disease (HCC)    • PONV (postoperative nausea and vomiting) 10/25/2023   • Skin lesion     Resolved 1/29/2016         PAST SURGICAL HISTORY:  Past Surgical History:   Procedure Laterality Date   • HYSTERECTOMY  12/2017   • TX CYSTOURETHROSCOPY N/A 12/11/2017    Procedure: CYSTOSCOPY;  Surgeon: Beverly Valdez MD;  Location: [facility];  Service: Gynecology   • TX EXC TUMOR SOFT TISS UPPER ARM/ELBW SUBFASC 5CM/> Left 10/25/2023    Procedure: EXCISION BIOPSY TISSUE LESION/MASS  "LOWER EXTREMITY ;  Surgeon: Christiano Ramirez MD;  Location: [facility];  Service: General   • LA LAPS W/VAG HYSTERECT 250 GM/&RMVL TUBE&/OVARIES N/A 12/11/2017    Procedure: LAPAROSCOPIC ASSISTED VAGINAL HYSTERECTOMY; BILATERAL SALPINGECTOMY;  Surgeon: Beverly Valdez MD;  Location:  Main OR;  Service: Gynecology        SOCIAL HISTORY:  Social History     Socioeconomic History   • Marital status: /Civil Union     Spouse name: Not on file   • Number of children: Not on file   • Years of education: Not on file   • Highest education level: Not on file   Occupational History   • Not on file   Tobacco Use   • Smoking status: Never     Passive exposure: Never   • Smokeless tobacco: Never   Vaping Use   • Vaping status: Never Used   Substance and Sexual Activity   • Alcohol use: Not Currently   • Drug use: No   • Sexual activity: Not on file   Other Topics Concern   • Not on file   Social History Narrative   • Not on file     Social Determinants of Health     Financial Resource Strain: Not on file   Food Insecurity: Not on file   Transportation Needs: Not on file   Physical Activity: Not on file   Stress: Not on file   Social Connections: Not on file   Intimate Partner Violence: Not on file   Housing Stability: Not on file        FAMILY HISTORY:  Family History   Problem Relation Age of Onset   • Gout Father    • Liver cancer Father 74   • No Known Problems Sister    • No Known Problems Daughter    • No Known Problems Daughter    • No Known Problems Daughter    • Rheum arthritis Maternal Grandmother    • Diabetes Brother    • No Known Problems Brother    • No Known Problems Brother    • Lupus Cousin    • Sjogren's syndrome Family    • Lupus Family    • Thyroid disease Family    • Heart disease Family         Objective    Objective    PHYSICAL EXAMINATION:   Blood pressure 102/62, pulse 90, temperature 97.9 °F (36.6 °C), temperature source Temporal, resp. rate 16, height 5' 7\" (1.702 m), weight 71.2 kg (157 " lb), last menstrual period 12/01/2017, SpO2 99%, not currently breastfeeding.     Pain Score: 0-No pain      Physical Exam  Constitutional:       Appearance: Normal appearance.   HENT:      Head: Normocephalic and atraumatic.      Mouth/Throat:      Mouth: Mucous membranes are moist.   Eyes:      Pupils: Pupils are equal, round, and reactive to light.   Cardiovascular:      Rate and Rhythm: Normal rate and regular rhythm.      Heart sounds: No murmur heard.     No friction rub. No gallop.   Pulmonary:      Effort: Pulmonary effort is normal.      Breath sounds: No wheezing, rhonchi or rales.   Abdominal:      General: Abdomen is flat. Bowel sounds are normal.      Palpations: Abdomen is soft.   Musculoskeletal:         General: Normal range of motion.      Cervical back: Normal range of motion.   Skin:     General: Skin is warm and dry.   Neurological:      General: No focal deficit present.      Mental Status: She is alert.         I reviewed lab data in the chart.    WBC   Date Value Ref Range Status   08/23/2023 3.53 (L) 4.31 - 10.16 Thousand/uL Final   04/08/2023 3.44 (L) 4.31 - 10.16 Thousand/uL Final   09/10/2022 4.77 4.31 - 10.16 Thousand/uL Final   11/07/2015 4.20 (L) 4.31 - 10.16 Thousand/uL Final   11/03/2015 3.5 (L) 4.8 - 10.8 X 1000/u    10/17/2015 5.2 4.8 - 10.8 X 1000/u      Hemoglobin   Date Value Ref Range Status   08/23/2023 13.5 11.5 - 15.4 g/dL Final   04/08/2023 12.9 11.5 - 15.4 g/dL Final   09/10/2022 13.8 11.5 - 15.4 g/dL Final   11/07/2015 12.5 11.5 - 15.4 g/dL Final   11/03/2015 12.9 12.0 - 16.0 g/dL    10/17/2015 13.4 12.0 - 16.0 g/dL      Platelets   Date Value Ref Range Status   08/23/2023 202 149 - 390 Thousands/uL Final   04/08/2023 162 149 - 390 Thousands/uL Final   09/10/2022 191 149 - 390 Thousands/uL Final   11/07/2015 216 149 - 390 Thousand/uL Final   11/03/2015 188 130 - 400 X 1000/u    10/17/2015 391 130 - 400 X 1000/u      MCV   Date Value Ref Range Status   08/23/2023 94 82 - 98  fL Final   04/08/2023 93 82 - 98 fL Final   09/10/2022 90 82 - 98 fL Final   11/07/2015 91 82 - 98 fL Final   11/03/2015 91.5 80.0 - 94.0 fL    10/17/2015 91.9 80.0 - 94.0 fL       Sodium   Date Value Ref Range Status   11/07/2015 139 136 - 145 mmol/L Final   11/03/2015 138 137 - 145 mmol/L    10/17/2015 136 (L) 137 - 145 mmol/L      Potassium   Date Value Ref Range Status   08/23/2023 4.1 3.5 - 5.3 mmol/L Final   04/08/2023 3.7 3.5 - 5.3 mmol/L Final   09/10/2022 4.0 3.5 - 5.3 mmol/L Final   11/07/2015 3.7 3.5 - 5.3 mmol/L Final   11/03/2015 3.9 3.6 - 4.8 mmol/L    10/17/2015 3.5 (L) 3.6 - 4.8 mmol/L      Chloride   Date Value Ref Range Status   08/23/2023 104 96 - 108 mmol/L Final   04/08/2023 103 96 - 108 mmol/L Final   09/10/2022 104 96 - 108 mmol/L Final   11/07/2015 104 98 - 108 mmol/L Final   11/03/2015 104 96 - 107 mmol/L    10/17/2015 101 96 - 107 mmol/L      CO2   Date Value Ref Range Status   08/23/2023 28 21 - 32 mmol/L Final   04/08/2023 26 21 - 32 mmol/L Final   09/10/2022 27 21 - 32 mmol/L Final   11/07/2015 27.5 21.0 - 32.0 mmol/L Final   11/03/2015 28 22 - 29 mmol/L    10/17/2015 27 22 - 29 mmol/L      BUN   Date Value Ref Range Status   08/23/2023 16 5 - 25 mg/dL Final   04/08/2023 17 5 - 25 mg/dL Final   09/10/2022 11 5 - 25 mg/dL Final   11/07/2015 13 5 - 25 mg/dL Final   11/03/2015 9 9 - 21 mg/dL    10/17/2015 11 9 - 21 mg/dL      Creatinine   Date Value Ref Range Status   08/23/2023 0.67 0.60 - 1.30 mg/dL Final     Comment:     Standardized to IDMS reference method   04/08/2023 0.74 0.60 - 1.30 mg/dL Final     Comment:     Standardized to IDMS reference method   09/10/2022 0.80 0.60 - 1.30 mg/dL Final     Comment:     Standardized to IDMS reference method   11/07/2015 1.08 0.60 - 1.30 mg/dL Final     Comment:     Standardized to IDMS reference method   11/03/2015 0.8 0.6 - 1.3 mg/dL    10/17/2015 1.0 0.6 - 1.3 mg/dL      Glucose   Date Value Ref Range Status   08/23/2023 86 65 - 140 mg/dL Final      Comment:     If the patient is fasting, the ADA then defines impaired fasting glucose as > 100 mg/dL and diabetes as > or equal to 123 mg/dL.   02/28/2019 73 65 - 140 mg/dL Final     Comment:       If the patient is fasting, the ADA then defines impaired fasting glucose as > 100 mg/dL and diabetes as > or equal to 123 mg/dL.  Specimen collection should occur prior to Sulfasalazine administration due to the potential for falsely depressed results. Specimen collection should occur prior to Sulfapyridine administration due to the potential for falsely elevated results.   01/22/2018 86 65 - 140 mg/dL Final     Comment:       If the patient is fasting, the ADA then defines impaired fasting glucose as > 100 mg/dL and diabetes as > or equal to 123 mg/dL.  Specimen collection should occur prior to Sulfasalazine administration due to the potential for falsely depressed results. Specimen collection should occur prior to Sulfapyridine administration due to the potential for falsely elevated results.     Calcium   Date Value Ref Range Status   08/23/2023 9.6 8.4 - 10.2 mg/dL Final   04/08/2023 9.4 8.4 - 10.2 mg/dL Final   09/10/2022 9.2 8.4 - 10.2 mg/dL Final   11/07/2015 9.1 8.3 - 10.1 mg/dL Final   11/03/2015 8.5 8.4 - 10.0 mg/dL    10/17/2015 8.7 8.4 - 10.0 mg/dL      Total Protein   Date Value Ref Range Status   11/07/2015 7.7 6.4 - 8.2 g/dL Final   11/03/2015 7.6 6.4 - 8.1 g/dL    10/17/2015 7.8 6.4 - 8.1 g/dL      Albumin   Date Value Ref Range Status   08/23/2023 4.3 3.5 - 5.0 g/dL Final   04/08/2023 4.3 3.5 - 5.0 g/dL Final   09/08/2021 3.8 3.5 - 5.0 g/dL Final   11/07/2015 3.5 3.5 - 5.0 g/dL Final   11/03/2015 3.5 3.2 - 5.0 g/dL    10/17/2015 3.5 3.2 - 5.0 g/dL      Total Bilirubin   Date Value Ref Range Status   08/23/2023 0.42 0.20 - 1.00 mg/dL Final     Comment:     Use of this assay is not recommended for patients undergoing treatment with eltrombopag due to the potential for falsely elevated  results.  N-acetyl-p-benzoquinone imine (metabolite of Acetaminophen) will generate erroneously low results in samples for patients that have taken an overdose of Acetaminophen.   04/08/2023 0.57 0.20 - 1.00 mg/dL Final     Comment:     Use of this assay is not recommended for patients undergoing treatment with eltrombopag due to the potential for falsely elevated results.  N-acetyl-p-benzoquinone imine (metabolite of Acetaminophen) will generate erroneously low results in samples for patients that have taken an overdose of Acetaminophen.   09/08/2021 0.37 0.20 - 1.00 mg/dL Final     Comment:     Use of this assay is not recommended for patients undergoing treatment with eltrombopag due to the potential for falsely elevated results.     Alkaline Phosphatase   Date Value Ref Range Status   08/23/2023 68 34 - 104 U/L Final   04/08/2023 54 34 - 104 U/L Final   09/08/2021 74 46 - 116 U/L Final   11/07/2015 81 46 - 116 U/L Final   11/03/2015 77 46 - 116 IU/L    10/17/2015 76 46 - 116 IU/L      AST   Date Value Ref Range Status   08/23/2023 20 13 - 39 U/L Final   04/08/2023 20 13 - 39 U/L Final   09/08/2021 19 5 - 45 U/L Final     Comment:     Specimen collection should occur prior to Sulfasalazine administration due to the potential for falsely depressed results.    11/07/2015 17 5 - 45 U/L Final   11/03/2015 19 14 - 38 IU/L    10/17/2015 18 14 - 38 IU/L      ALT   Date Value Ref Range Status   08/23/2023 21 7 - 52 U/L Final     Comment:     Specimen collection should occur prior to Sulfasalazine administration due to the potential for falsely depressed results.    04/08/2023 20 7 - 52 U/L Final     Comment:     Specimen collection should occur prior to Sulfasalazine administration due to the potential for falsely depressed results.    09/08/2021 23 12 - 78 U/L Final     Comment:     Specimen collection should occur prior to Sulfasalazine administration due to the potential for falsely depressed results.    11/07/2015 23  "12 - 78 U/L Final   11/03/2015 24 12 - 78 IU/L    10/17/2015 24 12 - 78 IU/L       LD   Date Value Ref Range Status   08/23/2023 153 140 - 271 U/L Final     No results found for: \"TSH\"  No results found for: \"S9MJGIN\"   No results found for: \"FREET4\"      RECENT IMAGING:  No results found.       Assessment    Assessment/Plan  Patient has recently diagnosed (8/23) stage T1B melanoma of the hip s/p WLE with high risk features of 0.8mm Breslow thickness, Leeroy level IV, Mitoses of 1/mm squared and Tumor infiltrating lymphocytes. She was recommended SLN biopsy to confirm no fanny involvement but refused due to side effects of lymphedema and it limiting her work. At this time, given her Stage 1B and wide local excision with no residual melanoma in the margins, we will track her disease through imaging, blood work, and regular follow ups.We will use LDH to track for any signs or Melanoma recurrence or progression. We will also order an US of her left groin to assess for any inguinal lymphadenopathy and see if SLN biopsy is more urgently warranted.  Patient to continue following dermatology every three months in the mean time and will follow us every 6 months with F/U appts made.    1. Malignant melanoma of left lower extremity including hip (HCC)  -     CBC and differential; Future  -     Comprehensive metabolic panel; Future  -     LD,Blood; Future  -     US groin/inguinal area; Future; Expected date: 01/25/2024  -     Comprehensive metabolic panel; Future; Expected date: 07/25/2024  -     CBC and differential; Future; Expected date: 07/25/2024  -     LD,Blood; Future; Expected date: 07/25/2024    2. History of melanoma  -     Ambulatory Referral to Hematology / Oncology  -     CBC and differential; Future  -     Comprehensive metabolic panel; Future  -     LD,Blood; Future  -     US groin/inguinal area; Future; Expected date: 01/25/2024  -     Comprehensive metabolic panel; Future; Expected date: 07/25/2024  -     CBC " and differential; Future; Expected date: 07/25/2024  -     LD,Blood; Future; Expected date: 07/25/2024         No follow-ups on file.     Attestation signed by Piper Santoro MD at 1/26/2024  5:17 PM:  Attending Attestation     I reviewed the care furnished by the Resident during the visit.  I was present in the office and personally saw and examined the patient.    Ms. Diane is a 53-year-old female with newly diagnosed stage Ib melanoma here for disease management discussion.  She noticed a lesion on her left lateral thigh that started changing.  It grew in size, got darker in color, and became drier and blotchy.  She presented to general surgery for excisional biopsy.  Biopsy on 8/3/2023 demonstrated melanoma superficial spreading with Breslow's thickness of 0.8 mm, no ulceration, Leeroy level IV, mitoses 1/mm².  No other concerning features.  Margins were negative for melanoma in situ or invasive melanoma.  There was a discussion between the surgeon and surgeon yue regarding sentinel lymph node biopsy which was offered to the patient, but she did decline at the time.  Underwent wide local excision in October 2023 with no residual melanoma.    She does describe having increased sun exposure when she was younger with increased number of sunburns.  She states her father had undiagnosed melanoma with skin lesions.  She denies any family history of breast, ovarian, or pancreatic cancer.  However her father did have cholangiocarcinoma.    On exam ECOG PS 0.  Well-healed surgical scar.  No evidence of recurrence at the primary site.  No lymphadenopathy.  No concerning lesions.  She does have some moles that are benign in appearance, including under dermoscopy.  There are 2 lesions in the left upper back and left mid lower back that are a little bit darker than others     Ms. Diane is a 53-year female with newly diagnosed stage Ib melanoma here for disease management discussion.    I had an extensive  discussion with the patient regarding her  diagnosis, prognosis, and recommendations for further management.  We reviewed her melanoma history, current findings, pathology and imaging tests.    I discussed that she has an early stage melanoma, stage Ib, and that she has low risk of recurrence.  I did discuss she is more at risk for developing a new primary melanoma and recommend close dermatology follow-up, in addition to us.  I did discuss that there is 2 lesions on her back, the left upper and left mid lower should be monitored and she should point these out to dermatology when she sees them next.    I discussed that she will require ongoing monitoring and surveillance, both for disease recurrence as well as a new primary melanoma as well as other nonmelanoma skin cancers. This includes physical exams and labs, including a comprehensive metabolic panel, CBC with differential and LDH.    We discussed the importance of regular cutaneous self examinations and reviewed the features of lesions that could be concerning for skin cancer. I did explain that she  is at risk for developing another primary melanoma as well. We also discussed avoidance of unnecessary sun exposure and use of sun protective clothing and sunscreen.  I also recommended routine follow up and skin checks with dermatology.    Family Screening: her  first-degree relatives, namely his siblings, parents, and children, have an increased risk of developing a melanoma over the general population given. her  diagnosis of melanoma, and should have annual dermatologic screening.    She will return for follow-up in 6 months.  Orders placed in prescription todd for blood work to be done at that time.  She knows to call with issues or concerns prior to her next visit.      Piper Santoro MD

## 2024-01-25 NOTE — PROGRESS NOTES
[facility] [place]  [address], [facility] [address]  [phone]     Date of Visit: 1/25/2024  Name: Felicia Diane   YOB: 1970     Subjective    Subjective    VISIT DIAGNOSIS:  Diagnoses and all orders for this visit:    Malignant melanoma of left lower extremity including hip (HCC)  -     CBC and differential; Future  -     Comprehensive metabolic panel; Future  -     LD,Blood; Future  -     US groin/inguinal area; Future  -     Comprehensive metabolic panel; Future  -     CBC and differential; Future  -     LD,Blood; Future    History of melanoma  -     Ambulatory Referral to Hematology / Oncology  -     CBC and differential; Future  -     Comprehensive metabolic panel; Future  -     LD,Blood; Future  -     US groin/inguinal area; Future  -     Comprehensive metabolic panel; Future  -     CBC and differential; Future  -     LD,Blood; Future        Oncology History   Malignant melanoma of left lower extremity including hip (HCC)   8/3/2023 -  Cancer Staged    Staging form: Melanoma of the Skin, AJCC 8th Edition  - Clinical stage from 8/3/2023: Stage IB (cT1b, cN0, cM0) - Signed by Piper Santoro MD on 1/25/2024       10/25/2023 Initial Diagnosis    Malignant melanoma of left lower extremity including hip (HCC)        Cancer Staging   Malignant melanoma of left lower extremity including hip (HCC)  Staging form: Melanoma of the Skin, AJCC 8th Edition  - Clinical stage from 8/3/2023: Stage IB (cT1b, cN0, cM0) - Signed by Piper Santoro MD on 1/25/2024     [No matching plan found]     HISTORY OF PRESENT ILLNESS: Felicia Diane is a 53 y.o. female  with a history of mixed connective tissue disorder and Sjogren's who presents for initial consultation of her melanoma. Patient was diagnosed with a Stage T1B melanoma of her left lateral thigh on 8/2/23. She is s/p resection and WLE on 10/25/23 due to 2 mm margins. She was recommended a SLN  biopsy which she declined due to how it could impact her work and concerns of lymphedema. She presents today on referral from her dermatologist for her Stage 1B melanoma.    Patient states that she had pale pink mole on her left thigh that was present for many years. Over the last few months the spot had become larger, dry, blotchy, and hyperpigmented. Her  noted these changes around summer of 2023 and recommended the patient be evaluated by dermatology. She had scheduled an appointment with Dermatology but the earliest appointment she could get was in January 2024. So in the meant time patient had her melanotic lesion evaluated by surgery. Surgery performed an elective excision of her atypical nevus of her left thigh. Biopsy results confirmed melanoma with a Breslow thickness of 0.8 mm and Leeroy level IV without any ulcerations and mitoses of 1/mm squared. There were no microsattelites or lymphovascular invasion but tumor infiltrating lymphocytes were present in the region. Based on her high risk features, patient was recommended re-excision with a SLN biopsy. Patient subsequently underwent a wide local excision but declined SLN biopsy.    Patient had significant risk factors for developing melanoma. She was outside a lot as a child and would develop blistering sun burns all over her body every summer. She states that since late 80s she started wearing sun protection but last 10 years she has been wearing SPF  sunscreen. She has her  check her skin and she states that he noticed some lesions in the back and R. Gluteal region and the left scapular region. The lesions have not grown in size but apparently the shape and hue of the lesions bothers her and her . She states that her father has undiagnosed melanoma based on an obvious melanotic lesion in his face. He also had cholangiocarcinoma that led to his ultimate passing. Patient denies any other known family history of cancer including no  know breast, ovarian, pancreatic, and renal cell. She is up to date on her cancer screening. She is a non-smoker, drinks alcohol occasionally, and does not use recreational drugs. She has a son who does have some melanotic lesion on the right trunk and she will tell him to go to the dermatologist.        Review of Systems   Constitutional:  Negative for appetite change, chills, diaphoresis, fatigue, fever and unexpected weight change.   HENT:   Negative for lump/mass and trouble swallowing.    Eyes:  Negative for icterus.   Respiratory:  Negative for chest tightness, cough, shortness of breath and wheezing.    Cardiovascular:  Negative for chest pain, leg swelling and palpitations.   Gastrointestinal:  Negative for abdominal pain, constipation, diarrhea, nausea and vomiting.   Musculoskeletal:  Negative for arthralgias and myalgias.   Skin:  Negative for itching and rash.   Neurological:  Negative for extremity weakness, headaches and numbness.   Hematological:  Negative for adenopathy.        MEDICATIONS:    Current Outpatient Medications:     Cholecalciferol (Vitamin D3) 50 MCG (2000 UT) TABS, Take 2,000 Units by mouth daily, Disp: , Rfl:     COD LIVER OIL PO, Take by mouth daily after breakfast, Disp: , Rfl:     magnesium 30 MG tablet, Take 30 mg by mouth 2 (two) times a day, Disp: , Rfl:     oxyCODONE-acetaminophen (PERCOCET) 5-325 mg per tablet, Take 1 tablet by mouth every 8 (eight) hours as needed for severe pain for up to 10 doses Max Daily Amount: 3 tablets (Patient not taking: Reported on 11/9/2023), Disp: 10 tablet, Rfl: 0     ALLERGIES:  Allergies   Allergen Reactions    Covid-19 Mrna Vacc (Moderna) Palpitations     Felt sick and dizzy for days     Levaquin [Levofloxacin] Other (See Comments)     Tendon tears/ tendon pain    Reglan [Metoclopramide] Other (See Comments)     Severe agitation    Amoxil [Amoxicillin] Rash        ACTIVE PROBLEMS:  Patient Active Problem List   Diagnosis    Dense breasts     Lateral epicondylitis of right elbow    Chronic midline low back pain without sciatica    Mixed connective tissue disease (HCC)    Anti-cardiolipin antibody positive    Sjogren's syndrome (HCC)    Chronic leukopenia    Skin lesion    PONV (postoperative nausea and vomiting)    History of hysterectomy    Malignant melanoma of left lower extremity including hip (HCC)          PAST MEDICAL HISTORY:   Past Medical History:   Diagnosis Date    Abnormal menstrual periods     Resolved 12/18/2017     Abnormal uterine bleeding     Resolved 12/18/2017     Anticardiolipin syndrome (HCC)     Arthropathy, multiple sites     Resolved 1/29/2016     Erythema nodosum     Resolved 5/1/2017     Herniated lumbar intervertebral disc     Malignant melanoma of left lower extremity including hip (HCC) 10/25/2023    Mixed connective tissue disease (HCC)     Mixed connective tissue disease (HCC)     PONV (postoperative nausea and vomiting) 10/25/2023    Skin lesion     Resolved 1/29/2016         PAST SURGICAL HISTORY:  Past Surgical History:   Procedure Laterality Date    HYSTERECTOMY  12/2017    MN CYSTOURETHROSCOPY N/A 12/11/2017    Procedure: CYSTOSCOPY;  Surgeon: Beverly Valdez MD;  Location: [facility];  Service: Gynecology    MN EXC TUMOR SOFT TISS UPPER ARM/ELBW SUBFASC 5CM/> Left 10/25/2023    Procedure: EXCISION BIOPSY TISSUE LESION/MASS LOWER EXTREMITY ;  Surgeon: Christiano Ramirez MD;  Location: [facility];  Service: General    MN LAPS W/VAG HYSTERECT 250 GM/&RMVL TUBE&/OVARIES N/A 12/11/2017    Procedure: LAPAROSCOPIC ASSISTED VAGINAL HYSTERECTOMY; BILATERAL SALPINGECTOMY;  Surgeon: Beverly Valdez MD;  Location: [facility];  Service: Gynecology        SOCIAL HISTORY:  Social History     Socioeconomic History    Marital status: /Civil Union     Spouse name: Not on file    Number of children: Not on file    Years of education: Not on file    Highest education level: Not on file   Occupational History    Not on  "file   Tobacco Use    Smoking status: Never     Passive exposure: Never    Smokeless tobacco: Never   Vaping Use    Vaping status: Never Used   Substance and Sexual Activity    Alcohol use: Not Currently    Drug use: No    Sexual activity: Not on file   Other Topics Concern    Not on file   Social History Narrative    Not on file     Social Determinants of Health     Financial Resource Strain: Not on file   Food Insecurity: Not on file   Transportation Needs: Not on file   Physical Activity: Not on file   Stress: Not on file   Social Connections: Not on file   Intimate Partner Violence: Not on file   Housing Stability: Not on file        FAMILY HISTORY:  Family History   Problem Relation Age of Onset    Gout Father     Liver cancer Father 74    No Known Problems Sister     No Known Problems Daughter     No Known Problems Daughter     No Known Problems Daughter     Rheum arthritis Maternal Grandmother     Diabetes Brother     No Known Problems Brother     No Known Problems Brother     Lupus Cousin     Sjogren's syndrome Family     Lupus Family     Thyroid disease Family     Heart disease Family         Objective    Objective    PHYSICAL EXAMINATION:   Blood pressure 102/62, pulse 90, temperature 97.9 °F (36.6 °C), temperature source Temporal, resp. rate 16, height 5' 7\" (1.702 m), weight 71.2 kg (157 lb), last menstrual period 12/01/2017, SpO2 99%, not currently breastfeeding.     Pain Score: 0-No pain      Physical Exam  Constitutional:       Appearance: Normal appearance.   HENT:      Head: Normocephalic and atraumatic.      Mouth/Throat:      Mouth: Mucous membranes are moist.   Eyes:      Pupils: Pupils are equal, round, and reactive to light.   Cardiovascular:      Rate and Rhythm: Normal rate and regular rhythm.      Heart sounds: No murmur heard.     No friction rub. No gallop.   Pulmonary:      Effort: Pulmonary effort is normal.      Breath sounds: No wheezing, rhonchi or rales.   Abdominal:      General: " Abdomen is flat. Bowel sounds are normal.      Palpations: Abdomen is soft.   Musculoskeletal:         General: Normal range of motion.      Cervical back: Normal range of motion.   Skin:     General: Skin is warm and dry.   Neurological:      General: No focal deficit present.      Mental Status: She is alert.         I reviewed lab data in the chart.    WBC   Date Value Ref Range Status   08/23/2023 3.53 (L) 4.31 - 10.16 Thousand/uL Final   04/08/2023 3.44 (L) 4.31 - 10.16 Thousand/uL Final   09/10/2022 4.77 4.31 - 10.16 Thousand/uL Final   11/07/2015 4.20 (L) 4.31 - 10.16 Thousand/uL Final   11/03/2015 3.5 (L) 4.8 - 10.8 X 1000/u    10/17/2015 5.2 4.8 - 10.8 X 1000/u      Hemoglobin   Date Value Ref Range Status   08/23/2023 13.5 11.5 - 15.4 g/dL Final   04/08/2023 12.9 11.5 - 15.4 g/dL Final   09/10/2022 13.8 11.5 - 15.4 g/dL Final   11/07/2015 12.5 11.5 - 15.4 g/dL Final   11/03/2015 12.9 12.0 - 16.0 g/dL    10/17/2015 13.4 12.0 - 16.0 g/dL      Platelets   Date Value Ref Range Status   08/23/2023 202 149 - 390 Thousands/uL Final   04/08/2023 162 149 - 390 Thousands/uL Final   09/10/2022 191 149 - 390 Thousands/uL Final   11/07/2015 216 149 - 390 Thousand/uL Final   11/03/2015 188 130 - 400 X 1000/u    10/17/2015 391 130 - 400 X 1000/u      MCV   Date Value Ref Range Status   08/23/2023 94 82 - 98 fL Final   04/08/2023 93 82 - 98 fL Final   09/10/2022 90 82 - 98 fL Final   11/07/2015 91 82 - 98 fL Final   11/03/2015 91.5 80.0 - 94.0 fL    10/17/2015 91.9 80.0 - 94.0 fL       Sodium   Date Value Ref Range Status   11/07/2015 139 136 - 145 mmol/L Final   11/03/2015 138 137 - 145 mmol/L    10/17/2015 136 (L) 137 - 145 mmol/L      Potassium   Date Value Ref Range Status   08/23/2023 4.1 3.5 - 5.3 mmol/L Final   04/08/2023 3.7 3.5 - 5.3 mmol/L Final   09/10/2022 4.0 3.5 - 5.3 mmol/L Final   11/07/2015 3.7 3.5 - 5.3 mmol/L Final   11/03/2015 3.9 3.6 - 4.8 mmol/L    10/17/2015 3.5 (L) 3.6 - 4.8 mmol/L      Chloride    Date Value Ref Range Status   08/23/2023 104 96 - 108 mmol/L Final   04/08/2023 103 96 - 108 mmol/L Final   09/10/2022 104 96 - 108 mmol/L Final   11/07/2015 104 98 - 108 mmol/L Final   11/03/2015 104 96 - 107 mmol/L    10/17/2015 101 96 - 107 mmol/L      CO2   Date Value Ref Range Status   08/23/2023 28 21 - 32 mmol/L Final   04/08/2023 26 21 - 32 mmol/L Final   09/10/2022 27 21 - 32 mmol/L Final   11/07/2015 27.5 21.0 - 32.0 mmol/L Final   11/03/2015 28 22 - 29 mmol/L    10/17/2015 27 22 - 29 mmol/L      BUN   Date Value Ref Range Status   08/23/2023 16 5 - 25 mg/dL Final   04/08/2023 17 5 - 25 mg/dL Final   09/10/2022 11 5 - 25 mg/dL Final   11/07/2015 13 5 - 25 mg/dL Final   11/03/2015 9 9 - 21 mg/dL    10/17/2015 11 9 - 21 mg/dL      Creatinine   Date Value Ref Range Status   08/23/2023 0.67 0.60 - 1.30 mg/dL Final     Comment:     Standardized to IDMS reference method   04/08/2023 0.74 0.60 - 1.30 mg/dL Final     Comment:     Standardized to IDMS reference method   09/10/2022 0.80 0.60 - 1.30 mg/dL Final     Comment:     Standardized to IDMS reference method   11/07/2015 1.08 0.60 - 1.30 mg/dL Final     Comment:     Standardized to IDMS reference method   11/03/2015 0.8 0.6 - 1.3 mg/dL    10/17/2015 1.0 0.6 - 1.3 mg/dL      Glucose   Date Value Ref Range Status   08/23/2023 86 65 - 140 mg/dL Final     Comment:     If the patient is fasting, the ADA then defines impaired fasting glucose as > 100 mg/dL and diabetes as > or equal to 123 mg/dL.   02/28/2019 73 65 - 140 mg/dL Final     Comment:       If the patient is fasting, the ADA then defines impaired fasting glucose as > 100 mg/dL and diabetes as > or equal to 123 mg/dL.  Specimen collection should occur prior to Sulfasalazine administration due to the potential for falsely depressed results. Specimen collection should occur prior to Sulfapyridine administration due to the potential for falsely elevated results.   01/22/2018 86 65 - 140 mg/dL Final      Comment:       If the patient is fasting, the ADA then defines impaired fasting glucose as > 100 mg/dL and diabetes as > or equal to 123 mg/dL.  Specimen collection should occur prior to Sulfasalazine administration due to the potential for falsely depressed results. Specimen collection should occur prior to Sulfapyridine administration due to the potential for falsely elevated results.     Calcium   Date Value Ref Range Status   08/23/2023 9.6 8.4 - 10.2 mg/dL Final   04/08/2023 9.4 8.4 - 10.2 mg/dL Final   09/10/2022 9.2 8.4 - 10.2 mg/dL Final   11/07/2015 9.1 8.3 - 10.1 mg/dL Final   11/03/2015 8.5 8.4 - 10.0 mg/dL    10/17/2015 8.7 8.4 - 10.0 mg/dL      Total Protein   Date Value Ref Range Status   11/07/2015 7.7 6.4 - 8.2 g/dL Final   11/03/2015 7.6 6.4 - 8.1 g/dL    10/17/2015 7.8 6.4 - 8.1 g/dL      Albumin   Date Value Ref Range Status   08/23/2023 4.3 3.5 - 5.0 g/dL Final   04/08/2023 4.3 3.5 - 5.0 g/dL Final   09/08/2021 3.8 3.5 - 5.0 g/dL Final   11/07/2015 3.5 3.5 - 5.0 g/dL Final   11/03/2015 3.5 3.2 - 5.0 g/dL    10/17/2015 3.5 3.2 - 5.0 g/dL      Total Bilirubin   Date Value Ref Range Status   08/23/2023 0.42 0.20 - 1.00 mg/dL Final     Comment:     Use of this assay is not recommended for patients undergoing treatment with eltrombopag due to the potential for falsely elevated results.  N-acetyl-p-benzoquinone imine (metabolite of Acetaminophen) will generate erroneously low results in samples for patients that have taken an overdose of Acetaminophen.   04/08/2023 0.57 0.20 - 1.00 mg/dL Final     Comment:     Use of this assay is not recommended for patients undergoing treatment with eltrombopag due to the potential for falsely elevated results.  N-acetyl-p-benzoquinone imine (metabolite of Acetaminophen) will generate erroneously low results in samples for patients that have taken an overdose of Acetaminophen.   09/08/2021 0.37 0.20 - 1.00 mg/dL Final     Comment:     Use of this assay is not  "recommended for patients undergoing treatment with eltrombopag due to the potential for falsely elevated results.     Alkaline Phosphatase   Date Value Ref Range Status   08/23/2023 68 34 - 104 U/L Final   04/08/2023 54 34 - 104 U/L Final   09/08/2021 74 46 - 116 U/L Final   11/07/2015 81 46 - 116 U/L Final   11/03/2015 77 46 - 116 IU/L    10/17/2015 76 46 - 116 IU/L      AST   Date Value Ref Range Status   08/23/2023 20 13 - 39 U/L Final   04/08/2023 20 13 - 39 U/L Final   09/08/2021 19 5 - 45 U/L Final     Comment:     Specimen collection should occur prior to Sulfasalazine administration due to the potential for falsely depressed results.    11/07/2015 17 5 - 45 U/L Final   11/03/2015 19 14 - 38 IU/L    10/17/2015 18 14 - 38 IU/L      ALT   Date Value Ref Range Status   08/23/2023 21 7 - 52 U/L Final     Comment:     Specimen collection should occur prior to Sulfasalazine administration due to the potential for falsely depressed results.    04/08/2023 20 7 - 52 U/L Final     Comment:     Specimen collection should occur prior to Sulfasalazine administration due to the potential for falsely depressed results.    09/08/2021 23 12 - 78 U/L Final     Comment:     Specimen collection should occur prior to Sulfasalazine administration due to the potential for falsely depressed results.    11/07/2015 23 12 - 78 U/L Final   11/03/2015 24 12 - 78 IU/L    10/17/2015 24 12 - 78 IU/L       LD   Date Value Ref Range Status   08/23/2023 153 140 - 271 U/L Final     No results found for: \"TSH\"  No results found for: \"G5WZEPB\"   No results found for: \"FREET4\"      RECENT IMAGING:  No results found.       Assessment    Assessment/Plan  Patient has recently diagnosed (8/23) stage T1B melanoma of the hip s/p WLE with high risk features of 0.8mm Breslow thickness, Leeroy level IV, Mitoses of 1/mm squared and Tumor infiltrating lymphocytes. She was recommended SLN biopsy to confirm no fanny involvement but refused due to side effects " of lymphedema and it limiting her work. At this time, given her Stage 1B and wide local excision with no residual melanoma in the margins, we will track her disease through imaging, blood work, and regular follow ups.We will use LDH to track for any signs or Melanoma recurrence or progression. We will also order an US of her left groin to assess for any inguinal lymphadenopathy and see if SLN biopsy is more urgently warranted.  Patient to continue following dermatology every three months in the mean time and will follow us every 6 months with F/U appts made.    1. Malignant melanoma of left lower extremity including hip (HCC)  -     CBC and differential; Future  -     Comprehensive metabolic panel; Future  -     LD,Blood; Future  -     US groin/inguinal area; Future; Expected date: 01/25/2024  -     Comprehensive metabolic panel; Future; Expected date: 07/25/2024  -     CBC and differential; Future; Expected date: 07/25/2024  -     LD,Blood; Future; Expected date: 07/25/2024    2. History of melanoma  -     Ambulatory Referral to Hematology / Oncology  -     CBC and differential; Future  -     Comprehensive metabolic panel; Future  -     LD,Blood; Future  -     US groin/inguinal area; Future; Expected date: 01/25/2024  -     Comprehensive metabolic panel; Future; Expected date: 07/25/2024  -     CBC and differential; Future; Expected date: 07/25/2024  -     LD,Blood; Future; Expected date: 07/25/2024         No follow-ups on file.

## 2024-01-31 ENCOUNTER — HOSPITAL ENCOUNTER (OUTPATIENT)
Dept: ULTRASOUND IMAGING | Facility: HOSPITAL | Age: 54
Discharge: HOME | End: 2024-01-31
Attending: INTERNAL MEDICINE
Payer: COMMERCIAL

## 2024-01-31 DIAGNOSIS — Z85.820 HISTORY OF MELANOMA: ICD-10-CM

## 2024-01-31 DIAGNOSIS — C43.72 MALIGNANT MELANOMA OF LEFT LOWER EXTREMITY INCLUDING HIP (HCC): ICD-10-CM

## 2024-01-31 PROCEDURE — 76705 ECHO EXAM OF ABDOMEN: CPT

## 2024-02-12 ENCOUNTER — TELEPHONE (OUTPATIENT)
Dept: HEMATOLOGY ONCOLOGY | Facility: CLINIC | Age: 54
End: 2024-02-12

## 2024-02-12 NOTE — TELEPHONE ENCOUNTER
----- Message from Markie Santoro MD sent at 2/9/2024  6:26 PM EST -----  Can you let her know that her imaging looks good. No concerningLNs  markie  ----- Message -----  From: Interface, Radiology Results In  Sent: 2/9/2024   6:22 AM EST  To: Markie Santoro MD

## 2024-02-12 NOTE — TELEPHONE ENCOUNTER
Called and spoke with the patient in regards to her 2/9 abdominal/lymph node ultrasound. Dr. Santoro reviewed the study and patient's lymph nodes are all normal size, no suspicious lymph nodes seen. Patient verbally understood.

## 2024-04-03 ENCOUNTER — OFFICE VISIT (OUTPATIENT)
Dept: DERMATOLOGY | Facility: CLINIC | Age: 54
End: 2024-04-03
Payer: COMMERCIAL

## 2024-04-03 VITALS — HEIGHT: 67 IN | BODY MASS INDEX: 25.11 KG/M2 | TEMPERATURE: 97.9 F | WEIGHT: 160 LBS

## 2024-04-03 DIAGNOSIS — R21 RASH: ICD-10-CM

## 2024-04-03 DIAGNOSIS — L85.3 XEROSIS OF SKIN: ICD-10-CM

## 2024-04-03 DIAGNOSIS — Z85.820 HISTORY OF MELANOMA: ICD-10-CM

## 2024-04-03 DIAGNOSIS — L81.4 LENTIGO: ICD-10-CM

## 2024-04-03 DIAGNOSIS — D18.01 CHERRY ANGIOMA: ICD-10-CM

## 2024-04-03 DIAGNOSIS — D22.9 MULTIPLE MELANOCYTIC NEVI: Primary | ICD-10-CM

## 2024-04-03 PROCEDURE — 99213 OFFICE O/P EST LOW 20 MIN: CPT | Performed by: DERMATOLOGY

## 2024-04-03 NOTE — PATIENT INSTRUCTIONS
"MELANOCYTIC NEVI (\"Moles\")        Assessment and Plan:  Based on a thorough discussion of this condition and the management approach to it (including a comprehensive discussion of the known risks, side effects and potential benefits of treatment), the patient (family) agrees to implement the following specific plan:  When outside we recommend using a wide brim hat, sunglasses, long sleeve and pants, sunscreen with SPF 30+ with reapplication every 2 hours, or SPF specific clothing   Benign, reassured  Annual skin check     Melanocytic Nevi  Melanocytic nevi (\"moles\") are tan or brown, raised or flat areas of the skin which have an increased number of melanocytes. Melanocytes are the cells in our body which make pigment and account for skin color.    Some moles are present at birth (I.e., \"congenital nevi\"), while others come up later in life (i.e., \"acquired nevi\").  The sun can stimulate the body to make more moles.  Sunburns are not the only thing that triggers more moles.  Chronic sun exposure can do it too.     Clinically distinguishing a healthy mole from melanoma may be difficult, even for experienced dermatologists. The \"ABCDE's\" of moles have been suggested as a means of helping to alert a person to a suspicious mole and the possible increased risk of melanoma.  The suggestions for raising alert are as follows:    Asymmetry: Healthy moles tend to be symmetric, while melanomas are often asymmetric.  Asymmetry means if you draw a line through the mole, the two halves do not match in color, size, shape, or surface texture. Asymmetry can be a result of rapid enlargement of a mole, the development of a raised area on a previously flat lesion, scaling, ulceration, bleeding or scabbing within the mole.  Any mole that starts to demonstrate \"asymmetry\" should be examined promptly by a board certified dermatologist.     Border: Healthy moles tend to have discrete, even borders.  The border of a melanoma often blends " "into the normal skin and does not sharply delineate the mole from normal skin.  Any mole that starts to demonstrate \"uneven borders\" should be examined promptly by a board certified dermatologist.     Color: Healthy moles tend to be one color throughout.  Melanomas tend to be made up of different colors ranging from dark black, blue, white, or red.  Any mole that demonstrates a color change should be examined promptly by a board certified dermatologist.     Diameter: Healthy moles tend to be smaller than 0.6 cm in size; an exception are \"congenital nevi\" that can be larger.  Melanomas tend to grow and can often be greater than 0.6 cm (1/4 of an inch, or the size of a pencil eraser). This is only a guideline, and many normal moles may be larger than 0.6 cm without being unhealthy.  Any mole that starts to change in size (small to bigger or bigger to smaller) should be examined promptly by a board certified dermatologist.     Evolving: Healthy moles tend to \"stay the same.\"  Melanomas may often show signs of change or evolution such as a change in size, shape, color, or elevation.  Any mole that starts to itch, bleed, crust, burn, hurt, or ulcerate or demonstrate a change or evolution should be examined promptly by a board certified dermatologist.        LENTIGO      Assessment and Plan:  Based on a thorough discussion of this condition and the management approach to it (including a comprehensive discussion of the known risks, side effects and potential benefits of treatment), the patient (family) agrees to implement the following specific plan:  When outside we recommend using a wide brim hat, sunglasses, long sleeve and pants, sunscreen with SPF 30+ with reapplication every 2 hours, or SPF specific clothing       What is a lentigo?  A lentigo is a pigmented flat or slightly raised lesion with a clearly defined edge. Unlike an ephelis (freckle), it does not fade in the winter months. There are several kinds of " lentigo.  The name lentigo originally referred to its appearance resembling a small lentil. The plural of lentigo is lentigines, although “lentigos” is also in common use.    Who gets lentigines?  Lentigines can affect males and females of all ages and races. Solar lentigines are especially prevalent in fair skinned adults. Lentigines associated with syndromes are present at birth or arise during childhood.    What causes lentigines?  Common forms of lentigo are due to exposure to ultraviolet radiation:  Sun damage including sunburn   Indoor tanning   Phototherapy, especially photochemotherapy (PUVA)    Ionizing radiation, eg radiation therapy, can also cause lentigines.  Several familial syndromes associated with widespread lentigines originate from mutations in Attila-MAP kinase, mTOR signaling and PTEN pathways.    What is the treatment for lentigines?  Most lentigines are left alone. Attempts to lighten them may not be successful. The following approaches are used:  SPF 50+ broad-spectrum sunscreen   Hydroquinone bleaching cream   Alpha hydroxy acids   Vitamin C   Retinoids   Azelaic acid   Chemical peels  Individual lesions can be permanently removed using:  Cryotherapy   Intense pulsed light   Pigment lasers    How can lentigines be prevented?  Lentigines associated with exposure ultraviolet radiation can be prevented by very careful sun protection. Clothing is more successful at preventing new lentigines than are sunscreens.    What is the outlook for lentigines?  Lentigines usually persist. They may increase in number with age and sun exposure. Some in sun-protected sites may fade and disappear.    PRESLEY ANGIOMAS          Assessment and Plan:  Based on a thorough discussion of this condition and the management approach to it (including a comprehensive discussion of the known risks, side effects and potential benefits of treatment), the patient (family) agrees to implement the following specific plan:  Monitor  "for changes  Benign, reassured      Assessment and Plan:    Cherry angioma, also known as Aldana de Dorian spots, are benign vascular skin lesions. A \"cherry angioma\" is a firm red, blue or purple papule, 0.1-1 cm in diameter. When thrombosed, they can appear black in colour until evaluated with a dermatoscope when the red or purple colour is more easily seen. Cherry angioma may develop on any part of the body but most often appear on the scalp, face, lips and trunk.  An angioma is due to proliferating endothelial cells; these are the cells that line the inside of a blood vessel.    Angiomas can arise in early life or later in life; the most common type of angioma is a cherry angioma.  Cherry angiomas are very common in males and females of any age or race. They are more noticeable in white skin than in skin of colour. They markedly increase in number from about the age of 40. There may be a family history of similar lesions. Eruptive cherry angiomas have been rarely reported to be associated with internal malignancy. The cause of angiomas is unknown. Genetic analysis of cherry angiomas has shown that they frequently carry specific somatic missense mutations in the GNAQ and GNA11 (Q209H) genes, which are involved in other vascular and melanocytic proliferations.          XEROSIS (\"DRY SKIN\")        Assessment and Plan:  Based on a thorough discussion of this condition and the management approach to it (including a comprehensive discussion of the known risks, side effects and potential benefits of treatment), the patient (family) agrees to implement the following specific plan:  Use moisturizer like Eucerin,Cerave or Aveeno Cream 3 times a day for the dry skin            Dry skin refers to skin that feels dry to touch. Dry skin has a dull surface with a rough, scaly quality. The skin is less pliable and cracked. When dryness is severe, the skin may become inflamed and fissured.  Although any body site can be dry, " dry skin tends to affect the shins more than any other site.    Dry skin is lacking moisture in the outer horny cell layer (stratum corneum) and this results in cracks in the skin surface.  Dry skin is also called xerosis, xeroderma or asteatosis (lack of fat).  It can affect males and females of all ages. There is some racial variability in water and lipid content of the skin.  Dry skin that starts in early childhood may be one of about 20 types of ichthyosis (fish-scale skin). There is often a family history of dry skin.   Dry skin is commonly seen in people with atopic dermatitis.  Nearly everyone > 60 years has dry skin.    Dry skin that begins later may be seen in people with certain diseases and conditions.  Postmenopausal women  Hypothyroidism  Chronic renal disease   Malnutrition and weight loss   Subclinical dermatitis   Treatment with certain drugs such as oral retinoids, diuretics and epidermal growth factor receptor inhibitors      What is the treatment for dry skin?  The mainstay of treatment of dry skin and ichthyosis is moisturisers/emollients. They should be applied liberally and often enough to:  Reduce itch   Improve the barrier function   Prevent entry of irritants, bacteria   Reduce transepidermal water loss.      How can dry skin be prevented?  Eliminate aggravating factors:  Reduce the frequency of bathing.   A humidifier in winter and air conditioner in summer   Compare having a short shower with a prolonged soak in a bath.   Use lukewarm, not hot, water.   Replace standard soap with a substitute such as a synthetic detergent cleanser, water-miscible emollient, bath oil, anti-pruritic tar oil, colloidal oatmeal etc.   Apply an emollient liberally and often, particularly shortly after bathing, and when itchy. The drier the skin, the thicker this should be, especially on the hands.    What is the outlook for dry skin?  A tendency to dry skin may persist life-long, or it may improve once  "contributing factors are controlled.     HISTORY OF MELANOMA             Assessment and Plan:  Based on a thorough discussion of this condition and the management approach to it (including a comprehensive discussion of the known risks, side effects and potential benefits of treatment), the patient (family) agrees to implement the following specific plan:  When outside we recommend using a wide brim hat, sunglasses, long sleeve and pants, sunscreen with SPF 30+ with reapplication every 2 hours, or SPF specific clothing    We recommend that you have regular full body skin exams with a dermatologist every 3 months for 3 years and then every 6 months. Declined full body skin check today.   Recommend yearly appointments with your eye doctor and dentist. Please make sure they are aware of your history of Melanoma.   Discussed that with this stage of T 1 B, lymph node biopsy would have been recommended.  Discussed referral to Dr. Santoro. Patient agreeable.      What happens at follow-up?  The main purpose of follow-up is to detect recurrences early (metastatic melanoma), but it also offers an opportunity to diagnose a new primary melanoma at the first possible opportunity. A second invasive melanoma occurs in 5-10% of melanoma patients and a new melanoma in situ is diagnosed in more than 20% of melanoma patients.     Our practice makes the following recommendations for follow-up for patients with invasive melanoma.  At-least \"monthly\" self-skin examinations   Routine skin checks by a board certified dermatologist  Follow-up intervals are \"every 3 months\" within 2 years of a new melanoma diagnosis; \"every 6 months\" between 2-4 years of a new melanoma diagnosis; and \"annually\" after 4 years of a new melanoma diagnosis  Individual patient's needs should be considered before an appropriate follow-up is offered  Provide education and support to help the patient adjust to their illness     Follow-up appointments should " include:  A check of the scar where the primary melanoma was removed  Checking the regional lymph nodes  A general skin examination  A full physical examination at least annually by your primary care physician     In those with more advanced primary disease, follow-up may include:  Blood tests  Imaging: ultrasound, X-ray, CT, MRI and PET scan.     Most tests are not worthwhile for patients with stage 1 or 2 melanoma unless there are signs or symptoms of disease recurrence or metastasis. No tests are necessary for healthy patients who have remained well for five years or longer after removal of their melanoma.     What is the outlook for patients with melanoma?  Melanoma in situ is cured by excision because it has no potential to spread around the body.  The risk of spread and ultimate death from invasive melanoma depends on several factors, but the main one is the Breslow thickness of the melanoma at the time it was surgically removed.  Metastases are rare for melanomas < 0.75 mm and the risk for tumours 0.75-1 mm thick is about 5%. The risk steadily increases with thickness so that melanomas > 4 mm have a risk of metastasis of about 40%.     Melanoma is a potentially serious type of skin cancer, in which there is uncontrolled growth of melanocytes (pigment cells). Melanoma is sometimes called malignant melanoma.  Normal melanocytes are found in the basal layer of the epidermis (the outer layer of skin). Melanocytes produce a protein called melanin, which protects skin cells by absorbing ultraviolet (UV) radiation. Melanocytes are found in equal numbers in black and white skin, but melanocytes in black skin produce much more melanin. People with dark brown or black skin are very much less likely to be damaged by UV radiation than those with white skin.     RASH    Assessment and Plan:  Based on a thorough discussion of this condition and the management approach to it (including a comprehensive discussion of the  known risks, side effects and potential benefits of treatment), the patient (family) agrees to implement the following specific plan:  Reassured benign   Monitor for any changes

## 2024-04-03 NOTE — PROGRESS NOTES
"[facility] Note     Patient Name: Felicia Diane  Encounter Date: 4/3/2024    Have you been cared for by a [facility] Dermatologist in the last 3 years and, if so, which description applies to you?    Yes.  I have been here within the last 3 years, and my medical history has NOT changed since that time.  I am FEMALE/of child-bearing potential.    REVIEW OF SYSTEMS:  Have you recently had or currently have any of the following? No changes in my recent health.   PAST MEDICAL HISTORY:  Have you personally ever had or currently have any of the following?  If \"YES,\" then please provide more detail. No changes in my medical history.   HISTORY OF IMMUNOSUPPRESSION: Do you have a history of any of the following:  Systemic Immunosuppression such as Diabetes, Biologic or Immunotherapy, Chemotherapy, Organ Transplantation, Bone Marrow Transplantation?  No     Answering \"YES\" requires the addition of the dotphrase \"IMMUNOSUPPRESSED\" as the first diagnosis of the patient's visit.   FAMILY HISTORY:  Any \"first degree relatives\" (parent, brother, sister, or child) with the following?    No changes in my family's known health.   PATIENT EXPERIENCE:    Do you want the Dermatologist to perform a COMPLETE skin exam today including a clinical examination under the \"bra and underwear\" areas?  NO Breast, and feet not examined today.   If necessary, do we have your permission to call and leave a detailed message on your Preferred Phone number that includes your specific medical information?  Yes      Allergies   Allergen Reactions    Covid-19 Mrna Vacc (Moderna) Palpitations     Felt sick and dizzy for days     Levaquin [Levofloxacin] Other (See Comments)     Tendon tears/ tendon pain    Reglan [Metoclopramide] Other (See Comments)     Severe agitation    Amoxil [Amoxicillin] Rash      Current Outpatient Medications:     Cholecalciferol (Vitamin D3) 50 MCG (2000 UT) TABS, Take 2,000 Units by mouth daily, Disp: , " "Rfl:     magnesium 30 MG tablet, Take 30 mg by mouth 2 (two) times a day, Disp: , Rfl:     COD LIVER OIL PO, Take by mouth daily after breakfast, Disp: , Rfl:     oxyCODONE-acetaminophen (PERCOCET) 5-325 mg per tablet, Take 1 tablet by mouth every 8 (eight) hours as needed for severe pain for up to 10 doses Max Daily Amount: 3 tablets (Patient not taking: Reported on 11/9/2023), Disp: 10 tablet, Rfl: 0          Whom besides the patient is providing clinical information about today's encounter?   NO ADDITIONAL HISTORIAN (patient alone provided history)    Physical Exam and Assessment/Plan by Diagnosis:    Patient here for skin exam, patient has history of Melanoma.     MELANOCYTIC NEVI (\"Moles\")    Physical Exam:  Anatomic Location Affected:   Mostly on sun-exposed areas of the trunk and extremities  A; Left upper back; 0.6 X 0.5 cm irregular brown macule- no change;   Morphological Description:  Scattered, 1-4mm round to ovoid, symmetrical-appearing, even bordered, skin colored to dark brown macules/papules, mostly in sun-exposed areas  Pertinent Positives:  Pertinent Negatives:    Additional History of Present Condition:      Assessment and Plan:  Based on a thorough discussion of this condition and the management approach to it (including a comprehensive discussion of the known risks, side effects and potential benefits of treatment), the patient (family) agrees to implement the following specific plan:  When outside we recommend using a wide brim hat, sunglasses, long sleeve and pants, sunscreen with SPF 30+ with reapplication every 2 hours, or SPF specific clothing   Benign, reassured  Annual skin check     Melanocytic Nevi  Melanocytic nevi (\"moles\") are tan or brown, raised or flat areas of the skin which have an increased number of melanocytes. Melanocytes are the cells in our body which make pigment and account for skin color.    Some moles are present at birth (I.e., \"congenital nevi\"), while others come up " "later in life (i.e., \"acquired nevi\").  The sun can stimulate the body to make more moles.  Sunburns are not the only thing that triggers more moles.  Chronic sun exposure can do it too.     Clinically distinguishing a healthy mole from melanoma may be difficult, even for experienced dermatologists. The \"ABCDE's\" of moles have been suggested as a means of helping to alert a person to a suspicious mole and the possible increased risk of melanoma.  The suggestions for raising alert are as follows:    Asymmetry: Healthy moles tend to be symmetric, while melanomas are often asymmetric.  Asymmetry means if you draw a line through the mole, the two halves do not match in color, size, shape, or surface texture. Asymmetry can be a result of rapid enlargement of a mole, the development of a raised area on a previously flat lesion, scaling, ulceration, bleeding or scabbing within the mole.  Any mole that starts to demonstrate \"asymmetry\" should be examined promptly by a board certified dermatologist.     Border: Healthy moles tend to have discrete, even borders.  The border of a melanoma often blends into the normal skin and does not sharply delineate the mole from normal skin.  Any mole that starts to demonstrate \"uneven borders\" should be examined promptly by a board certified dermatologist.     Color: Healthy moles tend to be one color throughout.  Melanomas tend to be made up of different colors ranging from dark black, blue, white, or red.  Any mole that demonstrates a color change should be examined promptly by a board certified dermatologist.     Diameter: Healthy moles tend to be smaller than 0.6 cm in size; an exception are \"congenital nevi\" that can be larger.  Melanomas tend to grow and can often be greater than 0.6 cm (1/4 of an inch, or the size of a pencil eraser). This is only a guideline, and many normal moles may be larger than 0.6 cm without being unhealthy.  Any mole that starts to change in size (small to " "bigger or bigger to smaller) should be examined promptly by a board certified dermatologist.     Evolving: Healthy moles tend to \"stay the same.\"  Melanomas may often show signs of change or evolution such as a change in size, shape, color, or elevation.  Any mole that starts to itch, bleed, crust, burn, hurt, or ulcerate or demonstrate a change or evolution should be examined promptly by a board certified dermatologist.        LENTIGO    Physical Exam:  Anatomic Location Affected:  trunk, arms  Morphological Description:  Light brown macules  Pertinent Positives:  Pertinent Negatives:    Additional History of Present Condition:      Assessment and Plan:  Based on a thorough discussion of this condition and the management approach to it (including a comprehensive discussion of the known risks, side effects and potential benefits of treatment), the patient (family) agrees to implement the following specific plan:  When outside we recommend using a wide brim hat, sunglasses, long sleeve and pants, sunscreen with SPF 30+ with reapplication every 2 hours, or SPF specific clothing       What is a lentigo?  A lentigo is a pigmented flat or slightly raised lesion with a clearly defined edge. Unlike an ephelis (freckle), it does not fade in the winter months. There are several kinds of lentigo.  The name lentigo originally referred to its appearance resembling a small lentil. The plural of lentigo is lentigines, although “lentigos” is also in common use.    Who gets lentigines?  Lentigines can affect males and females of all ages and races. Solar lentigines are especially prevalent in fair skinned adults. Lentigines associated with syndromes are present at birth or arise during childhood.    What causes lentigines?  Common forms of lentigo are due to exposure to ultraviolet radiation:  Sun damage including sunburn   Indoor tanning   Phototherapy, especially photochemotherapy (PUVA)    Ionizing radiation, eg radiation " "therapy, can also cause lentigines.  Several familial syndromes associated with widespread lentigines originate from mutations in Attila-MAP kinase, mTOR signaling and PTEN pathways.    What is the treatment for lentigines?  Most lentigines are left alone. Attempts to lighten them may not be successful. The following approaches are used:  SPF 50+ broad-spectrum sunscreen   Hydroquinone bleaching cream   Alpha hydroxy acids   Vitamin C   Retinoids   Azelaic acid   Chemical peels  Individual lesions can be permanently removed using:  Cryotherapy   Intense pulsed light   Pigment lasers    How can lentigines be prevented?  Lentigines associated with exposure ultraviolet radiation can be prevented by very careful sun protection. Clothing is more successful at preventing new lentigines than are sunscreens.    What is the outlook for lentigines?  Lentigines usually persist. They may increase in number with age and sun exposure. Some in sun-protected sites may fade and disappear.    PRESLEY ANGIOMAS    Physical Exam:  Anatomic Location Affected:  trunk  Morphological Description:  Scattered cherry red, 1-4 mm papules.  Pertinent Positives:  Pertinent Negatives:    Additional History of Present Condition:      Assessment and Plan:  Based on a thorough discussion of this condition and the management approach to it (including a comprehensive discussion of the known risks, side effects and potential benefits of treatment), the patient (family) agrees to implement the following specific plan:  Monitor for changes  Benign, reassured      Assessment and Plan:    Cherry angioma, also known as Aldana de Dorian spots, are benign vascular skin lesions. A \"cherry angioma\" is a firm red, blue or purple papule, 0.1-1 cm in diameter. When thrombosed, they can appear black in colour until evaluated with a dermatoscope when the red or purple colour is more easily seen. Cherry angioma may develop on any part of the body but most often appear " "on the scalp, face, lips and trunk.  An angioma is due to proliferating endothelial cells; these are the cells that line the inside of a blood vessel.    Angiomas can arise in early life or later in life; the most common type of angioma is a cherry angioma.  Cherry angiomas are very common in males and females of any age or race. They are more noticeable in white skin than in skin of colour. They markedly increase in number from about the age of 40. There may be a family history of similar lesions. Eruptive cherry angiomas have been rarely reported to be associated with internal malignancy. The cause of angiomas is unknown. Genetic analysis of cherry angiomas has shown that they frequently carry specific somatic missense mutations in the GNAQ and GNA11 (Q209H) genes, which are involved in other vascular and melanocytic proliferations.        XEROSIS (\"DRY SKIN\")    Physical Exam:  Anatomic Location Affected:  diffuse  Morphological Description:  xerosis  Pertinent Positives:  Pertinent Negatives:    Additional History of Present Condition:      Assessment and Plan:  Based on a thorough discussion of this condition and the management approach to it (including a comprehensive discussion of the known risks, side effects and potential benefits of treatment), the patient (family) agrees to implement the following specific plan:  Use moisturizer like Eucerin,Cerave or Aveeno Cream 3 times a day for the dry skin            Dry skin refers to skin that feels dry to touch. Dry skin has a dull surface with a rough, scaly quality. The skin is less pliable and cracked. When dryness is severe, the skin may become inflamed and fissured.  Although any body site can be dry, dry skin tends to affect the shins more than any other site.    Dry skin is lacking moisture in the outer horny cell layer (stratum corneum) and this results in cracks in the skin surface.  Dry skin is also called xerosis, xeroderma or asteatosis (lack of fat).  " It can affect males and females of all ages. There is some racial variability in water and lipid content of the skin.  Dry skin that starts in early childhood may be one of about 20 types of ichthyosis (fish-scale skin). There is often a family history of dry skin.   Dry skin is commonly seen in people with atopic dermatitis.  Nearly everyone > 60 years has dry skin.    Dry skin that begins later may be seen in people with certain diseases and conditions.  Postmenopausal women  Hypothyroidism  Chronic renal disease   Malnutrition and weight loss   Subclinical dermatitis   Treatment with certain drugs such as oral retinoids, diuretics and epidermal growth factor receptor inhibitors      What is the treatment for dry skin?  The mainstay of treatment of dry skin and ichthyosis is moisturisers/emollients. They should be applied liberally and often enough to:  Reduce itch   Improve the barrier function   Prevent entry of irritants, bacteria   Reduce transepidermal water loss.      How can dry skin be prevented?  Eliminate aggravating factors:  Reduce the frequency of bathing.   A humidifier in winter and air conditioner in summer   Compare having a short shower with a prolonged soak in a bath.   Use lukewarm, not hot, water.   Replace standard soap with a substitute such as a synthetic detergent cleanser, water-miscible emollient, bath oil, anti-pruritic tar oil, colloidal oatmeal etc.   Apply an emollient liberally and often, particularly shortly after bathing, and when itchy. The drier the skin, the thicker this should be, especially on the hands.    What is the outlook for dry skin?  A tendency to dry skin may persist life-long, or it may improve once contributing factors are controlled.     HISTORY OF MELANOMA     Physical Exam:  Anatomic Location Affected:  Left thigh  Morphological Description of Scar:  well healed scar  Year Treated: 10/2023  TNM Classification: pT1b   Suspected Recurrence: no  Regional  "adenopathy: no     Additional History of Present Condition:  Surgery by general surgery, pathology results: -Prior procedure site changes present.   Residual melanoma not seen. [id number].        Assessment and Plan:  Based on a thorough discussion of this condition and the management approach to it (including a comprehensive discussion of the known risks, side effects and potential benefits of treatment), the patient (family) agrees to implement the following specific plan:  When outside we recommend using a wide brim hat, sunglasses, long sleeve and pants, sunscreen with SPF 30+ with reapplication every 2 hours, or SPF specific clothing    We recommend that you have regular full body skin exams with a dermatologist every 3 months for 3 years and then every 6 months.   Recommend yearly appointments with your eye doctor and dentist. Please make sure they are aware of your history of Melanoma.   .      What happens at follow-up?  The main purpose of follow-up is to detect recurrences early (metastatic melanoma), but it also offers an opportunity to diagnose a new primary melanoma at the first possible opportunity. A second invasive melanoma occurs in 5-10% of melanoma patients and a new melanoma in situ is diagnosed in more than 20% of melanoma patients.     Our practice makes the following recommendations for follow-up for patients with invasive melanoma.  At-least \"monthly\" self-skin examinations   Routine skin checks by a board certified dermatologist  Follow-up intervals are \"every 3 months\" within 2 years of a new melanoma diagnosis; \"every 6 months\" between 2-4 years of a new melanoma diagnosis; and \"annually\" after 4 years of a new melanoma diagnosis  Individual patient's needs should be considered before an appropriate follow-up is offered  Provide education and support to help the patient adjust to their illness     Follow-up appointments should include:  A check of the scar where the primary melanoma was " removed  Checking the regional lymph nodes  A general skin examination  A full physical examination at least annually by your primary care physician     In those with more advanced primary disease, follow-up may include:  Blood tests  Imaging: ultrasound, X-ray, CT, MRI and PET scan.     Most tests are not worthwhile for patients with stage 1 or 2 melanoma unless there are signs or symptoms of disease recurrence or metastasis. No tests are necessary for healthy patients who have remained well for five years or longer after removal of their melanoma.     What is the outlook for patients with melanoma?  Melanoma in situ is cured by excision because it has no potential to spread around the body.  The risk of spread and ultimate death from invasive melanoma depends on several factors, but the main one is the Breslow thickness of the melanoma at the time it was surgically removed.  Metastases are rare for melanomas < 0.75 mm and the risk for tumours 0.75-1 mm thick is about 5%. The risk steadily increases with thickness so that melanomas > 4 mm have a risk of metastasis of about 40%.     Melanoma is a potentially serious type of skin cancer, in which there is uncontrolled growth of melanocytes (pigment cells). Melanoma is sometimes called malignant melanoma.  Normal melanocytes are found in the basal layer of the epidermis (the outer layer of skin). Melanocytes produce a protein called melanin, which protects skin cells by absorbing ultraviolet (UV) radiation. Melanocytes are found in equal numbers in black and white skin, but melanocytes in black skin produce much more melanin. People with dark brown or black skin are very much less likely to be damaged by UV radiation than those with white skin.     RASH    Physical Exam:  (Anatomic Location); (Size and Morphological Description); (Differential Diagnosis):  Upper eyelids; yellow discoloration; diffdx; likely xanthelasma, less likely amyloidosis  Pertinent  Positives:  Pertinent Negatives:    Additional History of Present Condition:  Patient states that her sister that noticed yellowing on upper eye lids.     Assessment and Plan:  Based on a thorough discussion of this condition and the management approach to it (including a comprehensive discussion of the known risks, side effects and potential benefits of treatment), the patient (family) agrees to implement the following specific plan:  Reassured benign   Monitor for any changes        Scribe Attestation      I,:  Dionna Fields MA am acting as a scribe while in the presence of the attending physician.:       I,:  Yajaira Escalona MD personally performed the services described in this documentation    as scribed in my presence.:

## 2024-07-10 ENCOUNTER — OFFICE VISIT (OUTPATIENT)
Dept: DERMATOLOGY | Facility: CLINIC | Age: 54
End: 2024-07-10
Payer: COMMERCIAL

## 2024-07-10 VITALS — WEIGHT: 166 LBS | TEMPERATURE: 97.8 F | HEIGHT: 67 IN | BODY MASS INDEX: 26.06 KG/M2

## 2024-07-10 DIAGNOSIS — L52 ERYTHEMA NODOSUM: ICD-10-CM

## 2024-07-10 DIAGNOSIS — L85.3 XEROSIS OF SKIN: ICD-10-CM

## 2024-07-10 DIAGNOSIS — D22.9 MULTIPLE MELANOCYTIC NEVI: ICD-10-CM

## 2024-07-10 DIAGNOSIS — Z85.820 HISTORY OF MELANOMA: Primary | ICD-10-CM

## 2024-07-10 DIAGNOSIS — L81.4 LENTIGO: ICD-10-CM

## 2024-07-10 DIAGNOSIS — D18.01 CHERRY ANGIOMA: ICD-10-CM

## 2024-07-10 PROCEDURE — 99213 OFFICE O/P EST LOW 20 MIN: CPT | Performed by: DERMATOLOGY

## 2024-07-10 NOTE — PATIENT INSTRUCTIONS
"HISTORY OF MELANOMA     Physical Exam:  Anatomic Location Affected:  Left thigh  Morphological Description of Scar:  well healed scar  Year Treated: 10/2023  TNM Classification: pT1b   Suspected Recurrence: no  Regional adenopathy: no     Additional History of Present Condition:  Surgery by general surgery, pathology results: -Prior procedure site changes present.   Residual melanoma not seen. [id number].        Assessment and Plan:  Based on a thorough discussion of this condition and the management approach to it (including a comprehensive discussion of the known risks, side effects and potential benefits of treatment), the patient (family) agrees to implement the following specific plan:  When outside we recommend using a wide brim hat, sunglasses, long sleeve and pants, sunscreen with SPF 30+ with reapplication every 2 hours, or SPF specific clothing    We recommend that you have regular full body skin exams with a dermatologist every 3 months for 3 years and then every 6 months.   Recommend yearly appointments with your eye doctor and dentist. Please make sure they are aware of your history of Melanoma.   .      What happens at follow-up?  The main purpose of follow-up is to detect recurrences early (metastatic melanoma), but it also offers an opportunity to diagnose a new primary melanoma at the first possible opportunity. A second invasive melanoma occurs in 5-10% of melanoma patients and a new melanoma in situ is diagnosed in more than 20% of melanoma patients.     Our practice makes the following recommendations for follow-up for patients with invasive melanoma.  At-least \"monthly\" self-skin examinations   Routine skin checks by a board certified dermatologist  Follow-up intervals are \"every 3 months\" within 2 years of a new melanoma diagnosis; \"every 6 months\" between 2-4 years of a new melanoma diagnosis; and \"annually\" after 4 years of a new melanoma diagnosis  Individual patient's needs should be " considered before an appropriate follow-up is offered  Provide education and support to help the patient adjust to their illness     Follow-up appointments should include:  A check of the scar where the primary melanoma was removed  Checking the regional lymph nodes  A general skin examination  A full physical examination at least annually by your primary care physician     In those with more advanced primary disease, follow-up may include:  Blood tests  Imaging: ultrasound, X-ray, CT, MRI and PET scan.     Most tests are not worthwhile for patients with stage 1 or 2 melanoma unless there are signs or symptoms of disease recurrence or metastasis. No tests are necessary for healthy patients who have remained well for five years or longer after removal of their melanoma.     What is the outlook for patients with melanoma?  Melanoma in situ is cured by excision because it has no potential to spread around the body.  The risk of spread and ultimate death from invasive melanoma depends on several factors, but the main one is the Breslow thickness of the melanoma at the time it was surgically removed.  Metastases are rare for melanomas < 0.75 mm and the risk for tumours 0.75-1 mm thick is about 5%. The risk steadily increases with thickness so that melanomas > 4 mm have a risk of metastasis of about 40%.     Melanoma is a potentially serious type of skin cancer, in which there is uncontrolled growth of melanocytes (pigment cells). Melanoma is sometimes called malignant melanoma.  Normal melanocytes are found in the basal layer of the epidermis (the outer layer of skin). Melanocytes produce a protein called melanin, which protects skin cells by absorbing ultraviolet (UV) radiation. Melanocytes are found in equal numbers in black and white skin, but melanocytes in black skin produce much more melanin. People with dark brown or black skin are very much less likely to be damaged by UV radiation than those with white skin.     "    MELANOCYTIC NEVI (\"Moles\")    Physical Exam:  Anatomic Location Affected:   Mostly on sun-exposed areas of the trunk and extremities  Morphological Description:  Scattered, 1-4mm round to ovoid, symmetrical-appearing, even bordered, skin colored to dark brown macules/papules, mostly in sun-exposed areas  Pertinent Positives:  Pertinent Negatives:    Additional History of Present Condition:      Assessment and Plan:  Based on a thorough discussion of this condition and the management approach to it (including a comprehensive discussion of the known risks, side effects and potential benefits of treatment), the patient (family) agrees to implement the following specific plan:  When outside we recommend using a wide brim hat, sunglasses, long sleeve and pants, sunscreen with SPF 30+ with reapplication every 2 hours, or SPF specific clothing   Benign, reassured  Annual skin check     Melanocytic Nevi  Melanocytic nevi (\"moles\") are tan or brown, raised or flat areas of the skin which have an increased number of melanocytes. Melanocytes are the cells in our body which make pigment and account for skin color.    Some moles are present at birth (I.e., \"congenital nevi\"), while others come up later in life (i.e., \"acquired nevi\").  The sun can stimulate the body to make more moles.  Sunburns are not the only thing that triggers more moles.  Chronic sun exposure can do it too.     Clinically distinguishing a healthy mole from melanoma may be difficult, even for experienced dermatologists. The \"ABCDE's\" of moles have been suggested as a means of helping to alert a person to a suspicious mole and the possible increased risk of melanoma.  The suggestions for raising alert are as follows:    Asymmetry: Healthy moles tend to be symmetric, while melanomas are often asymmetric.  Asymmetry means if you draw a line through the mole, the two halves do not match in color, size, shape, or surface texture. Asymmetry can be a result of " "rapid enlargement of a mole, the development of a raised area on a previously flat lesion, scaling, ulceration, bleeding or scabbing within the mole.  Any mole that starts to demonstrate \"asymmetry\" should be examined promptly by a board certified dermatologist.     Border: Healthy moles tend to have discrete, even borders.  The border of a melanoma often blends into the normal skin and does not sharply delineate the mole from normal skin.  Any mole that starts to demonstrate \"uneven borders\" should be examined promptly by a board certified dermatologist.     Color: Healthy moles tend to be one color throughout.  Melanomas tend to be made up of different colors ranging from dark black, blue, white, or red.  Any mole that demonstrates a color change should be examined promptly by a board certified dermatologist.     Diameter: Healthy moles tend to be smaller than 0.6 cm in size; an exception are \"congenital nevi\" that can be larger.  Melanomas tend to grow and can often be greater than 0.6 cm (1/4 of an inch, or the size of a pencil eraser). This is only a guideline, and many normal moles may be larger than 0.6 cm without being unhealthy.  Any mole that starts to change in size (small to bigger or bigger to smaller) should be examined promptly by a board certified dermatologist.     Evolving: Healthy moles tend to \"stay the same.\"  Melanomas may often show signs of change or evolution such as a change in size, shape, color, or elevation.  Any mole that starts to itch, bleed, crust, burn, hurt, or ulcerate or demonstrate a change or evolution should be examined promptly by a board certified dermatologist.        LENTIGO    Physical Exam:  Anatomic Location Affected:  trunk, arms  Morphological Description:  Light brown macules  Pertinent Positives:  Pertinent Negatives:    Additional History of Present Condition:      Assessment and Plan:  Based on a thorough discussion of this condition and the management approach to " it (including a comprehensive discussion of the known risks, side effects and potential benefits of treatment), the patient (family) agrees to implement the following specific plan:  When outside we recommend using a wide brim hat, sunglasses, long sleeve and pants, sunscreen with SPF 30+ with reapplication every 2 hours, or SPF specific clothing       What is a lentigo?  A lentigo is a pigmented flat or slightly raised lesion with a clearly defined edge. Unlike an ephelis (freckle), it does not fade in the winter months. There are several kinds of lentigo.  The name lentigo originally referred to its appearance resembling a small lentil. The plural of lentigo is lentigines, although “lentigos” is also in common use.    Who gets lentigines?  Lentigines can affect males and females of all ages and races. Solar lentigines are especially prevalent in fair skinned adults. Lentigines associated with syndromes are present at birth or arise during childhood.    What causes lentigines?  Common forms of lentigo are due to exposure to ultraviolet radiation:  Sun damage including sunburn   Indoor tanning   Phototherapy, especially photochemotherapy (PUVA)    Ionizing radiation, eg radiation therapy, can also cause lentigines.  Several familial syndromes associated with widespread lentigines originate from mutations in Attila-MAP kinase, mTOR signaling and PTEN pathways.    What is the treatment for lentigines?  Most lentigines are left alone. Attempts to lighten them may not be successful. The following approaches are used:  SPF 50+ broad-spectrum sunscreen   Hydroquinone bleaching cream   Alpha hydroxy acids   Vitamin C   Retinoids   Azelaic acid   Chemical peels  Individual lesions can be permanently removed using:  Cryotherapy   Intense pulsed light   Pigment lasers    How can lentigines be prevented?  Lentigines associated with exposure ultraviolet radiation can be prevented by very careful sun protection. Clothing is more  "successful at preventing new lentigines than are sunscreens.    What is the outlook for lentigines?  Lentigines usually persist. They may increase in number with age and sun exposure. Some in sun-protected sites may fade and disappear.    PRESLEY ANGIOMAS    Physical Exam:  Anatomic Location Affected:  trunk  Morphological Description:  Scattered cherry red, 1-4 mm papules.  Pertinent Positives:  Pertinent Negatives:    Additional History of Present Condition:      Assessment and Plan:  Based on a thorough discussion of this condition and the management approach to it (including a comprehensive discussion of the known risks, side effects and potential benefits of treatment), the patient (family) agrees to implement the following specific plan:  Monitor for changes  Benign, reassured      Assessment and Plan:    Cherry angioma, also known as Aldana de Dorian spots, are benign vascular skin lesions. A \"cherry angioma\" is a firm red, blue or purple papule, 0.1-1 cm in diameter. When thrombosed, they can appear black in colour until evaluated with a dermatoscope when the red or purple colour is more easily seen. Cherry angioma may develop on any part of the body but most often appear on the scalp, face, lips and trunk.  An angioma is due to proliferating endothelial cells; these are the cells that line the inside of a blood vessel.    Angiomas can arise in early life or later in life; the most common type of angioma is a cherry angioma.  Cherry angiomas are very common in males and females of any age or race. They are more noticeable in white skin than in skin of colour. They markedly increase in number from about the age of 40. There may be a family history of similar lesions. Eruptive cherry angiomas have been rarely reported to be associated with internal malignancy. The cause of angiomas is unknown. Genetic analysis of cherry angiomas has shown that they frequently carry specific somatic missense mutations in the " "GNAQ and GNA11 (Q209H) genes, which are involved in other vascular and melanocytic proliferations.      SEBORRHEIC KERATOSIS; NON-INFLAMED    Physical Exam:  Anatomic Location Affected:  trunk  Morphological Description:  Flat and raised, waxy, smooth to warty textured, yellow to brownish-grey to dark brown to blackish, discrete, \"stuck-on\" appearing papules.  Pertinent Positives:  Pertinent Negatives:    Additional History of Present Condition:      Assessment and Plan:  Based on a thorough discussion of this condition and the management approach to it (including a comprehensive discussion of the known risks, side effects and potential benefits of treatment), the patient (family) agrees to implement the following specific plan:  Monitor for changes  Benign, reassured      Seborrheic Keratosis  A seborrheic keratosis is a harmless warty spot that appears during adult life as a common sign of skin aging.  Seborrheic keratoses can arise on any area of skin, covered or uncovered, with the usual exception of the palms and soles. They do not arise from mucous membranes. Seborrheic keratoses can have highly variable appearance.      Seborrheic keratoses are extremely common. It has been estimated that over 90% of adults over the age of 60 years have one or more of them. They occur in males and females of all races, typically beginning to erupt in the 30s or 40s. They are uncommon under the age of 20 years.  The precise cause of seborrhoeic keratoses is not known.  Seborrhoeic keratoses are considered degenerative in nature. As time goes by, seborrheic keratoses tend to become more numerous. Some people inherit a tendency to develop a very large number of them; some people may have hundreds of them.      There is no easy way to remove multiple lesions on a single occasion.  Unless a specific lesion is \"inflamed\" and is causing pain or stinging/burning or is bleeding, most insurance companies do not authorize " "treatment.    XEROSIS (\"DRY SKIN\")    Physical Exam:  Anatomic Location Affected:  diffuse  Morphological Description:  xerosis  Pertinent Positives:  Pertinent Negatives:    Additional History of Present Condition:      Assessment and Plan:  Based on a thorough discussion of this condition and the management approach to it (including a comprehensive discussion of the known risks, side effects and potential benefits of treatment), the patient (family) agrees to implement the following specific plan:  Use moisturizer like Eucerin,Cerave or Aveeno Cream 3 times a day for the dry skin            Dry skin refers to skin that feels dry to touch. Dry skin has a dull surface with a rough, scaly quality. The skin is less pliable and cracked. When dryness is severe, the skin may become inflamed and fissured.  Although any body site can be dry, dry skin tends to affect the shins more than any other site.    Dry skin is lacking moisture in the outer horny cell layer (stratum corneum) and this results in cracks in the skin surface.  Dry skin is also called xerosis, xeroderma or asteatosis (lack of fat).  It can affect males and females of all ages. There is some racial variability in water and lipid content of the skin.  Dry skin that starts in early childhood may be one of about 20 types of ichthyosis (fish-scale skin). There is often a family history of dry skin.   Dry skin is commonly seen in people with atopic dermatitis.  Nearly everyone > 60 years has dry skin.    Dry skin that begins later may be seen in people with certain diseases and conditions.  Postmenopausal women  Hypothyroidism  Chronic renal disease   Malnutrition and weight loss   Subclinical dermatitis   Treatment with certain drugs such as oral retinoids, diuretics and epidermal growth factor receptor inhibitors      What is the treatment for dry skin?  The mainstay of treatment of dry skin and ichthyosis is moisturisers/emollients. They should be applied " liberally and often enough to:  Reduce itch   Improve the barrier function   Prevent entry of irritants, bacteria   Reduce transepidermal water loss.      How can dry skin be prevented?  Eliminate aggravating factors:  Reduce the frequency of bathing.   A humidifier in winter and air conditioner in summer   Compare having a short shower with a prolonged soak in a bath.   Use lukewarm, not hot, water.   Replace standard soap with a substitute such as a synthetic detergent cleanser, water-miscible emollient, bath oil, anti-pruritic tar oil, colloidal oatmeal etc.   Apply an emollient liberally and often, particularly shortly after bathing, and when itchy. The drier the skin, the thicker this should be, especially on the hands.    What is the outlook for dry skin?  A tendency to dry skin may persist life-long, or it may improve once contributing factors are controlled.     ERYTHEMA NODOSUM    Physical Exam:  Anatomic Location Affected:  Right ankle  Morphological Description:  tender subcutaneous nodule  Pertinent Positives:  Pertinent Negatives:    Additional History of Present Condition:  history of EN with prednisone and plaquenil     Assessment and Plan:  Based on a thorough discussion of this condition and the management approach to it (including a comprehensive discussion of the known risks, side effects and potential benefits of treatment), the patient (family) agrees to implement the following specific plan:  Unclear if this is related to musculoskeletal changes from increased standing at work   Can continue to work on dietary changes to see if that if helpful    What is erythema nodosum?  Erythema nodosum is a skin condition where red lumps form on the shins, and less commonly the thighs and forearms. It is a type of panniculitis, i.e. an inflammatory disorder affecting subcutaneous fat.    Who gets erythema nodosum?  Most cases of erythema nodosum occur between the ages of 20 and 45, with a peak from 20 to  30. It occurs less often in the elderly and in children. It is 3 to 6 times more common in adult women than in adult men. However the sex incidence before puberty is about equal.  Most people with erythema nodosum are otherwise in good health but it is often associated with recent infection or illness.    What are the causes of erythema nodosum?  Erythema nodosum appears to be a hypersensitivity reaction with a number of different causes.    Common causes of erythema nodosum are:  Throat infections; these may be due to streptococccus, or viral in origin.  Other bacterial infections, such as Mycoplasma pneumoniae.  Sarcoidosis; erythema nodosum is often associated with enlargement of the lymph nodes (bihilar lymphadenopathy) in the lungs in sarcoidosis. This is known as Löfgren syndrome. It may result in a dry cough or some shortness of breath.  Tuberculosis (TB); erythema nodosum occurs with the primary infection with TB.   Pregnancy or the oral contraceptive pill; erythema nodosum may occur after the first 2 or 3 cycles on the pill. EN may occur in pregnancy, clear after delivery, then recur in subsequent pregnancies.  Other drugs; other drugs which have been reported to cause erythema nodosum include: sulphonamides, saliclyates, other nonsteroidal anti-inflammatory drugs (NSAIDs), bromides, iodides and gold salts.  Inflammatory bowel disease (ulcerative colitis or Crohn disease)  Other causes; there are many other causes of erythema nodosum  Erythema nodosum leprosum is a particular variant of erythema nodosum that affects some people being treated for leprosy, and is more usually called type 2 lepra reaction.    Clinical presentation of erythema nodosum    Symptoms of underlying disease may be present in some patients with erythema nodosum, e.g. sore throat in those with streptococcal infection.    Red lumps appear on the shins or about the knees and ankle. They vary in size between a cherry and a grapefruit and  "in number from 2 to 50 or more. Usually there are 6-12 lumps on the front and sides of the legs and knees. The thighs, outer aspects of the arms, face and neck are less frequently involved and at these other sites the lesions are smaller and more superficial. The nodules are slightly raised above the surrounding skin; they are hot and painful, bright red when they first appear, later becoming purple then fading through the colour changes of a bruise.    Erythema nodosum nodules continue to erupt for about 10 days. The \"bruising\" colour-change starts in the second week, becomes most marked in the third week, then subsides at any time from the end of the third week to the sixth week. Aching of the legs and swelling of the ankles may persist for some weeks, especially if the patient does not rest up. New crops of erythema nodosum may occur over a number of weeks. Rarely, 2 or 3 large lesions merge to form a crescentic ring, which spreads for some days before fading.    Other symptoms may include:  Fever, general aching and feeling unwell (malaise) especially when the nodules first occur.  Joint aches or arthralgias occur in over half of those presenting with erythema nodosum, regardless of cause. The knee jonts are almost always affected, the other large joints less commonly. Joint symptoms may persist for months afterwards but always resolve completely.  Conjunctivitis is less frequent.    How is erythema nodosum diagnosed?  The skin disorder is diagnosed clinically and a skin biopsy is often performed. The pathology of erythema nodosum shows inflammation around the septum between lobules of fat in subcutaneous tissue, without vasculitis.    Tests done in patients with erythema nodosum include:  Throat swab  Sputum or gastric washing if TB is suspected  Complete blood count and C-reactive protein (CRP) and/or erythrocyte sedimentation rate (ESR)  ASO titre (a test for streptococcal infection)  Chest X-ray (looking for " TB and sarcoidosis)  Virus studies  Yersinia titres  Mantoux test or QuantiFERON gold (tests for TB)    Treatment of erythema nodosum  If an underlying infection is found, this should be treated.  Bed rest is advised if pain and/or swelling is severe.  Firm supportive bandages or light compression stockings should be worn.  Anti-inflammatory medications reduce discomfort  Potassium iodide has been reported to be effective but is not easy to obtain.  Oral tetracyclines have anti-inflammatory properties and may reduce discomfort and duration of disease  Mild cases of erythema nodosum subside in 3 to 6 weeks. Cropping of new lesions may occur within this time, especially if the patient is not resting. Sometimes, erythema nodosum may become a chronic or persistent disorder lasting for 6 months and occasionally for years.

## 2024-07-10 NOTE — PROGRESS NOTES
"[facility] Note     Patient Name: Felicia Diane  Encounter Date: 7/10/224     Have you been cared for by a [facility] Dermatologist in the last 3 years and, if so, which description applies to you?    Yes.  I have been here within the last 3 years, and my medical history has NOT changed since that time.  I am FEMALE/of child-bearing potential.    REVIEW OF SYSTEMS:  Have you recently had or currently have any of the following? No changes in my recent health.   PAST MEDICAL HISTORY:  Have you personally ever had or currently have any of the following?  If \"YES,\" then please provide more detail. No changes in my medical history.   HISTORY OF IMMUNOSUPPRESSION: Do you have a history of any of the following:  Systemic Immunosuppression such as Diabetes, Biologic or Immunotherapy, Chemotherapy, Organ Transplantation, Bone Marrow Transplantation?  No     Answering \"YES\" requires the addition of the dotphrase \"IMMUNOSUPPRESSED\" as the first diagnosis of the patient's visit.   FAMILY HISTORY:  Any \"first degree relatives\" (parent, brother, sister, or child) with the following?    No changes in my family's known health.   PATIENT EXPERIENCE:    Do you want the Dermatologist to perform a COMPLETE skin exam today including a clinical examination under the \"bra and underwear\" areas?  No, feet, groin and buttocks not examined today  If necessary, do we have your permission to call and leave a detailed message on your Preferred Phone number that includes your specific medical information?  Yes      Allergies   Allergen Reactions    Covid-19 Mrna Vacc (Moderna) Palpitations     Felt sick and dizzy for days     Levaquin [Levofloxacin] Other (See Comments)     Tendon tears/ tendon pain    Reglan [Metoclopramide] Other (See Comments)     Severe agitation    Amoxil [Amoxicillin] Rash      Current Outpatient Medications:     Cholecalciferol (Vitamin D3) 50 MCG (2000 UT) TABS, Take 2,000 Units by mouth daily, " "Disp: , Rfl:     magnesium 30 MG tablet, Take 30 mg by mouth 2 (two) times a day, Disp: , Rfl:           Whom besides the patient is providing clinical information about today's encounter?   NO ADDITIONAL HISTORIAN (patient alone provided history)    Physical Exam and Assessment/Plan by Diagnosis:    HISTORY OF MELANOMA     Physical Exam:  Anatomic Location Affected:  Left thigh  Morphological Description of Scar:  well healed scar  Year Treated: 10/2023  TNM Classification: pT1b   Suspected Recurrence: no  Regional adenopathy: no     Additional History of Present Condition:  Surgery by general surgery, pathology results: -Prior procedure site changes present.   Residual melanoma not seen. [id number].        Assessment and Plan:  Based on a thorough discussion of this condition and the management approach to it (including a comprehensive discussion of the known risks, side effects and potential benefits of treatment), the patient (family) agrees to implement the following specific plan:  When outside we recommend using a wide brim hat, sunglasses, long sleeve and pants, sunscreen with SPF 30+ with reapplication every 2 hours, or SPF specific clothing    We recommend that you have regular full body skin exams with a dermatologist every 3 months for 3 years and then every 6 months.   Recommend yearly appointments with your eye doctor and dentist. Please make sure they are aware of your history of Melanoma.   .      What happens at follow-up?  The main purpose of follow-up is to detect recurrences early (metastatic melanoma), but it also offers an opportunity to diagnose a new primary melanoma at the first possible opportunity. A second invasive melanoma occurs in 5-10% of melanoma patients and a new melanoma in situ is diagnosed in more than 20% of melanoma patients.     Our practice makes the following recommendations for follow-up for patients with invasive melanoma.  At-least \"monthly\" self-skin examinations " "  Routine skin checks by a board certified dermatologist  Follow-up intervals are \"every 3 months\" within 2 years of a new melanoma diagnosis; \"every 6 months\" between 2-4 years of a new melanoma diagnosis; and \"annually\" after 4 years of a new melanoma diagnosis  Individual patient's needs should be considered before an appropriate follow-up is offered  Provide education and support to help the patient adjust to their illness     Follow-up appointments should include:  A check of the scar where the primary melanoma was removed  Checking the regional lymph nodes  A general skin examination  A full physical examination at least annually by your primary care physician     In those with more advanced primary disease, follow-up may include:  Blood tests  Imaging: ultrasound, X-ray, CT, MRI and PET scan.     Most tests are not worthwhile for patients with stage 1 or 2 melanoma unless there are signs or symptoms of disease recurrence or metastasis. No tests are necessary for healthy patients who have remained well for five years or longer after removal of their melanoma.     What is the outlook for patients with melanoma?  Melanoma in situ is cured by excision because it has no potential to spread around the body.  The risk of spread and ultimate death from invasive melanoma depends on several factors, but the main one is the Breslow thickness of the melanoma at the time it was surgically removed.  Metastases are rare for melanomas < 0.75 mm and the risk for tumours 0.75-1 mm thick is about 5%. The risk steadily increases with thickness so that melanomas > 4 mm have a risk of metastasis of about 40%.     Melanoma is a potentially serious type of skin cancer, in which there is uncontrolled growth of melanocytes (pigment cells). Melanoma is sometimes called malignant melanoma.  Normal melanocytes are found in the basal layer of the epidermis (the outer layer of skin). Melanocytes produce a protein called melanin, which " "protects skin cells by absorbing ultraviolet (UV) radiation. Melanocytes are found in equal numbers in black and white skin, but melanocytes in black skin produce much more melanin. People with dark brown or black skin are very much less likely to be damaged by UV radiation than those with white skin.        MELANOCYTIC NEVI (\"Moles\")    Physical Exam:  Anatomic Location Affected:   Mostly on sun-exposed areas of the trunk and extremities  Morphological Description:  Scattered, 1-4mm round to ovoid, symmetrical-appearing, even bordered, skin colored to dark brown macules/papules, mostly in sun-exposed areas  A; Left upper back; 0.6 X 0.5 cm irregular brown macule- no change; likely dysplastic nevus  Pertinent Positives:  Pertinent Negatives:    Additional History of Present Condition:      Assessment and Plan:  Based on a thorough discussion of this condition and the management approach to it (including a comprehensive discussion of the known risks, side effects and potential benefits of treatment), the patient (family) agrees to implement the following specific plan:  When outside we recommend using a wide brim hat, sunglasses, long sleeve and pants, sunscreen with SPF 30+ with reapplication every 2 hours, or SPF specific clothing   Benign, reassured  Annual skin check     Melanocytic Nevi  Melanocytic nevi (\"moles\") are tan or brown, raised or flat areas of the skin which have an increased number of melanocytes. Melanocytes are the cells in our body which make pigment and account for skin color.    Some moles are present at birth (I.e., \"congenital nevi\"), while others come up later in life (i.e., \"acquired nevi\").  The sun can stimulate the body to make more moles.  Sunburns are not the only thing that triggers more moles.  Chronic sun exposure can do it too.     Clinically distinguishing a healthy mole from melanoma may be difficult, even for experienced dermatologists. The \"ABCDE's\" of moles have been suggested " "as a means of helping to alert a person to a suspicious mole and the possible increased risk of melanoma.  The suggestions for raising alert are as follows:    Asymmetry: Healthy moles tend to be symmetric, while melanomas are often asymmetric.  Asymmetry means if you draw a line through the mole, the two halves do not match in color, size, shape, or surface texture. Asymmetry can be a result of rapid enlargement of a mole, the development of a raised area on a previously flat lesion, scaling, ulceration, bleeding or scabbing within the mole.  Any mole that starts to demonstrate \"asymmetry\" should be examined promptly by a board certified dermatologist.     Border: Healthy moles tend to have discrete, even borders.  The border of a melanoma often blends into the normal skin and does not sharply delineate the mole from normal skin.  Any mole that starts to demonstrate \"uneven borders\" should be examined promptly by a board certified dermatologist.     Color: Healthy moles tend to be one color throughout.  Melanomas tend to be made up of different colors ranging from dark black, blue, white, or red.  Any mole that demonstrates a color change should be examined promptly by a board certified dermatologist.     Diameter: Healthy moles tend to be smaller than 0.6 cm in size; an exception are \"congenital nevi\" that can be larger.  Melanomas tend to grow and can often be greater than 0.6 cm (1/4 of an inch, or the size of a pencil eraser). This is only a guideline, and many normal moles may be larger than 0.6 cm without being unhealthy.  Any mole that starts to change in size (small to bigger or bigger to smaller) should be examined promptly by a board certified dermatologist.     Evolving: Healthy moles tend to \"stay the same.\"  Melanomas may often show signs of change or evolution such as a change in size, shape, color, or elevation.  Any mole that starts to itch, bleed, crust, burn, hurt, or ulcerate or demonstrate a " change or evolution should be examined promptly by a board certified dermatologist.        LENTIGO    Physical Exam:  Anatomic Location Affected:  trunk, arms  Morphological Description:  Light brown macules  Pertinent Positives:  Pertinent Negatives:    Additional History of Present Condition:      Assessment and Plan:  Based on a thorough discussion of this condition and the management approach to it (including a comprehensive discussion of the known risks, side effects and potential benefits of treatment), the patient (family) agrees to implement the following specific plan:  When outside we recommend using a wide brim hat, sunglasses, long sleeve and pants, sunscreen with SPF 30+ with reapplication every 2 hours, or SPF specific clothing       What is a lentigo?  A lentigo is a pigmented flat or slightly raised lesion with a clearly defined edge. Unlike an ephelis (freckle), it does not fade in the winter months. There are several kinds of lentigo.  The name lentigo originally referred to its appearance resembling a small lentil. The plural of lentigo is lentigines, although “lentigos” is also in common use.    Who gets lentigines?  Lentigines can affect males and females of all ages and races. Solar lentigines are especially prevalent in fair skinned adults. Lentigines associated with syndromes are present at birth or arise during childhood.    What causes lentigines?  Common forms of lentigo are due to exposure to ultraviolet radiation:  Sun damage including sunburn   Indoor tanning   Phototherapy, especially photochemotherapy (PUVA)    Ionizing radiation, eg radiation therapy, can also cause lentigines.  Several familial syndromes associated with widespread lentigines originate from mutations in Attila-MAP kinase, mTOR signaling and PTEN pathways.    What is the treatment for lentigines?  Most lentigines are left alone. Attempts to lighten them may not be successful. The following approaches are used:  SPF 50+  "broad-spectrum sunscreen   Hydroquinone bleaching cream   Alpha hydroxy acids   Vitamin C   Retinoids   Azelaic acid   Chemical peels  Individual lesions can be permanently removed using:  Cryotherapy   Intense pulsed light   Pigment lasers    How can lentigines be prevented?  Lentigines associated with exposure ultraviolet radiation can be prevented by very careful sun protection. Clothing is more successful at preventing new lentigines than are sunscreens.    What is the outlook for lentigines?  Lentigines usually persist. They may increase in number with age and sun exposure. Some in sun-protected sites may fade and disappear.    PRESLEY ANGIOMAS    Physical Exam:  Anatomic Location Affected:  trunk  Morphological Description:  Scattered cherry red, 1-4 mm papules.  Pertinent Positives:  Pertinent Negatives:    Additional History of Present Condition:      Assessment and Plan:  Based on a thorough discussion of this condition and the management approach to it (including a comprehensive discussion of the known risks, side effects and potential benefits of treatment), the patient (family) agrees to implement the following specific plan:  Monitor for changes  Benign, reassured      Assessment and Plan:    Cherry angioma, also known as Aldana de Dorian spots, are benign vascular skin lesions. A \"cherry angioma\" is a firm red, blue or purple papule, 0.1-1 cm in diameter. When thrombosed, they can appear black in colour until evaluated with a dermatoscope when the red or purple colour is more easily seen. Cherry angioma may develop on any part of the body but most often appear on the scalp, face, lips and trunk.  An angioma is due to proliferating endothelial cells; these are the cells that line the inside of a blood vessel.    Angiomas can arise in early life or later in life; the most common type of angioma is a cherry angioma.  Cherry angiomas are very common in males and females of any age or race. They are more " "noticeable in white skin than in skin of colour. They markedly increase in number from about the age of 40. There may be a family history of similar lesions. Eruptive cherry angiomas have been rarely reported to be associated with internal malignancy. The cause of angiomas is unknown. Genetic analysis of cherry angiomas has shown that they frequently carry specific somatic missense mutations in the GNAQ and GNA11 (Q209H) genes, which are involved in other vascular and melanocytic proliferations.      XEROSIS (\"DRY SKIN\")    Physical Exam:  Anatomic Location Affected:  diffuse  Morphological Description:  xerosis  Pertinent Positives:  Pertinent Negatives:    Additional History of Present Condition:      Assessment and Plan:  Based on a thorough discussion of this condition and the management approach to it (including a comprehensive discussion of the known risks, side effects and potential benefits of treatment), the patient (family) agrees to implement the following specific plan:  Use moisturizer like Eucerin,Cerave or Aveeno Cream 3 times a day for the dry skin            Dry skin refers to skin that feels dry to touch. Dry skin has a dull surface with a rough, scaly quality. The skin is less pliable and cracked. When dryness is severe, the skin may become inflamed and fissured.  Although any body site can be dry, dry skin tends to affect the shins more than any other site.    Dry skin is lacking moisture in the outer horny cell layer (stratum corneum) and this results in cracks in the skin surface.  Dry skin is also called xerosis, xeroderma or asteatosis (lack of fat).  It can affect males and females of all ages. There is some racial variability in water and lipid content of the skin.  Dry skin that starts in early childhood may be one of about 20 types of ichthyosis (fish-scale skin). There is often a family history of dry skin.   Dry skin is commonly seen in people with atopic dermatitis.  Nearly everyone > " 60 years has dry skin.    Dry skin that begins later may be seen in people with certain diseases and conditions.  Postmenopausal women  Hypothyroidism  Chronic renal disease   Malnutrition and weight loss   Subclinical dermatitis   Treatment with certain drugs such as oral retinoids, diuretics and epidermal growth factor receptor inhibitors      What is the treatment for dry skin?  The mainstay of treatment of dry skin and ichthyosis is moisturisers/emollients. They should be applied liberally and often enough to:  Reduce itch   Improve the barrier function   Prevent entry of irritants, bacteria   Reduce transepidermal water loss.      How can dry skin be prevented?  Eliminate aggravating factors:  Reduce the frequency of bathing.   A humidifier in winter and air conditioner in summer   Compare having a short shower with a prolonged soak in a bath.   Use lukewarm, not hot, water.   Replace standard soap with a substitute such as a synthetic detergent cleanser, water-miscible emollient, bath oil, anti-pruritic tar oil, colloidal oatmeal etc.   Apply an emollient liberally and often, particularly shortly after bathing, and when itchy. The drier the skin, the thicker this should be, especially on the hands.    What is the outlook for dry skin?  A tendency to dry skin may persist life-long, or it may improve once contributing factors are controlled.     ERYTHEMA NODOSUM    Physical Exam:  Anatomic Location Affected:  Right ankle  Morphological Description:  tender subcutaneous nodule  Pertinent Positives:  Pertinent Negatives:    Additional History of Present Condition:  history of EN with prednisone and plaquenil     Assessment and Plan:  Based on a thorough discussion of this condition and the management approach to it (including a comprehensive discussion of the known risks, side effects and potential benefits of treatment), the patient (family) agrees to implement the following specific plan:  Unclear if this is  related to musculoskeletal changes from increased standing at work   Can continue to work on dietary changes to see if that if helpful    What is erythema nodosum?  Erythema nodosum is a skin condition where red lumps form on the shins, and less commonly the thighs and forearms. It is a type of panniculitis, i.e. an inflammatory disorder affecting subcutaneous fat.    Who gets erythema nodosum?  Most cases of erythema nodosum occur between the ages of 20 and 45, with a peak from 20 to 30. It occurs less often in the elderly and in children. It is 3 to 6 times more common in adult women than in adult men. However the sex incidence before puberty is about equal.  Most people with erythema nodosum are otherwise in good health but it is often associated with recent infection or illness.    What are the causes of erythema nodosum?  Erythema nodosum appears to be a hypersensitivity reaction with a number of different causes.    Common causes of erythema nodosum are:  Throat infections; these may be due to streptococccus, or viral in origin.  Other bacterial infections, such as Mycoplasma pneumoniae.  Sarcoidosis; erythema nodosum is often associated with enlargement of the lymph nodes (bihilar lymphadenopathy) in the lungs in sarcoidosis. This is known as Löfgren syndrome. It may result in a dry cough or some shortness of breath.  Tuberculosis (TB); erythema nodosum occurs with the primary infection with TB.   Pregnancy or the oral contraceptive pill; erythema nodosum may occur after the first 2 or 3 cycles on the pill. EN may occur in pregnancy, clear after delivery, then recur in subsequent pregnancies.  Other drugs; other drugs which have been reported to cause erythema nodosum include: sulphonamides, saliclyates, other nonsteroidal anti-inflammatory drugs (NSAIDs), bromides, iodides and gold salts.  Inflammatory bowel disease (ulcerative colitis or Crohn disease)  Other causes; there are many other causes of erythema  "nodosum  Erythema nodosum leprosum is a particular variant of erythema nodosum that affects some people being treated for leprosy, and is more usually called type 2 lepra reaction.    Clinical presentation of erythema nodosum    Symptoms of underlying disease may be present in some patients with erythema nodosum, e.g. sore throat in those with streptococcal infection.    Red lumps appear on the shins or about the knees and ankle. They vary in size between a cherry and a grapefruit and in number from 2 to 50 or more. Usually there are 6-12 lumps on the front and sides of the legs and knees. The thighs, outer aspects of the arms, face and neck are less frequently involved and at these other sites the lesions are smaller and more superficial. The nodules are slightly raised above the surrounding skin; they are hot and painful, bright red when they first appear, later becoming purple then fading through the colour changes of a bruise.    Erythema nodosum nodules continue to erupt for about 10 days. The \"bruising\" colour-change starts in the second week, becomes most marked in the third week, then subsides at any time from the end of the third week to the sixth week. Aching of the legs and swelling of the ankles may persist for some weeks, especially if the patient does not rest up. New crops of erythema nodosum may occur over a number of weeks. Rarely, 2 or 3 large lesions merge to form a crescentic ring, which spreads for some days before fading.    Other symptoms may include:  Fever, general aching and feeling unwell (malaise) especially when the nodules first occur.  Joint aches or arthralgias occur in over half of those presenting with erythema nodosum, regardless of cause. The knee jonts are almost always affected, the other large joints less commonly. Joint symptoms may persist for months afterwards but always resolve completely.  Conjunctivitis is less frequent.    How is erythema nodosum diagnosed?  The skin " disorder is diagnosed clinically and a skin biopsy is often performed. The pathology of erythema nodosum shows inflammation around the septum between lobules of fat in subcutaneous tissue, without vasculitis.    Tests done in patients with erythema nodosum include:  Throat swab  Sputum or gastric washing if TB is suspected  Complete blood count and C-reactive protein (CRP) and/or erythrocyte sedimentation rate (ESR)  ASO titre (a test for streptococcal infection)  Chest X-ray (looking for TB and sarcoidosis)  Virus studies  Yersinia titres  Mantoux test or QuantiFERON gold (tests for TB)    Treatment of erythema nodosum  If an underlying infection is found, this should be treated.  Bed rest is advised if pain and/or swelling is severe.  Firm supportive bandages or light compression stockings should be worn.  Anti-inflammatory medications reduce discomfort  Potassium iodide has been reported to be effective but is not easy to obtain.  Oral tetracyclines have anti-inflammatory properties and may reduce discomfort and duration of disease  Mild cases of erythema nodosum subside in 3 to 6 weeks. Cropping of new lesions may occur within this time, especially if the patient is not resting. Sometimes, erythema nodosum may become a chronic or persistent disorder lasting for 6 months and occasionally for years.     Scribe Attestation      I,:  Zulma Nunez am acting as a scribe while in the presence of the attending physician.:       I,:  Yajaira Escalona MD personally performed the services described in this documentation    as scribed in my presence.:

## 2024-07-23 ENCOUNTER — OFFICE VISIT (OUTPATIENT)
Dept: FAMILY MEDICINE CLINIC | Facility: CLINIC | Age: 54
End: 2024-07-23
Payer: COMMERCIAL

## 2024-07-23 VITALS
TEMPERATURE: 98.6 F | RESPIRATION RATE: 16 BRPM | WEIGHT: 167.6 LBS | HEART RATE: 76 BPM | BODY MASS INDEX: 26.93 KG/M2 | HEIGHT: 66 IN | SYSTOLIC BLOOD PRESSURE: 102 MMHG | DIASTOLIC BLOOD PRESSURE: 64 MMHG

## 2024-07-23 DIAGNOSIS — Z13.29 SCREENING FOR THYROID DISORDER: ICD-10-CM

## 2024-07-23 DIAGNOSIS — R92.30 DENSE BREAST: ICD-10-CM

## 2024-07-23 DIAGNOSIS — Z12.31 ENCOUNTER FOR SCREENING MAMMOGRAM FOR BREAST CANCER: ICD-10-CM

## 2024-07-23 DIAGNOSIS — E55.9 VITAMIN D INSUFFICIENCY: ICD-10-CM

## 2024-07-23 DIAGNOSIS — M35.00 SJOGREN'S SYNDROME, WITH UNSPECIFIED ORGAN INVOLVEMENT (HCC): ICD-10-CM

## 2024-07-23 DIAGNOSIS — R05.9 COUGH, UNSPECIFIED TYPE: ICD-10-CM

## 2024-07-23 DIAGNOSIS — M79.605 BILATERAL LEG PAIN: ICD-10-CM

## 2024-07-23 DIAGNOSIS — M79.10 MYALGIA: ICD-10-CM

## 2024-07-23 DIAGNOSIS — Z12.12 SCREENING FOR COLORECTAL CANCER: ICD-10-CM

## 2024-07-23 DIAGNOSIS — C43.72 MALIGNANT MELANOMA OF LEFT LOWER EXTREMITY INCLUDING HIP (HCC): ICD-10-CM

## 2024-07-23 DIAGNOSIS — Z12.31 SCREENING MAMMOGRAM, ENCOUNTER FOR: ICD-10-CM

## 2024-07-23 DIAGNOSIS — M35.1 MIXED CONNECTIVE TISSUE DISEASE (HCC): ICD-10-CM

## 2024-07-23 DIAGNOSIS — Z12.11 SCREENING FOR COLORECTAL CANCER: ICD-10-CM

## 2024-07-23 DIAGNOSIS — Z00.00 ROUTINE ADULT HEALTH MAINTENANCE: Primary | ICD-10-CM

## 2024-07-23 DIAGNOSIS — M79.604 BILATERAL LEG PAIN: ICD-10-CM

## 2024-07-23 PROCEDURE — 99396 PREV VISIT EST AGE 40-64: CPT | Performed by: NURSE PRACTITIONER

## 2024-07-23 NOTE — PROGRESS NOTES
[facility] HEALTH MAINTENANCE OFFICE VISIT  [facility]    NAME: Felicia Diane  AGE: 54 y.o. SEX: female  : 1970     DATE: 2024    Assessment and Plan     1. Routine adult health maintenance  2. Mixed connective tissue disease (HCC)  -     Comprehensive metabolic panel; Future  -     Sedimentation rate, automated; Future  -     Ambulatory Referral to Rheumatology; Future  3. Bilateral leg pain  -     VAS Lower extremity venous insufficiency duplex, bilateral w/ measurements; Future; Expected date: 2024  -     CBC; Future  -     Comprehensive metabolic panel; Future  -     Iron Panel (Includes Ferritin, Iron Sat%, Iron, and TIBC); Future  4. Myalgia  -     VAS Lower extremity venous insufficiency duplex, bilateral w/ measurements; Future; Expected date: 2024  -     CBC; Future  -     Comprehensive metabolic panel; Future  -     Iron Panel (Includes Ferritin, Iron Sat%, Iron, and TIBC); Future  5. Sjogren's syndrome, with unspecified organ involvement (HCC)  -     Ambulatory Referral to Rheumatology; Future  6. Encounter for screening mammogram for breast cancer  7. Screening mammogram, encounter for  -     Mammo screening bilateral w 3d & cad; Future; Expected date: 2024  -     US breast screening bilateral complete (ABUS); Future; Expected date: 2024  8. Dense breast  -     Mammo screening bilateral w 3d & cad; Future; Expected date: 2024  -     US breast screening bilateral complete (ABUS); Future; Expected date: 2024  9. Screening for colorectal cancer  -     Ambulatory Referral to Gastroenterology; Future  10. Vitamin D insufficiency  -     Vitamin D 25 hydroxy; Future  11. Screening for thyroid disorder  -     TSH, 3rd generation with Free T4 reflex; Future  12. Cough, unspecified type  -     XR chest pa & lateral; Future; Expected date: 2024  13. Malignant melanoma of left lower extremity including hip  ([facility])  Assessment & Plan:  Management as per dermatologist      Patient Counseling:   Nutrition: Stressed importance of a well balanced diet, moderation of sodium/saturated fat, caloric balance and sufficient intake of fiber  Exercise: Stressed the importance of regular exercise with a goal of 150 minutes per week  Dental Health: Discussed daily flossing and brushing and regular dental visits   Sexuality: Discussed sexually transmitted infections, use of condoms and prevention of unintended pregnancy  Alcohol Use:  Recommended moderation of alcohol intake  Injury Prevention: Discussed Safety Belts, Safety Helmets, and Smoke Detectors    Immunizations reviewed: Declined recommended vaccinations  Discussed benefits of:  Colon Cancer Screening, Mammogram , Cervical Cancer screening, and Screening labs.  BMI Counseling: Body mass index is 26.94 kg/m². Discussed with patient's BMI with her. The BMI is above normal. Nutrition recommendations include reducing portion sizes, decreasing overall calorie intake, 3-5 servings of fruits/vegetables daily, reducing fast food intake, consuming healthier snacks, decreasing soda and/or juice intake, moderation in carbohydrate intake, increasing intake of lean protein, reducing intake of saturated fat and trans fat, and reducing intake of cholesterol. Exercise recommendations include exercising 3-5 times per week, joining a gym, and strength training exercises.    Return in about 1 year (around 7/23/2025) for Annual physical.        Chief Complaint     Chief Complaint   Patient presents with   • Leg Pain     YC        History of Present Illness     HPI    Well Adult Physical   Patient here for a comprehensive physical exam.  Stated that has h/o autoimmune and taper herself off plaquenil couple of years as followed with sugar free and gluten free diet. 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 Had history of low vitamin D and will check that too.  Had some cough at times and will check chest xray as has h/o melanoma.  Defers shingrix and Tdap.        Diet and Physical Activity  Diet: well balanced diet  Exercise: rarely      Depression Screen  PHQ-2/9 Depression Screening    Little interest or pleasure in doing things: 0 - not at all  Feeling down, depressed, or hopeless: 0 - not at all  PHQ-2 Score: 0  PHQ-2 Interpretation: Negative depression screen          General Health  Hearing: Normal:  bilateral  Vision: no vision problems  Dental: regular dental visits    Reproductive Health  Follows with gynecologist      The following portions of the patient's history were reviewed and updated as appropriate: allergies, current medications, past family history, past medical history, past social history, past surgical history and problem list.    Review of Systems     Review of Systems   Constitutional: Negative.    HENT: Negative.     Eyes: Negative.    Respiratory: Negative.     Cardiovascular: Negative.    Gastrointestinal: Negative.    Endocrine: Negative.    Genitourinary: Negative.    Musculoskeletal:  Positive for myalgias. Negative for arthralgias, back pain, gait problem, joint swelling, neck pain and neck stiffness.        As noted in HPI       Skin: Negative.    Allergic/Immunologic: Negative.    Neurological: Negative.    Hematological: Negative.    Psychiatric/Behavioral: Negative.         Past Medical History     Past Medical History:   Diagnosis Date   • Abnormal menstrual periods     Resolved 12/18/2017    • Abnormal uterine bleeding     Resolved 12/18/2017    • Anticardiolipin syndrome (HCC)    • Arthropathy, multiple sites     Resolved 1/29/2016    • Erythema nodosum     Resolved 5/1/2017    • Herniated lumbar intervertebral disc    • Malignant melanoma of left lower extremity including hip (HCC) 10/25/2023   • Mixed  connective tissue disease (HCC)    • Mixed connective tissue disease (HCC)    • PONV (postoperative nausea and vomiting) 10/25/2023   • Skin lesion     Resolved 1/29/2016        Past Surgical History     Past Surgical History:   Procedure Laterality Date   • HYSTERECTOMY  12/2017   • MA CYSTOURETHROSCOPY N/A 12/11/2017    Procedure: CYSTOSCOPY;  Surgeon: Beverly Valdez MD;  Location: [facility];  Service: Gynecology   • MA EXC TUMOR SOFT TISS UPPER ARM/ELBW SUBFASC 5CM/> Left 10/25/2023    Procedure: EXCISION BIOPSY TISSUE LESION/MASS LOWER EXTREMITY ;  Surgeon: Christiano Ramirez MD;  Location: [facility];  Service: General   • MA LAPS W/VAG HYSTERECT 250 GM/&RMVL TUBE&/OVARIES N/A 12/11/2017    Procedure: LAPAROSCOPIC ASSISTED VAGINAL HYSTERECTOMY; BILATERAL SALPINGECTOMY;  Surgeon: Beverly Valdez MD;  Location: [facility];  Service: Gynecology       Social History     Social History     Socioeconomic History   • Marital status: /Civil Union     Spouse name: None   • Number of children: None   • Years of education: None   • Highest education level: None   Occupational History   • None   Tobacco Use   • Smoking status: Never     Passive exposure: Never   • Smokeless tobacco: Never   Vaping Use   • Vaping status: Never Used   Substance and Sexual Activity   • Alcohol use: Not Currently   • Drug use: No   • Sexual activity: None   Other Topics Concern   • None   Social History Narrative   • None     Social Determinants of Health     Financial Resource Strain: Not on file   Food Insecurity: Not on file   Transportation Needs: Not on file   Physical Activity: Not on file   Stress: Not on file   Social Connections: Not on file   Intimate Partner Violence: Not on file   Housing Stability: Not on file       Family History     Family History   Problem Relation Age of Onset   • Gout Father    • Liver cancer Father 74   • No Known Problems Sister    • No Known Problems Daughter    • No Known Problems  "Daughter    • No Known Problems Daughter    • Rheum arthritis Maternal Grandmother    • Diabetes Brother    • No Known Problems Brother    • No Known Problems Brother    • Lupus Cousin    • Sjogren's syndrome Family    • Lupus Family    • Thyroid disease Family    • Heart disease Family        Current Medications       Current Outpatient Medications:   •  Cholecalciferol (Vitamin D3) 50 MCG (2000 UT) TABS, Take 2,000 Units by mouth daily, Disp: , Rfl:   •  magnesium 30 MG tablet, Take 30 mg by mouth 2 (two) times a day, Disp: , Rfl:      Allergies     Allergies   Allergen Reactions   • Covid-19 Mrna Vacc (Moderna) Palpitations     Felt sick and dizzy for days    • Levaquin [Levofloxacin] Other (See Comments)     Tendon tears/ tendon pain   • Reglan [Metoclopramide] Other (See Comments)     Severe agitation   • Amoxil [Amoxicillin] Rash       Objective     /64   Pulse 76   Temp 98.6 °F (37 °C) (Tympanic)   Resp 16   Ht 5' 6.14\" (1.68 m)   Wt 76 kg (167 lb 9.6 oz)   LMP 12/01/2017   BMI 26.94 kg/m²      Physical Exam  Vitals reviewed.   Constitutional:       Appearance: Normal appearance.   HENT:      Head: Normocephalic and atraumatic.      Salivary Glands: Right salivary gland is not tender. Left salivary gland is not tender.      Right Ear: Tympanic membrane, ear canal and external ear normal.      Left Ear: Tympanic membrane, ear canal and external ear normal. There is no impacted cerumen.      Nose: Nose normal. No congestion.      Mouth/Throat:      Mouth: Mucous membranes are moist.      Pharynx: No oropharyngeal exudate or posterior oropharyngeal erythema.   Eyes:      General: Lids are normal.         Right eye: No discharge or hordeolum.         Left eye: No discharge or hordeolum.      Conjunctiva/sclera: Conjunctivae normal.      Right eye: Right conjunctiva is not injected. No exudate or hemorrhage.     Left eye: Left conjunctiva is not injected. No exudate or hemorrhage.     Pupils: Pupils " are equal, round, and reactive to light.   Neck:      Thyroid: No thyromegaly or thyroid tenderness.   Cardiovascular:      Rate and Rhythm: Normal rate and regular rhythm.      Pulses: Normal pulses.      Heart sounds: Normal heart sounds.   Pulmonary:      Effort: Pulmonary effort is normal.      Breath sounds: Normal breath sounds.   Chest:      Chest wall: No deformity, swelling, tenderness, crepitus or edema. There is no dullness to percussion.   Abdominal:      General: Abdomen is flat. Bowel sounds are normal.      Palpations: Abdomen is soft.      Tenderness: There is no abdominal tenderness.      Hernia: There is no hernia in the left inguinal area or right inguinal area.   Musculoskeletal:         General: No swelling or tenderness. Normal range of motion.      Cervical back: Full passive range of motion without pain, normal range of motion and neck supple.      Right lower leg: No edema.      Left lower leg: No edema.      Comments: Small varicose veins and spider veins noted on lower extremities. Bilateral calves and shins sore on touch but no swelling noted.    Lymphadenopathy:      Cervical:      Right cervical: No superficial or posterior cervical adenopathy.     Left cervical: No superficial or posterior cervical adenopathy.      Upper Body:      Right upper body: No supraclavicular or axillary adenopathy.      Left upper body: No supraclavicular or axillary adenopathy.      Lower Body: No right inguinal adenopathy. No left inguinal adenopathy.   Skin:     General: Skin is warm and dry.      Findings: No rash.   Neurological:      General: No focal deficit present.      Mental Status: She is alert and oriented to person, place, and time. Mental status is at baseline.      GCS: GCS eye subscore is 4. GCS verbal subscore is 5. GCS motor subscore is 6.      Motor: Motor function is intact.      Coordination: Coordination is intact. Coordination normal.      Gait: Gait normal.      Deep Tendon Reflexes:  Reflexes are normal and symmetric.   Psychiatric:         Attention and Perception: Attention normal.         Mood and Affect: Mood normal.         Speech: Speech normal.         Behavior: Behavior normal. Behavior is cooperative.         Thought Content: Thought content normal.         Judgment: Judgment normal.           Vision Screening    Right eye Left eye Both eyes   Without correction      With correction 20/13 20/15 20/13           Elyse Conn, Pending sale to [facility]

## 2024-07-23 NOTE — PATIENT INSTRUCTIONS
"Patient Education     Routine physical for adults   The Basics   Written by the doctors and editors at [facility]   What is a physical? -- A physical is a routine visit, or \"check-up,\" with your doctor. You might also hear it called a \"wellness visit\" or \"preventive visit.\"  During each visit, the doctor will:   Ask about your physical and mental health   Ask about your habits, behaviors, and lifestyle   Do an exam   Give you vaccines if needed   Talk to you about any medicines you take   Give advice about your health   Answer your questions  Getting regular check-ups is an important part of taking care of your health. It can help your doctor find and treat any problems you have. But it's also important for preventing health problems.  A routine physical is different from a \"sick visit.\" A sick visit is when you see a doctor because of a health concern or problem. Since physicals are scheduled ahead of time, you can think about what you want to ask the doctor.  How often should I get a physical? -- It depends on your age and health. In general, for people age 21 years and older:   If you are younger than 50 years, you might be able to get a physical every 3 years.   If you are 50 years or older, your doctor might recommend a physical every year.  If you have an ongoing health condition, like diabetes or high blood pressure, your doctor will probably want to see you more often.  What happens during a physical? -- In general, each visit will include:   Physical exam - The doctor or nurse will check your height, weight, heart rate, and blood pressure. They will also look at your eyes and ears. They will ask about how you are feeling and whether you have any symptoms that bother you.   Medicines - It's a good idea to bring a list of all the medicines you take to each doctor visit. Your doctor will talk to you about your medicines and answer any questions. Tell them if you are having any side effects that bother you. You " "should also tell them if you are having trouble paying for any of your medicines.   Habits and behaviors - This includes:   Your diet   Your exercise habits   Whether you smoke, drink alcohol, or use drugs   Whether you are sexually active   Whether you feel safe at home  Your doctor will talk to you about things you can do to improve your health and lower your risk of health problems. They will also offer help and support. For example, if you want to quit smoking, they can give you advice and might prescribe medicines. If you want to improve your diet or get more physical activity, they can help you with this, too.   Lab tests, if needed - The tests you get will depend on your age and situation. For example, your doctor might want to check your:   Cholesterol   Blood sugar   Iron level   Vaccines - The recommended vaccines will depend on your age, health, and what vaccines you already had. Vaccines are very important because they can prevent certain serious or deadly infections.   Discussion of screening - \"Screening\" means checking for diseases or other health problems before they cause symptoms. Your doctor can recommend screening based on your age, risk, and preferences. This might include tests to check for:   Cancer, such as breast, prostate, cervical, ovarian, colorectal, prostate, lung, or skin cancer   Sexually transmitted infections, such as chlamydia and gonorrhea   Mental health conditions like depression and anxiety  Your doctor will talk to you about the different types of screening tests. They can help you decide which screenings to have. They can also explain what the results might mean.   Answering questions - The physical is a good time to ask the doctor or nurse questions about your health. If needed, they can refer you to other doctors or specialists, too.  Adults older than 65 years often need other care, too. As you get older, your doctor will talk to you about:   How to prevent falling at " home   Hearing or vision tests   Memory testing   How to take your medicines safely   Making sure that you have the help and support you need at home  All topics are updated as new evidence becomes available and our peer review process is complete.  This topic retrieved from [employer] on: May 02, 2024.  Topic 622473 Version 1.0  Release: 32.4.3 - C32.122  © 2024 Up[employer]Date, Inc. and/or its affiliates. All rights reserved.  Consumer Information Use and Disclaimer   Disclaimer: This generalized information is a limited summary of diagnosis, treatment, and/or medication information. It is not meant to be comprehensive and should be used as a tool to help the user understand and/or assess potential diagnostic and treatment options. It does NOT include all information about conditions, treatments, medications, side effects, or risks that may apply to a specific patient. It is not intended to be medical advice or a substitute for the medical advice, diagnosis, or treatment of a health care provider based on the health care provider's examination and assessment of a patient's specific and unique circumstances. Patients must speak with a health care provider for complete information about their health, medical questions, and treatment options, including any risks or benefits regarding use of medications. This information does not endorse any treatments or medications as safe, effective, or approved for treating a specific patient. Up[employer]Date, Inc. and its affiliates disclaim any warranty or liability relating to this information or the use thereof.The use of this information is governed by the Terms of Use, available at https://www.woltersSonicsuwer.com/en/know/clinical-effectiveness-terms. 2024© UpToDate, Inc. and its affiliates and/or licensors. All rights reserved.  Copyright   © 2024 [employer]Date, Inc. and/or its affiliates. All rights reserved.

## 2024-07-25 ENCOUNTER — OFFICE VISIT (OUTPATIENT)
Dept: HEMATOLOGY ONCOLOGY | Facility: CLINIC | Age: 54
End: 2024-07-25
Payer: COMMERCIAL

## 2024-07-25 VITALS
HEART RATE: 120 BPM | SYSTOLIC BLOOD PRESSURE: 104 MMHG | RESPIRATION RATE: 18 BRPM | TEMPERATURE: 97.5 F | WEIGHT: 167 LBS | DIASTOLIC BLOOD PRESSURE: 62 MMHG | OXYGEN SATURATION: 97 % | BODY MASS INDEX: 26.21 KG/M2 | HEIGHT: 67 IN

## 2024-07-25 DIAGNOSIS — Z08 FOLLOW-UP SURVEILLANCE OF MELANOMA, ENCOUNTER FOR: Primary | ICD-10-CM

## 2024-07-25 DIAGNOSIS — C43.72 MALIGNANT MELANOMA OF LEFT LOWER EXTREMITY INCLUDING HIP (HCC): ICD-10-CM

## 2024-07-25 DIAGNOSIS — Z85.820 FOLLOW-UP SURVEILLANCE OF MELANOMA, ENCOUNTER FOR: Primary | ICD-10-CM

## 2024-07-25 PROCEDURE — 99213 OFFICE O/P EST LOW 20 MIN: CPT | Performed by: INTERNAL MEDICINE

## 2024-07-25 NOTE — LETTER
July 30, 2024     Yajaira Baca DO  [address] [facility]   [address]  [facility] [address]    Patient: Felicia Diane   YOB: 1970   Date of Visit: 7/25/2024       Dear Dr. Baca:    Thank you for referring Felicia Diane to me for evaluation. Below are my notes for this consultation.    If you have questions, please do not hesitate to call me. I look forward to following your patient along with you.         Sincerely,        Piper Santoro MD        CC: DO All Brenner MD  7/30/2024 10:18 PM  Attested  [facility] [address] [address]  [phone]  [phone]     Date of Visit: 7/25/2024  Name: Felicia Diane   YOB: 1970     Subjective   Subjective    VISIT DIAGNOSIS:  Diagnoses and all orders for this visit:    Malignant melanoma of left lower extremity including hip (HCC)  -     CBC and differential; Standing  -     Comprehensive metabolic panel; Standing  -     LD,Blood; Standing        Oncology History   Malignant melanoma of left lower extremity including hip (HCC)   8/3/2023 -  Cancer Staged    Staging form: Melanoma of the Skin, AJCC 8th Edition  - Clinical stage from 8/3/2023: Stage IB (cT1b, cN0, cM0) - Signed by Piper Santoro MD on 1/25/2024       10/25/2023 Initial Diagnosis    Malignant melanoma of left lower extremity including hip (HCC)        Cancer Staging   Malignant melanoma of left lower extremity including hip (HCC)  Staging form: Melanoma of the Skin, AJCC 8th Edition  - Clinical stage from 8/3/2023: Stage IB (cT1b, cN0, cM0) - Signed by Piper Santoro MD on 1/25/2024     [No matching plan found]     HISTORY OF PRESENT ILLNESS: Felicia Diane is a 54 y.o. female  with a history of mixed connective tissue disorder and Sjogren's who presents for initial consultation of her melanoma. Patient was diagnosed with a Stage T1B melanoma of her left lateral  thigh on 8/2/23. She is s/p resection and WLE on 10/25/23 due to 2 mm margins. She was recommended a SLN biopsy which she declined due to how it could impact her work and concerns of lymphedema. She presents today on referral from her dermatologist for her Stage 1B melanoma.    Patient states that she had pale pink mole on her left thigh that was present for many years. Over the last few months the spot had become larger, dry, blotchy, and hyperpigmented. Her  noted these changes around summer of 2023 and recommended the patient be evaluated by dermatology. She had scheduled an appointment with Dermatology but the earliest appointment she could get was in January 2024. So in the meant time patient had her melanotic lesion evaluated by surgery. Surgery performed an elective excision of her atypical nevus of her left thigh. Biopsy results confirmed melanoma with a Breslow thickness of 0.8 mm and Leeroy level IV without any ulcerations and mitoses of 1/mm squared. There were no microsattelites or lymphovascular invasion but tumor infiltrating lymphocytes were present in the region. Based on her high risk features, patient was recommended re-excision with a SLN biopsy. Patient subsequently underwent a wide local excision but declined SLN biopsy.    Patient had significant risk factors for developing melanoma. She was outside a lot as a child and would develop blistering sun burns all over her body every summer. She states that since late 80s she started wearing sun protection but last 10 years she has been wearing SPF  sunscreen. She has her  check her skin and she states that he noticed some lesions in the back and R. Gluteal region and the left scapular region. The lesions have not grown in size but apparently the shape and hue of the lesions bothers her and her . She states that her father has undiagnosed melanoma based on an obvious melanotic lesion in his face. He also had  cholangiocarcinoma that led to his ultimate passing. Patient denies any other known family history of cancer including no know breast, ovarian, pancreatic, and renal cell. She is up to date on her cancer screening. She is a non-smoker, drinks alcohol occasionally, and does not use recreational drugs. She has a son who does have some melanotic lesion on the right trunk and she will tell him to go to the dermatologist.        Review of Systems   Constitutional:  Negative for appetite change, chills, diaphoresis, fatigue, fever and unexpected weight change.   HENT:   Negative for lump/mass and trouble swallowing.    Eyes:  Negative for icterus.   Respiratory:  Negative for chest tightness, cough, shortness of breath and wheezing.    Cardiovascular:  Negative for chest pain, leg swelling and palpitations.   Gastrointestinal:  Negative for abdominal pain, constipation, diarrhea, nausea and vomiting.   Musculoskeletal:  Negative for arthralgias and myalgias.   Skin:  Positive for rash. Negative for itching.   Neurological:  Negative for extremity weakness, headaches and numbness.   Hematological:  Negative for adenopathy.        MEDICATIONS:    Current Outpatient Medications:   •  Cholecalciferol (Vitamin D3) 50 MCG (2000 UT) TABS, Take 2,000 Units by mouth daily, Disp: , Rfl:   •  magnesium 30 MG tablet, Take 30 mg by mouth 2 (two) times a day, Disp: , Rfl:      ALLERGIES:  Allergies   Allergen Reactions   • Covid-19 Mrna Vacc (Moderna) Palpitations     Felt sick and dizzy for days    • Levaquin [Levofloxacin] Other (See Comments)     Tendon tears/ tendon pain   • Reglan [Metoclopramide] Other (See Comments)     Severe agitation   • Amoxil [Amoxicillin] Rash        ACTIVE PROBLEMS:  Patient Active Problem List   Diagnosis   • Dense breasts   • Lateral epicondylitis of right elbow   • Chronic midline low back pain without sciatica   • Mixed connective tissue disease (HCC)   • Anti-cardiolipin antibody positive   •  Sjogren's syndrome (HCC)   • Chronic leukopenia   • Skin lesion   • PONV (postoperative nausea and vomiting)   • History of hysterectomy   • Malignant melanoma of left lower extremity including hip (HCC)          PAST MEDICAL HISTORY:   Past Medical History:   Diagnosis Date   • Abnormal menstrual periods     Resolved 12/18/2017    • Abnormal uterine bleeding     Resolved 12/18/2017    • Anticardiolipin syndrome (HCC)    • Arthropathy, multiple sites     Resolved 1/29/2016    • Erythema nodosum     Resolved 5/1/2017    • Herniated lumbar intervertebral disc    • Malignant melanoma of left lower extremity including hip (HCC) 10/25/2023   • Mixed connective tissue disease (HCC)    • Mixed connective tissue disease (HCC)    • PONV (postoperative nausea and vomiting) 10/25/2023   • Skin lesion     Resolved 1/29/2016         PAST SURGICAL HISTORY:  Past Surgical History:   Procedure Laterality Date   • HYSTERECTOMY  12/2017   • AR CYSTOURETHROSCOPY N/A 12/11/2017    Procedure: CYSTOSCOPY;  Surgeon: Beverly Valdez MD;  Location: [facility];  Service: Gynecology   • AR EXC TUMOR SOFT TISS UPPER ARM/ELBW SUBFASC 5CM/> Left 10/25/2023    Procedure: EXCISION BIOPSY TISSUE LESION/MASS LOWER EXTREMITY ;  Surgeon: Christiano Ramirez MD;  Location: [facility];  Service: General   • AR LAPS W/VAG HYSTERECT 250 GM/&RMVL TUBE&/OVARIES N/A 12/11/2017    Procedure: LAPAROSCOPIC ASSISTED VAGINAL HYSTERECTOMY; BILATERAL SALPINGECTOMY;  Surgeon: Beverly Valdez MD;  Location: [facility];  Service: Gynecology        SOCIAL HISTORY:  Social History     Socioeconomic History   • Marital status: /Civil Union     Spouse name: Not on file   • Number of children: Not on file   • Years of education: Not on file   • Highest education level: Not on file   Occupational History   • Not on file   Tobacco Use   • Smoking status: Never     Passive exposure: Never   • Smokeless tobacco: Never   Vaping Use   • Vaping status: Never Used  "  Substance and Sexual Activity   • Alcohol use: Not Currently   • Drug use: No   • Sexual activity: Not on file   Other Topics Concern   • Not on file   Social History Narrative   • Not on file     Social Determinants of Health     Financial Resource Strain: Not on file   Food Insecurity: Not on file   Transportation Needs: Not on file   Physical Activity: Not on file   Stress: Not on file   Social Connections: Not on file   Intimate Partner Violence: Not on file   Housing Stability: Not on file        FAMILY HISTORY:  Family History   Problem Relation Age of Onset   • Gout Father    • Liver cancer Father 74   • No Known Problems Sister    • No Known Problems Daughter    • No Known Problems Daughter    • No Known Problems Daughter    • Rheum arthritis Maternal Grandmother    • Diabetes Brother    • No Known Problems Brother    • No Known Problems Brother    • Lupus Cousin    • Sjogren's syndrome Family    • Lupus Family    • Thyroid disease Family    • Heart disease Family         Objective   Objective    PHYSICAL EXAMINATION:   Blood pressure 104/62, pulse (!) 120, temperature 97.5 °F (36.4 °C), temperature source Temporal, resp. rate 18, height 5' 6.5\" (1.689 m), weight 75.8 kg (167 lb), last menstrual period 12/01/2017, SpO2 97%, not currently breastfeeding.     Pain Score: 0-No pain      Physical Exam  Constitutional:       Appearance: Normal appearance.   HENT:      Head: Normocephalic and atraumatic.      Mouth/Throat:      Mouth: Mucous membranes are moist.   Eyes:      Pupils: Pupils are equal, round, and reactive to light.   Cardiovascular:      Rate and Rhythm: Normal rate and regular rhythm.      Heart sounds: No murmur heard.     No friction rub. No gallop.   Pulmonary:      Effort: Pulmonary effort is normal.      Breath sounds: No wheezing, rhonchi or rales.   Abdominal:      General: Abdomen is flat. Bowel sounds are normal. There is no distension.      Palpations: Abdomen is soft. There is no mass. "      Tenderness: There is no abdominal tenderness. There is no guarding or rebound.      Hernia: No hernia is present.   Musculoskeletal:         General: Normal range of motion.      Cervical back: Normal range of motion.      Right lower leg: No edema.      Left lower leg: No edema.      Comments: Erythema nodosum on the R shin   Skin:     General: Skin is warm and dry.   Neurological:      General: No focal deficit present.      Mental Status: She is alert.   Psychiatric:         Mood and Affect: Mood normal.         Behavior: Behavior normal.         Thought Content: Thought content normal.         Judgment: Judgment normal.         I reviewed lab data in the chart.    WBC   Date Value Ref Range Status   08/23/2023 3.53 (L) 4.31 - 10.16 Thousand/uL Final   04/08/2023 3.44 (L) 4.31 - 10.16 Thousand/uL Final   09/10/2022 4.77 4.31 - 10.16 Thousand/uL Final   11/07/2015 4.20 (L) 4.31 - 10.16 Thousand/uL Final   11/03/2015 3.5 (L) 4.8 - 10.8 X 1000/u    10/17/2015 5.2 4.8 - 10.8 X 1000/u      Hemoglobin   Date Value Ref Range Status   08/23/2023 13.5 11.5 - 15.4 g/dL Final   04/08/2023 12.9 11.5 - 15.4 g/dL Final   09/10/2022 13.8 11.5 - 15.4 g/dL Final   11/07/2015 12.5 11.5 - 15.4 g/dL Final   11/03/2015 12.9 12.0 - 16.0 g/dL    10/17/2015 13.4 12.0 - 16.0 g/dL      Platelets   Date Value Ref Range Status   08/23/2023 202 149 - 390 Thousands/uL Final   04/08/2023 162 149 - 390 Thousands/uL Final   09/10/2022 191 149 - 390 Thousands/uL Final   11/07/2015 216 149 - 390 Thousand/uL Final   11/03/2015 188 130 - 400 X 1000/u    10/17/2015 391 130 - 400 X 1000/u      MCV   Date Value Ref Range Status   08/23/2023 94 82 - 98 fL Final   04/08/2023 93 82 - 98 fL Final   09/10/2022 90 82 - 98 fL Final   11/07/2015 91 82 - 98 fL Final   11/03/2015 91.5 80.0 - 94.0 fL    10/17/2015 91.9 80.0 - 94.0 fL       Sodium   Date Value Ref Range Status   11/07/2015 139 136 - 145 mmol/L Final   11/03/2015 138 137 - 145 mmol/L     10/17/2015 136 (L) 137 - 145 mmol/L      Potassium   Date Value Ref Range Status   08/23/2023 4.1 3.5 - 5.3 mmol/L Final   04/08/2023 3.7 3.5 - 5.3 mmol/L Final   09/10/2022 4.0 3.5 - 5.3 mmol/L Final   11/07/2015 3.7 3.5 - 5.3 mmol/L Final   11/03/2015 3.9 3.6 - 4.8 mmol/L    10/17/2015 3.5 (L) 3.6 - 4.8 mmol/L      Chloride   Date Value Ref Range Status   08/23/2023 104 96 - 108 mmol/L Final   04/08/2023 103 96 - 108 mmol/L Final   09/10/2022 104 96 - 108 mmol/L Final   11/07/2015 104 98 - 108 mmol/L Final   11/03/2015 104 96 - 107 mmol/L    10/17/2015 101 96 - 107 mmol/L      CO2   Date Value Ref Range Status   08/23/2023 28 21 - 32 mmol/L Final   04/08/2023 26 21 - 32 mmol/L Final   09/10/2022 27 21 - 32 mmol/L Final   11/07/2015 27.5 21.0 - 32.0 mmol/L Final   11/03/2015 28 22 - 29 mmol/L    10/17/2015 27 22 - 29 mmol/L      BUN   Date Value Ref Range Status   08/23/2023 16 5 - 25 mg/dL Final   04/08/2023 17 5 - 25 mg/dL Final   09/10/2022 11 5 - 25 mg/dL Final   11/07/2015 13 5 - 25 mg/dL Final   11/03/2015 9 9 - 21 mg/dL    10/17/2015 11 9 - 21 mg/dL      Creatinine   Date Value Ref Range Status   08/23/2023 0.67 0.60 - 1.30 mg/dL Final     Comment:     Standardized to IDMS reference method   04/08/2023 0.74 0.60 - 1.30 mg/dL Final     Comment:     Standardized to IDMS reference method   09/10/2022 0.80 0.60 - 1.30 mg/dL Final     Comment:     Standardized to IDMS reference method   11/07/2015 1.08 0.60 - 1.30 mg/dL Final     Comment:     Standardized to IDMS reference method   11/03/2015 0.8 0.6 - 1.3 mg/dL    10/17/2015 1.0 0.6 - 1.3 mg/dL      Glucose   Date Value Ref Range Status   08/23/2023 86 65 - 140 mg/dL Final     Comment:     If the patient is fasting, the ADA then defines impaired fasting glucose as > 100 mg/dL and diabetes as > or equal to 123 mg/dL.   02/28/2019 73 65 - 140 mg/dL Final     Comment:       If the patient is fasting, the ADA then defines impaired fasting glucose as > 100 mg/dL  and diabetes as > or equal to 123 mg/dL.  Specimen collection should occur prior to Sulfasalazine administration due to the potential for falsely depressed results. Specimen collection should occur prior to Sulfapyridine administration due to the potential for falsely elevated results.   01/22/2018 86 65 - 140 mg/dL Final     Comment:       If the patient is fasting, the ADA then defines impaired fasting glucose as > 100 mg/dL and diabetes as > or equal to 123 mg/dL.  Specimen collection should occur prior to Sulfasalazine administration due to the potential for falsely depressed results. Specimen collection should occur prior to Sulfapyridine administration due to the potential for falsely elevated results.     Calcium   Date Value Ref Range Status   08/23/2023 9.6 8.4 - 10.2 mg/dL Final   04/08/2023 9.4 8.4 - 10.2 mg/dL Final   09/10/2022 9.2 8.4 - 10.2 mg/dL Final   11/07/2015 9.1 8.3 - 10.1 mg/dL Final   11/03/2015 8.5 8.4 - 10.0 mg/dL    10/17/2015 8.7 8.4 - 10.0 mg/dL      Total Protein   Date Value Ref Range Status   11/07/2015 7.7 6.4 - 8.2 g/dL Final   11/03/2015 7.6 6.4 - 8.1 g/dL    10/17/2015 7.8 6.4 - 8.1 g/dL      Albumin   Date Value Ref Range Status   08/23/2023 4.3 3.5 - 5.0 g/dL Final   04/08/2023 4.3 3.5 - 5.0 g/dL Final   09/08/2021 3.8 3.5 - 5.0 g/dL Final   11/07/2015 3.5 3.5 - 5.0 g/dL Final   11/03/2015 3.5 3.2 - 5.0 g/dL    10/17/2015 3.5 3.2 - 5.0 g/dL      Total Bilirubin   Date Value Ref Range Status   08/23/2023 0.42 0.20 - 1.00 mg/dL Final     Comment:     Use of this assay is not recommended for patients undergoing treatment with eltrombopag due to the potential for falsely elevated results.  N-acetyl-p-benzoquinone imine (metabolite of Acetaminophen) will generate erroneously low results in samples for patients that have taken an overdose of Acetaminophen.   04/08/2023 0.57 0.20 - 1.00 mg/dL Final     Comment:     Use of this assay is not recommended for patients undergoing treatment  "with eltrombopag due to the potential for falsely elevated results.  N-acetyl-p-benzoquinone imine (metabolite of Acetaminophen) will generate erroneously low results in samples for patients that have taken an overdose of Acetaminophen.   09/08/2021 0.37 0.20 - 1.00 mg/dL Final     Comment:     Use of this assay is not recommended for patients undergoing treatment with eltrombopag due to the potential for falsely elevated results.     Alkaline Phosphatase   Date Value Ref Range Status   08/23/2023 68 34 - 104 U/L Final   04/08/2023 54 34 - 104 U/L Final   09/08/2021 74 46 - 116 U/L Final   11/07/2015 81 46 - 116 U/L Final   11/03/2015 77 46 - 116 IU/L    10/17/2015 76 46 - 116 IU/L      AST   Date Value Ref Range Status   08/23/2023 20 13 - 39 U/L Final   04/08/2023 20 13 - 39 U/L Final   09/08/2021 19 5 - 45 U/L Final     Comment:     Specimen collection should occur prior to Sulfasalazine administration due to the potential for falsely depressed results.    11/07/2015 17 5 - 45 U/L Final   11/03/2015 19 14 - 38 IU/L    10/17/2015 18 14 - 38 IU/L      ALT   Date Value Ref Range Status   08/23/2023 21 7 - 52 U/L Final     Comment:     Specimen collection should occur prior to Sulfasalazine administration due to the potential for falsely depressed results.    04/08/2023 20 7 - 52 U/L Final     Comment:     Specimen collection should occur prior to Sulfasalazine administration due to the potential for falsely depressed results.    09/08/2021 23 12 - 78 U/L Final     Comment:     Specimen collection should occur prior to Sulfasalazine administration due to the potential for falsely depressed results.    11/07/2015 23 12 - 78 U/L Final   11/03/2015 24 12 - 78 IU/L    10/17/2015 24 12 - 78 IU/L       LD   Date Value Ref Range Status   08/23/2023 153 140 - 271 U/L Final     No results found for: \"TSH\"  No results found for: \"X6FLJWS\"   No results found for: \"FREET4\"      RECENT IMAGING:  No results found.       Assessment "   Assessment/Plan  Patient has recently diagnosed (8/23) stage T1B melanoma of the hip s/p WLE with high risk features of 0.8mm Breslow thickness, Leeroy level IV, Mitoses of 1/mm squared and Tumor infiltrating lymphocytes. She was recommended SLN biopsy to confirm no fanny involvement but refused due to side effects of lymphedema and it limiting her work. At this time, given her Stage 1B and wide local excision with no residual melanoma in the margins, we will track her disease through imaging, blood work, and regular follow ups.We will use LDH to track for any signs or Melanoma recurrence or progression.  Patient to continue following dermatology every three months in the mean time and will follow us every 6 months with F/U appts made.    1. Malignant melanoma of left lower extremity including hip (HCC)  -     CBC and differential; Standing  -     Comprehensive metabolic panel; Standing  -     LD,Blood; Standing       Return in about 6 months (around 1/25/2025) for Office Visit, labs.     Attestation signed by Piper Santoro MD at 7/30/2024 10:20 PM (Updated):  Attending Attestation:    I reviewed the care furnished by the Resident/Fellow during the visit on 7/25/2024.  I was present in the office and personally saw and examined the patient.    Ms. Diane is a 54-year-old female with stage Ib melanoma here for continued monitoring, follow-up and surveillance.  Continues to do well.  Recently saw dermatology approximately 2 weeks ago.  No concerning skin lesions at that time.  Denies any new, changing, concerning lesions today.  Denies lymphadenopathy.  Energy is good.    On exam ECOG PS 0.  No concerning skin lesions.  Well-healed surgical scar with no evidence of recurrence at the primary site.  No lymphadenopathy.    Ms. Diane is a 54-year-old female with stage Ib melanoma here for continued monitoring, follow-up and surveillance.  She is doing well with no clinical evidence of melanoma recurrence.   She was unable to get labs prior to today's visit.  She will get them following this visit.  We will continue to monitor her closely along with dermatology.  Continue to call with issues or concerns prior to her next visit.  Orders placed and prescription divided for blood work to be done prior to her next visit.    Piper Santoro MD, PhD

## 2024-07-25 NOTE — PROGRESS NOTES
[facility] [place]  [address] [id number]  [phone]  [phone]     Date of Visit: 7/25/2024  Name: Felicia Diane   YOB: 1970     Subjective    Subjective    VISIT DIAGNOSIS:  Diagnoses and all orders for this visit:    Malignant melanoma of left lower extremity including hip (HCC)  -     CBC and differential; Standing  -     Comprehensive metabolic panel; Standing  -     LD,Blood; Standing        Oncology History   Malignant melanoma of left lower extremity including hip (HCC)   8/3/2023 -  Cancer Staged    Staging form: Melanoma of the Skin, AJCC 8th Edition  - Clinical stage from 8/3/2023: Stage IB (cT1b, cN0, cM0) - Signed by Piper Santoro MD on 1/25/2024       10/25/2023 Initial Diagnosis    Malignant melanoma of left lower extremity including hip (HCC)        Cancer Staging   Malignant melanoma of left lower extremity including hip (HCC)  Staging form: Melanoma of the Skin, AJCC 8th Edition  - Clinical stage from 8/3/2023: Stage IB (cT1b, cN0, cM0) - Signed by Piper Santoro MD on 1/25/2024     [No matching plan found]     HISTORY OF PRESENT ILLNESS: Felicia Diane is a 54 y.o. female  with a history of mixed connective tissue disorder and Sjogren's who presents for initial consultation of her melanoma. Patient was diagnosed with a Stage T1B melanoma of her left lateral thigh on 8/2/23. She is s/p resection and WLE on 10/25/23 due to 2 mm margins. She was recommended a SLN biopsy which she declined due to how it could impact her work and concerns of lymphedema. She presents today on referral from her dermatologist for her Stage 1B melanoma.    Patient states that she had pale pink mole on her left thigh that was present for many years. Over the last few months the spot had become larger, dry, blotchy, and hyperpigmented. Her  noted these changes around summer of 2023 and recommended the patient be evaluated by  dermatology. She had scheduled an appointment with Dermatology but the earliest appointment she could get was in January 2024. So in the meant time patient had her melanotic lesion evaluated by surgery. Surgery performed an elective excision of her atypical nevus of her left thigh. Biopsy results confirmed melanoma with a Breslow thickness of 0.8 mm and Leeroy level IV without any ulcerations and mitoses of 1/mm squared. There were no microsattelites or lymphovascular invasion but tumor infiltrating lymphocytes were present in the region. Based on her high risk features, patient was recommended re-excision with a SLN biopsy. Patient subsequently underwent a wide local excision but declined SLN biopsy.    Patient had significant risk factors for developing melanoma. She was outside a lot as a child and would develop blistering sun burns all over her body every summer. She states that since late 80s she started wearing sun protection but last 10 years she has been wearing SPF  sunscreen. She has her  check her skin and she states that he noticed some lesions in the back and R. Gluteal region and the left scapular region. The lesions have not grown in size but apparently the shape and hue of the lesions bothers her and her . She states that her father has undiagnosed melanoma based on an obvious melanotic lesion in his face. He also had cholangiocarcinoma that led to his ultimate passing. Patient denies any other known family history of cancer including no know breast, ovarian, pancreatic, and renal cell. She is up to date on her cancer screening. She is a non-smoker, drinks alcohol occasionally, and does not use recreational drugs. She has a son who does have some melanotic lesion on the right trunk and she will tell him to go to the dermatologist.        Review of Systems   Constitutional:  Negative for appetite change, chills, diaphoresis, fatigue, fever and unexpected weight change.   HENT:    Negative for lump/mass and trouble swallowing.    Eyes:  Negative for icterus.   Respiratory:  Negative for chest tightness, cough, shortness of breath and wheezing.    Cardiovascular:  Negative for chest pain, leg swelling and palpitations.   Gastrointestinal:  Negative for abdominal pain, constipation, diarrhea, nausea and vomiting.   Musculoskeletal:  Negative for arthralgias and myalgias.   Skin:  Positive for rash. Negative for itching.   Neurological:  Negative for extremity weakness, headaches and numbness.   Hematological:  Negative for adenopathy.        MEDICATIONS:    Current Outpatient Medications:     Cholecalciferol (Vitamin D3) 50 MCG (2000 UT) TABS, Take 2,000 Units by mouth daily, Disp: , Rfl:     magnesium 30 MG tablet, Take 30 mg by mouth 2 (two) times a day, Disp: , Rfl:      ALLERGIES:  Allergies   Allergen Reactions    Covid-19 Mrna Vacc (Moderna) Palpitations     Felt sick and dizzy for days     Levaquin [Levofloxacin] Other (See Comments)     Tendon tears/ tendon pain    Reglan [Metoclopramide] Other (See Comments)     Severe agitation    Amoxil [Amoxicillin] Rash        ACTIVE PROBLEMS:  Patient Active Problem List   Diagnosis    Dense breasts    Lateral epicondylitis of right elbow    Chronic midline low back pain without sciatica    Mixed connective tissue disease (HCC)    Anti-cardiolipin antibody positive    Sjogren's syndrome (HCC)    Chronic leukopenia    Skin lesion    PONV (postoperative nausea and vomiting)    History of hysterectomy    Malignant melanoma of left lower extremity including hip (HCC)          PAST MEDICAL HISTORY:   Past Medical History:   Diagnosis Date    Abnormal menstrual periods     Resolved 12/18/2017     Abnormal uterine bleeding     Resolved 12/18/2017     Anticardiolipin syndrome (HCC)     Arthropathy, multiple sites     Resolved 1/29/2016     Erythema nodosum     Resolved 5/1/2017     Herniated lumbar intervertebral disc     Malignant melanoma of left lower  extremity including hip (HCC) 10/25/2023    Mixed connective tissue disease (HCC)     Mixed connective tissue disease (HCC)     PONV (postoperative nausea and vomiting) 10/25/2023    Skin lesion     Resolved 1/29/2016         PAST SURGICAL HISTORY:  Past Surgical History:   Procedure Laterality Date    HYSTERECTOMY  12/2017    CO CYSTOURETHROSCOPY N/A 12/11/2017    Procedure: CYSTOSCOPY;  Surgeon: Beverly Valdez MD;  Location: [facility];  Service: Gynecology    CO EXC TUMOR SOFT TISS UPPER ARM/ELBW SUBFASC 5CM/> Left 10/25/2023    Procedure: EXCISION BIOPSY TISSUE LESION/MASS LOWER EXTREMITY ;  Surgeon: Christiano Ramirez MD;  Location: [facility];  Service: General    CO LAPS W/VAG HYSTERECT 250 GM/&RMVL TUBE&/OVARIES N/A 12/11/2017    Procedure: LAPAROSCOPIC ASSISTED VAGINAL HYSTERECTOMY; BILATERAL SALPINGECTOMY;  Surgeon: Beverly Valdez MD;  Location: [facility];  Service: Gynecology        SOCIAL HISTORY:  Social History     Socioeconomic History    Marital status: /Civil Union     Spouse name: Not on file    Number of children: Not on file    Years of education: Not on file    Highest education level: Not on file   Occupational History    Not on file   Tobacco Use    Smoking status: Never     Passive exposure: Never    Smokeless tobacco: Never   Vaping Use    Vaping status: Never Used   Substance and Sexual Activity    Alcohol use: Not Currently    Drug use: No    Sexual activity: Not on file   Other Topics Concern    Not on file   Social History Narrative    Not on file     Social Determinants of Health     Financial Resource Strain: Not on file   Food Insecurity: Not on file   Transportation Needs: Not on file   Physical Activity: Not on file   Stress: Not on file   Social Connections: Not on file   Intimate Partner Violence: Not on file   Housing Stability: Not on file        FAMILY HISTORY:  Family History   Problem Relation Age of Onset    Gout Father     Liver cancer Father 74    No  "Known Problems Sister     No Known Problems Daughter     No Known Problems Daughter     No Known Problems Daughter     Rheum arthritis Maternal Grandmother     Diabetes Brother     No Known Problems Brother     No Known Problems Brother     Lupus Cousin     Sjogren's syndrome Family     Lupus Family     Thyroid disease Family     Heart disease Family         Objective    Objective    PHYSICAL EXAMINATION:   Blood pressure 104/62, pulse (!) 120, temperature 97.5 °F (36.4 °C), temperature source Temporal, resp. rate 18, height 5' 6.5\" (1.689 m), weight 75.8 kg (167 lb), last menstrual period 12/01/2017, SpO2 97%, not currently breastfeeding.     Pain Score: 0-No pain      Physical Exam  Constitutional:       Appearance: Normal appearance.   HENT:      Head: Normocephalic and atraumatic.      Mouth/Throat:      Mouth: Mucous membranes are moist.   Eyes:      Pupils: Pupils are equal, round, and reactive to light.   Cardiovascular:      Rate and Rhythm: Normal rate and regular rhythm.      Heart sounds: No murmur heard.     No friction rub. No gallop.   Pulmonary:      Effort: Pulmonary effort is normal.      Breath sounds: No wheezing, rhonchi or rales.   Abdominal:      General: Abdomen is flat. Bowel sounds are normal. There is no distension.      Palpations: Abdomen is soft. There is no mass.      Tenderness: There is no abdominal tenderness. There is no guarding or rebound.      Hernia: No hernia is present.   Musculoskeletal:         General: Normal range of motion.      Cervical back: Normal range of motion.      Right lower leg: No edema.      Left lower leg: No edema.      Comments: Erythema nodosum on the R shin   Skin:     General: Skin is warm and dry.   Neurological:      General: No focal deficit present.      Mental Status: She is alert.   Psychiatric:         Mood and Affect: Mood normal.         Behavior: Behavior normal.         Thought Content: Thought content normal.         Judgment: Judgment " normal.         I reviewed lab data in the chart.    WBC   Date Value Ref Range Status   08/23/2023 3.53 (L) 4.31 - 10.16 Thousand/uL Final   04/08/2023 3.44 (L) 4.31 - 10.16 Thousand/uL Final   09/10/2022 4.77 4.31 - 10.16 Thousand/uL Final   11/07/2015 4.20 (L) 4.31 - 10.16 Thousand/uL Final   11/03/2015 3.5 (L) 4.8 - 10.8 X 1000/u    10/17/2015 5.2 4.8 - 10.8 X 1000/u      Hemoglobin   Date Value Ref Range Status   08/23/2023 13.5 11.5 - 15.4 g/dL Final   04/08/2023 12.9 11.5 - 15.4 g/dL Final   09/10/2022 13.8 11.5 - 15.4 g/dL Final   11/07/2015 12.5 11.5 - 15.4 g/dL Final   11/03/2015 12.9 12.0 - 16.0 g/dL    10/17/2015 13.4 12.0 - 16.0 g/dL      Platelets   Date Value Ref Range Status   08/23/2023 202 149 - 390 Thousands/uL Final   04/08/2023 162 149 - 390 Thousands/uL Final   09/10/2022 191 149 - 390 Thousands/uL Final   11/07/2015 216 149 - 390 Thousand/uL Final   11/03/2015 188 130 - 400 X 1000/u    10/17/2015 391 130 - 400 X 1000/u      MCV   Date Value Ref Range Status   08/23/2023 94 82 - 98 fL Final   04/08/2023 93 82 - 98 fL Final   09/10/2022 90 82 - 98 fL Final   11/07/2015 91 82 - 98 fL Final   11/03/2015 91.5 80.0 - 94.0 fL    10/17/2015 91.9 80.0 - 94.0 fL       Sodium   Date Value Ref Range Status   11/07/2015 139 136 - 145 mmol/L Final   11/03/2015 138 137 - 145 mmol/L    10/17/2015 136 (L) 137 - 145 mmol/L      Potassium   Date Value Ref Range Status   08/23/2023 4.1 3.5 - 5.3 mmol/L Final   04/08/2023 3.7 3.5 - 5.3 mmol/L Final   09/10/2022 4.0 3.5 - 5.3 mmol/L Final   11/07/2015 3.7 3.5 - 5.3 mmol/L Final   11/03/2015 3.9 3.6 - 4.8 mmol/L    10/17/2015 3.5 (L) 3.6 - 4.8 mmol/L      Chloride   Date Value Ref Range Status   08/23/2023 104 96 - 108 mmol/L Final   04/08/2023 103 96 - 108 mmol/L Final   09/10/2022 104 96 - 108 mmol/L Final   11/07/2015 104 98 - 108 mmol/L Final   11/03/2015 104 96 - 107 mmol/L    10/17/2015 101 96 - 107 mmol/L      CO2   Date Value Ref Range Status   08/23/2023 28  21 - 32 mmol/L Final   04/08/2023 26 21 - 32 mmol/L Final   09/10/2022 27 21 - 32 mmol/L Final   11/07/2015 27.5 21.0 - 32.0 mmol/L Final   11/03/2015 28 22 - 29 mmol/L    10/17/2015 27 22 - 29 mmol/L      BUN   Date Value Ref Range Status   08/23/2023 16 5 - 25 mg/dL Final   04/08/2023 17 5 - 25 mg/dL Final   09/10/2022 11 5 - 25 mg/dL Final   11/07/2015 13 5 - 25 mg/dL Final   11/03/2015 9 9 - 21 mg/dL    10/17/2015 11 9 - 21 mg/dL      Creatinine   Date Value Ref Range Status   08/23/2023 0.67 0.60 - 1.30 mg/dL Final     Comment:     Standardized to IDMS reference method   04/08/2023 0.74 0.60 - 1.30 mg/dL Final     Comment:     Standardized to IDMS reference method   09/10/2022 0.80 0.60 - 1.30 mg/dL Final     Comment:     Standardized to IDMS reference method   11/07/2015 1.08 0.60 - 1.30 mg/dL Final     Comment:     Standardized to IDMS reference method   11/03/2015 0.8 0.6 - 1.3 mg/dL    10/17/2015 1.0 0.6 - 1.3 mg/dL      Glucose   Date Value Ref Range Status   08/23/2023 86 65 - 140 mg/dL Final     Comment:     If the patient is fasting, the ADA then defines impaired fasting glucose as > 100 mg/dL and diabetes as > or equal to 123 mg/dL.   02/28/2019 73 65 - 140 mg/dL Final     Comment:       If the patient is fasting, the ADA then defines impaired fasting glucose as > 100 mg/dL and diabetes as > or equal to 123 mg/dL.  Specimen collection should occur prior to Sulfasalazine administration due to the potential for falsely depressed results. Specimen collection should occur prior to Sulfapyridine administration due to the potential for falsely elevated results.   01/22/2018 86 65 - 140 mg/dL Final     Comment:       If the patient is fasting, the ADA then defines impaired fasting glucose as > 100 mg/dL and diabetes as > or equal to 123 mg/dL.  Specimen collection should occur prior to Sulfasalazine administration due to the potential for falsely depressed results. Specimen collection should occur prior to  Sulfapyridine administration due to the potential for falsely elevated results.     Calcium   Date Value Ref Range Status   08/23/2023 9.6 8.4 - 10.2 mg/dL Final   04/08/2023 9.4 8.4 - 10.2 mg/dL Final   09/10/2022 9.2 8.4 - 10.2 mg/dL Final   11/07/2015 9.1 8.3 - 10.1 mg/dL Final   11/03/2015 8.5 8.4 - 10.0 mg/dL    10/17/2015 8.7 8.4 - 10.0 mg/dL      Total Protein   Date Value Ref Range Status   11/07/2015 7.7 6.4 - 8.2 g/dL Final   11/03/2015 7.6 6.4 - 8.1 g/dL    10/17/2015 7.8 6.4 - 8.1 g/dL      Albumin   Date Value Ref Range Status   08/23/2023 4.3 3.5 - 5.0 g/dL Final   04/08/2023 4.3 3.5 - 5.0 g/dL Final   09/08/2021 3.8 3.5 - 5.0 g/dL Final   11/07/2015 3.5 3.5 - 5.0 g/dL Final   11/03/2015 3.5 3.2 - 5.0 g/dL    10/17/2015 3.5 3.2 - 5.0 g/dL      Total Bilirubin   Date Value Ref Range Status   08/23/2023 0.42 0.20 - 1.00 mg/dL Final     Comment:     Use of this assay is not recommended for patients undergoing treatment with eltrombopag due to the potential for falsely elevated results.  N-acetyl-p-benzoquinone imine (metabolite of Acetaminophen) will generate erroneously low results in samples for patients that have taken an overdose of Acetaminophen.   04/08/2023 0.57 0.20 - 1.00 mg/dL Final     Comment:     Use of this assay is not recommended for patients undergoing treatment with eltrombopag due to the potential for falsely elevated results.  N-acetyl-p-benzoquinone imine (metabolite of Acetaminophen) will generate erroneously low results in samples for patients that have taken an overdose of Acetaminophen.   09/08/2021 0.37 0.20 - 1.00 mg/dL Final     Comment:     Use of this assay is not recommended for patients undergoing treatment with eltrombopag due to the potential for falsely elevated results.     Alkaline Phosphatase   Date Value Ref Range Status   08/23/2023 68 34 - 104 U/L Final   04/08/2023 54 34 - 104 U/L Final   09/08/2021 74 46 - 116 U/L Final   11/07/2015 81 46 - 116 U/L Final  "  11/03/2015 77 46 - 116 IU/L    10/17/2015 76 46 - 116 IU/L      AST   Date Value Ref Range Status   08/23/2023 20 13 - 39 U/L Final   04/08/2023 20 13 - 39 U/L Final   09/08/2021 19 5 - 45 U/L Final     Comment:     Specimen collection should occur prior to Sulfasalazine administration due to the potential for falsely depressed results.    11/07/2015 17 5 - 45 U/L Final   11/03/2015 19 14 - 38 IU/L    10/17/2015 18 14 - 38 IU/L      ALT   Date Value Ref Range Status   08/23/2023 21 7 - 52 U/L Final     Comment:     Specimen collection should occur prior to Sulfasalazine administration due to the potential for falsely depressed results.    04/08/2023 20 7 - 52 U/L Final     Comment:     Specimen collection should occur prior to Sulfasalazine administration due to the potential for falsely depressed results.    09/08/2021 23 12 - 78 U/L Final     Comment:     Specimen collection should occur prior to Sulfasalazine administration due to the potential for falsely depressed results.    11/07/2015 23 12 - 78 U/L Final   11/03/2015 24 12 - 78 IU/L    10/17/2015 24 12 - 78 IU/L       LD   Date Value Ref Range Status   08/23/2023 153 140 - 271 U/L Final     No results found for: \"TSH\"  No results found for: \"D3ZRHVY\"   No results found for: \"FREET4\"      RECENT IMAGING:  No results found.       Assessment    Assessment/Plan  Patient has recently diagnosed (8/23) stage T1B melanoma of the hip s/p WLE with high risk features of 0.8mm Breslow thickness, Leeroy level IV, Mitoses of 1/mm squared and Tumor infiltrating lymphocytes. She was recommended SLN biopsy to confirm no fanny involvement but refused due to side effects of lymphedema and it limiting her work. At this time, given her Stage 1B and wide local excision with no residual melanoma in the margins, we will track her disease through imaging, blood work, and regular follow ups.We will use LDH to track for any signs or Melanoma recurrence or progression.  Patient to " continue following dermatology every three months in the mean time and will follow us every 6 months with F/U appts made.    1. Malignant melanoma of left lower extremity including hip (HCC)  -     CBC and differential; Standing  -     Comprehensive metabolic panel; Standing  -     LD,Blood; Standing       Return in about 6 months (around 1/25/2025) for Office Visit, labs.

## 2024-07-30 ENCOUNTER — TELEPHONE (OUTPATIENT)
Age: 54
End: 2024-07-30

## 2024-07-30 DIAGNOSIS — R22.31 SKIN LUMP OF ARM, RIGHT: Primary | ICD-10-CM

## 2024-07-30 NOTE — TELEPHONE ENCOUNTER
7/30/2024 12:50 PM returned call to Felicia     In 2017 she had an infiltrate during an IV.  It has hurt intermittently since then.    Today it feels worse. She has had a lump there since the incident.  The pain has been in her right forearm.    Ultrasound of lump ordered  Yajaira Baca DO

## 2024-07-30 NOTE — TELEPHONE ENCOUNTER
Patient called because she has been having pain in her right arm. She would like to speak to her doctor. Please call patient back to further discuss. Thank you.

## 2024-08-01 ENCOUNTER — HOSPITAL ENCOUNTER (OUTPATIENT)
Dept: ULTRASOUND IMAGING | Facility: HOSPITAL | Age: 54
Discharge: HOME | End: 2024-08-01
Payer: COMMERCIAL

## 2024-08-01 DIAGNOSIS — R22.31 SKIN LUMP OF ARM, RIGHT: ICD-10-CM

## 2024-08-01 PROCEDURE — 76882 US LMTD JT/FCL EVL NVASC XTR: CPT

## 2024-08-09 ENCOUNTER — APPOINTMENT (OUTPATIENT)
Dept: LAB | Facility: CLINIC | Age: 54
End: 2024-08-09
Payer: COMMERCIAL

## 2024-08-09 DIAGNOSIS — C43.72 MALIGNANT MELANOMA OF LEFT LOWER EXTREMITY INCLUDING HIP (HCC): ICD-10-CM

## 2024-08-09 DIAGNOSIS — M79.604 BILATERAL LEG PAIN: ICD-10-CM

## 2024-08-09 DIAGNOSIS — M79.605 BILATERAL LEG PAIN: ICD-10-CM

## 2024-08-09 DIAGNOSIS — E55.9 VITAMIN D INSUFFICIENCY: ICD-10-CM

## 2024-08-09 DIAGNOSIS — Z00.8 HEALTH EXAMINATION IN POPULATION SURVEY: ICD-10-CM

## 2024-08-09 DIAGNOSIS — M79.10 MYALGIA: ICD-10-CM

## 2024-08-09 DIAGNOSIS — M35.1 MIXED CONNECTIVE TISSUE DISEASE (HCC): ICD-10-CM

## 2024-08-09 DIAGNOSIS — Z13.29 SCREENING FOR THYROID DISORDER: ICD-10-CM

## 2024-08-09 LAB
25(OH)D3 SERPL-MCNC: 42.8 NG/ML (ref 30–100)
ALBUMIN SERPL BCG-MCNC: 4.2 G/DL (ref 3.5–5)
ALP SERPL-CCNC: 56 U/L (ref 34–104)
ALT SERPL W P-5'-P-CCNC: 16 U/L (ref 7–52)
ANION GAP SERPL CALCULATED.3IONS-SCNC: 6 MMOL/L (ref 4–13)
AST SERPL W P-5'-P-CCNC: 17 U/L (ref 13–39)
BASOPHILS # BLD AUTO: 0.01 THOUSANDS/ÂΜL (ref 0–0.1)
BASOPHILS NFR BLD AUTO: 0 % (ref 0–1)
BILIRUB SERPL-MCNC: 0.59 MG/DL (ref 0.2–1)
BUN SERPL-MCNC: 11 MG/DL (ref 5–25)
CALCIUM SERPL-MCNC: 9.5 MG/DL (ref 8.4–10.2)
CHLORIDE SERPL-SCNC: 105 MMOL/L (ref 96–108)
CHOLEST SERPL-MCNC: 177 MG/DL
CO2 SERPL-SCNC: 26 MMOL/L (ref 21–32)
CREAT SERPL-MCNC: 0.79 MG/DL (ref 0.6–1.3)
EOSINOPHIL # BLD AUTO: 0.13 THOUSAND/ÂΜL (ref 0–0.61)
EOSINOPHIL NFR BLD AUTO: 4 % (ref 0–6)
ERYTHROCYTE [DISTWIDTH] IN BLOOD BY AUTOMATED COUNT: 13.3 % (ref 11.6–15.1)
ERYTHROCYTE [SEDIMENTATION RATE] IN BLOOD: 87 MM/HOUR (ref 0–29)
EST. AVERAGE GLUCOSE BLD GHB EST-MCNC: 105 MG/DL
FERRITIN SERPL-MCNC: 166 NG/ML (ref 11–307)
GFR SERPL CREATININE-BSD FRML MDRD: 86 ML/MIN/1.73SQ M
GLUCOSE P FAST SERPL-MCNC: 85 MG/DL (ref 65–99)
HBA1C MFR BLD: 5.3 %
HCT VFR BLD AUTO: 40.4 % (ref 34.8–46.1)
HDLC SERPL-MCNC: 56 MG/DL
HGB BLD-MCNC: 13.2 G/DL (ref 11.5–15.4)
IMM GRANULOCYTES # BLD AUTO: 0 THOUSAND/UL (ref 0–0.2)
IMM GRANULOCYTES NFR BLD AUTO: 0 % (ref 0–2)
IRON SATN MFR SERPL: 42 % (ref 15–50)
IRON SERPL-MCNC: 112 UG/DL (ref 50–212)
LDH SERPL-CCNC: 121 U/L (ref 140–271)
LDLC SERPL CALC-MCNC: 106 MG/DL (ref 0–100)
LYMPHOCYTES # BLD AUTO: 1.27 THOUSANDS/ÂΜL (ref 0.6–4.47)
LYMPHOCYTES NFR BLD AUTO: 39 % (ref 14–44)
MCH RBC QN AUTO: 30.1 PG (ref 26.8–34.3)
MCHC RBC AUTO-ENTMCNC: 32.7 G/DL (ref 31.4–37.4)
MCV RBC AUTO: 92 FL (ref 82–98)
MONOCYTES # BLD AUTO: 0.39 THOUSAND/ÂΜL (ref 0.17–1.22)
MONOCYTES NFR BLD AUTO: 12 % (ref 4–12)
NEUTROPHILS # BLD AUTO: 1.49 THOUSANDS/ÂΜL (ref 1.85–7.62)
NEUTS SEG NFR BLD AUTO: 45 % (ref 43–75)
NONHDLC SERPL-MCNC: 121 MG/DL
NRBC BLD AUTO-RTO: 0 /100 WBCS
PLATELET # BLD AUTO: 217 THOUSANDS/UL (ref 149–390)
PMV BLD AUTO: 8.8 FL (ref 8.9–12.7)
POTASSIUM SERPL-SCNC: 3.6 MMOL/L (ref 3.5–5.3)
PROT SERPL-MCNC: 8.9 G/DL (ref 6.4–8.4)
RBC # BLD AUTO: 4.38 MILLION/UL (ref 3.81–5.12)
SODIUM SERPL-SCNC: 137 MMOL/L (ref 135–147)
T4 FREE SERPL-MCNC: 0.72 NG/DL (ref 0.61–1.12)
TIBC SERPL-MCNC: 264 UG/DL (ref 250–450)
TRIGL SERPL-MCNC: 74 MG/DL
TSH SERPL DL<=0.05 MIU/L-ACNC: 4.77 UIU/ML (ref 0.45–4.5)
UIBC SERPL-MCNC: 152 UG/DL (ref 155–355)
WBC # BLD AUTO: 3.29 THOUSAND/UL (ref 4.31–10.16)

## 2024-08-09 PROCEDURE — 83550 IRON BINDING TEST: CPT

## 2024-08-09 PROCEDURE — 80061 LIPID PANEL: CPT

## 2024-08-09 PROCEDURE — 36415 COLL VENOUS BLD VENIPUNCTURE: CPT

## 2024-08-09 PROCEDURE — 83036 HEMOGLOBIN GLYCOSYLATED A1C: CPT

## 2024-08-09 PROCEDURE — 85652 RBC SED RATE AUTOMATED: CPT

## 2024-08-09 PROCEDURE — 82306 VITAMIN D 25 HYDROXY: CPT

## 2024-08-09 PROCEDURE — 82728 ASSAY OF FERRITIN: CPT

## 2024-08-09 PROCEDURE — 80053 COMPREHEN METABOLIC PANEL: CPT

## 2024-08-09 PROCEDURE — 85025 COMPLETE CBC W/AUTO DIFF WBC: CPT

## 2024-08-09 PROCEDURE — 83615 LACTATE (LD) (LDH) ENZYME: CPT

## 2024-08-09 PROCEDURE — 84439 ASSAY OF FREE THYROXINE: CPT

## 2024-08-09 PROCEDURE — 83540 ASSAY OF IRON: CPT

## 2024-08-09 PROCEDURE — 84443 ASSAY THYROID STIM HORMONE: CPT

## 2024-09-04 ENCOUNTER — HOSPITAL ENCOUNTER (OUTPATIENT)
Dept: RADIOLOGY | Facility: HOSPITAL | Age: 54
Discharge: HOME | End: 2024-09-04
Payer: COMMERCIAL

## 2024-09-04 DIAGNOSIS — M79.604 BILATERAL LEG PAIN: ICD-10-CM

## 2024-09-04 DIAGNOSIS — M79.10 MYALGIA: ICD-10-CM

## 2024-09-04 DIAGNOSIS — M79.605 BILATERAL LEG PAIN: ICD-10-CM

## 2024-09-04 PROCEDURE — 93970 EXTREMITY STUDY: CPT

## 2024-09-04 PROCEDURE — 93970 EXTREMITY STUDY: CPT | Performed by: SURGERY

## 2024-09-05 DIAGNOSIS — R19.09 LEFT GROIN MASS: ICD-10-CM

## 2024-09-05 DIAGNOSIS — M79.604 BILATERAL LEG PAIN: ICD-10-CM

## 2024-09-05 DIAGNOSIS — M79.605 BILATERAL LEG PAIN: ICD-10-CM

## 2024-09-05 DIAGNOSIS — R19.09 RIGHT GROIN MASS: Primary | ICD-10-CM

## 2024-09-05 DIAGNOSIS — I87.2 VENOUS INSUFFICIENCY: ICD-10-CM

## 2024-09-17 ENCOUNTER — HOSPITAL ENCOUNTER (OUTPATIENT)
Dept: RADIOLOGY | Facility: HOSPITAL | Age: 54
Discharge: HOME | End: 2024-09-17
Payer: COMMERCIAL

## 2024-09-17 ENCOUNTER — HOSPITAL ENCOUNTER (OUTPATIENT)
Dept: ULTRASOUND IMAGING | Facility: HOSPITAL | Age: 54
Discharge: HOME | End: 2024-09-17
Payer: COMMERCIAL

## 2024-09-17 DIAGNOSIS — M54.50 ACUTE MIDLINE LOW BACK PAIN WITHOUT SCIATICA: Primary | ICD-10-CM

## 2024-09-17 DIAGNOSIS — R05.9 COUGH, UNSPECIFIED TYPE: ICD-10-CM

## 2024-09-17 DIAGNOSIS — R19.09 RIGHT GROIN MASS: ICD-10-CM

## 2024-09-17 DIAGNOSIS — R19.09 LEFT GROIN MASS: ICD-10-CM

## 2024-09-17 DIAGNOSIS — R59.1 LYMPHADENOPATHY: ICD-10-CM

## 2024-09-17 PROCEDURE — 76705 ECHO EXAM OF ABDOMEN: CPT

## 2024-09-17 PROCEDURE — 71046 X-RAY EXAM CHEST 2 VIEWS: CPT

## 2024-09-19 ENCOUNTER — APPOINTMENT (EMERGENCY)
Dept: CT IMAGING | Facility: HOSPITAL | Age: 54
End: 2024-09-19
Payer: COMMERCIAL

## 2024-09-19 ENCOUNTER — TELEPHONE (OUTPATIENT)
Age: 54
End: 2024-09-19

## 2024-09-19 ENCOUNTER — HOSPITAL ENCOUNTER (EMERGENCY)
Facility: HOSPITAL | Age: 54
Discharge: HOME | End: 2024-09-19
Attending: EMERGENCY MEDICINE
Payer: COMMERCIAL

## 2024-09-19 VITALS
OXYGEN SATURATION: 97 % | RESPIRATION RATE: 18 BRPM | SYSTOLIC BLOOD PRESSURE: 129 MMHG | HEART RATE: 89 BPM | TEMPERATURE: 98 F | DIASTOLIC BLOOD PRESSURE: 74 MMHG

## 2024-09-19 DIAGNOSIS — M54.9 BACK PAIN: ICD-10-CM

## 2024-09-19 DIAGNOSIS — M48.061 FORAMINAL STENOSIS OF LUMBAR REGION: Primary | ICD-10-CM

## 2024-09-19 PROCEDURE — 74176 CT ABD & PELVIS W/O CONTRAST: CPT

## 2024-09-19 PROCEDURE — 99283 EMERGENCY DEPT VISIT LOW MDM: CPT

## 2024-09-19 PROCEDURE — 99285 EMERGENCY DEPT VISIT HI MDM: CPT

## 2024-09-19 RX ORDER — ACETAMINOPHEN 325 MG/1
650 TABLET ORAL ONCE
Status: COMPLETED | OUTPATIENT
Start: 2024-09-19 | End: 2024-09-19

## 2024-09-19 RX ORDER — CYCLOBENZAPRINE HCL 10 MG
10 TABLET ORAL 2 TIMES DAILY PRN
Qty: 20 TABLET | Refills: 0 | Status: SHIPPED | OUTPATIENT
Start: 2024-09-19

## 2024-09-19 RX ORDER — DIAZEPAM 5 MG
5 TABLET ORAL ONCE
Status: COMPLETED | OUTPATIENT
Start: 2024-09-19 | End: 2024-09-19

## 2024-09-19 RX ORDER — CYCLOBENZAPRINE HCL 10 MG
10 TABLET ORAL ONCE
Status: COMPLETED | OUTPATIENT
Start: 2024-09-19 | End: 2024-09-19

## 2024-09-19 RX ORDER — KETOROLAC TROMETHAMINE 10 MG/1
10 TABLET, FILM COATED ORAL EVERY 6 HOURS PRN
Qty: 14 TABLET | Refills: 0 | Status: SHIPPED | OUTPATIENT
Start: 2024-09-19

## 2024-09-19 RX ADMIN — CYCLOBENZAPRINE 10 MG: 10 TABLET, FILM COATED ORAL at 18:50

## 2024-09-19 RX ADMIN — DIAZEPAM 5 MG: 5 TABLET ORAL at 17:11

## 2024-09-19 RX ADMIN — ACETAMINOPHEN 325MG 650 MG: 325 TABLET ORAL at 17:11

## 2024-09-19 NOTE — DISCHARGE INSTRUCTIONS
Mild degenerative change of the lower lumbar spine with mild left-sided neural foraminal stenosis at L5-S1.     Please take pain medication as prescribed and follow up with neurosurgery as needed

## 2024-09-19 NOTE — Clinical Note
Felicia Diane was seen and treated in our emergency department on 9/19/2024.            Light Duty/ No heavy lifiting    Diagnosis:     Felicia  may return to work on return date, is off the rest of the shift today.    She may return on this date: 09/20/2024         If you have any questions or concerns, please don't hesitate to call.      Sanford Summers PA-C    ______________________________           _______________          _______________  Hospital Representative                              Date                                Time

## 2024-09-19 NOTE — ED PROVIDER NOTES
1. Foraminal stenosis of lumbar region    2. Back pain      ED Disposition       ED Disposition   Discharge    Condition   Stable    Date/Time   Thu Sep 19, 2024  6:33 PM    Comment   Felicia Diane discharge to home/self care.                   Assessment & Plan       Medical Decision Making  54-year-old female presents today with concerns of back pain, lower radiating into her left leg.  Has numbness to her left leg and shooting pain occasionally especially with certain movements.  She does have a history of cancer, multiple myeloma, which was treated previously.  States that she was bending when the pain started however she is concerned with possible slipped disc.  States that pain improves with forward motion/bending and worsens when she is sitting straight up..  She denies any incontinence or saddle anesthesia.  No history of IV drug use.  No fevers.  No wounds.  Valium and Tylenol for pain. CT abd pelv without contrast + Lumbar spine recon to rule out fracture, dislocation.  CT revealing foraminal stenosis L5-S1, consistent with patient's symptoms. Patient feeling much better after medication. Will supply with pain medication and muscle relaxer for home. Sent to pharmacy. Follow up provided.  ------------------------------------------------------------  Strict return precautions discussed. Patient at time of discharge well-appearing in no acute distress, all questions answered. Patient agreeable to plan.  Patient's vitals, lab/imaging results, diagnosis, and treatment plan were discussed with the patient. All new/changed medications were discussed with patient, specifically, route of administration, how often and when to take, and where they can be picked up. Strict return precautions as well as close follow up with PCP was discussed with the patient and the patient was agreeable to my recommendations.  Patient verbally acknowledged understanding of the above communications. All labs reviewed and  "utilized in the medical decision making process (if labs were ordered). Portions of the record may have been created with voice recognition software.  Occasional wrong word or \"sound a like\" substitutions may have occurred due to the inherent limitations of voice recognition software.  Read the chart carefully and recognize, using context, where substitutions have occurred.      Amount and/or Complexity of Data Reviewed  Radiology: ordered.    Risk  OTC drugs.  Prescription drug management.                     Medications   diazepam (VALIUM) tablet 5 mg (5 mg Oral Given 9/19/24 1711)   acetaminophen (TYLENOL) tablet 650 mg (650 mg Oral Given 9/19/24 1711)   cyclobenzaprine (FLEXERIL) tablet 10 mg (10 mg Oral Given 9/19/24 1850)       History of Present Illness       54-year-old female presents today with concerns of back pain.  Radiates down the left leg.  Terminates before the knee.  States that the leg pain is sharp and stabbing.  Does have pain to the middle of her right with mainly left-sided symptoms.  No urinary fecal incontinence.  Does have a history of cancer, multiple myeloma.  Denies any car accidents or any traumas other than some bending while she was doing chores a few days ago.        Review of Systems   Constitutional:  Negative for chills and fever.   HENT:  Negative for ear pain and sore throat.    Eyes:  Negative for pain and visual disturbance.   Respiratory:  Negative for cough and shortness of breath.    Cardiovascular:  Negative for chest pain and palpitations.   Gastrointestinal:  Negative for abdominal pain, constipation, diarrhea, nausea and vomiting.   Genitourinary:  Negative for dysuria and hematuria.   Musculoskeletal:  Positive for arthralgias and back pain. Negative for gait problem, joint swelling, myalgias, neck pain and neck stiffness.   Skin:  Negative for color change and rash.   Neurological:  Negative for dizziness, seizures, syncope, weakness and headaches.   All other systems " reviewed and are negative.          Objective     ED Triage Vitals [09/19/24 1610]   Temperature Pulse Blood Pressure Respirations SpO2 Patient Position - Orthostatic VS   98 °F (36.7 °C) 89 129/74 18 97 % Sitting      Temp Source Heart Rate Source BP Location FiO2 (%) Pain Score    Oral Monitor Left arm -- --        Physical Exam  Vitals and nursing note reviewed.   Constitutional:       General: She is not in acute distress.     Appearance: She is well-developed.   HENT:      Head: Normocephalic and atraumatic.   Eyes:      Conjunctiva/sclera: Conjunctivae normal.   Cardiovascular:      Rate and Rhythm: Normal rate and regular rhythm.      Heart sounds: No murmur heard.  Pulmonary:      Effort: Pulmonary effort is normal. No respiratory distress.      Breath sounds: Normal breath sounds.   Abdominal:      Palpations: Abdomen is soft.      Tenderness: There is no abdominal tenderness.   Musculoskeletal:         General: Tenderness (to area around L4/L5) present. No swelling, deformity or signs of injury.      Cervical back: Neck supple.      Right lower leg: No edema.      Left lower leg: No edema.   Skin:     General: Skin is warm and dry.      Capillary Refill: Capillary refill takes less than 2 seconds.   Neurological:      Mental Status: She is alert.   Psychiatric:         Mood and Affect: Mood normal.         Labs Reviewed - No data to display  CT abdomen pelvis wo contrast   Final Interpretation by Jae Wallace MD (09/19 1812)      1.  No acute abdominopelvic pathology.   2.  Nonobstructing 4 mm right renal calculus. No hydroureteronephrosis.      Workstation performed: [id number]         CT recon only lumbar spine   Final Interpretation by Jae Wallace MD (09/19 0508)      1.  No fracture or traumatic subluxation.   2.  Mild degenerative change of the lower lumbar spine with mild left-sided neural foraminal stenosis at L5-S1.   3.  No suspicious osseous lesion.      Workstation performed: [id number]              Procedures    ED Medication and Procedure Management   Prior to Admission Medications   Prescriptions Last Dose Informant Patient Reported? Taking?   Cholecalciferol (Vitamin D3) 50 MCG (2000 UT) TABS   Yes No   Sig: Take 2,000 Units by mouth daily   magnesium 30 MG tablet   Yes No   Sig: Take 30 mg by mouth 2 (two) times a day      Facility-Administered Medications: None     Discharge Medication List as of 9/19/2024  6:45 PM        START taking these medications    Details   cyclobenzaprine (FLEXERIL) 10 mg tablet Take 1 tablet (10 mg total) by mouth 2 (two) times a day as needed for muscle spasms, Starting u 9/19/2024, Print      ketorolac (TORADOL) 10 mg tablet Take 1 tablet (10 mg total) by mouth every 6 (six) hours as needed for moderate pain, Starting u 9/19/2024, Print           CONTINUE these medications which have NOT CHANGED    Details   Cholecalciferol (Vitamin D3) 50 MCG (2000 UT) TABS Take 2,000 Units by mouth daily, Historical Med      magnesium 30 MG tablet Take 30 mg by mouth 2 (two) times a day, Historical Med           No discharge procedures on file.     Sanford Summers PA-C  09/19/24 2009

## 2024-09-19 NOTE — Clinical Note
Felicia Diane was seen and treated in our emergency department on 9/19/2024.    No restrictions            Diagnosis:     Felicia  may return to work on return date, is off the rest of the shift today.    She may return on this date: 09/20/2024         If you have any questions or concerns, please don't hesitate to call.      Sanford Summers PA-C    ______________________________           _______________          _______________  Hospital Representative                              Date                                Time

## 2024-09-19 NOTE — TELEPHONE ENCOUNTER
Patient called stating jacked up back on Tuyariel Bain- lumbar spine x-ray was ordered- pt is wondering if a CT of spine could be ordered instead- she doesn't think the x-ray will show anything bc there is no trauma - so to avoid unneccessary radiation- it doesn't feel muscle related- she feels like damage done in disc from turning.  Please review and let her know

## 2024-09-27 ENCOUNTER — OFFICE VISIT (OUTPATIENT)
Dept: NEUROSURGERY | Facility: CLINIC | Age: 54
End: 2024-09-27
Payer: COMMERCIAL

## 2024-09-27 VITALS
HEIGHT: 67 IN | BODY MASS INDEX: 25.74 KG/M2 | WEIGHT: 164 LBS | DIASTOLIC BLOOD PRESSURE: 64 MMHG | OXYGEN SATURATION: 99 % | RESPIRATION RATE: 16 BRPM | SYSTOLIC BLOOD PRESSURE: 92 MMHG | HEART RATE: 84 BPM | TEMPERATURE: 97.2 F

## 2024-09-27 DIAGNOSIS — M54.50 LOWER BACK PAIN: ICD-10-CM

## 2024-09-27 PROCEDURE — 99203 OFFICE O/P NEW LOW 30 MIN: CPT | Performed by: PHYSICIAN ASSISTANT

## 2024-09-27 NOTE — PROGRESS NOTES
Ambulatory Visit  Name: Felicia Diane      : 1970      MRN: [id number]  Encounter Provider: Gerhard Dior PA-C  Encounter Date: 2024   Encounter department: [facility] [place]    Assessment & Plan  Lower back pain  Patient is a 54 years old pleasant man [employer]'s employee referred by emergency medicine doctor for evaluation of acute on chronic lower back pain.  Patient reports history of low-grade back pain but about 2 weeks ago she tried to help patient and felt severe sharp lower lumbar back pain with radiation to her butt cheek region, and intermittently down to her posterior thigh region.  Patient reports that she seems feeling better after take rest, but occasionally feels numbness in her big toe of the left leg.  Denies weakness in the legs and no bowel/bladder dysfunction or anesthesia.  Patient denies any PT/OT or KAREN.     Patient denies history of diabetes mellitus, hypertension, congestive heart failure, stroke, seizures, bleeding disorder or taking anticoagulant medications.  No history of smoking cigarettes or drinking alcohol.    CT of lumbar spine without contrast demonstrates Mild degenerative change of the lower lumbar spine with mild left-sided neural foraminal stenosis at L5-S1 , there is also modest size disc at L5-S1, with mass effect on the cord but difficult to characterize the degree of nerve impingement or spinal cord compression.    Exam-patient alert and oriented x 3.  Moves all extremities.  Strength is 5/5 bilaterally.  Sensation to light touch intact throughout.  DTR 2+ no clonus bilaterally.  Slight tenderness in the lumbosacral region but patient has good range of motion lumbar spine flexion but slight with slight limitation on extension.    History, exam, and images reviewed with the patient.  Management plan discussed.  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  Helping patient out of bed, to chair, and  "putting back to bed etc. that could exacerbate her degenerative lumbar disease.  Given CT image findings, I would recommend MRI of lumbar spine to study the extent of compression or neural foraminal stenosis.  Patient is advised to follow-up with us if symptoms get worse for possible injection.  Fall precaution, avoid lifting heavy objects, excessive bending or twisting.  Questions and concerns were answered to patient's satisfaction.  Patient verbalized understanding's and agreed with the plan.    Plan:  Orders:    Ambulatory Referral to Neurosurgery    MRI lumbar spine without contrast; Future  Follow-up after MRI completion  Fall precaution, avoid lifting heavy objects, excessive bending or twisting  Call with question or concern    Chief complaint: \" Patient is here today referred by emergency medicine doctor for evaluation of lower back pain\"    History of Present Illness     See detailed discussion above    Review of system personally reviewed and updated as follows  Review of Systems   Musculoskeletal:  Positive for back pain (leftLBP across, mainly in left into buttock). Negative for gait problem and myalgias.        Acute pain since 9/17/24    PT in the past, has help    No PM, inj   Neurological:  Positive for weakness (left leg), numbness (N&T in left leg) and headaches.     I have personally reviewed the MA's review of systems and made changes as necessary.    Pertinent Medical History   History of mixed connective tissue disease, cerebral disease, anticardiolipin antibody syndrome , malignant melanoma of left lower extremity during hip, chronic leukopenia    Medical History Reviewed by provider this encounter:       Past Medical History   Past Medical History:   Diagnosis Date    Abnormal menstrual periods     Resolved 12/18/2017     Abnormal uterine bleeding     Resolved 12/18/2017     Anticardiolipin syndrome (HCC)     Arthropathy, multiple sites     Resolved 1/29/2016     Erythema nodosum     " Resolved 5/1/2017     Herniated lumbar intervertebral disc     Malignant melanoma of left lower extremity including hip (HCC) 10/25/2023    Mixed connective tissue disease (HCC)     Mixed connective tissue disease (HCC)     PONV (postoperative nausea and vomiting) 10/25/2023    Skin lesion     Resolved 1/29/2016      Past Surgical History:   Procedure Laterality Date    HYSTERECTOMY  12/2017    MN CYSTOURETHROSCOPY N/A 12/11/2017    Procedure: CYSTOSCOPY;  Surgeon: Beverly Valdez MD;  [facility];  Service: Gynecology    MN EXC TUMOR SOFT TISS UPPER ARM/ELBW SUBFASC 5CM/> Left 10/25/2023    Procedure: EXCISION BIOPSY TISSUE LESION/MASS LOWER EXTREMITY ;  Surgeon: Christiano Ramirez MD;  Location: [facility];  Service: General    MN LAPS W/VAG HYSTERECT 250 GM/&RMVL TUBE&/OVARIES N/A 12/11/2017    Procedure: LAPAROSCOPIC ASSISTED VAGINAL HYSTERECTOMY; BILATERAL SALPINGECTOMY;  Surgeon: Beverly Valdez MD;  [facility];  Service: Gynecology     Family History   Problem Relation Age of Onset    Gout Father     Liver cancer Father 74    No Known Problems Sister     No Known Problems Daughter     No Known Problems Daughter     No Known Problems Daughter     Rheum arthritis Maternal Grandmother     Diabetes Brother     No Known Problems Brother     No Known Problems Brother     Lupus Cousin     Sjogren's syndrome Family     Lupus Family     Thyroid disease Family     Heart disease Family      Current Outpatient Medications on File Prior to Visit   Medication Sig Dispense Refill    Cholecalciferol (Vitamin D3) 50 MCG (2000 UT) TABS Take 2,000 Units by mouth daily      magnesium 30 MG tablet Take 30 mg by mouth 2 (two) times a day      cyclobenzaprine (FLEXERIL) 10 mg tablet Take 1 tablet (10 mg total) by mouth 2 (two) times a day as needed for muscle spasms (Patient not taking: Reported on 9/27/2024) 20 tablet 0    ketorolac (TORADOL) 10 mg tablet Take 1 tablet (10 mg total) by mouth every 6  "(six) hours as needed for moderate pain (Patient not taking: Reported on 9/27/2024) 14 tablet 0     No current facility-administered medications on file prior to visit.     Allergies   Allergen Reactions    Covid-19 Mrna Vacc (Moderna) Palpitations     Felt sick and dizzy for days     Levaquin [Levofloxacin] Other (See Comments)     Tendon tears/ tendon pain    Reglan [Metoclopramide] Other (See Comments)     Severe agitation    Amoxil [Amoxicillin] Rash      Current Outpatient Medications on File Prior to Visit   Medication Sig Dispense Refill    Cholecalciferol (Vitamin D3) 50 MCG (2000 UT) TABS Take 2,000 Units by mouth daily      magnesium 30 MG tablet Take 30 mg by mouth 2 (two) times a day      cyclobenzaprine (FLEXERIL) 10 mg tablet Take 1 tablet (10 mg total) by mouth 2 (two) times a day as needed for muscle spasms (Patient not taking: Reported on 9/27/2024) 20 tablet 0    ketorolac (TORADOL) 10 mg tablet Take 1 tablet (10 mg total) by mouth every 6 (six) hours as needed for moderate pain (Patient not taking: Reported on 9/27/2024) 14 tablet 0     No current facility-administered medications on file prior to visit.      Social History     Tobacco Use    Smoking status: Never     Passive exposure: Never    Smokeless tobacco: Never   Vaping Use    Vaping status: Never Used   Substance and Sexual Activity    Alcohol use: Not Currently    Drug use: No    Sexual activity: Not on file     Objective     Ht 5' 6.5\" (1.689 m)   Wt 74.4 kg (164 lb)   LMP 12/01/2017   BMI 26.07 kg/m²     Physical Exam  Constitutional:       Appearance: Normal appearance.   HENT:      Head: Normocephalic and atraumatic.   Eyes:      Extraocular Movements: Extraocular movements intact.      Pupils: Pupils are equal, round, and reactive to light.   Pulmonary:      Effort: Pulmonary effort is normal.   Musculoskeletal:         General: Tenderness present.      Cervical back: Normal range of motion.   Neurological:      General: No focal " deficit present.      Mental Status: She is alert and oriented to person, place, and time.      Deep Tendon Reflexes:      Reflex Scores:       Tricep reflexes are 2+ on the right side and 2+ on the left side.       Bicep reflexes are 2+ on the right side and 2+ on the left side.       Brachioradialis reflexes are 2+ on the right side and 2+ on the left side.       Patellar reflexes are 2+ on the right side and 2+ on the left side.       Achilles reflexes are 2+ on the right side and 2+ on the left side.  Psychiatric:         Speech: Speech normal.       Neurologic Exam     Mental Status   Oriented to person, place, and time.   Speech: speech is normal   Level of consciousness: alert    Cranial Nerves     CN III, IV, VI   Pupils are equal, round, and reactive to light.    CN XI   CN XI normal.     Motor Exam   Muscle bulk: normal  Overall muscle tone: normal  Right arm tone: normal  Left arm tone: normal  Right arm pronator drift: absent  Left arm pronator drift: absent  Right leg tone: normal  Left leg tone: normal    Sensory Exam   Light touch normal.     Gait, Coordination, and Reflexes     Reflexes   Right brachioradialis: 2+  Left brachioradialis: 2+  Right biceps: 2+  Left biceps: 2+  Right triceps: 2+  Left triceps: 2+  Right patellar: 2+  Left patellar: 2+  Right achilles: 2+  Left achilles: 2+  Right : 2+  Left : 2+  Right Mccann: absent  Left Mccann: absent  Right ankle clonus: absent  Left ankle clonus: absent      I personally reviewed the following image studies in PACS and associated radiology reports: CT C-spine. My interpretation of the radiology images/reports is: CT shows slightly left L5-S1 disc protrusion.    Administrative Statements   I have spent a total time of 35 minutes in caring for this patient on the day of the visit/encounter including Diagnostic results, Prognosis, Risks and benefits of tx options, Instructions for management, Patient and family education, Importance of tx  compliance, Risk factor reductions, Impressions, Counseling / Coordination of care, Documenting in the medical record, Reviewing / ordering tests, medicine, procedures  , and Obtaining or reviewing history  .

## 2024-10-02 ENCOUNTER — TELEPHONE (OUTPATIENT)
Dept: VASCULAR SURGERY | Facility: CLINIC | Age: 54
End: 2024-10-02

## 2024-10-02 NOTE — TELEPHONE ENCOUNTER
Called pt and lmom to inform them we need to cancel their appt for tomorrow 10/3 with MBM and schedule with a VS because she got an LEVDR and that needs to be reviewed by a VS, cannot be with an AP

## 2024-10-03 NOTE — PROGRESS NOTES
Ambulatory Visit  Name: Felicia Diane      : 1970      MRN: [id number]  Encounter Provider: Mook Crawford MD  Encounter Date: 10/4/2024   [facility]: [facility] [place]    Assessment & Plan  Bilateral leg pain  Incidental finding of lymph nodes in both groins.  As there is h/o recent left thigh melanoma excision , she should discuss this finding of lymph node in left groin with her oncologist, may need to consider further workup such as biopsy    Orders:    Ambulatory Referral to Vascular Surgery    Venous insufficiency  Mild C3 class with pain and mild swelling at end of day after standing for prolonged period of time.  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  As her symptoms are well managed with compression, we can continue with current course.  If worsens in future we can consider that.  Risks of DVT with procedure discussed especially as she has anticariolipin antibody    I also reviewed the venous ultrasound which shows reflux in both saphenous vein and deeper veins.  Orders:    Ambulatory Referral to Vascular Surgery      History of Present Illness     Felicia Diane is a 54 y.o. female who presents for pain and mild edema in her legs at end of her shift.  She works as RN and stands a lot.  She has been using compression stockings.    New patient, presents to review LEVDR. Patient c/o edema in LLE. Patient elevates and wears compression.    History obtained from : patient  Review of Systems   Constitutional: Negative.    HENT: Negative.     Eyes: Negative.    Respiratory: Negative.     Cardiovascular:  Positive for leg swelling.   Gastrointestinal: Negative.    Endocrine: Negative.    Genitourinary: Negative.    Musculoskeletal: Negative.    Skin: Negative.    Allergic/Immunologic: Negative.    Neurological: Negative.    Hematological: Negative.    Psychiatric/Behavioral:  Negative.       Past Medical History   Past Medical History:   Diagnosis Date    Abnormal menstrual periods     Resolved 12/18/2017     Abnormal uterine bleeding     Resolved 12/18/2017     Anticardiolipin syndrome (HCC)     Arthropathy, multiple sites     Resolved 1/29/2016     Erythema nodosum     Resolved 5/1/2017     Herniated lumbar intervertebral disc     Malignant melanoma of left lower extremity including hip (HCC) 10/25/2023    Mixed connective tissue disease (HCC)     Mixed connective tissue disease (HCC)     PONV (postoperative nausea and vomiting) 10/25/2023    Skin lesion     Resolved 1/29/2016      Past Surgical History:   Procedure Laterality Date    HYSTERECTOMY  12/2017    KS CYSTOURETHROSCOPY N/A 12/11/2017    Procedure: CYSTOSCOPY;  Surgeon: Beverly Valdez MD;  [facility];  Service: Gynecology    KS EXC TUMOR SOFT TISS UPPER ARM/ELBW SUBFASC 5CM/> Left 10/25/2023    Procedure: EXCISION BIOPSY TISSUE LESION/MASS LOWER EXTREMITY ;  Surgeon: Christiano Ramirez MD;  Location[facility];  Service: General    KS LAPS W/VAG HYSTERECT 250 GM/&RMVL TUBE&/OVARIES N/A 12/11/2017    Procedure: LAPAROSCOPIC ASSISTED VAGINAL HYSTERECTOMY; BILATERAL SALPINGECTOMY;  Surgeon: Beverly Valdez MD;  [facility];  Service: Gynecology     Family History   Problem Relation Age of Onset    Gout Father     Liver cancer Father 74    No Known Problems Sister     No Known Problems Daughter     No Known Problems Daughter     No Known Problems Daughter     Rheum arthritis Maternal Grandmother     Diabetes Brother     No Known Problems Brother     No Known Problems Brother     Lupus Cousin     Sjogren's syndrome Family     Lupus Family     Thyroid disease Family     Heart disease Family      Current Outpatient Medications on File Prior to Visit   Medication Sig Dispense Refill    Cholecalciferol (Vitamin D3) 50 MCG (2000 UT) TABS Take 2,000 Units by mouth daily      magnesium 30 MG tablet Take 30  mg by mouth 2 (two) times a day      cyclobenzaprine (FLEXERIL) 10 mg tablet Take 1 tablet (10 mg total) by mouth 2 (two) times a day as needed for muscle spasms (Patient not taking: Reported on 9/27/2024) 20 tablet 0    ketorolac (TORADOL) 10 mg tablet Take 1 tablet (10 mg total) by mouth every 6 (six) hours as needed for moderate pain (Patient not taking: Reported on 9/27/2024) 14 tablet 0     No current facility-administered medications on file prior to visit.     Allergies   Allergen Reactions    Covid-19 Mrna Vacc (Moderna) Palpitations     Felt sick and dizzy for days     Levaquin [Levofloxacin] Other (See Comments)     Tendon tears/ tendon pain    Reglan [Metoclopramide] Other (See Comments)     Severe agitation    Amoxil [Amoxicillin] Rash          Objective     LMP 12/01/2017     Physical Exam  Vitals and nursing note reviewed.   Constitutional:       Appearance: Normal appearance.   Cardiovascular:      Rate and Rhythm: Normal rate and regular rhythm.      Comments: Scattered truncal varicosities and spider veins  Musculoskeletal:      Right lower leg: No edema.      Left lower leg: No edema.   Skin:     General: Skin is warm.      Capillary Refill: Capillary refill takes less than 2 seconds.   Neurological:      General: No focal deficit present.      Mental Status: She is alert.

## 2024-10-04 ENCOUNTER — CONSULT (OUTPATIENT)
Dept: VASCULAR SURGERY | Facility: CLINIC | Age: 54
End: 2024-10-04
Payer: COMMERCIAL

## 2024-10-04 VITALS
DIASTOLIC BLOOD PRESSURE: 76 MMHG | SYSTOLIC BLOOD PRESSURE: 124 MMHG | HEART RATE: 94 BPM | WEIGHT: 163 LBS | HEIGHT: 67 IN | BODY MASS INDEX: 25.58 KG/M2

## 2024-10-04 DIAGNOSIS — M79.605 BILATERAL LEG PAIN: ICD-10-CM

## 2024-10-04 DIAGNOSIS — M79.604 BILATERAL LEG PAIN: ICD-10-CM

## 2024-10-04 DIAGNOSIS — I87.2 VENOUS INSUFFICIENCY: ICD-10-CM

## 2024-10-04 PROCEDURE — 99242 OFF/OP CONSLTJ NEW/EST SF 20: CPT | Performed by: SURGERY

## 2024-10-04 NOTE — PATIENT INSTRUCTIONS
Get compression stockings 15-20 or 20-30   Use them at work    Check with oncologist about groin lymph and how to follow this long term.

## 2024-10-05 NOTE — ASSESSMENT & PLAN NOTE
Mild C3 class with pain and mild swelling at end of day after standing for prolonged period of time.  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  As her symptoms are well managed with compression, we can continue with current course.  If worsens in future we can consider that.  Risks of DVT with procedure discussed especially as she has anticariolipin antibody    I also reviewed the venous ultrasound which shows reflux in both saphenous vein and deeper veins.  Orders:    Ambulatory Referral to Vascular Surgery

## 2024-10-05 NOTE — ASSESSMENT & PLAN NOTE
Incidental finding of lymph nodes in both groins.  As there is h/o recent left thigh melanoma excision , she should discuss this finding of lymph node in left groin with her oncologist, may need to consider further workup such as biopsy    Orders:    Ambulatory Referral to Vascular Surgery

## 2024-10-07 ENCOUNTER — TELEPHONE (OUTPATIENT)
Dept: HEMATOLOGY ONCOLOGY | Facility: CLINIC | Age: 54
End: 2024-10-07

## 2024-10-07 NOTE — TELEPHONE ENCOUNTER
Called and spoke to Felicia. Let her know that Dr. Santoro received a copy of a note from her primary care provider's office/Dr. Crawford and would like to get her in the office sooner than her January appointment. Informed patient that we did schedule her for an appointment on 10/16 at 4:20 pm. Patient said that appointment should work for her and she will call back if anything changes.

## 2024-10-07 NOTE — TELEPHONE ENCOUNTER
----- Message from Markie Santoro MD sent at 10/5/2024 11:23 AM EDT -----  Can you give her a call and let her know that I received a copy of the note form her PCP office and I want to see her in the office sooner than January  Can you help get her onto my schedule the week of 10/14-maybe in and afternoon urgent visits  Thanks  markie  ----- Message -----  From: Mook Crawford MD  Sent: 10/5/2024  10:59 AM EDT  To: PIERRE Matson; Yajaira Baca DO; #

## 2024-10-15 ENCOUNTER — OFFICE VISIT (OUTPATIENT)
Dept: DERMATOLOGY | Facility: CLINIC | Age: 54
End: 2024-10-15
Payer: COMMERCIAL

## 2024-10-15 VITALS — HEIGHT: 67 IN | TEMPERATURE: 97.9 F | WEIGHT: 163 LBS | BODY MASS INDEX: 25.58 KG/M2

## 2024-10-15 DIAGNOSIS — D22.9 MULTIPLE MELANOCYTIC NEVI: ICD-10-CM

## 2024-10-15 DIAGNOSIS — L81.4 LENTIGO: ICD-10-CM

## 2024-10-15 DIAGNOSIS — Z85.820 HISTORY OF MELANOMA: Primary | ICD-10-CM

## 2024-10-15 DIAGNOSIS — L85.3 XEROSIS OF SKIN: ICD-10-CM

## 2024-10-15 DIAGNOSIS — D18.01 CHERRY ANGIOMA: ICD-10-CM

## 2024-10-15 PROCEDURE — 99213 OFFICE O/P EST LOW 20 MIN: CPT | Performed by: DERMATOLOGY

## 2024-10-15 NOTE — PROGRESS NOTES
"[facility] Note     Patient Name: Felicia Diane  Encounter Date: 10/15/24     Have you been cared for by a [facility] Dermatologist in the last 3 years and, if so, which description applies to you?    Yes.  I have been here within the last 3 years, and my medical history has NOT changed since that time.  I am FEMALE/of child-bearing potential.    REVIEW OF SYSTEMS:  Have you recently had or currently have any of the following? No changes in my recent health.   PAST MEDICAL HISTORY:  Have you personally ever had or currently have any of the following?  If \"YES,\" then please provide more detail. No changes in my medical history.   HISTORY OF IMMUNOSUPPRESSION: Do you have a history of any of the following:  Systemic Immunosuppression such as Diabetes, Biologic or Immunotherapy, Chemotherapy, Organ Transplantation, Bone Marrow Transplantation or Prednisone?  No     Answering \"YES\" requires the addition of the dotphrase \"IMMUNOSUPPRESSED\" as the first diagnosis of the patient's visit.   FAMILY HISTORY:  Any \"first degree relatives\" (parent, brother, sister, or child) with the following?    No changes in my family's known health.   PATIENT EXPERIENCE:    Do you want the Dermatologist to perform a COMPLETE skin exam today including a clinical examination under the \"bra and underwear\" areas?  Yes, underneath bra, underwear not examined today   If necessary, do we have your permission to call and leave a detailed message on your Preferred Phone number that includes your specific medical information?  Yes      Allergies   Allergen Reactions    Covid-19 Mrna Vacc (Moderna) Palpitations     Felt sick and dizzy for days     Levaquin [Levofloxacin] Other (See Comments)     Tendon tears/ tendon pain    Reglan [Metoclopramide] Other (See Comments)     Severe agitation    Amoxil [Amoxicillin] Rash      Current Outpatient Medications:     Cholecalciferol (Vitamin D3) 50 MCG (2000 UT) TABS, Take 2,000 Units " by mouth daily, Disp: , Rfl:     cyclobenzaprine (FLEXERIL) 10 mg tablet, Take 1 tablet (10 mg total) by mouth 2 (two) times a day as needed for muscle spasms (Patient not taking: Reported on 9/27/2024), Disp: 20 tablet, Rfl: 0    ketorolac (TORADOL) 10 mg tablet, Take 1 tablet (10 mg total) by mouth every 6 (six) hours as needed for moderate pain (Patient not taking: Reported on 9/27/2024), Disp: 14 tablet, Rfl: 0    magnesium 30 MG tablet, Take 30 mg by mouth 2 (two) times a day, Disp: , Rfl:           Whom besides the patient is providing clinical information about today's encounter?   NO ADDITIONAL HISTORIAN (patient alone provided history)    Physical Exam and Assessment/Plan by Diagnosis:    HISTORY OF MELANOMA     Physical Exam:  Anatomic Location Affected:  Left thigh  Morphological Description of Scar:  well healed scar  Year Treated: 10/2023  TNM Classification: pT1b   Suspected Recurrence: no  Regional adenopathy: on ultrasound 9/2024, seeing Dr. Santoro tomorrow     Additional History of Present Condition:  Surgery by general surgery, pathology results: -Prior procedure site changes present.   Residual melanoma not seen. [id number].        Assessment and Plan:  Based on a thorough discussion of this condition and the management approach to it (including a comprehensive discussion of the known risks, side effects and potential benefits of treatment), the patient (family) agrees to implement the following specific plan:  When outside we recommend using a wide brim hat, sunglasses, long sleeve and pants, sunscreen with SPF 30+ with reapplication every 2 hours, or SPF specific clothing    We recommend that you have regular full body skin exams with a dermatologist every 3 months for 3 years and then every 6 months.   Recommend yearly appointments with your eye doctor and dentist. Please make sure they are aware of your history of Melanoma.   .      What happens at follow-up?  The main purpose of follow-up is  "to detect recurrences early (metastatic melanoma), but it also offers an opportunity to diagnose a new primary melanoma at the first possible opportunity. A second invasive melanoma occurs in 5-10% of melanoma patients and a new melanoma in situ is diagnosed in more than 20% of melanoma patients.     Our practice makes the following recommendations for follow-up for patients with invasive melanoma.  At-least \"monthly\" self-skin examinations   Routine skin checks by a board certified dermatologist  Follow-up intervals are \"every 3 months\" within 2 years of a new melanoma diagnosis; \"every 6 months\" between 2-4 years of a new melanoma diagnosis; and \"annually\" after 4 years of a new melanoma diagnosis  Individual patient's needs should be considered before an appropriate follow-up is offered  Provide education and support to help the patient adjust to their illness     Follow-up appointments should include:  A check of the scar where the primary melanoma was removed  Checking the regional lymph nodes  A general skin examination  A full physical examination at least annually by your primary care physician     In those with more advanced primary disease, follow-up may include:  Blood tests  Imaging: ultrasound, X-ray, CT, MRI and PET scan.     Most tests are not worthwhile for patients with stage 1 or 2 melanoma unless there are signs or symptoms of disease recurrence or metastasis. No tests are necessary for healthy patients who have remained well for five years or longer after removal of their melanoma.     What is the outlook for patients with melanoma?  Melanoma in situ is cured by excision because it has no potential to spread around the body.  The risk of spread and ultimate death from invasive melanoma depends on several factors, but the main one is the Breslow thickness of the melanoma at the time it was surgically removed.  Metastases are rare for melanomas < 0.75 mm and the risk for tumours 0.75-1 mm thick is " "about 5%. The risk steadily increases with thickness so that melanomas > 4 mm have a risk of metastasis of about 40%.     Melanoma is a potentially serious type of skin cancer, in which there is uncontrolled growth of melanocytes (pigment cells). Melanoma is sometimes called malignant melanoma.  Normal melanocytes are found in the basal layer of the epidermis (the outer layer of skin). Melanocytes produce a protein called melanin, which protects skin cells by absorbing ultraviolet (UV) radiation. Melanocytes are found in equal numbers in black and white skin, but melanocytes in black skin produce much more melanin. People with dark brown or black skin are very much less likely to be damaged by UV radiation than those with white skin.         MELANOCYTIC NEVI (\"Moles\")    Physical Exam:  Anatomic Location Affected:   Mostly on sun-exposed areas of the trunk and extremities  Morphological Description:  Scattered, 1-4mm round to ovoid, symmetrical-appearing, even bordered, skin colored to dark brown macules/papules, mostly in sun-exposed areas  Pertinent Positives:  Pertinent Negatives:    Additional History of Present Condition:      Assessment and Plan:  Based on a thorough discussion of this condition and the management approach to it (including a comprehensive discussion of the known risks, side effects and potential benefits of treatment), the patient (family) agrees to implement the following specific plan:  When outside we recommend using a wide brim hat, sunglasses, long sleeve and pants, sunscreen with SPF 30+ with reapplication every 2 hours, or SPF specific clothing   Benign, reassured  Annual skin check     Melanocytic Nevi  Melanocytic nevi (\"moles\") are tan or brown, raised or flat areas of the skin which have an increased number of melanocytes. Melanocytes are the cells in our body which make pigment and account for skin color.    Some moles are present at birth (I.e., \"congenital nevi\"), while others " "come up later in life (i.e., \"acquired nevi\").  The sun can stimulate the body to make more moles.  Sunburns are not the only thing that triggers more moles.  Chronic sun exposure can do it too.     Clinically distinguishing a healthy mole from melanoma may be difficult, even for experienced dermatologists. The \"ABCDE's\" of moles have been suggested as a means of helping to alert a person to a suspicious mole and the possible increased risk of melanoma.  The suggestions for raising alert are as follows:    Asymmetry: Healthy moles tend to be symmetric, while melanomas are often asymmetric.  Asymmetry means if you draw a line through the mole, the two halves do not match in color, size, shape, or surface texture. Asymmetry can be a result of rapid enlargement of a mole, the development of a raised area on a previously flat lesion, scaling, ulceration, bleeding or scabbing within the mole.  Any mole that starts to demonstrate \"asymmetry\" should be examined promptly by a board certified dermatologist.     Border: Healthy moles tend to have discrete, even borders.  The border of a melanoma often blends into the normal skin and does not sharply delineate the mole from normal skin.  Any mole that starts to demonstrate \"uneven borders\" should be examined promptly by a board certified dermatologist.     Color: Healthy moles tend to be one color throughout.  Melanomas tend to be made up of different colors ranging from dark black, blue, white, or red.  Any mole that demonstrates a color change should be examined promptly by a board certified dermatologist.     Diameter: Healthy moles tend to be smaller than 0.6 cm in size; an exception are \"congenital nevi\" that can be larger.  Melanomas tend to grow and can often be greater than 0.6 cm (1/4 of an inch, or the size of a pencil eraser). This is only a guideline, and many normal moles may be larger than 0.6 cm without being unhealthy.  Any mole that starts to change in size " "(small to bigger or bigger to smaller) should be examined promptly by a board certified dermatologist.     Evolving: Healthy moles tend to \"stay the same.\"  Melanomas may often show signs of change or evolution such as a change in size, shape, color, or elevation.  Any mole that starts to itch, bleed, crust, burn, hurt, or ulcerate or demonstrate a change or evolution should be examined promptly by a board certified dermatologist.        LENTIGO    Physical Exam:  Anatomic Location Affected:  trunk, arms  Morphological Description:  Light brown macules  Pertinent Positives:  Pertinent Negatives:    Additional History of Present Condition:      Assessment and Plan:  Based on a thorough discussion of this condition and the management approach to it (including a comprehensive discussion of the known risks, side effects and potential benefits of treatment), the patient (family) agrees to implement the following specific plan:  When outside we recommend using a wide brim hat, sunglasses, long sleeve and pants, sunscreen with SPF 30+ with reapplication every 2 hours, or SPF specific clothing       What is a lentigo?  A lentigo is a pigmented flat or slightly raised lesion with a clearly defined edge. Unlike an ephelis (freckle), it does not fade in the winter months. There are several kinds of lentigo.  The name lentigo originally referred to its appearance resembling a small lentil. The plural of lentigo is lentigines, although “lentigos” is also in common use.    Who gets lentigines?  Lentigines can affect males and females of all ages and races. Solar lentigines are especially prevalent in fair skinned adults. Lentigines associated with syndromes are present at birth or arise during childhood.    What causes lentigines?  Common forms of lentigo are due to exposure to ultraviolet radiation:  Sun damage including sunburn   Indoor tanning   Phototherapy, especially photochemotherapy (PUVA)    Ionizing radiation, eg " "radiation therapy, can also cause lentigines.  Several familial syndromes associated with widespread lentigines originate from mutations in Attila-MAP kinase, mTOR signaling and PTEN pathways.    What is the treatment for lentigines?  Most lentigines are left alone. Attempts to lighten them may not be successful. The following approaches are used:  SPF 50+ broad-spectrum sunscreen   Hydroquinone bleaching cream   Alpha hydroxy acids   Vitamin C   Retinoids   Azelaic acid   Chemical peels  Individual lesions can be permanently removed using:  Cryotherapy   Intense pulsed light   Pigment lasers    How can lentigines be prevented?  Lentigines associated with exposure ultraviolet radiation can be prevented by very careful sun protection. Clothing is more successful at preventing new lentigines than are sunscreens.    What is the outlook for lentigines?  Lentigines usually persist. They may increase in number with age and sun exposure. Some in sun-protected sites may fade and disappear.    PRESLEY ANGIOMAS    Physical Exam:  Anatomic Location Affected:  trunk  Morphological Description:  Scattered cherry red, 1-4 mm papules.  Pertinent Positives:  Pertinent Negatives:    Additional History of Present Condition:      Assessment and Plan:  Based on a thorough discussion of this condition and the management approach to it (including a comprehensive discussion of the known risks, side effects and potential benefits of treatment), the patient (family) agrees to implement the following specific plan:  Monitor for changes  Benign, reassured      Assessment and Plan:    Cherry angioma, also known as Aldana de Dorian spots, are benign vascular skin lesions. A \"cherry angioma\" is a firm red, blue or purple papule, 0.1-1 cm in diameter. When thrombosed, they can appear black in colour until evaluated with a dermatoscope when the red or purple colour is more easily seen. Cherry angioma may develop on any part of the body but most " "often appear on the scalp, face, lips and trunk.  An angioma is due to proliferating endothelial cells; these are the cells that line the inside of a blood vessel.    Angiomas can arise in early life or later in life; the most common type of angioma is a cherry angioma.  Cherry angiomas are very common in males and females of any age or race. They are more noticeable in white skin than in skin of colour. They markedly increase in number from about the age of 40. There may be a family history of similar lesions. Eruptive cherry angiomas have been rarely reported to be associated with internal malignancy. The cause of angiomas is unknown. Genetic analysis of cherry angiomas has shown that they frequently carry specific somatic missense mutations in the GNAQ and GNA11 (Q209H) genes, which are involved in other vascular and melanocytic proliferations.    XEROSIS (\"DRY SKIN\")    Physical Exam:  Anatomic Location Affected:  diffuse  Morphological Description:  xerosis  Pertinent Positives:  Pertinent Negatives:    Additional History of Present Condition:      Assessment and Plan:  Based on a thorough discussion of this condition and the management approach to it (including a comprehensive discussion of the known risks, side effects and potential benefits of treatment), the patient (family) agrees to implement the following specific plan:  Use moisturizer like Eucerin,Cerave or Aveeno Cream 3 times a day for the dry skin            Dry skin refers to skin that feels dry to touch. Dry skin has a dull surface with a rough, scaly quality. The skin is less pliable and cracked. When dryness is severe, the skin may become inflamed and fissured.  Although any body site can be dry, dry skin tends to affect the shins more than any other site.    Dry skin is lacking moisture in the outer horny cell layer (stratum corneum) and this results in cracks in the skin surface.  Dry skin is also called xerosis, xeroderma or asteatosis (lack " of fat).  It can affect males and females of all ages. There is some racial variability in water and lipid content of the skin.  Dry skin that starts in early childhood may be one of about 20 types of ichthyosis (fish-scale skin). There is often a family history of dry skin.   Dry skin is commonly seen in people with atopic dermatitis.  Nearly everyone > 60 years has dry skin.    Dry skin that begins later may be seen in people with certain diseases and conditions.  Postmenopausal women  Hypothyroidism  Chronic renal disease   Malnutrition and weight loss   Subclinical dermatitis   Treatment with certain drugs such as oral retinoids, diuretics and epidermal growth factor receptor inhibitors      What is the treatment for dry skin?  The mainstay of treatment of dry skin and ichthyosis is moisturisers/emollients. They should be applied liberally and often enough to:  Reduce itch   Improve the barrier function   Prevent entry of irritants, bacteria   Reduce transepidermal water loss.      How can dry skin be prevented?  Eliminate aggravating factors:  Reduce the frequency of bathing.   A humidifier in winter and air conditioner in summer   Compare having a short shower with a prolonged soak in a bath.   Use lukewarm, not hot, water.   Replace standard soap with a substitute such as a synthetic detergent cleanser, water-miscible emollient, bath oil, anti-pruritic tar oil, colloidal oatmeal etc.   Apply an emollient liberally and often, particularly shortly after bathing, and when itchy. The drier the skin, the thicker this should be, especially on the hands.    What is the outlook for dry skin?  A tendency to dry skin may persist life-long, or it may improve once contributing factors are controlled.     Scribe Attestation      I,:  Marah Edwards MA am acting as a scribe while in the presence of the attending physician.:       I,:  Yajaira Escalona MD personally performed the services described in this documentation    as  scribed in my presence.:

## 2024-10-15 NOTE — PATIENT INSTRUCTIONS
"HISTORY OF MELANOMA        Assessment and Plan:  Based on a thorough discussion of this condition and the management approach to it (including a comprehensive discussion of the known risks, side effects and potential benefits of treatment), the patient (family) agrees to implement the following specific plan:  When outside we recommend using a wide brim hat, sunglasses, long sleeve and pants, sunscreen with SPF 30+ with reapplication every 2 hours, or SPF specific clothing    We recommend that you have regular full body skin exams with a dermatologist every 3 months for 3 years and then every 6 months.   Recommend yearly appointments with your eye doctor and dentist. Please make sure they are aware of your history of Melanoma.   .      What happens at follow-up?  The main purpose of follow-up is to detect recurrences early (metastatic melanoma), but it also offers an opportunity to diagnose a new primary melanoma at the first possible opportunity. A second invasive melanoma occurs in 5-10% of melanoma patients and a new melanoma in situ is diagnosed in more than 20% of melanoma patients.     Our practice makes the following recommendations for follow-up for patients with invasive melanoma.  At-least \"monthly\" self-skin examinations   Routine skin checks by a board certified dermatologist  Follow-up intervals are \"every 3 months\" within 2 years of a new melanoma diagnosis; \"every 6 months\" between 2-4 years of a new melanoma diagnosis; and \"annually\" after 4 years of a new melanoma diagnosis  Individual patient's needs should be considered before an appropriate follow-up is offered  Provide education and support to help the patient adjust to their illness     Follow-up appointments should include:  A check of the scar where the primary melanoma was removed  Checking the regional lymph nodes  A general skin examination  A full physical examination at least annually by your primary care physician     In those with more " "advanced primary disease, follow-up may include:  Blood tests  Imaging: ultrasound, X-ray, CT, MRI and PET scan.     Most tests are not worthwhile for patients with stage 1 or 2 melanoma unless there are signs or symptoms of disease recurrence or metastasis. No tests are necessary for healthy patients who have remained well for five years or longer after removal of their melanoma.     What is the outlook for patients with melanoma?  Melanoma in situ is cured by excision because it has no potential to spread around the body.  The risk of spread and ultimate death from invasive melanoma depends on several factors, but the main one is the Breslow thickness of the melanoma at the time it was surgically removed.  Metastases are rare for melanomas < 0.75 mm and the risk for tumours 0.75-1 mm thick is about 5%. The risk steadily increases with thickness so that melanomas > 4 mm have a risk of metastasis of about 40%.     Melanoma is a potentially serious type of skin cancer, in which there is uncontrolled growth of melanocytes (pigment cells). Melanoma is sometimes called malignant melanoma.  Normal melanocytes are found in the basal layer of the epidermis (the outer layer of skin). Melanocytes produce a protein called melanin, which protects skin cells by absorbing ultraviolet (UV) radiation. Melanocytes are found in equal numbers in black and white skin, but melanocytes in black skin produce much more melanin. People with dark brown or black skin are very much less likely to be damaged by UV radiation than those with white skin.     MELANOCYTIC NEVI (\"Moles\")      Assessment and Plan:  Based on a thorough discussion of this condition and the management approach to it (including a comprehensive discussion of the known risks, side effects and potential benefits of treatment), the patient (family) agrees to implement the following specific plan:  When outside we recommend using a wide brim hat, sunglasses, long sleeve and " "pants, sunscreen with SPF 30+ with reapplication every 2 hours, or SPF specific clothing   Benign, reassured  Annual skin check     Melanocytic Nevi  Melanocytic nevi (\"moles\") are tan or brown, raised or flat areas of the skin which have an increased number of melanocytes. Melanocytes are the cells in our body which make pigment and account for skin color.    Some moles are present at birth (I.e., \"congenital nevi\"), while others come up later in life (i.e., \"acquired nevi\").  The sun can stimulate the body to make more moles.  Sunburns are not the only thing that triggers more moles.  Chronic sun exposure can do it too.     Clinically distinguishing a healthy mole from melanoma may be difficult, even for experienced dermatologists. The \"ABCDE's\" of moles have been suggested as a means of helping to alert a person to a suspicious mole and the possible increased risk of melanoma.  The suggestions for raising alert are as follows:    Asymmetry: Healthy moles tend to be symmetric, while melanomas are often asymmetric.  Asymmetry means if you draw a line through the mole, the two halves do not match in color, size, shape, or surface texture. Asymmetry can be a result of rapid enlargement of a mole, the development of a raised area on a previously flat lesion, scaling, ulceration, bleeding or scabbing within the mole.  Any mole that starts to demonstrate \"asymmetry\" should be examined promptly by a board certified dermatologist.     Border: Healthy moles tend to have discrete, even borders.  The border of a melanoma often blends into the normal skin and does not sharply delineate the mole from normal skin.  Any mole that starts to demonstrate \"uneven borders\" should be examined promptly by a board certified dermatologist.     Color: Healthy moles tend to be one color throughout.  Melanomas tend to be made up of different colors ranging from dark black, blue, white, or red.  Any mole that demonstrates a color change " "should be examined promptly by a board certified dermatologist.     Diameter: Healthy moles tend to be smaller than 0.6 cm in size; an exception are \"congenital nevi\" that can be larger.  Melanomas tend to grow and can often be greater than 0.6 cm (1/4 of an inch, or the size of a pencil eraser). This is only a guideline, and many normal moles may be larger than 0.6 cm without being unhealthy.  Any mole that starts to change in size (small to bigger or bigger to smaller) should be examined promptly by a board certified dermatologist.     Evolving: Healthy moles tend to \"stay the same.\"  Melanomas may often show signs of change or evolution such as a change in size, shape, color, or elevation.  Any mole that starts to itch, bleed, crust, burn, hurt, or ulcerate or demonstrate a change or evolution should be examined promptly by a board certified dermatologist.        LENTIGO      Assessment and Plan:  Based on a thorough discussion of this condition and the management approach to it (including a comprehensive discussion of the known risks, side effects and potential benefits of treatment), the patient (family) agrees to implement the following specific plan:  When outside we recommend using a wide brim hat, sunglasses, long sleeve and pants, sunscreen with SPF 30+ with reapplication every 2 hours, or SPF specific clothing       What is a lentigo?  A lentigo is a pigmented flat or slightly raised lesion with a clearly defined edge. Unlike an ephelis (freckle), it does not fade in the winter months. There are several kinds of lentigo.  The name lentigo originally referred to its appearance resembling a small lentil. The plural of lentigo is lentigines, although “lentigos” is also in common use.    Who gets lentigines?  Lentigines can affect males and females of all ages and races. Solar lentigines are especially prevalent in fair skinned adults. Lentigines associated with syndromes are present at birth or arise during " "childhood.    What causes lentigines?  Common forms of lentigo are due to exposure to ultraviolet radiation:  Sun damage including sunburn   Indoor tanning   Phototherapy, especially photochemotherapy (PUVA)    Ionizing radiation, eg radiation therapy, can also cause lentigines.  Several familial syndromes associated with widespread lentigines originate from mutations in Attila-MAP kinase, mTOR signaling and PTEN pathways.    What is the treatment for lentigines?  Most lentigines are left alone. Attempts to lighten them may not be successful. The following approaches are used:  SPF 50+ broad-spectrum sunscreen   Hydroquinone bleaching cream   Alpha hydroxy acids   Vitamin C   Retinoids   Azelaic acid   Chemical peels  Individual lesions can be permanently removed using:  Cryotherapy   Intense pulsed light   Pigment lasers    How can lentigines be prevented?  Lentigines associated with exposure ultraviolet radiation can be prevented by very careful sun protection. Clothing is more successful at preventing new lentigines than are sunscreens.    What is the outlook for lentigines?  Lentigines usually persist. They may increase in number with age and sun exposure. Some in sun-protected sites may fade and disappear.    PRESLEY ANGIOMAS      Assessment and Plan:  Based on a thorough discussion of this condition and the management approach to it (including a comprehensive discussion of the known risks, side effects and potential benefits of treatment), the patient (family) agrees to implement the following specific plan:  Monitor for changes  Benign, reassured      Assessment and Plan:    Cherry angioma, also known as Aldana de Dorian spots, are benign vascular skin lesions. A \"cherry angioma\" is a firm red, blue or purple papule, 0.1-1 cm in diameter. When thrombosed, they can appear black in colour until evaluated with a dermatoscope when the red or purple colour is more easily seen. Cherry angioma may develop on any part " "of the body but most often appear on the scalp, face, lips and trunk.  An angioma is due to proliferating endothelial cells; these are the cells that line the inside of a blood vessel.    Angiomas can arise in early life or later in life; the most common type of angioma is a cherry angioma.  Cherry angiomas are very common in males and females of any age or race. They are more noticeable in white skin than in skin of colour. They markedly increase in number from about the age of 40. There may be a family history of similar lesions. Eruptive cherry angiomas have been rarely reported to be associated with internal malignancy. The cause of angiomas is unknown. Genetic analysis of cherry angiomas has shown that they frequently carry specific somatic missense mutations in the GNAQ and GNA11 (Q209H) genes, which are involved in other vascular and melanocytic proliferations.      XEROSIS (\"DRY SKIN\")      Assessment and Plan:  Based on a thorough discussion of this condition and the management approach to it (including a comprehensive discussion of the known risks, side effects and potential benefits of treatment), the patient (family) agrees to implement the following specific plan:  Use moisturizer like Eucerin,Cerave or Aveeno Cream 3 times a day for the dry skin            Dry skin refers to skin that feels dry to touch. Dry skin has a dull surface with a rough, scaly quality. The skin is less pliable and cracked. When dryness is severe, the skin may become inflamed and fissured.  Although any body site can be dry, dry skin tends to affect the shins more than any other site.    Dry skin is lacking moisture in the outer horny cell layer (stratum corneum) and this results in cracks in the skin surface.  Dry skin is also called xerosis, xeroderma or asteatosis (lack of fat).  It can affect males and females of all ages. There is some racial variability in water and lipid content of the skin.  Dry skin that starts in early " childhood may be one of about 20 types of ichthyosis (fish-scale skin). There is often a family history of dry skin.   Dry skin is commonly seen in people with atopic dermatitis.  Nearly everyone > 60 years has dry skin.    Dry skin that begins later may be seen in people with certain diseases and conditions.  Postmenopausal women  Hypothyroidism  Chronic renal disease   Malnutrition and weight loss   Subclinical dermatitis   Treatment with certain drugs such as oral retinoids, diuretics and epidermal growth factor receptor inhibitors      What is the treatment for dry skin?  The mainstay of treatment of dry skin and ichthyosis is moisturisers/emollients. They should be applied liberally and often enough to:  Reduce itch   Improve the barrier function   Prevent entry of irritants, bacteria   Reduce transepidermal water loss.      How can dry skin be prevented?  Eliminate aggravating factors:  Reduce the frequency of bathing.   A humidifier in winter and air conditioner in summer   Compare having a short shower with a prolonged soak in a bath.   Use lukewarm, not hot, water.   Replace standard soap with a substitute such as a synthetic detergent cleanser, water-miscible emollient, bath oil, anti-pruritic tar oil, colloidal oatmeal etc.   Apply an emollient liberally and often, particularly shortly after bathing, and when itchy. The drier the skin, the thicker this should be, especially on the hands.    What is the outlook for dry skin?  A tendency to dry skin may persist life-long, or it may improve once contributing factors are controlled.

## 2024-10-16 ENCOUNTER — OFFICE VISIT (OUTPATIENT)
Dept: HEMATOLOGY ONCOLOGY | Facility: CLINIC | Age: 54
End: 2024-10-16
Payer: COMMERCIAL

## 2024-10-16 VITALS
DIASTOLIC BLOOD PRESSURE: 70 MMHG | HEART RATE: 107 BPM | OXYGEN SATURATION: 97 % | SYSTOLIC BLOOD PRESSURE: 102 MMHG | BODY MASS INDEX: 26.29 KG/M2 | RESPIRATION RATE: 15 BRPM | HEIGHT: 67 IN | WEIGHT: 167.5 LBS | TEMPERATURE: 97.3 F

## 2024-10-16 DIAGNOSIS — C43.72 MALIGNANT MELANOMA OF LEFT LOWER EXTREMITY INCLUDING HIP (HCC): Primary | ICD-10-CM

## 2024-10-16 DIAGNOSIS — R59.1 LYMPHADENOPATHY: ICD-10-CM

## 2024-10-16 PROCEDURE — 99214 OFFICE O/P EST MOD 30 MIN: CPT | Performed by: INTERNAL MEDICINE

## 2024-10-16 NOTE — LETTER
October 31, 2024     Yajaira Baca DO  [address] [facility]   [address]  [facility] [address]    Patient: Felicia Diane   YOB: 1970   Date of Visit: 10/16/2024       Dear Dr. Baca:    Thank you for referring Felicia Diane to me for evaluation. Below are my notes for this consultation.    If you have questions, please do not hesitate to call me. I look forward to following your patient along with you.         Sincerely,        Piper Santoro MD        CC: DO Javier Brenner MD Melissa A Wilson, MD  10/31/2024  5:33 AM  Sign when Signing Visit  [facility] [address] [address]  [phone]  [phone]     Date of Visit: 10/16/2024  Name: Felicia Diane   YOB: 1970        Subjective    VISIT DIAGNOSIS:  Diagnoses and all orders for this visit:    Malignant melanoma of left lower extremity including hip (HCC)    Lymphadenopathy        Oncology History   Malignant melanoma of left lower extremity including hip (HCC)   8/3/2023 -  Cancer Staged    Staging form: Melanoma of the Skin, AJCC 8th Edition  - Clinical stage from 8/3/2023: Stage IB (cT1b, cN0, cM0) - Signed by Piper Santoro MD on 1/25/2024       10/25/2023 Initial Diagnosis    Malignant melanoma of left lower extremity including hip (HCC)        Cancer Staging   Malignant melanoma of left lower extremity including hip (HCC)  Staging form: Melanoma of the Skin, AJCC 8th Edition  - Clinical stage from 8/3/2023: Stage IB (cT1b, cN0, cM0) - Signed by Piper Santoro MD on 1/25/2024          HISTORY OF PRESENT ILLNESS: Felicia Diane is a 54 y.o. female  who has stage Ib melanoma who typically has routine follow-up presenting now with new lymphadenopathy identified on exam by vascular.    Has been having back pain and seen in the ER recently for this.  Found to have foraminal narrowing.  Was referred to neurosurgery and seen.   Imaging ordered of her lumbar spine.  Also seen by vascular due to venous insufficiency.  Was noted on exam that she had bilateral groin lymph nodes.    She was instructed to speak with her oncologist.  I also received a note from this visit alerting me of these bilateral groin lymph nodes.    She is otherwise feeling okay.  Continues to follow closely with dermatology.  Had just seen dermatology 10/15/2024.  No concerning skin lesions at that time.    She denies any fevers or chills.  Denies any illness.      REVIEW OF SYSTEMS:  Review of Systems   Constitutional:  Negative for appetite change, fatigue, fever and unexpected weight change.   HENT:   Negative for lump/mass, trouble swallowing and voice change.    Eyes:  Negative for icterus.   Respiratory:  Negative for cough, shortness of breath and wheezing.    Cardiovascular:  Positive for leg swelling (by the end of the day).   Gastrointestinal:  Negative for abdominal pain, constipation, diarrhea, nausea and vomiting.   Genitourinary:  Negative for difficulty urinating and hematuria.    Musculoskeletal:  Positive for back pain. Negative for arthralgias, gait problem and myalgias.   Skin:  Negative for itching and rash.        No new, changing, or concerning lesions.   Neurological:  Negative for extremity weakness, gait problem, headaches, light-headedness and numbness.   Hematological:  Positive for adenopathy.        MEDICATIONS:    Current Outpatient Medications:   •  Cholecalciferol (Vitamin D3) 50 MCG (2000 UT) TABS, Take 2,000 Units by mouth daily, Disp: , Rfl:   •  cyclobenzaprine (FLEXERIL) 10 mg tablet, Take 1 tablet (10 mg total) by mouth 2 (two) times a day as needed for muscle spasms (Patient not taking: Reported on 9/27/2024), Disp: 20 tablet, Rfl: 0  •  ketorolac (TORADOL) 10 mg tablet, Take 1 tablet (10 mg total) by mouth every 6 (six) hours as needed for moderate pain (Patient not taking: Reported on 9/27/2024), Disp: 14 tablet, Rfl: 0  •   magnesium 30 MG tablet, Take 30 mg by mouth 2 (two) times a day, Disp: , Rfl:      ALLERGIES:  Allergies   Allergen Reactions   • Covid-19 Mrna Vacc (Moderna) Palpitations     Felt sick and dizzy for days    • Levaquin [Levofloxacin] Other (See Comments)     Tendon tears/ tendon pain   • Reglan [Metoclopramide] Other (See Comments)     Severe agitation   • Amoxil [Amoxicillin] Rash        ACTIVE PROBLEMS:  Patient Active Problem List   Diagnosis   • Dense breasts   • Lateral epicondylitis of right elbow   • Chronic left-sided low back pain with left-sided sciatica   • Mixed connective tissue disease (HCC)   • Anti-cardiolipin antibody positive   • Sjogren's syndrome (HCC)   • Chronic leukopenia   • Skin lesion   • PONV (postoperative nausea and vomiting)   • History of hysterectomy   • Malignant melanoma of left lower extremity including hip (HCC)   • Venous insufficiency   • Bilateral leg pain          PAST MEDICAL HISTORY:   Past Medical History:   Diagnosis Date   • Abnormal menstrual periods     Resolved 12/18/2017    • Abnormal uterine bleeding     Resolved 12/18/2017    • Anticardiolipin syndrome (HCC)    • Arthropathy, multiple sites     Resolved 1/29/2016    • Erythema nodosum     Resolved 5/1/2017    • Herniated lumbar intervertebral disc    • Malignant melanoma of left lower extremity including hip (HCC) 10/25/2023   • Mixed connective tissue disease (HCC)    • Mixed connective tissue disease (HCC)    • PONV (postoperative nausea and vomiting) 10/25/2023   • Skin lesion     Resolved 1/29/2016         PAST SURGICAL HISTORY:  Past Surgical History:   Procedure Laterality Date   • HYSTERECTOMY  12/2017   • NJ CYSTOURETHROSCOPY N/A 12/11/2017    Procedure: CYSTOSCOPY;  Surgeon: Beverly Valdez MD;  Location: [facility];  Service: Gynecology   • NJ EXC TUMOR SOFT TISS UPPER ARM/ELBW SUBFASC 5CM/> Left 10/25/2023    Procedure: EXCISION BIOPSY TISSUE LESION/MASS LOWER EXTREMITY ;  Surgeon: Christiano Ramirez  "MD;  Location: [facility];  Service: General   • IN LAPS W/VAG HYSTERECT 250 GM/&RMVL TUBE&/OVARIES N/A 12/11/2017    Procedure: LAPAROSCOPIC ASSISTED VAGINAL HYSTERECTOMY; BILATERAL SALPINGECTOMY;  Surgeon: Beverly Valdez MD;  Location: [facility] OR;  Service: Gynecology        SOCIAL HISTORY:  Social History     Socioeconomic History   • Marital status: /Civil Union     Spouse name: None   • Number of children: None   • Years of education: None   • Highest education level: None   Occupational History   • None   Tobacco Use   • Smoking status: Never     Passive exposure: Never   • Smokeless tobacco: Never   Vaping Use   • Vaping status: Never Used   Substance and Sexual Activity   • Alcohol use: Not Currently   • Drug use: No   • Sexual activity: None   Other Topics Concern   • None   Social History Narrative   • None     Social Determinants of Health     Financial Resource Strain: Not on file   Food Insecurity: Not on file   Transportation Needs: Not on file   Physical Activity: Not on file   Stress: Not on file   Social Connections: Not on file   Intimate Partner Violence: Not on file   Housing Stability: Not on file        FAMILY HISTORY:  Family History   Problem Relation Age of Onset   • Gout Father    • Liver cancer Father 74   • No Known Problems Sister    • No Known Problems Daughter    • No Known Problems Daughter    • No Known Problems Daughter    • Rheum arthritis Maternal Grandmother    • Diabetes Brother    • No Known Problems Brother    • No Known Problems Brother    • Lupus Cousin    • Sjogren's syndrome Family    • Lupus Family    • Thyroid disease Family    • Heart disease Family            Objective    PHYSICAL EXAMINATION:   Blood pressure 102/70, pulse (!) 107, temperature (!) 97.3 °F (36.3 °C), temperature source Temporal, resp. rate 15, height 5' 6.5\" (1.689 m), weight 76 kg (167 lb 8 oz), last menstrual period 12/01/2017, SpO2 97%, not currently breastfeeding.     Pain Score: 0-No " pain     ECOG Performance Status      Flowsheet Row Most Recent Value   ECOG Performance Status 0 - Fully active, able to carry on all pre-disease performance without restriction               Physical Exam  Constitutional:       General: She is not in acute distress.     Appearance: Normal appearance. She is not ill-appearing or toxic-appearing.   HENT:      Head: Normocephalic and atraumatic.      Right Ear: External ear normal.      Left Ear: External ear normal.      Nose: Nose normal.      Mouth/Throat:      Mouth: Mucous membranes are moist.      Pharynx: Oropharynx is clear.   Eyes:      General: No scleral icterus.        Right eye: No discharge.         Left eye: No discharge.      Conjunctiva/sclera: Conjunctivae normal.   Cardiovascular:      Rate and Rhythm: Normal rate and regular rhythm.      Pulses: Normal pulses.      Heart sounds: No murmur heard.     No friction rub. No gallop.   Pulmonary:      Effort: Pulmonary effort is normal. No respiratory distress.      Breath sounds: Normal breath sounds. No wheezing or rales.   Abdominal:      General: Bowel sounds are normal. There is no distension.      Palpations: There is no mass.      Tenderness: There is no abdominal tenderness. There is no rebound.   Musculoskeletal:         General: No swelling or tenderness.      Cervical back: Normal range of motion. No rigidity.      Right lower leg: No edema.      Left lower leg: No edema.   Lymphadenopathy:      Head:      Right side of head: No submandibular, preauricular or posterior auricular adenopathy.      Left side of head: No submandibular, preauricular or posterior auricular adenopathy.      Cervical: No cervical adenopathy.      Right cervical: No superficial or posterior cervical adenopathy.     Left cervical: No superficial or posterior cervical adenopathy.      Upper Body:      Right upper body: No supraclavicular or axillary adenopathy.      Left upper body: No supraclavicular or axillary  adenopathy.      Lower Body: No right inguinal adenopathy. Left inguinal adenopathy (more prominent on exam) present.   Skin:     General: Skin is warm.      Coloration: Skin is not jaundiced.      Findings: No lesion or rash.   Neurological:      General: No focal deficit present.      Mental Status: She is alert and oriented to person, place, and time.      Cranial Nerves: No cranial nerve deficit.      Motor: No weakness.      Gait: Gait normal.   Psychiatric:         Mood and Affect: Mood normal.         Behavior: Behavior normal.         Thought Content: Thought content normal.         Judgment: Judgment normal.         I reviewed lab data in the chart.    WBC   Date Value Ref Range Status   08/09/2024 3.29 (L) 4.31 - 10.16 Thousand/uL Final   08/23/2023 3.53 (L) 4.31 - 10.16 Thousand/uL Final   04/08/2023 3.44 (L) 4.31 - 10.16 Thousand/uL Final   11/07/2015 4.20 (L) 4.31 - 10.16 Thousand/uL Final   11/03/2015 3.5 (L) 4.8 - 10.8 X 1000/u    10/17/2015 5.2 4.8 - 10.8 X 1000/u      Hemoglobin   Date Value Ref Range Status   08/09/2024 13.2 11.5 - 15.4 g/dL Final   08/23/2023 13.5 11.5 - 15.4 g/dL Final   04/08/2023 12.9 11.5 - 15.4 g/dL Final   11/07/2015 12.5 11.5 - 15.4 g/dL Final   11/03/2015 12.9 12.0 - 16.0 g/dL    10/17/2015 13.4 12.0 - 16.0 g/dL      Platelets   Date Value Ref Range Status   08/09/2024 217 149 - 390 Thousands/uL Final   08/23/2023 202 149 - 390 Thousands/uL Final   04/08/2023 162 149 - 390 Thousands/uL Final   11/07/2015 216 149 - 390 Thousand/uL Final   11/03/2015 188 130 - 400 X 1000/u    10/17/2015 391 130 - 400 X 1000/u      MCV   Date Value Ref Range Status   08/09/2024 92 82 - 98 fL Final   08/23/2023 94 82 - 98 fL Final   04/08/2023 93 82 - 98 fL Final   11/07/2015 91 82 - 98 fL Final   11/03/2015 91.5 80.0 - 94.0 fL    10/17/2015 91.9 80.0 - 94.0 fL       Sodium   Date Value Ref Range Status   11/07/2015 139 136 - 145 mmol/L Final   11/03/2015 138 137 - 145 mmol/L    10/17/2015 136  (L) 137 - 145 mmol/L      Potassium   Date Value Ref Range Status   08/09/2024 3.6 3.5 - 5.3 mmol/L Final   08/23/2023 4.1 3.5 - 5.3 mmol/L Final   04/08/2023 3.7 3.5 - 5.3 mmol/L Final   11/07/2015 3.7 3.5 - 5.3 mmol/L Final   11/03/2015 3.9 3.6 - 4.8 mmol/L    10/17/2015 3.5 (L) 3.6 - 4.8 mmol/L      Chloride   Date Value Ref Range Status   08/09/2024 105 96 - 108 mmol/L Final   08/23/2023 104 96 - 108 mmol/L Final   04/08/2023 103 96 - 108 mmol/L Final   11/07/2015 104 98 - 108 mmol/L Final   11/03/2015 104 96 - 107 mmol/L    10/17/2015 101 96 - 107 mmol/L      CO2   Date Value Ref Range Status   08/09/2024 26 21 - 32 mmol/L Final   08/23/2023 28 21 - 32 mmol/L Final   04/08/2023 26 21 - 32 mmol/L Final   11/07/2015 27.5 21.0 - 32.0 mmol/L Final   11/03/2015 28 22 - 29 mmol/L    10/17/2015 27 22 - 29 mmol/L      BUN   Date Value Ref Range Status   08/09/2024 11 5 - 25 mg/dL Final   08/23/2023 16 5 - 25 mg/dL Final   04/08/2023 17 5 - 25 mg/dL Final   11/07/2015 13 5 - 25 mg/dL Final   11/03/2015 9 9 - 21 mg/dL    10/17/2015 11 9 - 21 mg/dL      Creatinine   Date Value Ref Range Status   08/09/2024 0.79 0.60 - 1.30 mg/dL Final     Comment:     Standardized to IDMS reference method   08/23/2023 0.67 0.60 - 1.30 mg/dL Final     Comment:     Standardized to IDMS reference method   04/08/2023 0.74 0.60 - 1.30 mg/dL Final     Comment:     Standardized to IDMS reference method   11/07/2015 1.08 0.60 - 1.30 mg/dL Final     Comment:     Standardized to IDMS reference method   11/03/2015 0.8 0.6 - 1.3 mg/dL    10/17/2015 1.0 0.6 - 1.3 mg/dL      Glucose   Date Value Ref Range Status   08/23/2023 86 65 - 140 mg/dL Final     Comment:     If the patient is fasting, the ADA then defines impaired fasting glucose as > 100 mg/dL and diabetes as > or equal to 123 mg/dL.   02/28/2019 73 65 - 140 mg/dL Final     Comment:       If the patient is fasting, the ADA then defines impaired fasting glucose as > 100 mg/dL and diabetes as >  or equal to 123 mg/dL.  Specimen collection should occur prior to Sulfasalazine administration due to the potential for falsely depressed results. Specimen collection should occur prior to Sulfapyridine administration due to the potential for falsely elevated results.   01/22/2018 86 65 - 140 mg/dL Final     Comment:       If the patient is fasting, the ADA then defines impaired fasting glucose as > 100 mg/dL and diabetes as > or equal to 123 mg/dL.  Specimen collection should occur prior to Sulfasalazine administration due to the potential for falsely depressed results. Specimen collection should occur prior to Sulfapyridine administration due to the potential for falsely elevated results.     Calcium   Date Value Ref Range Status   08/09/2024 9.5 8.4 - 10.2 mg/dL Final   08/23/2023 9.6 8.4 - 10.2 mg/dL Final   04/08/2023 9.4 8.4 - 10.2 mg/dL Final   11/07/2015 9.1 8.3 - 10.1 mg/dL Final   11/03/2015 8.5 8.4 - 10.0 mg/dL    10/17/2015 8.7 8.4 - 10.0 mg/dL      Total Protein   Date Value Ref Range Status   11/07/2015 7.7 6.4 - 8.2 g/dL Final   11/03/2015 7.6 6.4 - 8.1 g/dL    10/17/2015 7.8 6.4 - 8.1 g/dL      Albumin   Date Value Ref Range Status   08/09/2024 4.2 3.5 - 5.0 g/dL Final   08/23/2023 4.3 3.5 - 5.0 g/dL Final   04/08/2023 4.3 3.5 - 5.0 g/dL Final   11/07/2015 3.5 3.5 - 5.0 g/dL Final   11/03/2015 3.5 3.2 - 5.0 g/dL    10/17/2015 3.5 3.2 - 5.0 g/dL      Total Bilirubin   Date Value Ref Range Status   08/09/2024 0.59 0.20 - 1.00 mg/dL Final     Comment:     Use of this assay is not recommended for patients undergoing treatment with eltrombopag due to the potential for falsely elevated results.  N-acetyl-p-benzoquinone imine (metabolite of Acetaminophen) will generate erroneously low results in samples for patients that have taken an overdose of Acetaminophen.   08/23/2023 0.42 0.20 - 1.00 mg/dL Final     Comment:     Use of this assay is not recommended for patients undergoing treatment with eltrombopag  "due to the potential for falsely elevated results.  N-acetyl-p-benzoquinone imine (metabolite of Acetaminophen) will generate erroneously low results in samples for patients that have taken an overdose of Acetaminophen.   04/08/2023 0.57 0.20 - 1.00 mg/dL Final     Comment:     Use of this assay is not recommended for patients undergoing treatment with eltrombopag due to the potential for falsely elevated results.  N-acetyl-p-benzoquinone imine (metabolite of Acetaminophen) will generate erroneously low results in samples for patients that have taken an overdose of Acetaminophen.     Alkaline Phosphatase   Date Value Ref Range Status   08/09/2024 56 34 - 104 U/L Final   08/23/2023 68 34 - 104 U/L Final   04/08/2023 54 34 - 104 U/L Final   11/07/2015 81 46 - 116 U/L Final   11/03/2015 77 46 - 116 IU/L    10/17/2015 76 46 - 116 IU/L      AST   Date Value Ref Range Status   08/09/2024 17 13 - 39 U/L Final   08/23/2023 20 13 - 39 U/L Final   04/08/2023 20 13 - 39 U/L Final   11/07/2015 17 5 - 45 U/L Final   11/03/2015 19 14 - 38 IU/L    10/17/2015 18 14 - 38 IU/L      ALT   Date Value Ref Range Status   08/09/2024 16 7 - 52 U/L Final     Comment:     Specimen collection should occur prior to Sulfasalazine administration due to the potential for falsely depressed results.    08/23/2023 21 7 - 52 U/L Final     Comment:     Specimen collection should occur prior to Sulfasalazine administration due to the potential for falsely depressed results.    04/08/2023 20 7 - 52 U/L Final     Comment:     Specimen collection should occur prior to Sulfasalazine administration due to the potential for falsely depressed results.    11/07/2015 23 12 - 78 U/L Final   11/03/2015 24 12 - 78 IU/L    10/17/2015 24 12 - 78 IU/L       LD   Date Value Ref Range Status   08/09/2024 121 (L) 140 - 271 U/L Final   08/23/2023 153 140 - 271 U/L Final     No results found for: \"TSH\"  No results found for: \"D5LYBAB\"   Free T4   Date Value Ref Range " Status   08/09/2024 0.72 0.61 - 1.12 ng/dL Final     Comment:     Specimens with biotin concentrations > 10 ng/mL can lead to significant (> 10%) positive bias in result.         RECENT IMAGING:       Assessment/Plan  Ms. Diane is a 54-year-old female with stage Ib melanoma here for evaluation given enlarged bilateral lymph nodes on exam with vascular.    1. Malignant melanoma of left lower extremity including hip (HCC)  2. Lymphadenopathy  She has stage Ib melanoma that has been monitored closely by myself and dermatology.  New bilateral lymph nodes on exam for vascular.  Today some fullness in the left groin area.  Unclear if these are reactionary lymph nodes versus related to her melanoma.    Discussed that we will follow-up after ultrasound is performed and further recommendations at that time.  Discussed ultrasound is good technique for looking and evaluating lymph nodes.  She will follow-up as scheduled with me for routine monitoring as planned.  However I will call her after the ultrasound and discuss any further recommendations.      She knows to call with issues or concerns.      Follow up as planned and I will call after US    Piper Santoro MD PhD

## 2024-10-17 ENCOUNTER — HOSPITAL ENCOUNTER (OUTPATIENT)
Dept: MAMMOGRAPHY | Facility: CLINIC | Age: 54
End: 2024-10-17
Payer: COMMERCIAL

## 2024-10-17 ENCOUNTER — HOSPITAL ENCOUNTER (OUTPATIENT)
Dept: ULTRASOUND IMAGING | Facility: CLINIC | Age: 54
End: 2024-10-17
Payer: COMMERCIAL

## 2024-10-17 VITALS — HEIGHT: 67 IN | BODY MASS INDEX: 26.21 KG/M2 | WEIGHT: 167 LBS

## 2024-10-17 DIAGNOSIS — R92.30 DENSE BREAST: ICD-10-CM

## 2024-10-17 DIAGNOSIS — Z12.31 SCREENING MAMMOGRAM, ENCOUNTER FOR: ICD-10-CM

## 2024-10-17 PROCEDURE — 76641 ULTRASOUND BREAST COMPLETE: CPT

## 2024-10-17 PROCEDURE — 77063 BREAST TOMOSYNTHESIS BI: CPT

## 2024-10-17 PROCEDURE — 77067 SCR MAMMO BI INCL CAD: CPT

## 2024-10-23 ENCOUNTER — HOSPITAL ENCOUNTER (OUTPATIENT)
Dept: MRI IMAGING | Facility: HOSPITAL | Age: 54
Discharge: HOME | End: 2024-10-23
Payer: COMMERCIAL

## 2024-10-23 ENCOUNTER — HOSPITAL ENCOUNTER (OUTPATIENT)
Dept: ULTRASOUND IMAGING | Facility: HOSPITAL | Age: 54
Discharge: HOME | End: 2024-10-23
Payer: COMMERCIAL

## 2024-10-23 DIAGNOSIS — M54.50 LOWER BACK PAIN: ICD-10-CM

## 2024-10-23 DIAGNOSIS — R59.1 LYMPHADENOPATHY: ICD-10-CM

## 2024-10-23 PROCEDURE — 72148 MRI LUMBAR SPINE W/O DYE: CPT

## 2024-10-23 PROCEDURE — 76705 ECHO EXAM OF ABDOMEN: CPT

## 2024-10-28 ENCOUNTER — TELEPHONE (OUTPATIENT)
Dept: FAMILY MEDICINE CLINIC | Facility: CLINIC | Age: 54
End: 2024-10-28

## 2024-10-28 DIAGNOSIS — R59.1 LYMPHADENOPATHY: Primary | ICD-10-CM

## 2024-10-28 NOTE — ASSESSMENT & PLAN NOTE
Patient presents for follow up of back pain / lumbar radiculopathy after imaging  History of chronic low back pain with acute worsening including radiation of pain into her left buttock after attempting to move the patient at work, with associated numbness and tingling and weakness  She has since returned to her baseline pain    Imaging:  MRI lumbar spine without contrast 10/23/2024:  Lumbar degenerative changes without high-grade canal or foraminal stenosis as detailed.  Degenerative left lateral recess stenosis at level L5-S1 with encroachment of the left S1 nerve root. Correlate for left S1 radiculopathy. Mild to moderate left foraminal narrowing at L5-S1.    Plan:  Imaging reviewed with patient, demonstrates degenerative changes, most notable left L5-S1 with possible encroachment of the left S1 nerve root.  Thankfully patient does not exhibit any weakness on exam and she is currently back at her baseline.  At this time would not recommend surgical intervention.  I have offered a physical therapy prescription for back and core strengthening and mobility should patient want to move forward with this  I will defer pain management if she is not interested in any injections  She is okay to continue activity as tolerated  Continue over-the-counter and prescribed medication for pain control is warranted  Patient is to return to the office on an as-needed basis or sooner should patient develop worsening symptoms or red flag signs

## 2024-10-28 NOTE — TELEPHONE ENCOUNTER
Patient called and will follow up surgeon for possible biopsy 2 prominent inguinal nodes demonstrate more normal appearing morphology suggesting these may've been reactive on the prior study. Will also discuss with her oncologist about US findings and ordering some tumor markers and also wants to discuss with them if she should sure go for biopsy at this time or wait.    Ultrasound showed  2 prominent inguinal nodes demonstrate more normal appearing morphology suggesting these may've been reactive on the prior study.      There is a node in the upper left thigh with somewhat irregular nodular margins not imaged previously. Portions of it are narrow with normal morphology although there is some asymmetric nodularity of the cortex more inferiorly. 2.3 x 0.4 x 1.2 cm node is seen in the superior thigh not imaged previously. (Static image 200) a weakly echogenic notch is seen; however there appears to be some cortical irregularity on the transverse volumetric sweep    Correlation with tumor markers is advised. If there is history of any acute infection, a short-term follow-up in 2 months time could be obtained otherwise histologic sampling should be considered.    PIERRE Matson

## 2024-10-29 ENCOUNTER — OFFICE VISIT (OUTPATIENT)
Dept: NEUROSURGERY | Facility: CLINIC | Age: 54
End: 2024-10-29
Payer: COMMERCIAL

## 2024-10-29 VITALS
RESPIRATION RATE: 16 BRPM | HEIGHT: 67 IN | TEMPERATURE: 97.8 F | OXYGEN SATURATION: 98 % | SYSTOLIC BLOOD PRESSURE: 108 MMHG | DIASTOLIC BLOOD PRESSURE: 68 MMHG | BODY MASS INDEX: 26.06 KG/M2 | HEART RATE: 87 BPM | WEIGHT: 166 LBS

## 2024-10-29 DIAGNOSIS — M54.42 CHRONIC LEFT-SIDED LOW BACK PAIN WITH LEFT-SIDED SCIATICA: ICD-10-CM

## 2024-10-29 DIAGNOSIS — M54.16 LUMBAR RADICULOPATHY: Primary | ICD-10-CM

## 2024-10-29 DIAGNOSIS — G89.29 CHRONIC LEFT-SIDED LOW BACK PAIN WITH LEFT-SIDED SCIATICA: ICD-10-CM

## 2024-10-29 PROCEDURE — 99215 OFFICE O/P EST HI 40 MIN: CPT | Performed by: PHYSICIAN ASSISTANT

## 2024-10-29 NOTE — PROGRESS NOTES
Ambulatory Visit  Name: Felicia Diane      : 1970      MRN: [id number]  Encounter Provider: Huyen Mccormack PA-C  Encounter Date: 10/29/2024   Encounter department: [facility] NEUROSURGICAL [facility]    Assessment & Plan  Chronic left-sided low back pain with left-sided sciatica  Patient presents for follow up of back pain / lumbar radiculopathy after imaging  History of chronic low back pain with acute worsening including radiation of pain into her left buttock after attempting to move the patient at work, with associated numbness and tingling and weakness  She has since returned to her baseline pain    Imaging:  MRI lumbar spine without contrast 10/23/2024:  Lumbar degenerative changes without high-grade canal or foraminal stenosis as detailed.  Degenerative left lateral recess stenosis at level L5-S1 with encroachment of the left S1 nerve root. Correlate for left S1 radiculopathy. Mild to moderate left foraminal narrowing at L5-S1.    Plan:  Imaging reviewed with patient, demonstrates degenerative changes, most notable left L5-S1 with possible encroachment of the left S1 nerve root.  Thankfully patient does not exhibit any weakness on exam and she is currently back at her baseline.  At this time would not recommend surgical intervention.  I have offered a physical therapy prescription for back and core strengthening and mobility should patient want to move forward with this  I will defer pain management if she is not interested in any injections  She is okay to continue activity as tolerated  Continue over-the-counter and prescribed medication for pain control is warranted  Patient is to return to the office on an as-needed basis or sooner should patient develop worsening symptoms or red flag signs       Lumbar radiculopathy  As noted above    Orders:    Ambulatory Referral to [facility]; Future      History of Present Illness   54-year-old female with past medical history  significant for anticardiolipin syndrome, lupus, rheumatoid arthritis, chronic back pain who presents for follow-up of lumbar radiculopathy after obtaining imaging.    Patient had acute onset left-sided back pain with radiation into the buttock with associated numbness, tingling and weakness after moving a patient over a month ago.  She states since that time she has returned to her baseline chronic pain and feels fine.  She is not taking anything for pain.  She has not completed physical therapy and does not necessarily feel interested in this.  She is not interested in injections.  She is not interested in surgical intervention.  No right lower extremity symptoms, bowel or bladder issues, saddle anesthesia.      Review of Systems   Respiratory:  Negative for chest tightness and shortness of breath.    Gastrointestinal: Negative.    Genitourinary: Negative.    Musculoskeletal:  Positive for back pain (mid to left low back and buttock pain) and gait problem (occasional). Negative for myalgias.   Neurological:  Positive for weakness (left leg) and numbness (N/T occasional, left leg).   Psychiatric/Behavioral:  Positive for sleep disturbance.      I have personally reviewed the MA's review of systems and made changes as necessary.    Medical History Reviewed by provider this encounter:       Past Medical History   Past Medical History:   Diagnosis Date    Abnormal menstrual periods     Resolved 12/18/2017     Abnormal uterine bleeding     Resolved 12/18/2017     Anticardiolipin syndrome (HCC)     Arthropathy, multiple sites     Resolved 1/29/2016     Erythema nodosum     Resolved 5/1/2017     Herniated lumbar intervertebral disc     Malignant melanoma of left lower extremity including hip (HCC) 10/25/2023    Mixed connective tissue disease (HCC)     Mixed connective tissue disease (HCC)     PONV (postoperative nausea and vomiting) 10/25/2023    Skin lesion     Resolved 1/29/2016      Past Surgical History:   Procedure  Laterality Date    HYSTERECTOMY  12/2017    TX CYSTOURETHROSCOPY N/A 12/11/2017    Procedure: CYSTOSCOPY;  Surgeon: Beverly Valdez MD;  Location: [facility];  Service: Gynecology    TX EXC TUMOR SOFT TISS UPPER ARM/ELBW SUBFASC 5CM/> Left 10/25/2023    Procedure: EXCISION BIOPSY TISSUE LESION/MASS LOWER EXTREMITY ;  Surgeon: Christiano Ramirez MD;  Location: [facility];  Service: General    TX LAPS W/VAG HYSTERECT 250 GM/&RMVL TUBE&/OVARIES N/A 12/11/2017    Procedure: LAPAROSCOPIC ASSISTED VAGINAL HYSTERECTOMY; BILATERAL SALPINGECTOMY;  Surgeon: Beverly Valdez MD;  Location: [facility];  Service: Gynecology     Family History   Problem Relation Age of Onset    Gout Father     Liver cancer Father 74    No Known Problems Sister     No Known Problems Daughter     No Known Problems Daughter     No Known Problems Daughter     Rheum arthritis Maternal Grandmother     Diabetes Brother     No Known Problems Brother     No Known Problems Brother     Lupus Cousin     Sjogren's syndrome Family     Lupus Family     Thyroid disease Family     Heart disease Family      Current Outpatient Medications on File Prior to Visit   Medication Sig Dispense Refill    Cholecalciferol (Vitamin D3) 50 MCG (2000 UT) TABS Take 2,000 Units by mouth daily      magnesium 30 MG tablet Take 30 mg by mouth 2 (two) times a day      cyclobenzaprine (FLEXERIL) 10 mg tablet Take 1 tablet (10 mg total) by mouth 2 (two) times a day as needed for muscle spasms (Patient not taking: Reported on 9/27/2024) 20 tablet 0    ketorolac (TORADOL) 10 mg tablet Take 1 tablet (10 mg total) by mouth every 6 (six) hours as needed for moderate pain (Patient not taking: Reported on 9/27/2024) 14 tablet 0     No current facility-administered medications on file prior to visit.     Allergies   Allergen Reactions    Covid-19 Mrna Vacc (Moderna) Palpitations     Felt sick and dizzy for days     Levaquin [Levofloxacin] Other (See Comments)     Tendon tears/ tendon  "pain    Reglan [Metoclopramide] Other (See Comments)     Severe agitation    Amoxil [Amoxicillin] Rash      Current Outpatient Medications on File Prior to Visit   Medication Sig Dispense Refill    Cholecalciferol (Vitamin D3) 50 MCG (2000 UT) TABS Take 2,000 Units by mouth daily      magnesium 30 MG tablet Take 30 mg by mouth 2 (two) times a day      cyclobenzaprine (FLEXERIL) 10 mg tablet Take 1 tablet (10 mg total) by mouth 2 (two) times a day as needed for muscle spasms (Patient not taking: Reported on 9/27/2024) 20 tablet 0    ketorolac (TORADOL) 10 mg tablet Take 1 tablet (10 mg total) by mouth every 6 (six) hours as needed for moderate pain (Patient not taking: Reported on 9/27/2024) 14 tablet 0     No current facility-administered medications on file prior to visit.      Social History     Tobacco Use    Smoking status: Never     Passive exposure: Never    Smokeless tobacco: Never   Vaping Use    Vaping status: Never Used   Substance and Sexual Activity    Alcohol use: Not Currently    Drug use: No    Sexual activity: Not on file     Objective     /68 (BP Location: Left arm, Patient Position: Sitting, Cuff Size: Standard)   Pulse 87   Temp 97.8 °F (36.6 °C) (Temporal)   Resp 16   Ht 5' 6.5\" (1.689 m)   Wt 75.3 kg (166 lb)   LMP 12/01/2017   SpO2 98%   BMI 26.39 kg/m²     Physical Exam  Constitutional:       General: She is awake.      Appearance: Normal appearance.   HENT:      Head: Normocephalic and atraumatic.   Eyes:      Conjunctiva/sclera: Conjunctivae normal.   Cardiovascular:      Rate and Rhythm: Normal rate.   Pulmonary:      Effort: Pulmonary effort is normal. No respiratory distress.   Skin:     General: Skin is warm and dry.   Neurological:      Mental Status: She is alert and oriented to person, place, and time.      Motor: Motor strength is normal.     Coordination: Finger-Nose-Finger Test normal.      Gait: Gait is intact.      Deep Tendon Reflexes:      Reflex Scores:       " Patellar reflexes are 2+ on the right side and 2+ on the left side.  Psychiatric:         Attention and Perception: Attention and perception normal.         Mood and Affect: Mood and affect normal.         Speech: Speech normal.         Behavior: Behavior normal. Behavior is cooperative.         Thought Content: Thought content normal.         Cognition and Memory: Cognition and memory normal.         Judgment: Judgment normal.       Neurologic Exam     Mental Status   Oriented to person, place, and time.   Follows 1 step commands.   Attention: normal. Concentration: normal.   Speech: speech is normal   Level of consciousness: alert  Knowledge: good.   Normal comprehension.     Cranial Nerves     CN III, IV, VI   Conjugate gaze: present    CN VIII   Hearing: intact    Motor Exam   Muscle bulk: normal  Overall muscle tone: normal  Right arm pronator drift: absent  Left arm pronator drift: absent    Strength   Strength 5/5 throughout.     Gait, Coordination, and Reflexes     Gait  Gait: normal    Coordination   Finger to nose coordination: normal    Tremor   Resting tremor: absent  Intention tremor: absent  Action tremor: absent    Reflexes   Right patellar: 2+  Left patellar: 2+  Right : 2+  Left : 2+  Right Mccann: absent  Left Mccann: absent  Right ankle clonus: absent  Left ankle clonus: absent      I personally reviewed the following image studies in PACS and associated radiology reports: MRI spine. My interpretation of the radiology images/reports is: as noted above.    Administrative Statements   I have spent a total time of 40 minutes in caring for this patient on the day of the visit/encounter including Diagnostic results, Instructions for management, Patient and family education, Impressions, Counseling / Coordination of care, Documenting in the medical record, Reviewing / ordering tests, medicine, procedures  , and Obtaining or reviewing history  .

## 2024-10-29 NOTE — PATIENT INSTRUCTIONS
Ambulatory referral provided for physical therapy for back and core strengthening and mobility if patient desires.  Continue over-the-counter and prescribed medication for pain control.  Continue activity as tolerated.  Return to the office on an as-needed basis or sooner if you develop worsening symptoms or red flag signs.

## 2024-10-31 NOTE — PROGRESS NOTES
[facility] [place]  [address]  [place] [address]  [phone]  [phone]     Date of Visit: 10/16/2024  Name: Felicia Diane   YOB: 1970        Subjective    VISIT DIAGNOSIS:  Diagnoses and all orders for this visit:    Malignant melanoma of left lower extremity including hip (HCC)    Lymphadenopathy        Oncology History   Malignant melanoma of left lower extremity including hip (HCC)   8/3/2023 -  Cancer Staged    Staging form: Melanoma of the Skin, AJCC 8th Edition  - Clinical stage from 8/3/2023: Stage IB (cT1b, cN0, cM0) - Signed by Piper Santoro MD on 1/25/2024       10/25/2023 Initial Diagnosis    Malignant melanoma of left lower extremity including hip (HCC)        Cancer Staging   Malignant melanoma of left lower extremity including hip (HCC)  Staging form: Melanoma of the Skin, AJCC 8th Edition  - Clinical stage from 8/3/2023: Stage IB (cT1b, cN0, cM0) - Signed by Piper Santoro MD on 1/25/2024          HISTORY OF PRESENT ILLNESS: Felicia Diane is a 54 y.o. female  who has stage Ib melanoma who typically has routine follow-up presenting now with new lymphadenopathy identified on exam by vascular.    Has been having back pain and seen in the ER recently for this.  Found to have foraminal narrowing.  Was referred to neurosurgery and seen.  Imaging ordered of her lumbar spine.  Also seen by vascular due to venous insufficiency.  Was noted on exam that she had bilateral groin lymph nodes.    She was instructed to speak with her oncologist.  I also received a note from this visit alerting me of these bilateral groin lymph nodes.    She is otherwise feeling okay.  Continues to follow closely with dermatology.  Had just seen dermatology 10/15/2024.  No concerning skin lesions at that time.    She denies any fevers or chills.  Denies any illness.      REVIEW OF SYSTEMS:  Review of Systems   Constitutional:  Negative for appetite  change, fatigue, fever and unexpected weight change.   HENT:   Negative for lump/mass, trouble swallowing and voice change.    Eyes:  Negative for icterus.   Respiratory:  Negative for cough, shortness of breath and wheezing.    Cardiovascular:  Positive for leg swelling (by the end of the day).   Gastrointestinal:  Negative for abdominal pain, constipation, diarrhea, nausea and vomiting.   Genitourinary:  Negative for difficulty urinating and hematuria.    Musculoskeletal:  Positive for back pain. Negative for arthralgias, gait problem and myalgias.   Skin:  Negative for itching and rash.        No new, changing, or concerning lesions.   Neurological:  Negative for extremity weakness, gait problem, headaches, light-headedness and numbness.   Hematological:  Positive for adenopathy.        MEDICATIONS:    Current Outpatient Medications:     Cholecalciferol (Vitamin D3) 50 MCG (2000 UT) TABS, Take 2,000 Units by mouth daily, Disp: , Rfl:     cyclobenzaprine (FLEXERIL) 10 mg tablet, Take 1 tablet (10 mg total) by mouth 2 (two) times a day as needed for muscle spasms (Patient not taking: Reported on 9/27/2024), Disp: 20 tablet, Rfl: 0    ketorolac (TORADOL) 10 mg tablet, Take 1 tablet (10 mg total) by mouth every 6 (six) hours as needed for moderate pain (Patient not taking: Reported on 9/27/2024), Disp: 14 tablet, Rfl: 0    magnesium 30 MG tablet, Take 30 mg by mouth 2 (two) times a day, Disp: , Rfl:      ALLERGIES:  Allergies   Allergen Reactions    Covid-19 Mrna Vacc (Moderna) Palpitations     Felt sick and dizzy for days     Levaquin [Levofloxacin] Other (See Comments)     Tendon tears/ tendon pain    Reglan [Metoclopramide] Other (See Comments)     Severe agitation    Amoxil [Amoxicillin] Rash        ACTIVE PROBLEMS:  Patient Active Problem List   Diagnosis    Dense breasts    Lateral epicondylitis of right elbow    Chronic left-sided low back pain with left-sided sciatica    Mixed connective tissue disease (HCC)     Anti-cardiolipin antibody positive    Sjogren's syndrome (HCC)    Chronic leukopenia    Skin lesion    PONV (postoperative nausea and vomiting)    History of hysterectomy    Malignant melanoma of left lower extremity including hip (HCC)    Venous insufficiency    Bilateral leg pain          PAST MEDICAL HISTORY:   Past Medical History:   Diagnosis Date    Abnormal menstrual periods     Resolved 12/18/2017     Abnormal uterine bleeding     Resolved 12/18/2017     Anticardiolipin syndrome (HCC)     Arthropathy, multiple sites     Resolved 1/29/2016     Erythema nodosum     Resolved 5/1/2017     Herniated lumbar intervertebral disc     Malignant melanoma of left lower extremity including hip (HCC) 10/25/2023    Mixed connective tissue disease (HCC)     Mixed connective tissue disease (HCC)     PONV (postoperative nausea and vomiting) 10/25/2023    Skin lesion     Resolved 1/29/2016         PAST SURGICAL HISTORY:  Past Surgical History:   Procedure Laterality Date    HYSTERECTOMY  12/2017    IA CYSTOURETHROSCOPY N/A 12/11/2017    Procedure: CYSTOSCOPY;  Surgeon: Beverly Valdez MD;  Location: [facility];  Service: Gynecology    IA EXC TUMOR SOFT TISS UPPER ARM/ELBW SUBFASC 5CM/> Left 10/25/2023    Procedure: EXCISION BIOPSY TISSUE LESION/MASS LOWER EXTREMITY ;  Surgeon: Christiano Ramirez MD;  Location: [facility];  Service: General    IA LAPS W/VAG HYSTERECT 250 GM/&RMVL TUBE&/OVARIES N/A 12/11/2017    Procedure: LAPAROSCOPIC ASSISTED VAGINAL HYSTERECTOMY; BILATERAL SALPINGECTOMY;  Surgeon: Beverly Valdez MD;  Location: [facility];  Service: Gynecology        SOCIAL HISTORY:  Social History     Socioeconomic History    Marital status: /Civil Union     Spouse name: None    Number of children: None    Years of education: None    Highest education level: None   Occupational History    None   Tobacco Use    Smoking status: Never     Passive exposure: Never    Smokeless tobacco: Never   Vaping Use     "Vaping status: Never Used   Substance and Sexual Activity    Alcohol use: Not Currently    Drug use: No    Sexual activity: None   Other Topics Concern    None   Social History Narrative    None     Social Determinants of Health     Financial Resource Strain: Not on file   Food Insecurity: Not on file   Transportation Needs: Not on file   Physical Activity: Not on file   Stress: Not on file   Social Connections: Not on file   Intimate Partner Violence: Not on file   Housing Stability: Not on file        FAMILY HISTORY:  Family History   Problem Relation Age of Onset    Gout Father     Liver cancer Father 74    No Known Problems Sister     No Known Problems Daughter     No Known Problems Daughter     No Known Problems Daughter     Rheum arthritis Maternal Grandmother     Diabetes Brother     No Known Problems Brother     No Known Problems Brother     Lupus Cousin     Sjogren's syndrome Family     Lupus Family     Thyroid disease Family     Heart disease Family            Objective    PHYSICAL EXAMINATION:   Blood pressure 102/70, pulse (!) 107, temperature (!) 97.3 °F (36.3 °C), temperature source Temporal, resp. rate 15, height 5' 6.5\" (1.689 m), weight 76 kg (167 lb 8 oz), last menstrual period 12/01/2017, SpO2 97%, not currently breastfeeding.     Pain Score: 0-No pain     ECOG Performance Status      Flowsheet Row Most Recent Value   ECOG Performance Status 0 - Fully active, able to carry on all pre-disease performance without restriction               Physical Exam  Constitutional:       General: She is not in acute distress.     Appearance: Normal appearance. She is not ill-appearing or toxic-appearing.   HENT:      Head: Normocephalic and atraumatic.      Right Ear: External ear normal.      Left Ear: External ear normal.      Nose: Nose normal.      Mouth/Throat:      Mouth: Mucous membranes are moist.      Pharynx: Oropharynx is clear.   Eyes:      General: No scleral icterus.        Right eye: No discharge. "         Left eye: No discharge.      Conjunctiva/sclera: Conjunctivae normal.   Cardiovascular:      Rate and Rhythm: Normal rate and regular rhythm.      Pulses: Normal pulses.      Heart sounds: No murmur heard.     No friction rub. No gallop.   Pulmonary:      Effort: Pulmonary effort is normal. No respiratory distress.      Breath sounds: Normal breath sounds. No wheezing or rales.   Abdominal:      General: Bowel sounds are normal. There is no distension.      Palpations: There is no mass.      Tenderness: There is no abdominal tenderness. There is no rebound.   Musculoskeletal:         General: No swelling or tenderness.      Cervical back: Normal range of motion. No rigidity.      Right lower leg: No edema.      Left lower leg: No edema.   Lymphadenopathy:      Head:      Right side of head: No submandibular, preauricular or posterior auricular adenopathy.      Left side of head: No submandibular, preauricular or posterior auricular adenopathy.      Cervical: No cervical adenopathy.      Right cervical: No superficial or posterior cervical adenopathy.     Left cervical: No superficial or posterior cervical adenopathy.      Upper Body:      Right upper body: No supraclavicular or axillary adenopathy.      Left upper body: No supraclavicular or axillary adenopathy.      Lower Body: No right inguinal adenopathy. Left inguinal adenopathy (more prominent on exam) present.   Skin:     General: Skin is warm.      Coloration: Skin is not jaundiced.      Findings: No lesion or rash.   Neurological:      General: No focal deficit present.      Mental Status: She is alert and oriented to person, place, and time.      Cranial Nerves: No cranial nerve deficit.      Motor: No weakness.      Gait: Gait normal.   Psychiatric:         Mood and Affect: Mood normal.         Behavior: Behavior normal.         Thought Content: Thought content normal.         Judgment: Judgment normal.         I reviewed lab data in the  chart.    WBC   Date Value Ref Range Status   08/09/2024 3.29 (L) 4.31 - 10.16 Thousand/uL Final   08/23/2023 3.53 (L) 4.31 - 10.16 Thousand/uL Final   04/08/2023 3.44 (L) 4.31 - 10.16 Thousand/uL Final   11/07/2015 4.20 (L) 4.31 - 10.16 Thousand/uL Final   11/03/2015 3.5 (L) 4.8 - 10.8 X 1000/u    10/17/2015 5.2 4.8 - 10.8 X 1000/u      Hemoglobin   Date Value Ref Range Status   08/09/2024 13.2 11.5 - 15.4 g/dL Final   08/23/2023 13.5 11.5 - 15.4 g/dL Final   04/08/2023 12.9 11.5 - 15.4 g/dL Final   11/07/2015 12.5 11.5 - 15.4 g/dL Final   11/03/2015 12.9 12.0 - 16.0 g/dL    10/17/2015 13.4 12.0 - 16.0 g/dL      Platelets   Date Value Ref Range Status   08/09/2024 217 149 - 390 Thousands/uL Final   08/23/2023 202 149 - 390 Thousands/uL Final   04/08/2023 162 149 - 390 Thousands/uL Final   11/07/2015 216 149 - 390 Thousand/uL Final   11/03/2015 188 130 - 400 X 1000/u    10/17/2015 391 130 - 400 X 1000/u      MCV   Date Value Ref Range Status   08/09/2024 92 82 - 98 fL Final   08/23/2023 94 82 - 98 fL Final   04/08/2023 93 82 - 98 fL Final   11/07/2015 91 82 - 98 fL Final   11/03/2015 91.5 80.0 - 94.0 fL    10/17/2015 91.9 80.0 - 94.0 fL       Sodium   Date Value Ref Range Status   11/07/2015 139 136 - 145 mmol/L Final   11/03/2015 138 137 - 145 mmol/L    10/17/2015 136 (L) 137 - 145 mmol/L      Potassium   Date Value Ref Range Status   08/09/2024 3.6 3.5 - 5.3 mmol/L Final   08/23/2023 4.1 3.5 - 5.3 mmol/L Final   04/08/2023 3.7 3.5 - 5.3 mmol/L Final   11/07/2015 3.7 3.5 - 5.3 mmol/L Final   11/03/2015 3.9 3.6 - 4.8 mmol/L    10/17/2015 3.5 (L) 3.6 - 4.8 mmol/L      Chloride   Date Value Ref Range Status   08/09/2024 105 96 - 108 mmol/L Final   08/23/2023 104 96 - 108 mmol/L Final   04/08/2023 103 96 - 108 mmol/L Final   11/07/2015 104 98 - 108 mmol/L Final   11/03/2015 104 96 - 107 mmol/L    10/17/2015 101 96 - 107 mmol/L      CO2   Date Value Ref Range Status   08/09/2024 26 21 - 32 mmol/L Final   08/23/2023 28  21 - 32 mmol/L Final   04/08/2023 26 21 - 32 mmol/L Final   11/07/2015 27.5 21.0 - 32.0 mmol/L Final   11/03/2015 28 22 - 29 mmol/L    10/17/2015 27 22 - 29 mmol/L      BUN   Date Value Ref Range Status   08/09/2024 11 5 - 25 mg/dL Final   08/23/2023 16 5 - 25 mg/dL Final   04/08/2023 17 5 - 25 mg/dL Final   11/07/2015 13 5 - 25 mg/dL Final   11/03/2015 9 9 - 21 mg/dL    10/17/2015 11 9 - 21 mg/dL      Creatinine   Date Value Ref Range Status   08/09/2024 0.79 0.60 - 1.30 mg/dL Final     Comment:     Standardized to IDMS reference method   08/23/2023 0.67 0.60 - 1.30 mg/dL Final     Comment:     Standardized to IDMS reference method   04/08/2023 0.74 0.60 - 1.30 mg/dL Final     Comment:     Standardized to IDMS reference method   11/07/2015 1.08 0.60 - 1.30 mg/dL Final     Comment:     Standardized to IDMS reference method   11/03/2015 0.8 0.6 - 1.3 mg/dL    10/17/2015 1.0 0.6 - 1.3 mg/dL      Glucose   Date Value Ref Range Status   08/23/2023 86 65 - 140 mg/dL Final     Comment:     If the patient is fasting, the ADA then defines impaired fasting glucose as > 100 mg/dL and diabetes as > or equal to 123 mg/dL.   02/28/2019 73 65 - 140 mg/dL Final     Comment:       If the patient is fasting, the ADA then defines impaired fasting glucose as > 100 mg/dL and diabetes as > or equal to 123 mg/dL.  Specimen collection should occur prior to Sulfasalazine administration due to the potential for falsely depressed results. Specimen collection should occur prior to Sulfapyridine administration due to the potential for falsely elevated results.   01/22/2018 86 65 - 140 mg/dL Final     Comment:       If the patient is fasting, the ADA then defines impaired fasting glucose as > 100 mg/dL and diabetes as > or equal to 123 mg/dL.  Specimen collection should occur prior to Sulfasalazine administration due to the potential for falsely depressed results. Specimen collection should occur prior to Sulfapyridine administration due to the  potential for falsely elevated results.     Calcium   Date Value Ref Range Status   08/09/2024 9.5 8.4 - 10.2 mg/dL Final   08/23/2023 9.6 8.4 - 10.2 mg/dL Final   04/08/2023 9.4 8.4 - 10.2 mg/dL Final   11/07/2015 9.1 8.3 - 10.1 mg/dL Final   11/03/2015 8.5 8.4 - 10.0 mg/dL    10/17/2015 8.7 8.4 - 10.0 mg/dL      Total Protein   Date Value Ref Range Status   11/07/2015 7.7 6.4 - 8.2 g/dL Final   11/03/2015 7.6 6.4 - 8.1 g/dL    10/17/2015 7.8 6.4 - 8.1 g/dL      Albumin   Date Value Ref Range Status   08/09/2024 4.2 3.5 - 5.0 g/dL Final   08/23/2023 4.3 3.5 - 5.0 g/dL Final   04/08/2023 4.3 3.5 - 5.0 g/dL Final   11/07/2015 3.5 3.5 - 5.0 g/dL Final   11/03/2015 3.5 3.2 - 5.0 g/dL    10/17/2015 3.5 3.2 - 5.0 g/dL      Total Bilirubin   Date Value Ref Range Status   08/09/2024 0.59 0.20 - 1.00 mg/dL Final     Comment:     Use of this assay is not recommended for patients undergoing treatment with eltrombopag due to the potential for falsely elevated results.  N-acetyl-p-benzoquinone imine (metabolite of Acetaminophen) will generate erroneously low results in samples for patients that have taken an overdose of Acetaminophen.   08/23/2023 0.42 0.20 - 1.00 mg/dL Final     Comment:     Use of this assay is not recommended for patients undergoing treatment with eltrombopag due to the potential for falsely elevated results.  N-acetyl-p-benzoquinone imine (metabolite of Acetaminophen) will generate erroneously low results in samples for patients that have taken an overdose of Acetaminophen.   04/08/2023 0.57 0.20 - 1.00 mg/dL Final     Comment:     Use of this assay is not recommended for patients undergoing treatment with eltrombopag due to the potential for falsely elevated results.  N-acetyl-p-benzoquinone imine (metabolite of Acetaminophen) will generate erroneously low results in samples for patients that have taken an overdose of Acetaminophen.     Alkaline Phosphatase   Date Value Ref Range Status   08/09/2024 56 34  "- 104 U/L Final   08/23/2023 68 34 - 104 U/L Final   04/08/2023 54 34 - 104 U/L Final   11/07/2015 81 46 - 116 U/L Final   11/03/2015 77 46 - 116 IU/L    10/17/2015 76 46 - 116 IU/L      AST   Date Value Ref Range Status   08/09/2024 17 13 - 39 U/L Final   08/23/2023 20 13 - 39 U/L Final   04/08/2023 20 13 - 39 U/L Final   11/07/2015 17 5 - 45 U/L Final   11/03/2015 19 14 - 38 IU/L    10/17/2015 18 14 - 38 IU/L      ALT   Date Value Ref Range Status   08/09/2024 16 7 - 52 U/L Final     Comment:     Specimen collection should occur prior to Sulfasalazine administration due to the potential for falsely depressed results.    08/23/2023 21 7 - 52 U/L Final     Comment:     Specimen collection should occur prior to Sulfasalazine administration due to the potential for falsely depressed results.    04/08/2023 20 7 - 52 U/L Final     Comment:     Specimen collection should occur prior to Sulfasalazine administration due to the potential for falsely depressed results.    11/07/2015 23 12 - 78 U/L Final   11/03/2015 24 12 - 78 IU/L    10/17/2015 24 12 - 78 IU/L       LD   Date Value Ref Range Status   08/09/2024 121 (L) 140 - 271 U/L Final   08/23/2023 153 140 - 271 U/L Final     No results found for: \"TSH\"  No results found for: \"N5NWQOL\"   Free T4   Date Value Ref Range Status   08/09/2024 0.72 0.61 - 1.12 ng/dL Final     Comment:     Specimens with biotin concentrations > 10 ng/mL can lead to significant (> 10%) positive bias in result.         RECENT IMAGING:       Assessment/Plan  Ms. Diane is a 54-year-old female with stage Ib melanoma here for evaluation given enlarged bilateral lymph nodes on exam with vascular.    1. Malignant melanoma of left lower extremity including hip (HCC)  2. Lymphadenopathy  She has stage Ib melanoma that has been monitored closely by myself and dermatology.  New bilateral lymph nodes on exam for vascular.  Today some fullness in the left groin area.  Unclear if these are reactionary " lymph nodes versus related to her melanoma.    Discussed that we will follow-up after ultrasound is performed and further recommendations at that time.  Discussed ultrasound is good technique for looking and evaluating lymph nodes.  She will follow-up as scheduled with me for routine monitoring as planned.  However I will call her after the ultrasound and discuss any further recommendations.      She knows to call with issues or concerns.      Follow up as planned and I will call after US    Piper Santoro MD PhD

## 2024-11-08 ENCOUNTER — TELEPHONE (OUTPATIENT)
Dept: HEMATOLOGY ONCOLOGY | Facility: CLINIC | Age: 54
End: 2024-11-08

## 2024-11-08 NOTE — TELEPHONE ENCOUNTER
Reviewed results from her ultrasound of the groin/inguinal area on 10/23/2024.  Called to discuss with patient, but got her voicemail and I left a message.      There are 2 prominent inguinal nodes demonstrated that are more normal-appearing suggesting may have been reactive on previous study.  There is an node in the upper left thigh somewhat irregular nodular margins not imaged previously.  There is some asymmetric nodularity at the cortex.  Recommendation for tissue sampling if there is no history of acute infection.      Left her message to call back and that I would also try calling her again as well for further discussion regarding these results and next steps.    I do think she should probably undergo tissue sampling with a biopsy, but I will discuss this with her when I am able to speak with her.    Piper Santoro MD, PhD

## 2024-11-14 ENCOUNTER — TELEPHONE (OUTPATIENT)
Age: 54
End: 2024-11-14

## 2024-11-14 NOTE — TELEPHONE ENCOUNTER
Patient is requesting a call back from Dr Santoro personally to further discuss the conversation they were having on MyChart. Please Call her at [phone].

## 2024-11-20 DIAGNOSIS — C43.72 MALIGNANT MELANOMA OF LEFT LOWER EXTREMITY INCLUDING HIP (HCC): Primary | ICD-10-CM

## 2024-11-20 DIAGNOSIS — R59.1 LYMPHADENOPATHY: ICD-10-CM

## 2024-11-20 NOTE — PROGRESS NOTES
Patient had ultrasound of the left groin on 10/23/2024.  There are 2 prominent inguinal nodes demonstrating more normal-appearing morphology suggested that they may have been reactive.  There is an node in the upper left thigh with somewhat irregular nodular margins not imaged previously.  Portions of it are narrow and normal morphology although there are some asymmetric goal nodularity of the cortex more inferiorly.    Does have a history of lupus although there is discussion of obtaining biopsy.    Discussed with her in depth regarding sampling of these lymph nodes.  She does have underlying lupus which could certainly be affecting her lymph nodes, but in the setting of her melanoma, discussed sampling to rule out disease recurrence.    She is agreeable to IR biopsy of the lymph node.    I placed order for IR consult for biopsy of the lymph node that is a concerning on imaging.    Piper Santoro MD, PhD

## 2024-11-21 ENCOUNTER — TELEPHONE (OUTPATIENT)
Dept: HEMATOLOGY ONCOLOGY | Facility: CLINIC | Age: 54
End: 2024-11-21

## 2024-11-21 ENCOUNTER — PREP FOR PROCEDURE (OUTPATIENT)
Dept: INTERVENTIONAL RADIOLOGY/VASCULAR | Facility: CLINIC | Age: 54
End: 2024-11-21

## 2024-11-21 DIAGNOSIS — C43.72 MALIGNANT MELANOMA OF LEFT LOWER EXTREMITY INCLUDING HIP (HCC): Primary | ICD-10-CM

## 2024-11-21 NOTE — TELEPHONE ENCOUNTER
Spoke with patient today.  She decided she wants to completely remove the upper L thigh lymph node.  She does not wish to proceed with the biopsy through IR  Patient requesting this be done at the [facility] with Dr. Charlie ME[place] placed the referral.  Patient aware she will receive a call from surgical oncology department

## 2024-11-26 ENCOUNTER — CONSULT (OUTPATIENT)
Dept: SURGICAL ONCOLOGY | Facility: CLINIC | Age: 54
End: 2024-11-26
Payer: COMMERCIAL

## 2024-11-26 VITALS
RESPIRATION RATE: 16 BRPM | BODY MASS INDEX: 26.53 KG/M2 | DIASTOLIC BLOOD PRESSURE: 86 MMHG | OXYGEN SATURATION: 98 % | TEMPERATURE: 97.7 F | SYSTOLIC BLOOD PRESSURE: 138 MMHG | HEIGHT: 67 IN | WEIGHT: 169 LBS | HEART RATE: 105 BPM

## 2024-11-26 DIAGNOSIS — C43.72 MALIGNANT MELANOMA OF LEFT LOWER EXTREMITY INCLUDING HIP (HCC): Primary | ICD-10-CM

## 2024-11-26 PROCEDURE — 99244 OFF/OP CNSLTJ NEW/EST MOD 40: CPT | Performed by: STUDENT IN AN ORGANIZED HEALTH CARE EDUCATION/TRAINING PROGRAM

## 2024-11-26 NOTE — LETTER
November 27, 2024     Piper Santoro MD  [address] [facility] [address]    Patient: Felicia Diane   YOB: 1970   Date of Visit: 11/26/2024       Dear Dr. Santoro:    Thank you for referring Felicia Diane to me for evaluation. Below are my notes for this consultation.    If you have questions, please do not hesitate to call me. I look forward to following your patient along with you.         Sincerely,        Marita Lawson MD        CC: No Recipients    Marita Lawson MD  11/27/2024 11:58 AM  Sign when Signing Visit  SURGICAL ONCOLOGY CONSULT    Felicia Diane  1970  [id number]  [address] [place] [address]  [facility] [address] [address]      ASSESSMENT AND PLAN    1. Malignant melanoma of left lower extremity including hip (HCC)  Assessment & Plan:  Today we discussed the presence of resolving nodes of the inguinal basin as well as the emergence of a new abnormal appearing node on most recent ultrasound.  We discussed that the standard of care for diagnostic purposes would be a needle biopsy.  We discussed that this node would have to be needle localized, and appears to be within the investing fascia of the thigh.  This would be a invasive procedure to remove the node, and is higher risk than needle biopsy.  We discussed that excision of the node is almost always not necessary unless there is evidence of a lymphoma type diagnosis.  My suspicion for this is quite low given her symptoms.  The patient understands this rationale, and agrees that a needle biopsy is the most reasonable next step.  I will communicate with Dr. Santoro regarding this conversation, and she will continue the diagnosis workup for this patient.  All questions answered today.  Orders:  -     Ambulatory Referral to Surgical Oncology        NEW VISIT DATA    Oncology History   Malignant melanoma of left lower extremity including hip (HCC)    8/3/2023 -  Cancer Staged    Staging form: Melanoma of the Skin, AJCC 8th Edition  - Clinical stage from 8/3/2023: Stage IB (cT1b, cN0, cM0) - Signed by Piper Santoro MD on 1/25/2024       8/3/2023 Surgery    Skin, left leg, excisional biopsy:  Melanoma, superficial spreading type   Breslow thickness: 0.8 mm   Ulceration: Not seen.  Anatomic (Leeroy) level: IV  Type: Superficial spreading type  Mitoses: 1/mm2.   Microsatellites: Not seen.  Lymphovascular invasion: Not seen.  Neurotropism: Not seen.  Tumor regression: Not seen  Tumor-infiltrating lymphocytes (TIL): Present, focally brisk.  Margin assessment: Melanoma in situ and invasive  melanoma do not extends to peripheral and deep margins. Evaluation of the tips is pending deeper levels and will be reported in an addendum.   Pathologic stage: pT1b         10/25/2023 Surgery    Skin, left thigh, excision:     -Prior procedure site changes present.     -Residual melanoma not seen.            History of Present Illness:   Pt with hx T1b ada; declined SLN bx. Has Sjogrens, mixed connective tissue disease with varying sx; most recently on surveillance US 9/2024 of her LN basin she was found to have enlarged nodes with normal morphology. F/U US 6 weeks later demonstrated near-normal resolution of the nodes with the emergence of a new node @ 2.3 cm within the upper thigh. A needle biopsy was obtained but pt preferred excision and she was thus sent to see me. She has aching and soreness throughout her body. No other enlarged nodes.     Review of Systems  Complete ROS Surg Onc:   Constitutional: The patient YES history of general fatigue, no recent weight loss, no change in appetite.   Eyes: No complaints of visual problems, no scleral icterus.   ENT: no complaints of ear pain, no hoarseness, no difficulty swallowing,  no tinnitus and no new masses in head, oral cavity, or neck.   Cardiovascular: No complaints of chest pain, no palpitations, no ankle edema.    Respiratory: No complaints of shortness of breath, no cough.   Gastrointestinal: No complaints of jaundice, no bloody stools, no pale stools.   Genitourinary: No complaints of dysuria, no hematuria, no nocturia, no frequent urination, no urethral discharge.   Musculoskeletal: No complaints of weakness, paralysis, YES joint stiffness & arthralgias.  Integumentary: No complaints of rash, no new lesions.   Neurological: No complaints of convulsions, no seizures, no dizziness.   Hematologic/Lymphatic: No complaints of easy bruising.   Endocrine:  No hot or cold intolerance.  No polydipsia, polyphagia, or polyuria.  Allergy/immunology:  No environmental allergies.  No food allergies.  Not immunocompromised.  Skin:  No pallor or rash.  No wound.      Patient Active Problem List   Diagnosis   • Dense breasts   • Lateral epicondylitis of right elbow   • Chronic left-sided low back pain with left-sided sciatica   • Mixed connective tissue disease (HCC)   • Anti-cardiolipin antibody positive   • Sjogren's syndrome (HCC)   • Chronic leukopenia   • Skin lesion   • PONV (postoperative nausea and vomiting)   • History of hysterectomy   • Malignant melanoma of left lower extremity including hip (HCC)   • Venous insufficiency   • Bilateral leg pain     Past Medical History:   Diagnosis Date   • Abnormal menstrual periods     Resolved 12/18/2017    • Abnormal uterine bleeding     Resolved 12/18/2017    • Anticardiolipin syndrome (HCC)    • Arthropathy, multiple sites     Resolved 1/29/2016    • Erythema nodosum     Resolved 5/1/2017    • Herniated lumbar intervertebral disc    • Malignant melanoma of left lower extremity including hip (HCC) 10/25/2023   • Mixed connective tissue disease (HCC)    • Mixed connective tissue disease (HCC)    • PONV (postoperative nausea and vomiting) 10/25/2023   • Skin lesion     Resolved 1/29/2016      Past Surgical History:   Procedure Laterality Date   • HYSTERECTOMY  12/2017   • MS  CYSTOURETHROSCOPY N/A 12/11/2017    Procedure: CYSTOSCOPY;  Surgeon: Beverly Valdez MD;  [facility];  Service: Gynecology   • OK EXC TUMOR SOFT TISS UPPER ARM/ELBW SUBFASC 5CM/> Left 10/25/2023    Procedure: EXCISION BIOPSY TISSUE LESION/MASS LOWER EXTREMITY ;  Surgeon: Christiano Ramirez MD;  Location: [facility];  Service: General   • OK LAPS W/VAG HYSTERECT 250 GM/&RMVL TUBE&/OVARIES N/A 12/11/2017    Procedure: LAPAROSCOPIC ASSISTED VAGINAL HYSTERECTOMY; BILATERAL SALPINGECTOMY;  Surgeon: Beverly Valdez MD;  [facility];  Service: Gynecology     Family History   Problem Relation Age of Onset   • Gout Father    • Liver cancer Father 74   • No Known Problems Sister    • No Known Problems Daughter    • No Known Problems Daughter    • No Known Problems Daughter    • Rheum arthritis Maternal Grandmother    • Diabetes Brother    • No Known Problems Brother    • No Known Problems Brother    • Lupus Cousin    • Sjogren's syndrome Family    • Lupus Family    • Thyroid disease Family    • Heart disease Family      Social History     Socioeconomic History   • Marital status: /Civil Union     Spouse name: Not on file   • Number of children: Not on file   • Years of education: Not on file   • Highest education level: Not on file   Occupational History   • Not on file   Tobacco Use   • Smoking status: Never     Passive exposure: Never   • Smokeless tobacco: Never   Vaping Use   • Vaping status: Never Used   Substance and Sexual Activity   • Alcohol use: Not Currently   • Drug use: No   • Sexual activity: Not on file   Other Topics Concern   • Not on file   Social History Narrative   • Not on file     Social Drivers of Health     Financial Resource Strain: Not on file   Food Insecurity: Not on file   Transportation Needs: Not on file   Physical Activity: Not on file   Stress: Not on file   Social Connections: Not on file   Intimate Partner Violence: Not on file   Housing Stability: Not on  file       Current Outpatient Medications:   •  Cholecalciferol (Vitamin D3) 50 MCG (2000 UT) TABS, Take 2,000 Units by mouth daily, Disp: , Rfl:   •  cyclobenzaprine (FLEXERIL) 10 mg tablet, Take 1 tablet (10 mg total) by mouth 2 (two) times a day as needed for muscle spasms (Patient not taking: Reported on 9/27/2024), Disp: 20 tablet, Rfl: 0  •  ketorolac (TORADOL) 10 mg tablet, Take 1 tablet (10 mg total) by mouth every 6 (six) hours as needed for moderate pain (Patient not taking: Reported on 9/27/2024), Disp: 14 tablet, Rfl: 0  •  magnesium 30 MG tablet, Take 30 mg by mouth 2 (two) times a day, Disp: , Rfl:   Allergies   Allergen Reactions   • Covid-19 Mrna Vacc (Moderna) Palpitations     Felt sick and dizzy for days    • Levaquin [Levofloxacin] Other (See Comments)     Tendon tears/ tendon pain   • Reglan [Metoclopramide] Other (See Comments)     Severe agitation   • Amoxil [Amoxicillin] Rash         Vitals:    11/26/24 1136   BP: 138/86   Pulse: 105   Resp: 16   Temp: 97.7 °F (36.5 °C)   SpO2: 98%       Physical Exam   Constitutional: General appearance: The Patient is well-developed and well-nourished who appears the stated age in no acute distress. Patient is pleasant and talkative.     HEENT:  Normocephalic.  Sclerae are anicteric. Mucous membranes are moist. Neck is supple without adenopathy. No JVD.     Chest: Easy WOB.     Cardiac: Heart is regular rate.     Abdomen: Abdomen is soft, non-tender, non-distended and without masses.     Extremities: There is no clubbing or cyanosis. There is no edema.  Symmetric.  Neuro: Grossly nonfocal. Gait is normal.     Lymphatic: No evidence of cervical adenopathy bilaterally.   No evidence of axillary adenopathy bilaterally. No evidence of inguinal adenopathy bilaterally.     Skin: Warm, anicteric.  Melanoma scar well-healed  Psych:  Patient is pleasant and talkative.      I personally reviewed and interpreted the available history, laboratory and imaging data,  including: Dr Santoro med onc notes, cancer history, US x 2, MRI, mammo, path. Discussed with Dr Santoro

## 2024-11-27 DIAGNOSIS — C43.72 MALIGNANT MELANOMA OF LEFT LOWER EXTREMITY INCLUDING HIP (HCC): Primary | ICD-10-CM

## 2024-11-27 DIAGNOSIS — R59.1 LYMPHADENOPATHY: ICD-10-CM

## 2024-11-27 NOTE — ASSESSMENT & PLAN NOTE
Today we discussed the presence of resolving nodes of the inguinal basin as well as the emergence of a new abnormal appearing node on most recent ultrasound.  We discussed that the standard of care for diagnostic purposes would be a needle biopsy.  We discussed that this node would have to be needle localized, and appears to be within the investing fascia of the thigh.  This would be a invasive procedure to remove the node, and is higher risk than needle biopsy.  We discussed that excision of the node is almost always not necessary unless there is evidence of a lymphoma type diagnosis.  My suspicion for this is quite low given her symptoms.  The patient understands this rationale, and agrees that a needle biopsy is the most reasonable next step.  I will communicate with Dr. Santoro regarding this conversation, and she will continue the diagnosis workup for this patient.  All questions answered today.

## 2024-11-27 NOTE — PROGRESS NOTES
SURGICAL ONCOLOGY CONSULT    Felicia Diane  1970  [id number]  [address] [facility]  [facility] [place]  [address] [facility] [address]      ASSESSMENT AND PLAN    1. Malignant melanoma of left lower extremity including hip (HCC)  Assessment & Plan:  Today we discussed the presence of resolving nodes of the inguinal basin as well as the emergence of a new abnormal appearing node on most recent ultrasound.  We discussed that the standard of care for diagnostic purposes would be a needle biopsy.  We discussed that this node would have to be needle localized, and appears to be within the investing fascia of the thigh.  This would be a invasive procedure to remove the node, and is higher risk than needle biopsy.  We discussed that excision of the node is almost always not necessary unless there is evidence of a lymphoma type diagnosis.  My suspicion for this is quite low given her symptoms.  The patient understands this rationale, and agrees that a needle biopsy is the most reasonable next step.  I will communicate with Dr. Santoro regarding this conversation, and she will continue the diagnosis workup for this patient.  All questions answered today.  Orders:  -     Ambulatory Referral to Surgical Oncology        NEW VISIT DATA    Oncology History   Malignant melanoma of left lower extremity including hip (HCC)   8/3/2023 -  Cancer Staged    Staging form: Melanoma of the Skin, AJCC 8th Edition  - Clinical stage from 8/3/2023: Stage IB (cT1b, cN0, cM0) - Signed by Piper Santoro MD on 1/25/2024       8/3/2023 Surgery    Skin, left leg, excisional biopsy:  Melanoma, superficial spreading type   Breslow thickness: 0.8 mm   Ulceration: Not seen.  Anatomic (Leeroy) level: IV  Type: Superficial spreading type  Mitoses: 1/mm2.   Microsatellites: Not seen.  Lymphovascular invasion: Not seen.  Neurotropism: Not seen.  Tumor regression: Not seen  Tumor-infiltrating  lymphocytes (TIL): Present, focally brisk.  Margin assessment: Melanoma in situ and invasive  melanoma do not extends to peripheral and deep margins. Evaluation of the tips is pending deeper levels and will be reported in an addendum.   Pathologic stage: pT1b         10/25/2023 Surgery    Skin, left thigh, excision:     -Prior procedure site changes present.     -Residual melanoma not seen.            History of Present Illness:   Pt with hx T1b ada; declined SLN bx. Has Sjogrens, mixed connective tissue disease with varying sx; most recently on surveillance US 9/2024 of her LN basin she was found to have enlarged nodes with normal morphology. F/U US 6 weeks later demonstrated near-normal resolution of the nodes with the emergence of a new node @ 2.3 cm within the upper thigh. A needle biopsy was obtained but pt preferred excision and she was thus sent to see me. She has aching and soreness throughout her body. No other enlarged nodes.     Review of Systems  Complete ROS Surg Onc:   Constitutional: The patient YES history of general fatigue, no recent weight loss, no change in appetite.   Eyes: No complaints of visual problems, no scleral icterus.   ENT: no complaints of ear pain, no hoarseness, no difficulty swallowing,  no tinnitus and no new masses in head, oral cavity, or neck.   Cardiovascular: No complaints of chest pain, no palpitations, no ankle edema.   Respiratory: No complaints of shortness of breath, no cough.   Gastrointestinal: No complaints of jaundice, no bloody stools, no pale stools.   Genitourinary: No complaints of dysuria, no hematuria, no nocturia, no frequent urination, no urethral discharge.   Musculoskeletal: No complaints of weakness, paralysis, YES joint stiffness & arthralgias.  Integumentary: No complaints of rash, no new lesions.   Neurological: No complaints of convulsions, no seizures, no dizziness.   Hematologic/Lymphatic: No complaints of easy bruising.   Endocrine:  No hot or cold  intolerance.  No polydipsia, polyphagia, or polyuria.  Allergy/immunology:  No environmental allergies.  No food allergies.  Not immunocompromised.  Skin:  No pallor or rash.  No wound.      Patient Active Problem List   Diagnosis    Dense breasts    Lateral epicondylitis of right elbow    Chronic left-sided low back pain with left-sided sciatica    Mixed connective tissue disease (HCC)    Anti-cardiolipin antibody positive    Sjogren's syndrome (HCC)    Chronic leukopenia    Skin lesion    PONV (postoperative nausea and vomiting)    History of hysterectomy    Malignant melanoma of left lower extremity including hip (HCC)    Venous insufficiency    Bilateral leg pain     Past Medical History:   Diagnosis Date    Abnormal menstrual periods     Resolved 12/18/2017     Abnormal uterine bleeding     Resolved 12/18/2017     Anticardiolipin syndrome (HCC)     Arthropathy, multiple sites     Resolved 1/29/2016     Erythema nodosum     Resolved 5/1/2017     Herniated lumbar intervertebral disc     Malignant melanoma of left lower extremity including hip (HCC) 10/25/2023    Mixed connective tissue disease (HCC)     Mixed connective tissue disease (HCC)     PONV (postoperative nausea and vomiting) 10/25/2023    Skin lesion     Resolved 1/29/2016      Past Surgical History:   Procedure Laterality Date    HYSTERECTOMY  12/2017    ID CYSTOURETHROSCOPY N/A 12/11/2017    Procedure: CYSTOSCOPY;  Surgeon: Beverly Valdez MD;  [facility];  Service: Gynecology    ID EXC TUMOR SOFT TISS UPPER ARM/ELBW SUBFASC 5CM/> Left 10/25/2023    Procedure: EXCISION BIOPSY TISSUE LESION/MASS LOWER EXTREMITY ;  Surgeon: Christiano Ramirez MD;  [facility];  Service: General    ID LAPS W/VAG HYSTERECT 250 GM/&RMVL TUBE&/OVARIES N/A 12/11/2017    Procedure: LAPAROSCOPIC ASSISTED VAGINAL HYSTERECTOMY; BILATERAL SALPINGECTOMY;  Surgeon: Beverly Valdez MD;  [facility];  Service: Gynecology     Family History    Problem Relation Age of Onset    Gout Father     Liver cancer Father 74    No Known Problems Sister     No Known Problems Daughter     No Known Problems Daughter     No Known Problems Daughter     Rheum arthritis Maternal Grandmother     Diabetes Brother     No Known Problems Brother     No Known Problems Brother     Lupus Cousin     Sjogren's syndrome Family     Lupus Family     Thyroid disease Family     Heart disease Family      Social History     Socioeconomic History    Marital status: /Civil Union     Spouse name: Not on file    Number of children: Not on file    Years of education: Not on file    Highest education level: Not on file   Occupational History    Not on file   Tobacco Use    Smoking status: Never     Passive exposure: Never    Smokeless tobacco: Never   Vaping Use    Vaping status: Never Used   Substance and Sexual Activity    Alcohol use: Not Currently    Drug use: No    Sexual activity: Not on file   Other Topics Concern    Not on file   Social History Narrative    Not on file     Social Drivers of Health     Financial Resource Strain: Not on file   Food Insecurity: Not on file   Transportation Needs: Not on file   Physical Activity: Not on file   Stress: Not on file   Social Connections: Not on file   Intimate Partner Violence: Not on file   Housing Stability: Not on file       Current Outpatient Medications:     Cholecalciferol (Vitamin D3) 50 MCG (2000 UT) TABS, Take 2,000 Units by mouth daily, Disp: , Rfl:     cyclobenzaprine (FLEXERIL) 10 mg tablet, Take 1 tablet (10 mg total) by mouth 2 (two) times a day as needed for muscle spasms (Patient not taking: Reported on 9/27/2024), Disp: 20 tablet, Rfl: 0    ketorolac (TORADOL) 10 mg tablet, Take 1 tablet (10 mg total) by mouth every 6 (six) hours as needed for moderate pain (Patient not taking: Reported on 9/27/2024), Disp: 14 tablet, Rfl: 0    magnesium 30 MG tablet, Take 30 mg by mouth 2 (two) times a day, Disp: , Rfl:   Allergies    Allergen Reactions    Covid-19 Mrna Vacc (Moderna) Palpitations     Felt sick and dizzy for days     Levaquin [Levofloxacin] Other (See Comments)     Tendon tears/ tendon pain    Reglan [Metoclopramide] Other (See Comments)     Severe agitation    Amoxil [Amoxicillin] Rash         Vitals:    11/26/24 1136   BP: 138/86   Pulse: 105   Resp: 16   Temp: 97.7 °F (36.5 °C)   SpO2: 98%       Physical Exam   Constitutional: General appearance: The Patient is well-developed and well-nourished who appears the stated age in no acute distress. Patient is pleasant and talkative.     HEENT:  Normocephalic.  Sclerae are anicteric. Mucous membranes are moist. Neck is supple without adenopathy. No JVD.     Chest: Easy WOB.     Cardiac: Heart is regular rate.     Abdomen: Abdomen is soft, non-tender, non-distended and without masses.     Extremities: There is no clubbing or cyanosis. There is no edema.  Symmetric.  Neuro: Grossly nonfocal. Gait is normal.     Lymphatic: No evidence of cervical adenopathy bilaterally.   No evidence of axillary adenopathy bilaterally. No evidence of inguinal adenopathy bilaterally.     Skin: Warm, anicteric.  Melanoma scar well-healed  Psych:  Patient is pleasant and talkative.      I personally reviewed and interpreted the available history, laboratory and imaging data, including: Dr Santoro med onc notes, cancer history, US x 2, MRI, mammo, path. Discussed with Dr Santoro

## 2024-12-10 ENCOUNTER — TELEPHONE (OUTPATIENT)
Dept: RADIOLOGY | Facility: HOSPITAL | Age: 54
End: 2024-12-10

## 2024-12-10 NOTE — NURSING NOTE
Spoke with patient regarding lymph node biopsy on 12/10 at [facility].   Pt to arrive in outpatient registration at 0830, no prep for biopsy, pt can eat, drink and take meds.  Questions answered as offered.

## 2024-12-12 ENCOUNTER — HOSPITAL ENCOUNTER (OUTPATIENT)
Dept: NON INVASIVE DIAGNOSTICS | Facility: HOSPITAL | Age: 54
Discharge: HOME | End: 2024-12-12
Attending: RADIOLOGY
Payer: COMMERCIAL

## 2024-12-12 VITALS
RESPIRATION RATE: 18 BRPM | SYSTOLIC BLOOD PRESSURE: 114 MMHG | DIASTOLIC BLOOD PRESSURE: 80 MMHG | OXYGEN SATURATION: 98 % | HEART RATE: 57 BPM

## 2024-12-12 DIAGNOSIS — C43.72 MALIGNANT MELANOMA OF LEFT LOWER EXTREMITY INCLUDING HIP (HCC): ICD-10-CM

## 2024-12-12 PROCEDURE — 88185 FLOWCYTOMETRY/TC ADD-ON: CPT | Performed by: INTERNAL MEDICINE

## 2024-12-12 PROCEDURE — 88364 INSITU HYBRIDIZATION (FISH): CPT | Performed by: STUDENT IN AN ORGANIZED HEALTH CARE EDUCATION/TRAINING PROGRAM

## 2024-12-12 PROCEDURE — 38505 NEEDLE BIOPSY LYMPH NODES: CPT

## 2024-12-12 PROCEDURE — 88341 IMHCHEM/IMCYTCHM EA ADD ANTB: CPT | Performed by: STUDENT IN AN ORGANIZED HEALTH CARE EDUCATION/TRAINING PROGRAM

## 2024-12-12 PROCEDURE — 88184 FLOWCYTOMETRY/ TC 1 MARKER: CPT | Performed by: INTERNAL MEDICINE

## 2024-12-12 PROCEDURE — 88307 TISSUE EXAM BY PATHOLOGIST: CPT | Performed by: STUDENT IN AN ORGANIZED HEALTH CARE EDUCATION/TRAINING PROGRAM

## 2024-12-12 PROCEDURE — 88342 IMHCHEM/IMCYTCHM 1ST ANTB: CPT | Performed by: STUDENT IN AN ORGANIZED HEALTH CARE EDUCATION/TRAINING PROGRAM

## 2024-12-12 PROCEDURE — 88365 INSITU HYBRIDIZATION (FISH): CPT | Performed by: STUDENT IN AN ORGANIZED HEALTH CARE EDUCATION/TRAINING PROGRAM

## 2024-12-12 PROCEDURE — 88360 TUMOR IMMUNOHISTOCHEM/MANUAL: CPT | Performed by: STUDENT IN AN ORGANIZED HEALTH CARE EDUCATION/TRAINING PROGRAM

## 2024-12-12 PROCEDURE — 38505 NEEDLE BIOPSY LYMPH NODES: CPT | Performed by: RADIOLOGY

## 2024-12-12 PROCEDURE — 76942 ECHO GUIDE FOR BIOPSY: CPT | Performed by: RADIOLOGY

## 2024-12-12 RX ORDER — LIDOCAINE WITH 8.4% SOD BICARB 0.9%(10ML)
SYRINGE (ML) INJECTION AS NEEDED
Status: COMPLETED | OUTPATIENT
Start: 2024-12-12 | End: 2024-12-12

## 2024-12-12 RX ADMIN — Medication 10 ML: at 09:10

## 2024-12-12 NOTE — DISCHARGE INSTRUCTIONS
Lymph Node Biopsy   WHAT YOU NEED TO KNOW:   A lymph node biopsy is done to remove all or part of a lymph node. A lymph node can be tested for infection, cancer, and other medical conditions. This information may help your healthcare provider decide if you need more tests or treatments.   DISCHARGE INSTRUCTIONS:   Seek care immediately if:   Blood soaks through your bandage.    Your bruise suddenly gets larger and feels hard.  Contact your healthcare provider if:   You have a fever or chills.    Your wound is red, swollen, or draining  .    Your pain does not get better after you take medicine.     You have questions or concerns about your condition or care.  Medicines:  You may need any of the following:  NSAIDs , such as ibuprofen, help decrease swelling, pain, and fever. This medicine is available with or without a doctor's order. NSAIDs can cause stomach bleeding or kidney problems in certain people. If you take blood thinner medicine, always ask your healthcare provider if NSAIDs are safe for you. Always read the medicine label and follow directions.    Acetaminophen  decreases pain and fever. It is available without a doctor's order. Ask how much to take and how often to take it. Follow directions. Read the labels of all other medicines you are using to see if they also contain acetaminophen, or ask your doctor or pharmacist. Acetaminophen can cause liver damage if not taken correctly. Do not use more than 4 grams (4,000 milligrams) total of acetaminophen in one day.     Prescription pain medicine  may be given. Ask your healthcare provider how to take this medicine safely. Some prescription pain medicines contain acetaminophen. Do not take other medicines that contain acetaminophen without talking to your healthcare provider. Too much acetaminophen may cause liver damage. Prescription pain medicine may cause constipation. Ask your healthcare provider how to prevent or treat constipation.     Take your  medicine as directed.  Call your healthcare provider if you think your medicine is not helping or if you have side effects. Tell him if you are allergic to any medicine. Keep a list of the medicines, vitamins, and herbs you take. Include the amounts, and when and why you take them. Bring the list or the pill bottles to follow-up visits. Carry your medicine list with you in case of an emergency.  Care for your wound as directed:  Ask your healthcare provider when your biopsy site can get wet. Carefully wash around the biopsy site with soap and water. It is okay to let soap and water gently run over your biopsy site. Do not  scrub your biopsy site. You may remove the bandage in 24 hours. Check your biopsy site every day for signs of infection, such as redness, swelling, or pus. Do not put powders or lotions on your biopsy site..   Self-care:   Apply ice  on your biopsy site for 15 to 20 minutes every hour or as directed. Use an ice pack, or put crushed ice in a plastic bag. Cover it with a towel before you apply it to your skin. Ice helps prevent tissue damage and decreases swelling..     Do not do strenuous activities  for 24 to 48 hours. Strenuous activities include heavy lifting, sports, or running.  Follow up with your healthcare provider as directed:  You may need more tests. Write down your questions so you remember to ask them during your visits.  .  You may contact the Interventional RN for any questions at [[phone] til 4pm.After 4pm you may contact your ordering MD.

## 2024-12-12 NOTE — SEDATION DOCUMENTATION
Procedure ended, left, pt tolerated with local anesthesia, band aid to site.  Pt discharged home with instructions.

## 2024-12-12 NOTE — BRIEF OP NOTE (RAD/CATH)
INTERVENTIONAL RADIOLOGY PROCEDURE NOTE    Date: 12/12/2024    Procedure:   Procedure Summary       Date: 12/12/24 Room / Location: [facility] Cardiac Cath [facility]    Anesthesia Start:  Anesthesia Stop:     Procedure: IR BIOPSY LYMPH NODE Diagnosis:       Malignant melanoma of left lower extremity including hip (HCC)      (left inguinal lymphadenopathy)    Scheduled Providers:  Responsible Provider:     Anesthesia Type: Not recorded ASA Status: Not recorded            Preoperative diagnosis:   1. Malignant melanoma of left lower extremity including hip (HCC)         Postoperative diagnosis: Same.    Surgeon: Perry Torres MD     Assistant: None. No qualified resident was available.    Blood loss: Minimal    Specimens: 8, 18 G cores     Findings:  Left inguinal/upper thigh lymph node biopsy, approximately 8 cores taken. 2 placed in RPMI, the rest in formalin.    Complications: None immediate.    Anesthesia: local

## 2024-12-15 LAB — SCAN RESULT: NORMAL

## 2024-12-19 PROCEDURE — 88342 IMHCHEM/IMCYTCHM 1ST ANTB: CPT | Performed by: STUDENT IN AN ORGANIZED HEALTH CARE EDUCATION/TRAINING PROGRAM

## 2024-12-19 PROCEDURE — 88364 INSITU HYBRIDIZATION (FISH): CPT | Performed by: STUDENT IN AN ORGANIZED HEALTH CARE EDUCATION/TRAINING PROGRAM

## 2024-12-19 PROCEDURE — 88307 TISSUE EXAM BY PATHOLOGIST: CPT | Performed by: STUDENT IN AN ORGANIZED HEALTH CARE EDUCATION/TRAINING PROGRAM

## 2024-12-19 PROCEDURE — 88341 IMHCHEM/IMCYTCHM EA ADD ANTB: CPT | Performed by: STUDENT IN AN ORGANIZED HEALTH CARE EDUCATION/TRAINING PROGRAM

## 2024-12-19 PROCEDURE — 88365 INSITU HYBRIDIZATION (FISH): CPT | Performed by: STUDENT IN AN ORGANIZED HEALTH CARE EDUCATION/TRAINING PROGRAM

## 2024-12-19 PROCEDURE — 88360 TUMOR IMMUNOHISTOCHEM/MANUAL: CPT | Performed by: STUDENT IN AN ORGANIZED HEALTH CARE EDUCATION/TRAINING PROGRAM

## 2024-12-20 ENCOUNTER — RESULTS FOLLOW-UP (OUTPATIENT)
Dept: HEMATOLOGY ONCOLOGY | Facility: CLINIC | Age: 54
End: 2024-12-20

## 2025-01-14 ENCOUNTER — OFFICE VISIT (OUTPATIENT)
Dept: DERMATOLOGY | Facility: CLINIC | Age: 55
End: 2025-01-14
Payer: COMMERCIAL

## 2025-01-14 VITALS — BODY MASS INDEX: 26.53 KG/M2 | HEIGHT: 67 IN | WEIGHT: 169 LBS | TEMPERATURE: 98.3 F

## 2025-01-14 DIAGNOSIS — L82.1 SEBORRHEIC KERATOSIS: ICD-10-CM

## 2025-01-14 DIAGNOSIS — Z85.820 HISTORY OF MELANOMA: Primary | ICD-10-CM

## 2025-01-14 DIAGNOSIS — L85.3 XEROSIS OF SKIN: ICD-10-CM

## 2025-01-14 DIAGNOSIS — L81.4 LENTIGO: ICD-10-CM

## 2025-01-14 DIAGNOSIS — D22.9 MULTIPLE MELANOCYTIC NEVI: ICD-10-CM

## 2025-01-14 DIAGNOSIS — D18.01 CHERRY ANGIOMA: ICD-10-CM

## 2025-01-14 PROCEDURE — 99213 OFFICE O/P EST LOW 20 MIN: CPT | Performed by: DERMATOLOGY

## 2025-01-14 NOTE — PROGRESS NOTES
"[facility] Note     Patient Name: Felicia Diane  Encounter Date: 1/14/25     Have you been cared for by a [facility] Dermatologist in the last 3 years and, if so, which description applies to you?    Yes.  I have been here within the last 3 years, and my medical history has NOT changed since that time.  I am FEMALE/of child-bearing potential.    REVIEW OF SYSTEMS:  Have you recently had or currently have any of the following? No changes in my recent health.   PAST MEDICAL HISTORY:  Have you personally ever had or currently have any of the following?  If \"YES,\" then please provide more detail. No changes in my medical history.   HISTORY OF IMMUNOSUPPRESSION: Do you have a history of any of the following:  Systemic Immunosuppression such as Diabetes, Biologic or Immunotherapy, Chemotherapy, Organ Transplantation, Bone Marrow Transplantation or Prednisone?  No     Answering \"YES\" requires the addition of the dotphrase \"IMMUNOSUPPRESSED\" as the first diagnosis of the patient's visit.   FAMILY HISTORY:  Any \"first degree relatives\" (parent, brother, sister, or child) with the following?    No changes in my family's known health.   PATIENT EXPERIENCE:    Do you want the Dermatologist to perform a COMPLETE skin exam today including a clinical examination under the \"bra and underwear\" areas?  Yes, underneath bra, underneath underwear and feet not examined today   If necessary, do we have your permission to call and leave a detailed message on your Preferred Phone number that includes your specific medical information?  Yes      Allergies   Allergen Reactions    Covid-19 Mrna Vacc (Moderna) Palpitations     Felt sick and dizzy for days     Levaquin [Levofloxacin] Other (See Comments)     Tendon tears/ tendon pain    Reglan [Metoclopramide] Other (See Comments)     Severe agitation    Amoxil [Amoxicillin] Rash      Current Outpatient Medications:     Cholecalciferol (Vitamin D3) 50 MCG (2000 UT) " TABS, Take 2,000 Units by mouth daily, Disp: , Rfl:     cyclobenzaprine (FLEXERIL) 10 mg tablet, Take 1 tablet (10 mg total) by mouth 2 (two) times a day as needed for muscle spasms (Patient not taking: Reported on 9/27/2024), Disp: 20 tablet, Rfl: 0    ketorolac (TORADOL) 10 mg tablet, Take 1 tablet (10 mg total) by mouth every 6 (six) hours as needed for moderate pain (Patient not taking: Reported on 9/27/2024), Disp: 14 tablet, Rfl: 0    magnesium 30 MG tablet, Take 30 mg by mouth 2 (two) times a day, Disp: , Rfl:           Whom besides the patient is providing clinical information about today's encounter?   NO ADDITIONAL HISTORIAN (patient alone provided history)    Physical Exam and Assessment/Plan by Diagnosis:    HISTORY OF MELANOMA     Physical Exam:  Anatomic Location Affected:  Left leg-8/2023  Morphological Description of Scar:  well healed scar  Year Treated: 10/2023  TNM Classification: pT1b   Suspected Recurrence: no  Regional adenopathy: no     Additional History of Present Condition:  excision done by general surgeon, Christiano Ramirez MD on left leg on 10/25/23      Assessment and Plan:  Based on a thorough discussion of this condition and the management approach to it (including a comprehensive discussion of the known risks, side effects and potential benefits of treatment), the patient (family) agrees to implement the following specific plan:  When outside we recommend using a wide brim hat, sunglasses, long sleeve and pants, sunscreen with SPF 30+ with reapplication every 2 hours, or SPF specific clothing    Continue with routine skin checks  Recommend yearly appointments with your eye doctor and dentist. Please make sure they are aware of your history of Melanoma.   Monitor for any changes      What happens at follow-up?  The main purpose of follow-up is to detect recurrences early (metastatic melanoma), but it also offers an opportunity to diagnose a new primary melanoma at the first possible  "opportunity. A second invasive melanoma occurs in 5-10% of melanoma patients and a new melanoma in situ is diagnosed in more than 20% of melanoma patients.     Our practice makes the following recommendations for follow-up for patients with invasive melanoma.  At-least \"monthly\" self-skin examinations   Routine skin checks by a board certified dermatologist  Follow-up intervals are \"every 3 months\" within 2 years of a new melanoma diagnosis; \"every 6 months\" between 2-4 years of a new melanoma diagnosis; and \"annually\" after 4 years of a new melanoma diagnosis  Individual patient's needs should be considered before an appropriate follow-up is offered  Provide education and support to help the patient adjust to their illness     Follow-up appointments should include:  A check of the scar where the primary melanoma was removed  Checking the regional lymph nodes  A general skin examination  A full physical examination at least annually by your primary care physician     In those with more advanced primary disease, follow-up may include:  Blood tests  Imaging: ultrasound, X-ray, CT, MRI and PET scan.     Most tests are not worthwhile for patients with stage 1 or 2 melanoma unless there are signs or symptoms of disease recurrence or metastasis. No tests are necessary for healthy patients who have remained well for five years or longer after removal of their melanoma.     What is the outlook for patients with melanoma?  Melanoma in situ is cured by excision because it has no potential to spread around the body.  The risk of spread and ultimate death from invasive melanoma depends on several factors, but the main one is the Breslow thickness of the melanoma at the time it was surgically removed.  Metastases are rare for melanomas < 0.75 mm and the risk for tumours 0.75-1 mm thick is about 5%. The risk steadily increases with thickness so that melanomas > 4 mm have a risk of metastasis of about 40%.     Melanoma is a " "potentially serious type of skin cancer, in which there is uncontrolled growth of melanocytes (pigment cells). Melanoma is sometimes called malignant melanoma.  Normal melanocytes are found in the basal layer of the epidermis (the outer layer of skin). Melanocytes produce a protein called melanin, which protects skin cells by absorbing ultraviolet (UV) radiation. Melanocytes are found in equal numbers in black and white skin, but melanocytes in black skin produce much more melanin. People with dark brown or black skin are very much less likely to be damaged by UV radiation than those with white skin.          MELANOCYTIC NEVI (\"Moles\")    Physical Exam:  Anatomic Location Affected:   Mostly on sun-exposed areas of the trunk and extremities  Morphological Description:  Scattered, 1-4mm round to ovoid, symmetrical-appearing, even bordered, skin colored to dark brown macules/papules, mostly in sun-exposed areas  Pertinent Positives:  Pertinent Negatives:    Additional History of Present Condition:      Assessment and Plan:  Based on a thorough discussion of this condition and the management approach to it (including a comprehensive discussion of the known risks, side effects and potential benefits of treatment), the patient (family) agrees to implement the following specific plan:  When outside we recommend using a wide brim hat, sunglasses, long sleeve and pants, sunscreen with SPF 30+ with reapplication every 2 hours, or SPF specific clothing   Benign, reassured  Annual skin check     Melanocytic Nevi  Melanocytic nevi (\"moles\") are tan or brown, raised or flat areas of the skin which have an increased number of melanocytes. Melanocytes are the cells in our body which make pigment and account for skin color.    Some moles are present at birth (I.e., \"congenital nevi\"), while others come up later in life (i.e., \"acquired nevi\").  The sun can stimulate the body to make more moles.  Sunburns are not the only thing that " "triggers more moles.  Chronic sun exposure can do it too.     Clinically distinguishing a healthy mole from melanoma may be difficult, even for experienced dermatologists. The \"ABCDE's\" of moles have been suggested as a means of helping to alert a person to a suspicious mole and the possible increased risk of melanoma.  The suggestions for raising alert are as follows:    Asymmetry: Healthy moles tend to be symmetric, while melanomas are often asymmetric.  Asymmetry means if you draw a line through the mole, the two halves do not match in color, size, shape, or surface texture. Asymmetry can be a result of rapid enlargement of a mole, the development of a raised area on a previously flat lesion, scaling, ulceration, bleeding or scabbing within the mole.  Any mole that starts to demonstrate \"asymmetry\" should be examined promptly by a board certified dermatologist.     Border: Healthy moles tend to have discrete, even borders.  The border of a melanoma often blends into the normal skin and does not sharply delineate the mole from normal skin.  Any mole that starts to demonstrate \"uneven borders\" should be examined promptly by a board certified dermatologist.     Color: Healthy moles tend to be one color throughout.  Melanomas tend to be made up of different colors ranging from dark black, blue, white, or red.  Any mole that demonstrates a color change should be examined promptly by a board certified dermatologist.     Diameter: Healthy moles tend to be smaller than 0.6 cm in size; an exception are \"congenital nevi\" that can be larger.  Melanomas tend to grow and can often be greater than 0.6 cm (1/4 of an inch, or the size of a pencil eraser). This is only a guideline, and many normal moles may be larger than 0.6 cm without being unhealthy.  Any mole that starts to change in size (small to bigger or bigger to smaller) should be examined promptly by a board certified dermatologist.     Evolving: Healthy moles tend to " "\"stay the same.\"  Melanomas may often show signs of change or evolution such as a change in size, shape, color, or elevation.  Any mole that starts to itch, bleed, crust, burn, hurt, or ulcerate or demonstrate a change or evolution should be examined promptly by a board certified dermatologist.        LENTIGO    Physical Exam:  Anatomic Location Affected:  trunk, arms  Morphological Description:  Light brown macules  Pertinent Positives:  Pertinent Negatives:    Additional History of Present Condition:      Assessment and Plan:  Based on a thorough discussion of this condition and the management approach to it (including a comprehensive discussion of the known risks, side effects and potential benefits of treatment), the patient (family) agrees to implement the following specific plan:  When outside we recommend using a wide brim hat, sunglasses, long sleeve and pants, sunscreen with SPF 30+ with reapplication every 2 hours, or SPF specific clothing       What is a lentigo?  A lentigo is a pigmented flat or slightly raised lesion with a clearly defined edge. Unlike an ephelis (freckle), it does not fade in the winter months. There are several kinds of lentigo.  The name lentigo originally referred to its appearance resembling a small lentil. The plural of lentigo is lentigines, although “lentigos” is also in common use.    Who gets lentigines?  Lentigines can affect males and females of all ages and races. Solar lentigines are especially prevalent in fair skinned adults. Lentigines associated with syndromes are present at birth or arise during childhood.    What causes lentigines?  Common forms of lentigo are due to exposure to ultraviolet radiation:  Sun damage including sunburn   Indoor tanning   Phototherapy, especially photochemotherapy (PUVA)    Ionizing radiation, eg radiation therapy, can also cause lentigines.  Several familial syndromes associated with widespread lentigines originate from mutations in " "Attila-MAP kinase, mTOR signaling and PTEN pathways.    What is the treatment for lentigines?  Most lentigines are left alone. Attempts to lighten them may not be successful. The following approaches are used:  SPF 50+ broad-spectrum sunscreen   Hydroquinone bleaching cream   Alpha hydroxy acids   Vitamin C   Retinoids   Azelaic acid   Chemical peels  Individual lesions can be permanently removed using:  Cryotherapy   Intense pulsed light   Pigment lasers    How can lentigines be prevented?  Lentigines associated with exposure ultraviolet radiation can be prevented by very careful sun protection. Clothing is more successful at preventing new lentigines than are sunscreens.    What is the outlook for lentigines?  Lentigines usually persist. They may increase in number with age and sun exposure. Some in sun-protected sites may fade and disappear.    PRESLEY ANGIOMAS    Physical Exam:  Anatomic Location Affected:  trunk  Morphological Description:  Scattered cherry red, 1-4 mm papules.  Pertinent Positives:  Pertinent Negatives:    Additional History of Present Condition:      Assessment and Plan:  Based on a thorough discussion of this condition and the management approach to it (including a comprehensive discussion of the known risks, side effects and potential benefits of treatment), the patient (family) agrees to implement the following specific plan:  Monitor for changes  Benign, reassured      Assessment and Plan:    Cherry angioma, also known as Aldana de Dorian spots, are benign vascular skin lesions. A \"cherry angioma\" is a firm red, blue or purple papule, 0.1-1 cm in diameter. When thrombosed, they can appear black in colour until evaluated with a dermatoscope when the red or purple colour is more easily seen. Cherry angioma may develop on any part of the body but most often appear on the scalp, face, lips and trunk.  An angioma is due to proliferating endothelial cells; these are the cells that line the inside " "of a blood vessel.    Angiomas can arise in early life or later in life; the most common type of angioma is a cherry angioma.  Cherry angiomas are very common in males and females of any age or race. They are more noticeable in white skin than in skin of colour. They markedly increase in number from about the age of 40. There may be a family history of similar lesions. Eruptive cherry angiomas have been rarely reported to be associated with internal malignancy. The cause of angiomas is unknown. Genetic analysis of cherry angiomas has shown that they frequently carry specific somatic missense mutations in the GNAQ and GNA11 (Q209H) genes, which are involved in other vascular and melanocytic proliferations.      SEBORRHEIC KERATOSIS; NON-INFLAMED    Physical Exam:  Anatomic Location Affected:  trunk  Right thigh-monitor for any changes   Morphological Description:  Flat and raised, waxy, smooth to warty textured, yellow to brownish-grey to dark brown to blackish, discrete, \"stuck-on\" appearing papules.  Pertinent Positives:  Pertinent Negatives:    Additional History of Present Condition:      Assessment and Plan:  Based on a thorough discussion of this condition and the management approach to it (including a comprehensive discussion of the known risks, side effects and potential benefits of treatment), the patient (family) agrees to implement the following specific plan:  Monitor for changes  Benign, reassured      Seborrheic Keratosis  A seborrheic keratosis is a harmless warty spot that appears during adult life as a common sign of skin aging.  Seborrheic keratoses can arise on any area of skin, covered or uncovered, with the usual exception of the palms and soles. They do not arise from mucous membranes. Seborrheic keratoses can have highly variable appearance.      Seborrheic keratoses are extremely common. It has been estimated that over 90% of adults over the age of 60 years have one or more of them. They occur " "in males and females of all races, typically beginning to erupt in the 30s or 40s. They are uncommon under the age of 20 years.  The precise cause of seborrhoeic keratoses is not known.  Seborrhoeic keratoses are considered degenerative in nature. As time goes by, seborrheic keratoses tend to become more numerous. Some people inherit a tendency to develop a very large number of them; some people may have hundreds of them.      There is no easy way to remove multiple lesions on a single occasion.  Unless a specific lesion is \"inflamed\" and is causing pain or stinging/burning or is bleeding, most insurance companies do not authorize treatment.    XEROSIS (\"DRY SKIN\")    Physical Exam:  Anatomic Location Affected:  diffuse  Morphological Description:  xerosis  Pertinent Positives:  Pertinent Negatives:    Additional History of Present Condition:      Assessment and Plan:  Based on a thorough discussion of this condition and the management approach to it (including a comprehensive discussion of the known risks, side effects and potential benefits of treatment), the patient (family) agrees to implement the following specific plan:  Use moisturizer like Eucerin,Cerave or Aveeno Cream 3 times a day for the dry skin            Dry skin refers to skin that feels dry to touch. Dry skin has a dull surface with a rough, scaly quality. The skin is less pliable and cracked. When dryness is severe, the skin may become inflamed and fissured.  Although any body site can be dry, dry skin tends to affect the shins more than any other site.    Dry skin is lacking moisture in the outer horny cell layer (stratum corneum) and this results in cracks in the skin surface.  Dry skin is also called xerosis, xeroderma or asteatosis (lack of fat).  It can affect males and females of all ages. There is some racial variability in water and lipid content of the skin.  Dry skin that starts in early childhood may be one of about 20 types of " ichthyosis (fish-scale skin). There is often a family history of dry skin.   Dry skin is commonly seen in people with atopic dermatitis.  Nearly everyone > 60 years has dry skin.    Dry skin that begins later may be seen in people with certain diseases and conditions.  Postmenopausal women  Hypothyroidism  Chronic renal disease   Malnutrition and weight loss   Subclinical dermatitis   Treatment with certain drugs such as oral retinoids, diuretics and epidermal growth factor receptor inhibitors      What is the treatment for dry skin?  The mainstay of treatment of dry skin and ichthyosis is moisturisers/emollients. They should be applied liberally and often enough to:  Reduce itch   Improve the barrier function   Prevent entry of irritants, bacteria   Reduce transepidermal water loss.      How can dry skin be prevented?  Eliminate aggravating factors:  Reduce the frequency of bathing.   A humidifier in winter and air conditioner in summer   Compare having a short shower with a prolonged soak in a bath.   Use lukewarm, not hot, water.   Replace standard soap with a substitute such as a synthetic detergent cleanser, water-miscible emollient, bath oil, anti-pruritic tar oil, colloidal oatmeal etc.   Apply an emollient liberally and often, particularly shortly after bathing, and when itchy. The drier the skin, the thicker this should be, especially on the hands.    What is the outlook for dry skin?  A tendency to dry skin may persist life-long, or it may improve once contributing factors are controlled.     Scribe Attestation      I,:  Marah Edwards MA am acting as a scribe while in the presence of the attending physician.:       I,:  Yajaira Escalona MD personally performed the services described in this documentation    as scribed in my presence.:

## 2025-01-14 NOTE — PATIENT INSTRUCTIONS
"HISTORY OF MELANOMA     Assessment and Plan:  Based on a thorough discussion of this condition and the management approach to it (including a comprehensive discussion of the known risks, side effects and potential benefits of treatment), the patient (family) agrees to implement the following specific plan:  When outside we recommend using a wide brim hat, sunglasses, long sleeve and pants, sunscreen with SPF 30+ with reapplication every 2 hours, or SPF specific clothing    Continue with routine skin checks  Recommend yearly appointments with your eye doctor and dentist. Please make sure they are aware of your history of Melanoma.   Monitor for any changes      What happens at follow-up?  The main purpose of follow-up is to detect recurrences early (metastatic melanoma), but it also offers an opportunity to diagnose a new primary melanoma at the first possible opportunity. A second invasive melanoma occurs in 5-10% of melanoma patients and a new melanoma in situ is diagnosed in more than 20% of melanoma patients.     Our practice makes the following recommendations for follow-up for patients with invasive melanoma.  At-least \"monthly\" self-skin examinations   Routine skin checks by a board certified dermatologist  Follow-up intervals are \"every 3 months\" within 2 years of a new melanoma diagnosis; \"every 6 months\" between 2-4 years of a new melanoma diagnosis; and \"annually\" after 4 years of a new melanoma diagnosis  Individual patient's needs should be considered before an appropriate follow-up is offered  Provide education and support to help the patient adjust to their illness     Follow-up appointments should include:  A check of the scar where the primary melanoma was removed  Checking the regional lymph nodes  A general skin examination  A full physical examination at least annually by your primary care physician     In those with more advanced primary disease, follow-up may include:  Blood tests  Imaging: " "ultrasound, X-ray, CT, MRI and PET scan.     Most tests are not worthwhile for patients with stage 1 or 2 melanoma unless there are signs or symptoms of disease recurrence or metastasis. No tests are necessary for healthy patients who have remained well for five years or longer after removal of their melanoma.     What is the outlook for patients with melanoma?  Melanoma in situ is cured by excision because it has no potential to spread around the body.  The risk of spread and ultimate death from invasive melanoma depends on several factors, but the main one is the Breslow thickness of the melanoma at the time it was surgically removed.  Metastases are rare for melanomas < 0.75 mm and the risk for tumours 0.75-1 mm thick is about 5%. The risk steadily increases with thickness so that melanomas > 4 mm have a risk of metastasis of about 40%.     Melanoma is a potentially serious type of skin cancer, in which there is uncontrolled growth of melanocytes (pigment cells). Melanoma is sometimes called malignant melanoma.  Normal melanocytes are found in the basal layer of the epidermis (the outer layer of skin). Melanocytes produce a protein called melanin, which protects skin cells by absorbing ultraviolet (UV) radiation. Melanocytes are found in equal numbers in black and white skin, but melanocytes in black skin produce much more melanin. People with dark brown or black skin are very much less likely to be damaged by UV radiation than those with white skin.          MELANOCYTIC NEVI (\"Moles\")    Assessment and Plan:  Based on a thorough discussion of this condition and the management approach to it (including a comprehensive discussion of the known risks, side effects and potential benefits of treatment), the patient (family) agrees to implement the following specific plan:  When outside we recommend using a wide brim hat, sunglasses, long sleeve and pants, sunscreen with SPF 30+ with reapplication every 2 hours, or SPF " "specific clothing   Benign, reassured  Annual skin check     Melanocytic Nevi  Melanocytic nevi (\"moles\") are tan or brown, raised or flat areas of the skin which have an increased number of melanocytes. Melanocytes are the cells in our body which make pigment and account for skin color.    Some moles are present at birth (I.e., \"congenital nevi\"), while others come up later in life (i.e., \"acquired nevi\").  The sun can stimulate the body to make more moles.  Sunburns are not the only thing that triggers more moles.  Chronic sun exposure can do it too.     Clinically distinguishing a healthy mole from melanoma may be difficult, even for experienced dermatologists. The \"ABCDE's\" of moles have been suggested as a means of helping to alert a person to a suspicious mole and the possible increased risk of melanoma.  The suggestions for raising alert are as follows:    Asymmetry: Healthy moles tend to be symmetric, while melanomas are often asymmetric.  Asymmetry means if you draw a line through the mole, the two halves do not match in color, size, shape, or surface texture. Asymmetry can be a result of rapid enlargement of a mole, the development of a raised area on a previously flat lesion, scaling, ulceration, bleeding or scabbing within the mole.  Any mole that starts to demonstrate \"asymmetry\" should be examined promptly by a board certified dermatologist.     Border: Healthy moles tend to have discrete, even borders.  The border of a melanoma often blends into the normal skin and does not sharply delineate the mole from normal skin.  Any mole that starts to demonstrate \"uneven borders\" should be examined promptly by a board certified dermatologist.     Color: Healthy moles tend to be one color throughout.  Melanomas tend to be made up of different colors ranging from dark black, blue, white, or red.  Any mole that demonstrates a color change should be examined promptly by a board certified dermatologist. " "    Diameter: Healthy moles tend to be smaller than 0.6 cm in size; an exception are \"congenital nevi\" that can be larger.  Melanomas tend to grow and can often be greater than 0.6 cm (1/4 of an inch, or the size of a pencil eraser). This is only a guideline, and many normal moles may be larger than 0.6 cm without being unhealthy.  Any mole that starts to change in size (small to bigger or bigger to smaller) should be examined promptly by a board certified dermatologist.     Evolving: Healthy moles tend to \"stay the same.\"  Melanomas may often show signs of change or evolution such as a change in size, shape, color, or elevation.  Any mole that starts to itch, bleed, crust, burn, hurt, or ulcerate or demonstrate a change or evolution should be examined promptly by a board certified dermatologist.        LENTIGO    Assessment and Plan:  Based on a thorough discussion of this condition and the management approach to it (including a comprehensive discussion of the known risks, side effects and potential benefits of treatment), the patient (family) agrees to implement the following specific plan:  When outside we recommend using a wide brim hat, sunglasses, long sleeve and pants, sunscreen with SPF 30+ with reapplication every 2 hours, or SPF specific clothing       What is a lentigo?  A lentigo is a pigmented flat or slightly raised lesion with a clearly defined edge. Unlike an ephelis (freckle), it does not fade in the winter months. There are several kinds of lentigo.  The name lentigo originally referred to its appearance resembling a small lentil. The plural of lentigo is lentigines, although “lentigos” is also in common use.    Who gets lentigines?  Lentigines can affect males and females of all ages and races. Solar lentigines are especially prevalent in fair skinned adults. Lentigines associated with syndromes are present at birth or arise during childhood.    What causes lentigines?  Common forms of lentigo are " "due to exposure to ultraviolet radiation:  Sun damage including sunburn   Indoor tanning   Phototherapy, especially photochemotherapy (PUVA)    Ionizing radiation, eg radiation therapy, can also cause lentigines.  Several familial syndromes associated with widespread lentigines originate from mutations in Attila-MAP kinase, mTOR signaling and PTEN pathways.    What is the treatment for lentigines?  Most lentigines are left alone. Attempts to lighten them may not be successful. The following approaches are used:  SPF 50+ broad-spectrum sunscreen   Hydroquinone bleaching cream   Alpha hydroxy acids   Vitamin C   Retinoids   Azelaic acid   Chemical peels  Individual lesions can be permanently removed using:  Cryotherapy   Intense pulsed light   Pigment lasers    How can lentigines be prevented?  Lentigines associated with exposure ultraviolet radiation can be prevented by very careful sun protection. Clothing is more successful at preventing new lentigines than are sunscreens.    What is the outlook for lentigines?  Lentigines usually persist. They may increase in number with age and sun exposure. Some in sun-protected sites may fade and disappear.    PRESLEY ANGIOMAS    Assessment and Plan:  Based on a thorough discussion of this condition and the management approach to it (including a comprehensive discussion of the known risks, side effects and potential benefits of treatment), the patient (family) agrees to implement the following specific plan:  Monitor for changes  Benign, reassured      Assessment and Plan:    Cherry angioma, also known as Aldana de Dorian spots, are benign vascular skin lesions. A \"cherry angioma\" is a firm red, blue or purple papule, 0.1-1 cm in diameter. When thrombosed, they can appear black in colour until evaluated with a dermatoscope when the red or purple colour is more easily seen. Cherry angioma may develop on any part of the body but most often appear on the scalp, face, lips and trunk. "  An angioma is due to proliferating endothelial cells; these are the cells that line the inside of a blood vessel.    Angiomas can arise in early life or later in life; the most common type of angioma is a cherry angioma.  Cherry angiomas are very common in males and females of any age or race. They are more noticeable in white skin than in skin of colour. They markedly increase in number from about the age of 40. There may be a family history of similar lesions. Eruptive cherry angiomas have been rarely reported to be associated with internal malignancy. The cause of angiomas is unknown. Genetic analysis of cherry angiomas has shown that they frequently carry specific somatic missense mutations in the GNAQ and GNA11 (Q209H) genes, which are involved in other vascular and melanocytic proliferations.      SEBORRHEIC KERATOSIS; NON-INFLAMED    Assessment and Plan:  Based on a thorough discussion of this condition and the management approach to it (including a comprehensive discussion of the known risks, side effects and potential benefits of treatment), the patient (family) agrees to implement the following specific plan:  Monitor for changes  Benign, reassured      Seborrheic Keratosis  A seborrheic keratosis is a harmless warty spot that appears during adult life as a common sign of skin aging.  Seborrheic keratoses can arise on any area of skin, covered or uncovered, with the usual exception of the palms and soles. They do not arise from mucous membranes. Seborrheic keratoses can have highly variable appearance.      Seborrheic keratoses are extremely common. It has been estimated that over 90% of adults over the age of 60 years have one or more of them. They occur in males and females of all races, typically beginning to erupt in the 30s or 40s. They are uncommon under the age of 20 years.  The precise cause of seborrhoeic keratoses is not known.  Seborrhoeic keratoses are considered degenerative in nature. As  "time goes by, seborrheic keratoses tend to become more numerous. Some people inherit a tendency to develop a very large number of them; some people may have hundreds of them.      There is no easy way to remove multiple lesions on a single occasion.  Unless a specific lesion is \"inflamed\" and is causing pain or stinging/burning or is bleeding, most insurance companies do not authorize treatment.    XEROSIS (\"DRY SKIN\")    Assessment and Plan:  Based on a thorough discussion of this condition and the management approach to it (including a comprehensive discussion of the known risks, side effects and potential benefits of treatment), the patient (family) agrees to implement the following specific plan:  Use moisturizer like Eucerin,Cerave or Aveeno Cream 3 times a day for the dry skin            Dry skin refers to skin that feels dry to touch. Dry skin has a dull surface with a rough, scaly quality. The skin is less pliable and cracked. When dryness is severe, the skin may become inflamed and fissured.  Although any body site can be dry, dry skin tends to affect the shins more than any other site.    Dry skin is lacking moisture in the outer horny cell layer (stratum corneum) and this results in cracks in the skin surface.  Dry skin is also called xerosis, xeroderma or asteatosis (lack of fat).  It can affect males and females of all ages. There is some racial variability in water and lipid content of the skin.  Dry skin that starts in early childhood may be one of about 20 types of ichthyosis (fish-scale skin). There is often a family history of dry skin.   Dry skin is commonly seen in people with atopic dermatitis.  Nearly everyone > 60 years has dry skin.    Dry skin that begins later may be seen in people with certain diseases and conditions.  Postmenopausal women  Hypothyroidism  Chronic renal disease   Malnutrition and weight loss   Subclinical dermatitis   Treatment with certain drugs such as oral retinoids, " diuretics and epidermal growth factor receptor inhibitors      What is the treatment for dry skin?  The mainstay of treatment of dry skin and ichthyosis is moisturisers/emollients. They should be applied liberally and often enough to:  Reduce itch   Improve the barrier function   Prevent entry of irritants, bacteria   Reduce transepidermal water loss.      How can dry skin be prevented?  Eliminate aggravating factors:  Reduce the frequency of bathing.   A humidifier in winter and air conditioner in summer   Compare having a short shower with a prolonged soak in a bath.   Use lukewarm, not hot, water.   Replace standard soap with a substitute such as a synthetic detergent cleanser, water-miscible emollient, bath oil, anti-pruritic tar oil, colloidal oatmeal etc.   Apply an emollient liberally and often, particularly shortly after bathing, and when itchy. The drier the skin, the thicker this should be, especially on the hands.    What is the outlook for dry skin?  A tendency to dry skin may persist life-long, or it may improve once contributing factors are controlled.

## 2025-01-29 ENCOUNTER — OFFICE VISIT (OUTPATIENT)
Dept: HEMATOLOGY ONCOLOGY | Facility: CLINIC | Age: 55
End: 2025-01-29
Payer: COMMERCIAL

## 2025-01-29 VITALS
RESPIRATION RATE: 18 BRPM | SYSTOLIC BLOOD PRESSURE: 108 MMHG | BODY MASS INDEX: 26.84 KG/M2 | TEMPERATURE: 97 F | HEART RATE: 95 BPM | WEIGHT: 171 LBS | OXYGEN SATURATION: 99 % | DIASTOLIC BLOOD PRESSURE: 66 MMHG | HEIGHT: 67 IN

## 2025-01-29 DIAGNOSIS — C43.72 MALIGNANT MELANOMA OF LEFT LOWER EXTREMITY INCLUDING HIP (HCC): Primary | ICD-10-CM

## 2025-01-29 PROCEDURE — 99214 OFFICE O/P EST MOD 30 MIN: CPT | Performed by: INTERNAL MEDICINE

## 2025-01-29 NOTE — ASSESSMENT & PLAN NOTE
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 Of note, patient was noted to have new inguinal lymphadenopathy (e.g. Palpable left inguinal lymphadenopathy) in October 2024. Inguinal Ultrasound was obtained and demonstrated an irregular lymph node with nodular margins in the superior left thigh (Measuring 2.3 x 0.4 x 1.2-centimeters). Subsequent ultrasound-guided core-needle biopsy of the left inguinal lymph node was performed on December 12th, 2024. Pathology was reviewed and favored to represent reactive lymph node tissue; with no definitive evidence of a lymphoproliferative process, or metastatic disease.    On evaluation this morning, patient is clinically well. Physical examination is without interval development of new or concerning findings. No recent laboratory-studies available for review. Imaging, as noted above. Patient continues to follow routinely with Dermatology. Given the above-mentioned, will continue annual-surveillance every 6-months for the first 5-years. Routine imaging to screen for asymptomatic recurrence, or metastatic-disease is not recommended. Recommend close interval follow-up in approximately 6-months, with repeat laboratory-studies prior to visit. Patient expressed understanding and agreement with the above-mentioned plan.    Summary of Recommendations:  Continue outpatient follow-up with Dermatology every 3-months.  Recommend close interval follow-up in approximately 6-months:  Obtain CBC-D, CMP, and LDH prior to visit.

## 2025-01-29 NOTE — PROGRESS NOTES
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 Of note, patient was noted to have new inguinal lymphadenopathy (e.g. Palpable left inguinal lymphadenopathy) in 2024. Inguinal Ultrasound was obtained and demonstrated an irregular lymph node with nodular margins in the superior left thigh (Measuring 2.3 x 0.4 x 1.2-centimeters). Subsequent ultrasound-guided core-needle biopsy of the left inguinal lymph node was performed on 2024. Pathology was reviewed and favored to represent reactive lymph node tissue; with no definitive evidence of a lymphoproliferative process, or metastatic disease.    On evaluation this morning, patient is clinically well. Physical examination is without interval development of new or concerning findings. No recent laboratory-studies available for review. Imaging, as noted above. Patient continues to follow routinely with Dermatology. Given the above-mentioned, will continue annual-surveillance every 6-months for the first 5-years. Routine imaging to screen for asymptomatic recurrence, or metastatic-disease is not recommended. Recommend close interval follow-up in approximately 6-months, with repeat laboratory-studies prior to visit. Patient expressed understanding and agreement with the above-mentioned plan.    Summary of Recommendations:  Continue outpatient follow-up with Dermatology every 3-months.  Recommend close  interval follow-up in approximately 6-months:  Obtain CBC-D, CMP, and LDH prior to visit.       History of Present Illness   Chief Complaint: Outpatient Follow-Up.  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 Of note, patient was noted to have new inguinal lymphadenopathy (e.g. Palpable left inguinal lymphadenopathy) in October 2024. Inguinal Ultrasound was obtained and demonstrated an irregular lymph node with nodular margins in the superior left thigh (Measuring 2.3 x 0.4 x 1.2-centimeters). Subsequent ultrasound-guided core-needle biopsy of the left inguinal lymph node was performed on December 12th, 2024. Pathology was reviewed and favored to represent reactive lymph node tissue; with no definitive evidence of a lymphoproliferative process, or metastatic disease.  On evaluation this morning, patient feels well. She continues to work as a Nurse at [employer], and is tolerating the above-mentioned appropriately, without difficulty. She continues to follow regularly with Dermatology (e.g. Every 3-months); and most recently followed-up on January 14th, 2025, without new or concerning findings during that encounter. She has no complaints or concerns at the time of her encounter this morning. Will continue active-surveillance, as outlined above.    Oncology History   Cancer Staging   Malignant melanoma of left lower extremity including hip (HCC)  Staging form: Melanoma of the Skin, AJCC 8th Edition  - Clinical stage from 8/3/2023: Stage IB (cT1b, cN0, cM0) - Signed by Piper Santoro MD on 1/25/2024  Oncology History   Malignant melanoma of left lower extremity including hip (HCC)   8/3/2023 -  Cancer Staged    Staging form: Melanoma of the Skin, AJCC 8th Edition  -  Clinical stage from 8/3/2023: Stage IB (cT1b, cN0, cM0) - Signed by Piper Santoro MD on 1/25/2024       8/3/2023 Surgery    Skin, left leg, excisional biopsy:  Melanoma, superficial spreading type   Breslow thickness: 0.8 mm   Ulceration: Not seen.  Anatomic (Leeroy) level: IV  Type: Superficial spreading type  Mitoses: 1/mm2.   Microsatellites: Not seen.  Lymphovascular invasion: Not seen.  Neurotropism: Not seen.  Tumor regression: Not seen  Tumor-infiltrating lymphocytes (TIL): Present, focally brisk.  Margin assessment: Melanoma in situ and invasive  melanoma do not extends to peripheral and deep margins. Evaluation of the tips is pending deeper levels and will be reported in an addendum.   Pathologic stage: pT1b         10/25/2023 Surgery    Skin, left thigh, excision:     -Prior procedure site changes present.     -Residual melanoma not seen.           Review of Systems  Review of Systems:  All systems reviewed and negative except otherwise listed in the History of Present Illness.      Objective   LMP 12/01/2017     ECOG   0-Points.    Physical Exam:  General: Alert, and oriented; Pleasant, and conversational; Well-Appearing Female  Skin: No New or Concerning Cutaneous Lesions on Exam  HEENT: Atraumatic, and normocephalic; PERRLA; EOMI; Moist mucosal membranes  Neck: Trachea midline; No neck masses, thyromegaly, or cervical lymphadenopathy  Chest: No Axillary Lymphadenopathy (Palpable)  Cardiovascular: Regular rate and rhythm; No murmurs, rubs, or gallops; No peripheral edema  Respiratory: Clear to auscultation bilaterally; Adequate aeration; No supplemental oxygen   Abdomen: Soft, non-tender; Non-distended; No organomegaly; Bowel sounds present  Extremities: Left Lateral Lower Extremity Surgical Scar (Proximal Left Lower Extremity) Well-Healed Without Evidence of Local Recurrence; No Inguinal Lymphadenopathy; Core-Needle Biopsy Site at Superior Aspect of Left Thigh Without Palpable LAD  Neurologic:  Appropriately alert, and oriented to Person, Place, and Time; No focal neurologic deficits    Labs: I have reviewed the following labs:  Lab Results   Component Value Date/Time    WBC 3.29 (L) 08/09/2024 07:48 AM    RBC 4.38 08/09/2024 07:48 AM    Hemoglobin 13.2 08/09/2024 07:48 AM    Hematocrit 40.4 08/09/2024 07:48 AM    MCV 92 08/09/2024 07:48 AM    MCH 30.1 08/09/2024 07:48 AM    RDW 13.3 08/09/2024 07:48 AM    Platelets 217 08/09/2024 07:48 AM    Segmented % 45 08/09/2024 07:48 AM    Lymphocytes % 39 08/09/2024 07:48 AM    Monocytes % 12 08/09/2024 07:48 AM    Eosinophils Relative 4 08/09/2024 07:48 AM    Basophils Relative 0 08/09/2024 07:48 AM    Immature Grans % 0 08/09/2024 07:48 AM    Absolute Neutrophils 1.49 (L) 08/09/2024 07:48 AM     Lab Results   Component Value Date/Time    Potassium 3.6 08/09/2024 07:48 AM    Chloride 105 08/09/2024 07:48 AM    CO2 26 08/09/2024 07:48 AM    BUN 11 08/09/2024 07:48 AM    Creatinine 0.79 08/09/2024 07:48 AM    Glucose, Fasting 85 08/09/2024 07:48 AM    Calcium 9.5 08/09/2024 07:48 AM    AST 17 08/09/2024 07:48 AM    ALT 16 08/09/2024 07:48 AM    Alkaline Phosphatase 56 08/09/2024 07:48 AM    Total Protein 8.9 (H) 08/09/2024 07:48 AM    Albumin 4.2 08/09/2024 07:48 AM    Total Bilirubin 0.59 08/09/2024 07:48 AM    eGFR 86 08/09/2024 07:48 AM     Patient was seen and discussed with attending physician, Piper Santoro M.D., Ph.D.    Kimberlee Reardon D.O.  Hematology-Oncology Fellow (PGY-5)

## 2025-01-29 NOTE — LETTER
2025     Yajaira Baca DO  [address] [facility]   [address]  [facility] [address]    Patient: Felicia Diane   YOB: 1970   Date of Visit: 2025       Dear Dr. Baca:    Thank you for referring Felicia Diane to me for evaluation. Below are my notes for this consultation.    If you have questions, please do not hesitate to call me. I look forward to following your patient along with you.         Sincerely,        Piper Santoro MD        CC: No Recipients    Kimberlee ReardonDO  2025 10:10 AM  Attested  Medical Oncology: Outpatient Progress Note:                    Name: Felicia Diane      : 1970      MRN: [id number]                   Encounter Provider: Piper Santoro MD  Encounter Date: 2025   Encounter department: 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 Of note, patient was noted to have new inguinal lymphadenopathy (e.g. Palpable left inguinal lymphadenopathy) in 2024. Inguinal Ultrasound was obtained and demonstrated an irregular lymph node with nodular margins in the superior left thigh (Measuring 2.3 x 0.4 x 1.2-centimeters). Subsequent ultrasound-guided core-needle biopsy of the left inguinal lymph node was performed on 2024. Pathology was reviewed and favored to represent reactive lymph node tissue; with no definitive evidence of a lymphoproliferative process, or metastatic disease.    On evaluation this morning, patient is clinically well. Physical examination is without interval development of new or concerning findings. No recent laboratory-studies available for review. Imaging, as noted above.  Patient continues to follow routinely with Dermatology. Given the above-mentioned, will continue annual-surveillance every 6-months for the first 5-years. Routine imaging to screen for asymptomatic recurrence, or metastatic-disease is not recommended. Recommend close interval follow-up in approximately 6-months, with repeat laboratory-studies prior to visit. Patient expressed understanding and agreement with the above-mentioned plan.    Summary of Recommendations:  Continue outpatient follow-up with Dermatology every 3-months.  Recommend close interval follow-up in approximately 6-months:  Obtain CBC-D, CMP, and LDH prior to visit.       History of Present Illness  Chief Complaint: Outpatient Follow-Up.  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 Of note, patient was noted to have new inguinal lymphadenopathy (e.g. Palpable left inguinal lymphadenopathy) in October 2024. Inguinal Ultrasound was obtained and demonstrated an irregular lymph node with nodular margins in the superior left thigh (Measuring 2.3 x 0.4 x 1.2-centimeters). Subsequent ultrasound-guided core-needle biopsy of the left inguinal lymph node was performed on December 12th, 2024. Pathology was reviewed and favored to represent reactive lymph node tissue; with no definitive evidence of a lymphoproliferative process, or metastatic disease.  On evaluation this morning, patient feels well. She continues to work as a Nurse at [employer], and is tolerating the above-mentioned appropriately, without difficulty. She continues to follow regularly with Dermatology (e.g. Every 3-months); and most recently followed-up on January 14th, 2025, without new or concerning findings during that encounter. She has no  complaints or concerns at the time of her encounter this morning. Will continue active-surveillance, as outlined above.    Oncology History  Cancer Staging   Malignant melanoma of left lower extremity including hip (HCC)  Staging form: Melanoma of the Skin, AJCC 8th Edition  - Clinical stage from 8/3/2023: Stage IB (cT1b, cN0, cM0) - Signed by Piper Santoro MD on 1/25/2024  Oncology History   Malignant melanoma of left lower extremity including hip (HCC)   8/3/2023 -  Cancer Staged    Staging form: Melanoma of the Skin, AJCC 8th Edition  - Clinical stage from 8/3/2023: Stage IB (cT1b, cN0, cM0) - Signed by Piper Santoro MD on 1/25/2024       8/3/2023 Surgery    Skin, left leg, excisional biopsy:  Melanoma, superficial spreading type   Breslow thickness: 0.8 mm   Ulceration: Not seen.  Anatomic (Leeroy) level: IV  Type: Superficial spreading type  Mitoses: 1/mm2.   Microsatellites: Not seen.  Lymphovascular invasion: Not seen.  Neurotropism: Not seen.  Tumor regression: Not seen  Tumor-infiltrating lymphocytes (TIL): Present, focally brisk.  Margin assessment: Melanoma in situ and invasive  melanoma do not extends to peripheral and deep margins. Evaluation of the tips is pending deeper levels and will be reported in an addendum.   Pathologic stage: pT1b         10/25/2023 Surgery    Skin, left thigh, excision:     -Prior procedure site changes present.     -Residual melanoma not seen.          Review of Systems  Review of Systems:  All systems reviewed and negative except otherwise listed in the History of Present Illness.      Objective  LMP 12/01/2017     ECOG   0-Points.    Physical Exam:  General: Alert, and oriented; Pleasant, and conversational; Well-Appearing Female  Skin: No New or Concerning Cutaneous Lesions on Exam  HEENT: Atraumatic, and normocephalic; PERRLA; EOMI; Moist mucosal membranes  Neck: Trachea midline; No neck masses, thyromegaly, or cervical lymphadenopathy  Chest: No Axillary  Lymphadenopathy (Palpable)  Cardiovascular: Regular rate and rhythm; No murmurs, rubs, or gallops; No peripheral edema  Respiratory: Clear to auscultation bilaterally; Adequate aeration; No supplemental oxygen   Abdomen: Soft, non-tender; Non-distended; No organomegaly; Bowel sounds present  Extremities: Left Lateral Lower Extremity Surgical Scar (Proximal Left Lower Extremity) Well-Healed Without Evidence of Local Recurrence; No Inguinal Lymphadenopathy; Core-Needle Biopsy Site at Superior Aspect of Left Thigh Without Palpable LAD  Neurologic: Appropriately alert, and oriented to Person, Place, and Time; No focal neurologic deficits    Labs: I have reviewed the following labs:  Lab Results   Component Value Date/Time    WBC 3.29 (L) 08/09/2024 07:48 AM    RBC 4.38 08/09/2024 07:48 AM    Hemoglobin 13.2 08/09/2024 07:48 AM    Hematocrit 40.4 08/09/2024 07:48 AM    MCV 92 08/09/2024 07:48 AM    MCH 30.1 08/09/2024 07:48 AM    RDW 13.3 08/09/2024 07:48 AM    Platelets 217 08/09/2024 07:48 AM    Segmented % 45 08/09/2024 07:48 AM    Lymphocytes % 39 08/09/2024 07:48 AM    Monocytes % 12 08/09/2024 07:48 AM    Eosinophils Relative 4 08/09/2024 07:48 AM    Basophils Relative 0 08/09/2024 07:48 AM    Immature Grans % 0 08/09/2024 07:48 AM    Absolute Neutrophils 1.49 (L) 08/09/2024 07:48 AM     Lab Results   Component Value Date/Time    Potassium 3.6 08/09/2024 07:48 AM    Chloride 105 08/09/2024 07:48 AM    CO2 26 08/09/2024 07:48 AM    BUN 11 08/09/2024 07:48 AM    Creatinine 0.79 08/09/2024 07:48 AM    Glucose, Fasting 85 08/09/2024 07:48 AM    Calcium 9.5 08/09/2024 07:48 AM    AST 17 08/09/2024 07:48 AM    ALT 16 08/09/2024 07:48 AM    Alkaline Phosphatase 56 08/09/2024 07:48 AM    Total Protein 8.9 (H) 08/09/2024 07:48 AM    Albumin 4.2 08/09/2024 07:48 AM    Total Bilirubin 0.59 08/09/2024 07:48 AM    eGFR 86 08/09/2024 07:48 AM     Patient was seen and discussed with attending physician, Piper Santoro M.D.,  Ph.D.    Kimberlee Reardon D.O.  Hematology-Oncology Fellow (PGY-5)  Attestation signed by Piper Santoro MD at 2/2/2025 12:48 PM:  Attending Attestation:    I reviewed the care furnished by the Resident/Fellow during the visit on 1/29/2025.  I was present in the office and personally saw and examined the patient.    She is doing well.  No new, changing, concerning skin lesions.  No lymphadenopathy.  Continues to follow-up with dermatology.  Since her last visit there have been ongoing evaluation of left inguinal lymph nodes.  She had ultrasound of the groin area/left inguinal lymph nodes in October 2024 that did demonstrate some enlargement/prominent nodes concerning for reactive process versus other pathology.  She did undergo IR guided biopsy of the lymph node on 12/12/2024 where pathology demonstrates fibrotic lymph node and overall findings are nonspecific but favored to represent reactive lymph node tissue with no definitive evidence of lymphoproliferative disorder metastatic disease or other malignancy.  Consistent with her underlying diagnosis of lupus.  Denies any new, changing, concerning skin lesions.  Denies any lymphadenopathy.  Denies any other concerns.    On exam ECOG PS 0.  No concerning skin lesions.  No lymphadenopathy.  Well-healed surgical scar with no evidence of recurrence.    Ms. Diane is a 64-year-old female with stage Ib melanoma here for continued monitoring, follow-up and surveillance especially in the light of concerning left inguinal lymphadenopathy.  Discussed that clinically there is no evidence of melanoma recurrence.  Labs previously performed were reviewed.  No current labs.  Did discuss pathology from biopsy that demonstrates reactive process.  We will continue to monitor her closely.  She knows to call with issues or concerns prior to her next visit.  She knows to call with any new, changing, concerning skin lesions or lymphadenopathy.  She will return for follow-up in 6  months.  At that time blood work will be done.  Orders placed and prescription provided    Piper Santoro MD, PhD

## 2025-02-21 ENCOUNTER — OFFICE VISIT (OUTPATIENT)
Dept: FAMILY MEDICINE CLINIC | Facility: CLINIC | Age: 55
End: 2025-02-21
Payer: COMMERCIAL

## 2025-02-21 VITALS
BODY MASS INDEX: 26.02 KG/M2 | TEMPERATURE: 99.2 F | HEART RATE: 96 BPM | SYSTOLIC BLOOD PRESSURE: 126 MMHG | DIASTOLIC BLOOD PRESSURE: 72 MMHG | HEIGHT: 67 IN | WEIGHT: 165.8 LBS | RESPIRATION RATE: 18 BRPM

## 2025-02-21 DIAGNOSIS — R42 VERTIGO: Primary | ICD-10-CM

## 2025-02-21 DIAGNOSIS — C43.72 MALIGNANT MELANOMA OF LEFT LOWER EXTREMITY INCLUDING HIP (HCC): ICD-10-CM

## 2025-02-21 DIAGNOSIS — M35.00 SJOGREN'S SYNDROME, WITH UNSPECIFIED ORGAN INVOLVEMENT (HCC): ICD-10-CM

## 2025-02-21 PROCEDURE — 99213 OFFICE O/P EST LOW 20 MIN: CPT | Performed by: FAMILY MEDICINE

## 2025-02-21 RX ORDER — MECLIZINE HYDROCHLORIDE 25 MG/1
25 TABLET ORAL 3 TIMES DAILY PRN
Qty: 30 TABLET | Refills: 0 | Status: SHIPPED | OUTPATIENT
Start: 2025-02-21 | End: 2025-03-23

## 2025-02-21 NOTE — PROGRESS NOTES
Name: Felicia Diane      : 1970      MRN: [id number]  Encounter Provider: Frank Lombardi, DO  Encounter Date: 2025   Encounter department: [facility]  :  Assessment & Plan  Vertigo  Seems to have vertiogo but given the cancer hiostory I would want to get a ct scan of her head.  PT would like this.    Orders:  •  Ambulatory referral to Physical Therapy; Future  •  meclizine (ANTIVERT) 25 mg tablet; Take 1 tablet (25 mg total) by mouth 3 (three) times a day as needed for dizziness  •  CT head wo contrast; Future    Malignant melanoma of left lower extremity including hip (HCC)    Orders:  •  CT head wo contrast; Future    Sjogren's syndrome, with unspecified organ involvement (HCC)    Orders:  •  CT head wo contrast; Future           History of Present Illness   Pt states las Saturday she was getting up out of bed swung her legs out of bed and had spinning  Sat up again and it happened again  That happened till the afternoon  More episodes in the evening  And this continued  Vomited  Been dealing w it all week      Review of Systems   Constitutional: Negative.  Negative for activity change, appetite change, chills, diaphoresis and fatigue.   HENT: Negative.  Negative for dental problem, ear pain, sinus pressure and sore throat.    Eyes: Negative.  Negative for photophobia, pain, discharge, redness, itching and visual disturbance.   Respiratory:  Negative for apnea and chest tightness.    Cardiovascular: Negative.  Negative for chest pain, palpitations and leg swelling.   Gastrointestinal: Negative.  Negative for abdominal distention, abdominal pain, constipation and diarrhea.   Endocrine: Negative.  Negative for cold intolerance and heat intolerance.   Genitourinary: Negative.  Negative for difficulty urinating and dyspareunia.   Musculoskeletal: Negative.  Negative for arthralgias and back pain.   Skin: Negative.    Allergic/Immunologic: Negative for environmental allergies.  "  Neurological:  Positive for dizziness.   Psychiatric/Behavioral: Negative.  Negative for agitation.        Objective   /72   Pulse 96   Temp 99.2 °F (37.3 °C)   Resp 18   Ht 5' 6.5\" (1.689 m)   Wt 75.2 kg (165 lb 12.8 oz)   LMP 12/01/2017   BMI 26.36 kg/m²      Physical Exam  Neurological:      Comments: Extreme dizziness with lying flat and turning head to the rt , less so to the left         "

## 2025-02-25 DIAGNOSIS — R42 VERTIGO: Primary | ICD-10-CM

## 2025-02-25 DIAGNOSIS — C43.72 MALIGNANT MELANOMA OF LEFT LOWER EXTREMITY INCLUDING HIP (HCC): ICD-10-CM

## 2025-03-19 ENCOUNTER — HOSPITAL ENCOUNTER (OUTPATIENT)
Dept: RADIOLOGY | Facility: HOSPITAL | Age: 55
Discharge: HOME | End: 2025-03-19
Attending: FAMILY MEDICINE
Payer: COMMERCIAL

## 2025-03-19 DIAGNOSIS — R42 VERTIGO: ICD-10-CM

## 2025-03-19 DIAGNOSIS — C43.72 MALIGNANT MELANOMA OF LEFT LOWER EXTREMITY INCLUDING HIP (HCC): ICD-10-CM

## 2025-03-19 PROCEDURE — 70551 MRI BRAIN STEM W/O DYE: CPT

## 2025-03-20 ENCOUNTER — RESULTS FOLLOW-UP (OUTPATIENT)
Dept: FAMILY MEDICINE CLINIC | Facility: CLINIC | Age: 55
End: 2025-03-20

## 2025-07-09 ENCOUNTER — NURSE TRIAGE (OUTPATIENT)
Age: 55
End: 2025-07-09

## 2025-07-09 NOTE — TELEPHONE ENCOUNTER
"REASON FOR CONVERSATION: Arm Injury    SYMPTOMS: Patient states for the last 2 weeks, she has noticed increased swelling in her right elbow area.    In 2017, she suffered an Intravenous infiltrate and has had issues with the arm since.      Swelling is larger than a golf ball and is in the area of the AC going medial.  Denies bruising or injury.  ROM is within normal limits.  States when she presses on the area of edema, she feels like, \"it is hitting the funny bone.\"    She denies weakness, numbness, tingling, temperature or sensation changes.  Denies shortness of breath, chest pain, fever, redness or warmth.      Appointment made for tomorrow morning.  Patient would prefer to do outpatient testing without appointment.      OTHER HEALTH INFORMATION: History of autoimmune disease.     PROTOCOL DISPOSITION: See Today or Tomorrow in Office    CARE ADVICE PROVIDED: Encouraged to call back with questions or concerns.      PRACTICE FOLLOW-UP: Patient is scheduled for tomorrow.  She would prefer, to just have an order for an ultrasound put in.    Please advise and call patient if this can be done without an appointment.        Reason for Disposition   Patient wants to be seen    Answer Assessment - Initial Assessment Questions  1. MECHANISM: \"How did the injury happen?\"      Spontaneous   2. ONSET: \"When did the injury happen?\" (e.g., minutes, hours ago)       The past 2 weeks.   3. LOCATION: \"Where is the injury located?\" \"Which arm?\"      Right AC, area that is swollen is the AC more medial swelling,   4. APPEARANCE of INJURY: \"What does the injury look like?\"       N/A  5. SEVERITY: \"Can you use the arm normally?\"       Using normally.   6. SWELLING or BRUISING: \"is there any swelling or bruising?\" If Yes, ask: \"How large is it? (e.g., inches, centimeters)       Swelling, a little larger than a golf ball.   7. PAIN: \"Is there pain?\" If Yes, ask: \"How bad is the pain?\" (Scale 0-10; or none, mild, moderate, severe)      " "2/10  8. TETANUS: For any breaks in the skin, ask: \"When was the last tetanus booster?\"      N/A  9. OTHER SYMPTOMS: \"Do you have any other symptoms?\"  (e.g., numbness in hand)    If pressed on, feels like hitting the funny bone.  Like it is hitting a nerve.    Protocols used: Arm Injury-Adult-[place]    "

## 2025-07-14 ENCOUNTER — OFFICE VISIT (OUTPATIENT)
Dept: FAMILY MEDICINE CLINIC | Facility: CLINIC | Age: 55
End: 2025-07-14
Payer: COMMERCIAL

## 2025-07-14 VITALS
HEART RATE: 68 BPM | TEMPERATURE: 98.1 F | RESPIRATION RATE: 18 BRPM | BODY MASS INDEX: 26.02 KG/M2 | SYSTOLIC BLOOD PRESSURE: 102 MMHG | WEIGHT: 165.8 LBS | DIASTOLIC BLOOD PRESSURE: 76 MMHG | HEIGHT: 67 IN

## 2025-07-14 DIAGNOSIS — M25.421 ELBOW SWELLING, RIGHT: Primary | ICD-10-CM

## 2025-07-14 DIAGNOSIS — M79.601 PAIN OF RIGHT UPPER EXTREMITY: ICD-10-CM

## 2025-07-14 PROCEDURE — 99213 OFFICE O/P EST LOW 20 MIN: CPT | Performed by: NURSE PRACTITIONER

## 2025-07-14 NOTE — PROGRESS NOTES
"Name: Felicia Diane      : 1970      MRN: [id number]  Encounter Provider: PIERRE Matson  Encounter Date: 2025   Encounter department: [facility]  :  Assessment & Plan  Elbow swelling, right  Will do xray and if  unremarkable then will consider ultrasound of right elbow of soft tissues and ultrasound MSK of right lower arm  Orders:  •  XR elbow 3+ vw right; Future    Pain of right upper extremity                History of Present Illness   Patient stated that has swelling in right elbow from couple of months and feels discomfort at times. Stated that in 2017 after IV cannula, had infiltration in right lower arm and feels discomfort at times. Had steroid injections in right elbow in past      Review of Systems   Respiratory: Negative.     Cardiovascular: Negative.    Musculoskeletal:  Positive for arthralgias.        As noted in HPI           Objective   /76   Pulse 68   Temp 98.1 °F (36.7 °C)   Resp 18   Ht 5' 6.5\" (1.689 m)   Wt 75.2 kg (165 lb 12.8 oz)   LMP 2017   BMI 26.36 kg/m²      Physical Exam  HENT:      Head: Normocephalic.     Eyes:      Conjunctiva/sclera: Conjunctivae normal.       Cardiovascular:      Rate and Rhythm: Normal rate and regular rhythm.      Pulses: Normal pulses.   Pulmonary:      Effort: Pulmonary effort is normal.      Breath sounds: Normal breath sounds.     Musculoskeletal:        Arms:      Skin:     General: Skin is warm and dry.     Neurological:      Mental Status: She is alert and oriented to person, place, and time.     Psychiatric:         Mood and Affect: Mood normal.         Behavior: Behavior normal.         Thought Content: Thought content normal.         Judgment: Judgment normal.         "

## 2025-07-15 ENCOUNTER — OFFICE VISIT (OUTPATIENT)
Dept: DERMATOLOGY | Facility: CLINIC | Age: 55
End: 2025-07-15
Payer: COMMERCIAL

## 2025-07-15 ENCOUNTER — APPOINTMENT (OUTPATIENT)
Dept: RADIOLOGY | Facility: MEDICAL CENTER | Age: 55
End: 2025-07-15
Payer: COMMERCIAL

## 2025-07-15 VITALS — WEIGHT: 165 LBS | TEMPERATURE: 98.2 F | HEIGHT: 67 IN | BODY MASS INDEX: 25.9 KG/M2

## 2025-07-15 DIAGNOSIS — D22.9 MULTIPLE MELANOCYTIC NEVI: ICD-10-CM

## 2025-07-15 DIAGNOSIS — D18.01 CHERRY ANGIOMA: ICD-10-CM

## 2025-07-15 DIAGNOSIS — M25.421 ELBOW SWELLING, RIGHT: ICD-10-CM

## 2025-07-15 DIAGNOSIS — L82.1 SEBORRHEIC KERATOSIS: ICD-10-CM

## 2025-07-15 DIAGNOSIS — L57.0 KERATOSIS, ACTINIC: ICD-10-CM

## 2025-07-15 DIAGNOSIS — L85.3 XEROSIS OF SKIN: ICD-10-CM

## 2025-07-15 DIAGNOSIS — D48.9 NEOPLASM OF UNCERTAIN BEHAVIOR: ICD-10-CM

## 2025-07-15 DIAGNOSIS — L81.4 LENTIGO: ICD-10-CM

## 2025-07-15 DIAGNOSIS — Z85.820 HISTORY OF MELANOMA: Primary | ICD-10-CM

## 2025-07-15 PROCEDURE — 99213 OFFICE O/P EST LOW 20 MIN: CPT | Performed by: DERMATOLOGY

## 2025-07-15 PROCEDURE — 73080 X-RAY EXAM OF ELBOW: CPT

## 2025-07-26 ENCOUNTER — APPOINTMENT (OUTPATIENT)
Dept: LAB | Facility: HOSPITAL | Age: 55
End: 2025-07-26
Attending: PREVENTIVE MEDICINE
Payer: COMMERCIAL

## 2025-07-26 ENCOUNTER — APPOINTMENT (OUTPATIENT)
Dept: LAB | Facility: HOSPITAL | Age: 55
End: 2025-07-26
Payer: COMMERCIAL

## 2025-07-26 DIAGNOSIS — C43.72 MALIGNANT MELANOMA OF LEFT LOWER EXTREMITY INCLUDING HIP (HCC): ICD-10-CM

## 2025-07-26 LAB
ALBUMIN SERPL BCG-MCNC: 4.1 G/DL (ref 3.5–5)
ALP SERPL-CCNC: 47 U/L (ref 34–104)
ALT SERPL W P-5'-P-CCNC: 13 U/L (ref 7–52)
ANION GAP SERPL CALCULATED.3IONS-SCNC: 6 MMOL/L (ref 4–13)
AST SERPL W P-5'-P-CCNC: 16 U/L (ref 13–39)
BASOPHILS # BLD AUTO: 0.01 THOUSANDS/ÂΜL (ref 0–0.1)
BASOPHILS NFR BLD AUTO: 0 % (ref 0–1)
BILIRUB SERPL-MCNC: 0.47 MG/DL (ref 0.2–1)
BUN SERPL-MCNC: 8 MG/DL (ref 5–25)
CALCIUM SERPL-MCNC: 9.2 MG/DL (ref 8.4–10.2)
CHLORIDE SERPL-SCNC: 102 MMOL/L (ref 96–108)
CO2 SERPL-SCNC: 26 MMOL/L (ref 21–32)
CREAT SERPL-MCNC: 0.75 MG/DL (ref 0.6–1.3)
EOSINOPHIL # BLD AUTO: 0.12 THOUSAND/ÂΜL (ref 0–0.61)
EOSINOPHIL NFR BLD AUTO: 4 % (ref 0–6)
ERYTHROCYTE [DISTWIDTH] IN BLOOD BY AUTOMATED COUNT: 13.2 % (ref 11.6–15.1)
GFR SERPL CREATININE-BSD FRML MDRD: 91 ML/MIN/1.73SQ M
GLUCOSE P FAST SERPL-MCNC: 82 MG/DL (ref 65–99)
HCT VFR BLD AUTO: 36.1 % (ref 34.8–46.1)
HGB BLD-MCNC: 11.8 G/DL (ref 11.5–15.4)
IMM GRANULOCYTES # BLD AUTO: 0.01 THOUSAND/UL (ref 0–0.2)
IMM GRANULOCYTES NFR BLD AUTO: 0 % (ref 0–2)
LDH SERPL-CCNC: 133 U/L (ref 140–271)
LYMPHOCYTES # BLD AUTO: 1.38 THOUSANDS/ÂΜL (ref 0.6–4.47)
LYMPHOCYTES NFR BLD AUTO: 45 % (ref 14–44)
MCH RBC QN AUTO: 30.1 PG (ref 26.8–34.3)
MCHC RBC AUTO-ENTMCNC: 32.7 G/DL (ref 31.4–37.4)
MCV RBC AUTO: 92 FL (ref 82–98)
MONOCYTES # BLD AUTO: 0.38 THOUSAND/ÂΜL (ref 0.17–1.22)
MONOCYTES NFR BLD AUTO: 13 % (ref 4–12)
NEUTROPHILS # BLD AUTO: 1.15 THOUSANDS/ÂΜL (ref 1.85–7.62)
NEUTS SEG NFR BLD AUTO: 38 % (ref 43–75)
NRBC BLD AUTO-RTO: 0 /100 WBCS
PLATELET # BLD AUTO: 160 THOUSANDS/UL (ref 149–390)
PMV BLD AUTO: 8.7 FL (ref 8.9–12.7)
POTASSIUM SERPL-SCNC: 3.6 MMOL/L (ref 3.5–5.3)
PROT SERPL-MCNC: 8.9 G/DL (ref 6.4–8.4)
RBC # BLD AUTO: 3.92 MILLION/UL (ref 3.81–5.12)
SODIUM SERPL-SCNC: 134 MMOL/L (ref 135–147)
WBC # BLD AUTO: 3.05 THOUSAND/UL (ref 4.31–10.16)

## 2025-07-26 PROCEDURE — 80053 COMPREHEN METABOLIC PANEL: CPT

## 2025-07-26 PROCEDURE — 85025 COMPLETE CBC W/AUTO DIFF WBC: CPT

## 2025-07-26 PROCEDURE — 83615 LACTATE (LD) (LDH) ENZYME: CPT

## 2025-07-26 PROCEDURE — 36415 COLL VENOUS BLD VENIPUNCTURE: CPT

## 2025-07-29 ENCOUNTER — OFFICE VISIT (OUTPATIENT)
Dept: FAMILY MEDICINE CLINIC | Facility: CLINIC | Age: 55
End: 2025-07-29
Payer: COMMERCIAL

## 2025-07-29 VITALS
WEIGHT: 164.8 LBS | DIASTOLIC BLOOD PRESSURE: 72 MMHG | TEMPERATURE: 100.3 F | HEIGHT: 67 IN | HEART RATE: 88 BPM | SYSTOLIC BLOOD PRESSURE: 116 MMHG | BODY MASS INDEX: 25.87 KG/M2 | RESPIRATION RATE: 18 BRPM

## 2025-07-29 DIAGNOSIS — Z00.00 ANNUAL PHYSICAL EXAM: Primary | ICD-10-CM

## 2025-07-29 DIAGNOSIS — R59.1 LYMPHADENOPATHY: ICD-10-CM

## 2025-07-29 DIAGNOSIS — Z11.4 SCREENING FOR HIV (HUMAN IMMUNODEFICIENCY VIRUS): ICD-10-CM

## 2025-07-29 DIAGNOSIS — Z12.11 SCREENING FOR COLORECTAL CANCER: ICD-10-CM

## 2025-07-29 DIAGNOSIS — M25.421 ELBOW SWELLING, RIGHT: ICD-10-CM

## 2025-07-29 DIAGNOSIS — R50.9 FEVER, UNSPECIFIED FEVER CAUSE: ICD-10-CM

## 2025-07-29 DIAGNOSIS — Z12.12 SCREENING FOR COLORECTAL CANCER: ICD-10-CM

## 2025-07-29 LAB — S PYO AG THROAT QL: NEGATIVE

## 2025-07-29 PROCEDURE — 99396 PREV VISIT EST AGE 40-64: CPT | Performed by: NURSE PRACTITIONER

## 2025-07-29 PROCEDURE — 87880 STREP A ASSAY W/OPTIC: CPT | Performed by: NURSE PRACTITIONER

## 2025-07-29 RX ORDER — AZITHROMYCIN 250 MG/1
TABLET, FILM COATED ORAL
Qty: 6 TABLET | Refills: 0 | Status: SHIPPED | OUTPATIENT
Start: 2025-07-29 | End: 2025-08-03

## 2025-07-30 ENCOUNTER — OFFICE VISIT (OUTPATIENT)
Dept: HEMATOLOGY ONCOLOGY | Facility: CLINIC | Age: 55
End: 2025-07-30
Payer: COMMERCIAL

## 2025-07-30 VITALS
OXYGEN SATURATION: 100 % | SYSTOLIC BLOOD PRESSURE: 98 MMHG | RESPIRATION RATE: 18 BRPM | HEIGHT: 67 IN | DIASTOLIC BLOOD PRESSURE: 62 MMHG | TEMPERATURE: 98 F | WEIGHT: 165 LBS | HEART RATE: 94 BPM | BODY MASS INDEX: 25.9 KG/M2

## 2025-07-30 DIAGNOSIS — Z08 FOLLOW-UP SURVEILLANCE OF MELANOMA, ENCOUNTER FOR: Primary | ICD-10-CM

## 2025-07-30 DIAGNOSIS — R59.1 LYMPHADENOPATHY: ICD-10-CM

## 2025-07-30 DIAGNOSIS — D72.819 LEUKOPENIA, UNSPECIFIED TYPE: ICD-10-CM

## 2025-07-30 DIAGNOSIS — Z85.820 FOLLOW-UP SURVEILLANCE OF MELANOMA, ENCOUNTER FOR: Primary | ICD-10-CM

## 2025-07-30 DIAGNOSIS — C43.72 MALIGNANT MELANOMA OF LEFT LOWER EXTREMITY INCLUDING HIP (HCC): ICD-10-CM

## 2025-07-30 PROCEDURE — 99214 OFFICE O/P EST MOD 30 MIN: CPT | Performed by: INTERNAL MEDICINE

## 2025-08-05 ENCOUNTER — HOSPITAL ENCOUNTER (OUTPATIENT)
Dept: RADIOLOGY | Facility: HOSPITAL | Age: 55
Discharge: HOME | End: 2025-08-05
Attending: NURSE PRACTITIONER
Payer: COMMERCIAL

## 2025-08-05 DIAGNOSIS — M25.421 ELBOW SWELLING, RIGHT: ICD-10-CM

## 2025-08-05 DIAGNOSIS — M79.601 PAIN OF RIGHT UPPER EXTREMITY: ICD-10-CM

## 2025-08-05 PROCEDURE — 76882 US LMTD JT/FCL EVL NVASC XTR: CPT

## (undated) DEVICE — UTERINE MANIPULATOR RUMI 5.1 X 6 CM

## (undated) DEVICE — VIOLET BRAIDED (POLYGLACTIN 910), SYNTHETIC ABSORBABLE SUTURE: Brand: COATED VICRYL

## (undated) DEVICE — GLOVE INDICATOR PI UNDERGLOVE SZ 8 BLUE

## (undated) DEVICE — GLOVE INDICATOR PI UNDERGLOVE SZ 7.5 BLUE

## (undated) DEVICE — SUT MONOCRYL 4-0 PS-2 18 IN Y496G

## (undated) DEVICE — TOWEL SET X-RAY

## (undated) DEVICE — ENDOPATH XCEL UNIVERSAL TROCAR STABLILITY SLEEVES: Brand: ENDOPATH XCEL

## (undated) DEVICE — CURITY NON-ADHERENT STRIPS: Brand: CURITY

## (undated) DEVICE — CHLORAPREP HI-LITE 26ML ORANGE

## (undated) DEVICE — PREMIUM DRY TRAY LF: Brand: MEDLINE INDUSTRIES, INC.

## (undated) DEVICE — ENDOPATH XCEL BLADELESS TROCARS WITH STABILITY SLEEVES: Brand: ENDOPATH XCEL

## (undated) DEVICE — PLUMEPEN PRO 10FT

## (undated) DEVICE — SCD SEQUENTIAL COMPRESSION COMFORT SLEEVE MEDIUM KNEE LENGTH: Brand: KENDALL SCD

## (undated) DEVICE — SUT VICRYL 2-0 CT-2 18 IN J726D

## (undated) DEVICE — MEDI-VAC YANK SUCT HNDL W/TPRD BULBOUS TIP: Brand: CARDINAL HEALTH

## (undated) DEVICE — DRAPE SHEET THREE QUARTER

## (undated) DEVICE — 1820 FOAM BLOCK NEEDLE COUNTER: Brand: DEVON

## (undated) DEVICE — CYSTO TUBING SINGLE IRRIGATION

## (undated) DEVICE — ASTOUND STANDARD SURGICAL GOWN, XL: Brand: CONVERTORS

## (undated) DEVICE — TRAY FOLEY 16FR URIMETER SURESTEP

## (undated) DEVICE — CHLORHEXIDINE 4PCT 4 OZ

## (undated) DEVICE — LIGHT HANDLE COVER SLEEVE DISP BLUE STELLAR

## (undated) DEVICE — ADHESIVE SKN CLSR HISTOACRYL FLEX 0.5ML LF

## (undated) DEVICE — X-RAY DETECTABLE SPONGES,16 PLY: Brand: VISTEC

## (undated) DEVICE — 3M™ TEGADERM™ TRANSPARENT FILM DRESSING FRAME STYLE, 1626W, 4 IN X 4-3/4 IN (10 CM X 12 CM), 50/CT 4CT/CASE: Brand: 3M™ TEGADERM™

## (undated) DEVICE — NEPTUNE E-SEP SMOKE EVACUATION PENCIL, COATED, 70MM BLADE, PUSH BUTTON SWITCH: Brand: NEPTUNE E-SEP

## (undated) DEVICE — GLOVE INDICATOR PI UNDERGLOVE SZ 7 BLUE

## (undated) DEVICE — SPONGE GAUZE 4 X 8 12 PLY STRL LF

## (undated) DEVICE — GLOVE SRG BIOGEL 6.5

## (undated) DEVICE — SYRINGE 50ML LL

## (undated) DEVICE — IRRIG ENDO FLO TUBING

## (undated) DEVICE — BULB SYRINGE,IRRIGATION WITH PROTECTIVE CAP: Brand: DOVER

## (undated) DEVICE — INTENDED FOR TISSUE SEPARATION, AND OTHER PROCEDURES THAT REQUIRE A SHARP SURGICAL BLADE TO PUNCTURE OR CUT.: Brand: BARD-PARKER SAFETY BLADES SIZE 11, STERILE

## (undated) DEVICE — BASIC DOUBLE BASIN 2-LF: Brand: MEDLINE INDUSTRIES, INC.

## (undated) DEVICE — ENSEAL LAPAROSCOPIC TISSUE SEALER G2 ARTICULATING  CURVED JAW FOR USE WITH G2 GENERATOR 5MM DIAMETER 35CM SHAFT LENGTH: Brand: ENSEAL

## (undated) DEVICE — MAYO STAND COVER: Brand: CONVERTORS

## (undated) DEVICE — STERILE SURGICAL LUBRICANT,  TUBE: Brand: SURGILUBE

## (undated) DEVICE — TIBURON SPLIT SHEET: Brand: CONVERTORS

## (undated) DEVICE — PACK GENERAL LF

## (undated) DEVICE — CABLE ELECTROSURG AEM BURN PROTECTION SYSTEM DISP

## (undated) DEVICE — SUT ETHILON 3-0 FSL 30 IN 1671H

## (undated) DEVICE — TUBING SUCTION 5MM X 12 FT

## (undated) DEVICE — SUT VICRYL 3-0 SH 27 IN J416H

## (undated) DEVICE — SPONGE LAP 18 X 18 IN

## (undated) DEVICE — GLOVE SRG BIOGEL 8

## (undated) DEVICE — ARTHROSCOPY FLOOR MAT

## (undated) DEVICE — UNIVERSAL GYN LAPAROSCOPY,KIT: Brand: CARDINAL HEALTH